# Patient Record
Sex: FEMALE | Race: WHITE | Employment: OTHER | URBAN - METROPOLITAN AREA
[De-identification: names, ages, dates, MRNs, and addresses within clinical notes are randomized per-mention and may not be internally consistent; named-entity substitution may affect disease eponyms.]

---

## 2017-01-04 ENCOUNTER — HOSPITAL ENCOUNTER (OUTPATIENT)
Dept: MAMMOGRAPHY | Age: 67
Discharge: HOME | End: 2017-01-04
Attending: OBSTETRICS & GYNECOLOGY
Payer: MEDICARE

## 2017-01-04 DIAGNOSIS — Z12.31 VISIT FOR SCREENING MAMMOGRAM: ICD-10-CM

## 2017-01-04 PROCEDURE — 77067 SCR MAMMO BI INCL CAD: CPT

## 2017-01-13 ENCOUNTER — HOSPITAL ENCOUNTER (OUTPATIENT)
Dept: GENERAL RADIOLOGY | Age: 67
Discharge: HOME | End: 2017-01-13
Payer: MEDICARE

## 2017-01-13 DIAGNOSIS — M06.9 RHEUMATOID ARTHRITIS (HCC): ICD-10-CM

## 2017-01-13 DIAGNOSIS — R76.8 POSITIVE ANA (ANTINUCLEAR ANTIBODY): ICD-10-CM

## 2017-01-13 DIAGNOSIS — M50.30 DEGENERATIVE DISC DISEASE, CERVICAL: ICD-10-CM

## 2017-01-13 PROCEDURE — 73130 X-RAY EXAM OF HAND: CPT

## 2017-07-31 RX ORDER — ZOLPIDEM TARTRATE 10 MG/1
10 TABLET ORAL
Qty: 30 TAB | Refills: 0 | Status: SHIPPED | OUTPATIENT
Start: 2017-07-31 | End: 2018-09-19 | Stop reason: SDUPTHER

## 2017-07-31 NOTE — TELEPHONE ENCOUNTER
Last Refill: 6/21/17  Last visit:6/21/17    Requested Prescriptions     Pending Prescriptions Disp Refills    zolpidem (AMBIEN) 10 mg tablet 30 Tab 0     Sig: Take 1 Tab by mouth nightly as needed for Sleep. Max Daily Amount: 10 mg.

## 2017-11-16 ENCOUNTER — OFFICE VISIT (OUTPATIENT)
Dept: INTERNAL MEDICINE CLINIC | Age: 67
End: 2017-11-16

## 2017-11-16 VITALS
HEART RATE: 75 BPM | BODY MASS INDEX: 22.98 KG/M2 | DIASTOLIC BLOOD PRESSURE: 80 MMHG | HEIGHT: 66 IN | TEMPERATURE: 98 F | OXYGEN SATURATION: 98 % | SYSTOLIC BLOOD PRESSURE: 123 MMHG | WEIGHT: 143 LBS

## 2017-11-16 DIAGNOSIS — B37.0 THRUSH, ORAL: Primary | ICD-10-CM

## 2017-11-16 RX ORDER — MELOXICAM 15 MG/1
TABLET ORAL
COMMUNITY
Start: 2017-11-13 | End: 2018-07-18 | Stop reason: ALTCHOICE

## 2017-11-16 RX ORDER — MONTELUKAST SODIUM 4 MG/1
TABLET, CHEWABLE ORAL
Refills: 3 | COMMUNITY
Start: 2017-09-26 | End: 2018-01-02 | Stop reason: SDUPTHER

## 2017-11-16 RX ORDER — NYSTATIN 100000 [USP'U]/ML
SUSPENSION ORAL
Qty: 200 ML | Refills: 0 | Status: SHIPPED | OUTPATIENT
Start: 2017-11-16 | End: 2018-04-26 | Stop reason: ALTCHOICE

## 2017-11-16 NOTE — PATIENT INSTRUCTIONS
Candidiasis: Care Instructions  Your Care Instructions  Candidiasis (say \"umc-mvy-FV-uh-lindsey\") is a yeast infection. Yeast normally lives in your body. But it can cause problems if your body's defenses don't work as they should. Some medicines can increase your chance of getting a yeast infection. These include antibiotics, steroids, and cancer drugs. And some diseases like AIDS and diabetes can make you more likely to get yeast infections. There are different types of yeast infections. Fide Cure is a yeast infection in the mouth. It usually occurs in people with weak immune systems. It causes white patches inside the mouth and throat. Yeast infections of the skin usually occur in skin folds where the skin stays moist. They cause red, oozing patches on your skin. Babies can get these infections under the diaper. People who often wear gloves can get them on their hands. Many women get vaginal yeast infections. They are most common when women take antibiotics. These infections can cause the vagina to itch and burn. They also cause white discharge that looks like cottage cheese. In rare cases, yeast infects the blood. This can cause serious disease. This kind of infection is treated with medicine given through a needle into a vein (IV). After you start treatment, a yeast infection usually goes away quickly. But if your immune system is weak, the infection may come back. Tell your doctor if you get yeast infections often. Follow-up care is a key part of your treatment and safety. Be sure to make and go to all appointments, and call your doctor if you are having problems. It's also a good idea to know your test results and keep a list of the medicines you take. How can you care for yourself at home? · Take your medicines exactly as prescribed. Call your doctor if you think you are having a problem with your medicine. · Use antibiotics only as directed by your doctor. · Eat yogurt with live cultures.  It has bacteria called lactobacillus. It may help prevent some types of yeast infections. · Keep your skin clean and dry. Put powder on moist places. · If you are using a cream or suppository to treat a vaginal yeast infection, don't use condoms or a diaphragm. Use a different type of birth control. · Eat a healthy diet and get regular exercise. This will help keep your immune system strong. When should you call for help? Watch closely for changes in your health, and be sure to contact your doctor if:  ? · You do not get better as expected. Where can you learn more? Go to http://felipe-niesha.info/. Enter H537 in the search box to learn more about \"Candidiasis: Care Instructions. \"  Current as of: October 13, 2016  Content Version: 11.4  © 9164-4435 [employer]. Care instructions adapted under license by [employer] (which disclaims liability or warranty for this information). If you have questions about a medical condition or this instruction, always ask your healthcare professional. [address] any warranty or liability for your use of this information.

## 2017-11-16 NOTE — MR AVS SNAPSHOT
Visit Information Date & Time Provider Department Dept. Phone Encounter #  
 11/16/2017 11:00 AM Domi Vergara NP [address] [place] [address] [facility] [phone] [id number] Follow-up Instructions Return if symptoms worsen or fail to improve. Upcoming Health Maintenance Date Due Hepatitis C Screening 1950 DTaP/Tdap/Td series (1 - Tdap) 12/5/1970 FOBT Q 1 YEAR AGE 50-75 12/5/1999 ZOSTER VACCINE AGE 60> 10/5/2009 GLAUCOMA SCREENING Q2Y 12/5/2014 OSTEOPOROSIS SCREENING (DEXA) 12/5/2014 Pneumococcal 65+ Low/Medium Risk (1 of 2 - PCV13) 12/5/2014 MEDICARE YEARLY EXAM 12/5/2014 Influenza Age 5 to Adult 8/1/2017 BREAST CANCER SCRN MAMMOGRAM 1/4/2019 Allergies as of 11/16/2017  Review Complete On: 11/16/2017 By: Domi Vergara NP Severity Noted Reaction Type Reactions Sulfa (Sulfonamide Antibiotics)  02/03/2014    Unknown (comments) Current Immunizations  Never Reviewed No immunizations on file. Not reviewed this visit You Were Diagnosed With   
  
 Codes Comments Thrush, oral    -  Primary ICD-10-CM: B37.0 ICD-9-CM: 112.0 Vitals BP Pulse Temp Height(growth percentile) Weight(growth percentile) SpO2  
 123/80 (BP 1 Location: Left arm, BP Patient Position: Sitting) 75 98 °F (36.7 °C) (Oral) 5' 6\" (1.676 m) 143 lb (64.9 kg) 98% BMI Smoking Status 23.08 kg/m2 Never Smoker BMI and BSA Data Body Mass Index Body Surface Area 23.08 kg/m 2 1.74 m 2 Preferred Pharmacy Pharmacy Name Phone [phone] [phone] Your Updated Medication List  
  
   
This list is accurate as of: 11/16/17  5:07 PM.  Always use your most recent med list.  
  
  
  
  
 CALTRATE PLUS PO Take  by mouth. CENTRAL-DANIA Tab Generic drug:  geriatric multivitamins-min Take  by mouth.  
  
 colestipol 1 gram tablet Commonly known as:  COLESTID  
TAKE 1 TABLET BY MOUTH 2 TIMES DAILY FENOFIBRATE PO Take  by mouth. FISH OIL PO Take  by mouth.  
  
 meloxicam 15 mg tablet Commonly known as:  MOBIC  
  
 nystatin 100,000 unit/mL suspension Commonly known as:  MYCOSTATIN Take 5 ml four times a day . Swish and swallow OMEPRAZOLE PO Take  by mouth. OSTEO BI-FLEX PO Take  by mouth. WELCHOL PO Take  by mouth.  
  
 zolpidem 10 mg tablet Commonly known as:  AMBIEN Take 1 Tab by mouth nightly as needed for Sleep. Max Daily Amount: 10 mg.  
  
  
  
  
Prescriptions Sent to Pharmacy Refills  
 nystatin (MYCOSTATIN) 100,000 unit/mL suspension 0 Sig: Take 5 ml four times a day . Swish and swallow Class: Normal  
 Pharmacy: [address] #: [phone] Follow-up Instructions Return if symptoms worsen or fail to improve. To-Do List   
 01/05/2018 10:30 AM  
  Appointment with [facility] 2 at [address] [place] [address] ([phone]) Shower or bathe using soap and water. Do not use deodorant, powder, perfumes, or lotion the day of your exam.  If your prior mammograms were not performed at [facility] 6 please bring films with you or forward prior images 2 days before your procedure. Check in at registration 15min before your appointment time unless you were instructed to do otherwise. A script is not necessary, but if you have one, please bring it on the day of the mammogram or have it faxed to the department. [facility] [phone] [facility]  [phone] [facility] [address]  [phone] [facility] [phone] [address] [phone] Patient Instructions Candidiasis: Care Instructions Your Care Instructions Candidiasis (say \"wiw-goh-XN-uh-lindsey\") is a yeast infection.  Yeast normally lives in your body. But it can cause problems if your body's defenses don't work as they should. Some medicines can increase your chance of getting a yeast infection. These include antibiotics, steroids, and cancer drugs. And some diseases like AIDS and diabetes can make you more likely to get yeast infections. There are different types of yeast infections. Fuentes Pile is a yeast infection in the mouth. It usually occurs in people with weak immune systems. It causes white patches inside the mouth and throat. Yeast infections of the skin usually occur in skin folds where the skin stays moist. They cause red, oozing patches on your skin. Babies can get these infections under the diaper. People who often wear gloves can get them on their hands. Many women get vaginal yeast infections. They are most common when women take antibiotics. These infections can cause the vagina to itch and burn. They also cause white discharge that looks like cottage cheese. In rare cases, yeast infects the blood. This can cause serious disease. This kind of infection is treated with medicine given through a needle into a vein (IV). After you start treatment, a yeast infection usually goes away quickly. But if your immune system is weak, the infection may come back. Tell your doctor if you get yeast infections often. Follow-up care is a key part of your treatment and safety. Be sure to make and go to all appointments, and call your doctor if you are having problems. It's also a good idea to know your test results and keep a list of the medicines you take. How can you care for yourself at home? · Take your medicines exactly as prescribed. Call your doctor if you think you are having a problem with your medicine. · Use antibiotics only as directed by your doctor. · Eat yogurt with live cultures. It has bacteria called lactobacillus. It may help prevent some types of yeast infections. · Keep your skin clean and dry. Put powder on moist places. · If you are using a cream or suppository to treat a vaginal yeast infection, don't use condoms or a diaphragm. Use a different type of birth control. · Eat a healthy diet and get regular exercise. This will help keep your immune system strong. When should you call for help? Watch closely for changes in your health, and be sure to contact your doctor if: 
? · You do not get better as expected. Where can you learn more? Go to http://felipe-niesha.info/. Enter U184 in the search box to learn more about \"Candidiasis: Care Instructions. \" Current as of: October 13, 2016 Content Version: 11.4 © 4117-7224 [employer]. Care instructions adapted under license by [employer] (which disclaims liability or warranty for this information). If you have questions about a medical condition or this instruction, always ask your healthcare professional. [address] any warranty or liability for your use of this information. Introducing [facility]! Dear Lorena: 
Thank you for requesting a [address] account. Our records indicate that you have previously registered for a [address] account but its currently inactive. Please call our [address] support line at [phone]. Additional Information If you have questions, please visit the Frequently Asked Questions section of the [employer] [address] website at [email or web address] Remember, MyChart is NOT to be used for urgent needs. For medical emergencies, dial 911. Now available from your iPhone and Android! Please provide this summary of care documentation to your next provider. Your primary care clinician is listed as TRENT Polanco. If you have any questions after today's visit, please call [phone].

## 2017-11-16 NOTE — PROGRESS NOTES
Subjective:  Ms. Kendall Kaur is a pleasant 78year old lady, who comes in today for evaluation of several week history of intermittent whitish coating on her tongue. It has been more pronounced in the last week. She does have a history of rheumatoid arthritis that has been well managed on Meloxicam.  She tells me that she was never diagnosed with Sjogren's syndrome, although tells me she feels like her eyes are always dry and so is her mouth. This is not associated with any sore throat, earache, but she does have a little bit of nasal congestion. She denies any shortness of breath or cough, denies any chills or fever. She is able to swallow fluids and solid foods without any difficulty. Physical Examination:  GENERAL:  On exam she is a pleasant lady in no acute distress. She is alert and oriented. VITALS:  BP: 123/80. P: 75. T: 98.  O2 sat: 98%. WT: 143 lbs. HT: 5'6\". HEENT:  Normocephalic, atraumatic. TMs normal.  Mouth mucosa pink. Tongue midline. Tongue is coated with some white patches consistent with thrush. Pharynx is minimally injected. No sinus tenderness. NECK:  Supple without adenopathy. CHEST:  Lungs were clear to auscultation, no rales or wheezes. CARDIAC:  Heart regular rhythm without murmur. EXTREMITIES:  No edema or calf tenderness. Distal pulses were present. Impression:  1. Oral candidiasis. Plan:  1. It was opted to start her on Nystatin  Suspension 100,000 units/ml To use 5 mL, four times a day to swish and swallow. 2. She will follow up with Dr. Carlos Tobias if she fails to improve.

## 2017-11-16 NOTE — PROGRESS NOTES
Stu Palmer presents with   Chief Complaint   Patient presents with    Mouth Pain   Patient of Dr Chava Capps here with complaint of mouth pain, sores & trouble swallowing. This has been going on for several months. 1. Have you been to the ER, urgent care clinic since your last visit? Hospitalized since your last visit? No    2. Have you seen or consulted any other health care providers outside of the [address] [place] [address] since your last visit? Include any pap smears or colon screening.  No

## 2017-12-04 RX ORDER — FENOFIBRATE 160 MG/1
TABLET ORAL
Qty: 30 TAB | Refills: 6 | Status: SHIPPED | OUTPATIENT
Start: 2017-12-04 | End: 2018-07-09 | Stop reason: SDUPTHER

## 2018-01-02 RX ORDER — MONTELUKAST SODIUM 4 MG/1
TABLET, CHEWABLE ORAL
Qty: 60 TAB | Refills: 3 | Status: SHIPPED | OUTPATIENT
Start: 2018-01-02 | End: 2018-05-29 | Stop reason: SDUPTHER

## 2018-01-09 ENCOUNTER — HOSPITAL ENCOUNTER (OUTPATIENT)
Dept: MAMMOGRAPHY | Age: 68
Discharge: HOME | End: 2018-01-09
Attending: OBSTETRICS & GYNECOLOGY
Payer: MEDICARE

## 2018-01-09 DIAGNOSIS — Z12.31 VISIT FOR SCREENING MAMMOGRAM: ICD-10-CM

## 2018-01-09 PROCEDURE — 77067 SCR MAMMO BI INCL CAD: CPT

## 2018-03-17 ENCOUNTER — HOSPITAL ENCOUNTER (OUTPATIENT)
Dept: MRI IMAGING | Age: 68
Discharge: HOME | End: 2018-03-17
Attending: ORTHOPAEDIC SURGERY
Payer: MEDICARE

## 2018-03-17 DIAGNOSIS — M54.40 LOW BACK PAIN WITH SCIATICA, SCIATICA LATERALITY UNSPECIFIED, UNSPECIFIED BACK PAIN LATERALITY, UNSPECIFIED CHRONICITY: ICD-10-CM

## 2018-03-17 PROCEDURE — 72148 MRI LUMBAR SPINE W/O DYE: CPT

## 2018-04-26 ENCOUNTER — OFFICE VISIT (OUTPATIENT)
Dept: INTERNAL MEDICINE CLINIC | Age: 68
End: 2018-04-26

## 2018-04-26 VITALS
HEIGHT: 66 IN | OXYGEN SATURATION: 96 % | DIASTOLIC BLOOD PRESSURE: 70 MMHG | SYSTOLIC BLOOD PRESSURE: 136 MMHG | WEIGHT: 144 LBS | BODY MASS INDEX: 23.14 KG/M2 | HEART RATE: 81 BPM

## 2018-04-26 DIAGNOSIS — M79.605 PAIN IN BOTH LOWER EXTREMITIES: ICD-10-CM

## 2018-04-26 DIAGNOSIS — R10.31 RIGHT LOWER QUADRANT ABDOMINAL PAIN: Primary | ICD-10-CM

## 2018-04-26 DIAGNOSIS — M79.604 PAIN IN BOTH LOWER EXTREMITIES: ICD-10-CM

## 2018-04-26 DIAGNOSIS — K12.1 ULCER OF MOUTH: ICD-10-CM

## 2018-04-26 RX ORDER — PREDNISONE 5 MG/1
TABLET ORAL
Qty: 30 TAB | Refills: 0 | Status: SHIPPED | OUTPATIENT
Start: 2018-04-26 | End: 2018-05-08 | Stop reason: ALTCHOICE

## 2018-04-26 RX ORDER — GABAPENTIN 100 MG/1
CAPSULE ORAL 3 TIMES DAILY
COMMUNITY
End: 2019-10-01 | Stop reason: ALTCHOICE

## 2018-04-26 NOTE — PROGRESS NOTES
This note will not be viewable in [address] Subjective:     Mrs. Russo presents to the office today with a number of issues    The patient over the last 2 years has had intermittent bulging that will develop in her right lower quadrant of her abdomen. She notes that this area will stick out quite a bit and become painful it will then reduce itself and she will be asymptomatic initially 2 years ago it was infrequent in occurrence but now it is occurring approximately 1 time a week. She has had colonoscopies. Presently she is asymptomatic and has no symptoms. The patient complains of pain in both knees into her shins. This occurs at bedtime and is described as a toothache. She was with her mother when she saw Dr. Austin Krause and he did not think that her knees were arthritic and referred her to Dr. Marvel Real. The patient had an MRI of her back which he told her did not show much and she also had an x-ray of her left hip she was given meloxicam and gabapentin but continues to have the pain. She notes of no claudication or swelling. The patient complains of an extremely dry mouth and an ulcer that is on the hard palate of her roof of her mouth. Her brother who is a dentist recommended that she be seen by an oral surgeon to have this looked at. She is followed by a rheumatologist due to her dry mouth. Past Medical History:   Diagnosis Date    Arthritis     Hypercholesterolemia     Long term (current) use of anticoagulants      Past Surgical History:   Procedure Laterality Date    HX TONSILLECTOMY       Allergies   Allergen Reactions    Sulfa (Sulfonamide Antibiotics) Unknown (comments)     Current Outpatient Prescriptions   Medication Sig Dispense Refill    gabapentin (NEURONTIN) 100 mg capsule Take  by mouth three (3) times daily.       predniSONE (DELTASONE) 5 mg tablet 5 tablets day for days 1 through 4, then 4 tablets on day 5, then 3 tablets on day 6, then 2 tablets on day 7, then 1 tablet on day 8. 30 Tab 0    colestipol (COLESTID) 1 gram tablet TAKE 1 TABLET BY MOUTH 2 TIMES DAILY 60 Tab 3    fenofibrate (LOFIBRA) 160 mg tablet TAKE 1 TABLET BY MOUTH EVERY DAY 30 Tab 6    meloxicam (MOBIC) 15 mg tablet       zolpidem (AMBIEN) 10 mg tablet Take 1 Tab by mouth nightly as needed for Sleep. Max Daily Amount: 10 mg. 30 Tab 0    geriatric multivitamins-min (CENTRAL-DANIA) tab Take  by mouth.  OMEPRAZOLE PO Take  by mouth. Social History     Social History    Marital status:      Spouse name: N/A    Number of children: N/A    Years of education: N/A     Social History Main Topics    Smoking status: Never Smoker    Smokeless tobacco: Never Used    Alcohol use Yes      Comment: rarely    Drug use: None    Sexual activity: Not Asked     Other Topics Concern    None     Social History Narrative     Family History   Problem Relation Age of Onset    Heart Disease Maternal Grandfather        Review of Systems:  GEN: no weight loss, weight gain, fatigue or night sweats  Mouth: Complains of ulcer on roof of mouth and extreme dryness of mouth  CV: no PND, orthopnea, or palpitations  Resp: no dyspnea on exertion, no cough  Abd: no nausea, vomiting or diarrhea. Complains of intermittent bulging in the right lower quadrant of her abdomen which is painful  EXT: denies edema, claudication. Complains of toothache type pain involving both knees and shin regions  Endocrine: no hair loss, excessive thirst or polyuria  Neurological ROS: no TIA or stroke symptoms  ROS otherwise negative      Objective:     Visit Vitals    /70 (BP 1 Location: Right arm, BP Patient Position: Sitting)    Pulse 81    Ht 5' 6\" (1.676 m)    Wt 144 lb (65.3 kg)    SpO2 96%    BMI 23.24 kg/m2     Body mass index is 23.24 kg/(m^2). General:   alert, cooperative and no distress   Eyes: conjunctivae/sclerae clear.  PERRL, EOM's intact   Mouth:  No oral lesions, no pharyngeal erythema, no exudates   Neck: Trachea midline, no thyromegaly, no bruits   Heart: S1 and S2 normal,no murmurs noted    Lungs: Clear to auscultation bilaterally, no increased work of breathing   Abdomen: Soft, nontender. Normal bowel sounds   Extremities: No edema or cyanosis. These do not appear to be arthritic and there is no crepitation with range of motion. Her PT/DP pulses are 2+   Neuro: ..alert, oriented x3,speech normal in context and clarity, cranial nerves II-XII intact,motor strength: full proximally and distally,gait: normal  reflexes: full and symmetric     Physical exam otherwise negative         Assessment/Plan:     Diagnoses and all orders for this visit:    Right lower quadrant abdominal pain  -     REFERRAL TO GENERAL SURGERY    Pain in both lower extremities  -     predniSONE (DELTASONE) 5 mg tablet; 5 tablets day for days 1 through 4, then 4 tablets on day 5, then 3 tablets on day 6, then 2 tablets on day 7, then 1 tablet on day 8., Normal, Disp-30 Tab, R-0    Ulcer of mouth        Other instructions: The patient will be referred to general surgery for an evaluation of the recurrent bulging in the right lower quadrant to make sure she does not have a hernia. We will stop her meloxicam and place her on a tapering course of prednisone to see if it affects her knee and lower extremity discomfort    Have recommended an oral surgery evaluation of the ulcer on the roof of her mouth which is been present for 2 months    Follow-up here pending results of the above    Follow-up Disposition:  Return if symptoms worsen or fail to improve.     Sohan Phillips MD

## 2018-04-26 NOTE — PATIENT INSTRUCTIONS
Abdominal Pain: Care Instructions  Your Care Instructions    Abdominal pain has many possible causes. Some aren't serious and get better on their own in a few days. Others need more testing and treatment. If your pain continues or gets worse, you need to be rechecked and may need more tests to find out what is wrong. You may need surgery to correct the problem. Don't ignore new symptoms, such as fever, nausea and vomiting, urination problems, pain that gets worse, and dizziness. These may be signs of a more serious problem. Your doctor may have recommended a follow-up visit in the next 8 to 12 hours. If you are not getting better, you may need more tests or treatment. The doctor has checked you carefully, but problems can develop later. If you notice any problems or new symptoms, get medical treatment right away. Follow-up care is a key part of your treatment and safety. Be sure to make and go to all appointments, and call your doctor if you are having problems. It's also a good idea to know your test results and keep a list of the medicines you take. How can you care for yourself at home? · Rest until you feel better. · To prevent dehydration, drink plenty of fluids, enough so that your urine is light yellow or clear like water. Choose water and other caffeine-free clear liquids until you feel better. If you have kidney, heart, or liver disease and have to limit fluids, talk with your doctor before you increase the amount of fluids you drink. · If your stomach is upset, eat mild foods, such as rice, dry toast or crackers, bananas, and applesauce. Try eating several small meals instead of two or three large ones. · Wait until 48 hours after all symptoms have gone away before you have spicy foods, alcohol, and drinks that contain caffeine. · Do not eat foods that are high in fat. · Avoid anti-inflammatory medicines such as aspirin, ibuprofen (Advil, Motrin), and naproxen (Aleve).  These can cause stomach upset. Talk to your doctor if you take daily aspirin for another health problem. When should you call for help? Call 911 anytime you think you may need emergency care. For example, call if:  ? · You passed out (lost consciousness). ? · You pass maroon or very bloody stools. ? · You vomit blood or what looks like coffee grounds. ? · You have new, severe belly pain. ?Call your doctor now or seek immediate medical care if:  ? · Your pain gets worse, especially if it becomes focused in one area of your belly. ? · You have a new or higher fever. ? · Your stools are black and look like tar, or they have streaks of blood. ? · You have unexpected vaginal bleeding. ? · You have symptoms of a urinary tract infection. These may include:  ¨ Pain when you urinate. ¨ Urinating more often than usual.  ¨ Blood in your urine. ? · You are dizzy or lightheaded, or you feel like you may faint. ? Watch closely for changes in your health, and be sure to contact your doctor if:  ? · You are not getting better after 1 day (24 hours). Where can you learn more? Go to http://felipe-niesha.info/. Enter S181 in the search box to learn more about \"Abdominal Pain: Care Instructions. \"  Current as of: March 20, 2017  Content Version: 11.4  © 3206-9934 [employer]. Care instructions adapted under license by [employer] (which disclaims liability or warranty for this information). If you have questions about a medical condition or this instruction, always ask your healthcare professional. [place] [address] any warranty or liability for your use of this information.

## 2018-04-26 NOTE — PROGRESS NOTES
Nghia Edwards is a 75 y.o. female presenting for Possible Hernia (abdominal) and Leg Pain (at night)  . 1. Have you been to the ER, urgent care clinic since your last visit? Hospitalized since your last visit? No    2. Have you seen or consulted any other health care providers outside of the [facility] since your last visit? Include any pap smears or colon screening. No    Fall Risk Assessment, last 12 mths 4/26/2018   Able to walk? Yes   Fall in past 12 months? No         Abuse Screening Questionnaire 4/26/2018   Do you ever feel afraid of your partner? N   Are you in a relationship with someone who physically or mentally threatens you? N   Is it safe for you to go home?  Y       PHQ over the last two weeks 4/26/2018   Little interest or pleasure in doing things Not at all   Feeling down, depressed or hopeless Not at all   Total Score PHQ 2 0       Medications Discontinued During This Encounter   Medication Reason    FENOFIBRATE PO Duplicate Order

## 2018-04-26 NOTE — MR AVS SNAPSHOT
[address] [place] [address] [address] [address] [phone] Patient: Solomon Overton MRN: [id number]:[id number]/[id number] Visit Information Date & Time Provider Department Dept. Phone Encounter #  
 4/26/2018  8:50 AM Jeremiah Francis MD [facility] 84 [phone] [id number] Follow-up Instructions Return if symptoms worsen or fail to improve. Upcoming Health Maintenance Date Due Hepatitis C Screening 1950 DTaP/Tdap/Td series (1 - Tdap) 12/5/1970 FOBT Q 1 YEAR AGE 50-75 12/5/1999 ZOSTER VACCINE AGE 60> 10/5/2009 GLAUCOMA SCREENING Q2Y 12/5/2014 Bone Densitometry (Dexa) Screening 12/5/2014 Pneumococcal 65+ Low/Medium Risk (1 of 2 - PCV13) 12/5/2014 Influenza Age 5 to Adult 8/1/2017 MEDICARE YEARLY EXAM 3/18/2018 BREAST CANCER SCRN MAMMOGRAM 1/9/2020 Allergies as of 4/26/2018  Review Complete On: 4/26/2018 By: Jeremiah Francis MD  
  
 Severity Noted Reaction Type Reactions Sulfa (Sulfonamide Antibiotics)  02/03/2014    Unknown (comments) Current Immunizations  Never Reviewed No immunizations on file. Not reviewed this visit You Were Diagnosed With   
  
 Codes Comments Right lower quadrant abdominal pain    -  Primary ICD-10-CM: R10.31 ICD-9-CM: 789.03 Pain in both lower extremities     ICD-10-CM: M79.604, M79.605 ICD-9-CM: 729.5 Ulcer of mouth     ICD-10-CM: K12.1 ICD-9-CM: 528.9 Vitals BP Pulse Height(growth percentile) Weight(growth percentile) SpO2 BMI  
 136/70 (BP 1 Location: Right arm, BP Patient Position: Sitting) 81 5' 6\" (1.676 m) 144 lb (65.3 kg) 96% 23.24 kg/m2 OB Status Smoking Status Postmenopausal Never Smoker BMI and BSA Data Body Mass Index Body Surface Area  
 23.24 kg/m 2 1.74 m 2 Preferred Pharmacy Pharmacy Name Phone [phone] [phone] Your Updated Medication List  
  
   
This list is accurate as of 18  9:29 AM.  Always use your most recent med list.  
  
  
  
  
 Demario Isrrael Tab Generic drug:  geriatric multivitamins-min Take  by mouth.  
  
 colestipol 1 gram tablet Commonly known as:  COLESTID  
TAKE 1 TABLET BY MOUTH 2 TIMES DAILY  
  
 fenofibrate 160 mg tablet Commonly known as:  LOFIBRA TAKE 1 TABLET BY MOUTH EVERY DAY  
  
 gabapentin 100 mg capsule Commonly known as:  NEURONTIN Take  by mouth three (3) times daily. meloxicam 15 mg tablet Commonly known as:  MOBIC  
  
 OMEPRAZOLE PO Take  by mouth.  
  
 predniSONE 5 mg tablet Commonly known as:  DELTASONE  
5 tablets day for days 1 through 4, then 4 tablets on day 5, then 3 tablets on day 6, then 2 tablets on day 7, then 1 tablet on day 8.  
  
 zolpidem 10 mg tablet Commonly known as:  AMBIEN Take 1 Tab by mouth nightly as needed for Sleep. Max Daily Amount: 10 mg.  
  
  
  
  
Prescriptions Sent to Pharmacy Refills  
 predniSONE (DELTASONE) 5 mg tablet 0 Si tablets day for days 1 through 4, then 4 tablets on day 5, then 3 tablets on day 6, then 2 tablets on day 7, then 1 tablet on day 8. Class: Normal  
 Pharmacy: [facility] [address], [address] [place] [address] #: [phone] We Performed the Following REFERRAL TO GENERAL SURGERY [REF27 Custom] Comments:  
 Recurrent RLQ painful bulging Follow-up Instructions Return if symptoms worsen or fail to improve. Referral Information Referral ID Referred By Referred To  
  
 [id number] Gama Bermeo MD   
   [address] [place] [address] [facility] Phone: [phone] Fax: [phone] Visits Status Start Date End Date 1 New Request 4/26/18 4/26/19 If your referral has a status of pending review or denied, additional information will be sent to support the outcome of this decision. Introducing \[facility] [place] [facility]\"" SERVICES! Eddie Silver introduces [employer] patient portal. Now you can access parts of your medical record, email your doctor's office, and request medication refills online. 1. In your internet browser, go to [email or web address] 2. Click on the First Time User? Click Here link in the Sign In box. You will see the New Member Sign Up page. 3. Enter your [employer] Access Code exactly as it appears below. You will not need to use this code after youve completed the sign-up process. If you do not sign up before the expiration date, you must request a new code. · [employer] Access Code: [email or web address] Expires: 7/25/2018  8:58 AM 
 
4. Enter the last four digits of your Social Security Number (xxxx) and Date of Birth (mm/dd/yyyy) as indicated and click Submit. You will be taken to the next sign-up page. 5. Create a [employer] ID. This will be your [employer] login ID and cannot be changed, so think of one that is secure and easy to remember. 6. Create a [employer] password. You can change your password at any time. 7. Enter your Password Reset Question and Answer. This can be used at a later time if you forget your password. 8. Enter your e-mail address. You will receive e-mail notification when new information is available in [address] 9. Click Sign Up. You can now view and download portions of your medical record. 10. Click the Download Summary menu link to download a portable copy of your medical information. If you have questions, please visit the Frequently Asked Questions section of the [employer] website. Remember, [employer] is NOT to be used for urgent needs. For medical emergencies, dial 911. Now available from your iPhone and Android! Please provide this summary of care documentation to your next provider. Your primary care clinician is listed as TRENT Polanco. If you have any questions after today's visit, please call [phone].

## 2018-05-08 ENCOUNTER — OFFICE VISIT (OUTPATIENT)
Dept: SURGERY | Age: 68
End: 2018-05-08

## 2018-05-08 VITALS
HEIGHT: 66 IN | SYSTOLIC BLOOD PRESSURE: 119 MMHG | DIASTOLIC BLOOD PRESSURE: 66 MMHG | WEIGHT: 144.2 LBS | HEART RATE: 70 BPM | OXYGEN SATURATION: 98 % | BODY MASS INDEX: 23.18 KG/M2 | TEMPERATURE: 98 F | RESPIRATION RATE: 16 BRPM

## 2018-05-08 DIAGNOSIS — M94.0 SLIPPING RIB SYNDROME: Primary | ICD-10-CM

## 2018-05-08 RX ORDER — PILOCARPINE HYDROCHLORIDE 5 MG/1
TABLET, FILM COATED ORAL
Refills: 5 | COMMUNITY
Start: 2018-04-13 | End: 2018-07-18 | Stop reason: ALTCHOICE

## 2018-05-08 RX ORDER — AMOXICILLIN 250 MG/1
CAPSULE ORAL
Refills: 0 | COMMUNITY
Start: 2018-05-02 | End: 2018-06-26 | Stop reason: ALTCHOICE

## 2018-05-08 NOTE — MR AVS SNAPSHOT
[address], [address] 
[phone] Patient: Robert Benitez MRN: [id number]:[birth year] Visit Information Date & Time Provider Department Dept. Phone Encounter #  
 5/8/2018  1:40 PM Saul Rodriguez MD [facility] [id number] Upcoming Health Maintenance Date Due Hepatitis C Screening 1950 DTaP/Tdap/Td series (1 - Tdap) 12/5/1970 FOBT Q 1 YEAR AGE 50-75 12/5/1999 ZOSTER VACCINE AGE 60> 10/5/2009 GLAUCOMA SCREENING Q2Y 12/5/2014 Bone Densitometry (Dexa) Screening 12/5/2014 Pneumococcal 65+ Low/Medium Risk (1 of 2 - PCV13) 12/5/2014 MEDICARE YEARLY EXAM 3/18/2018 Influenza Age 5 to Adult 8/1/2018 BREAST CANCER SCRN MAMMOGRAM 1/9/2020 Allergies as of 5/8/2018  Review Complete On: 5/8/2018 By: Seun Mcnulty LPN Severity Noted Reaction Type Reactions Sulfa (Sulfonamide Antibiotics)  02/03/2014    Unknown (comments) Current Immunizations  Never Reviewed No immunizations on file. Not reviewed this visit Vitals BP Pulse Temp Resp Height(growth percentile) Weight(growth percentile)  
 119/66 (BP 1 Location: Left arm, BP Patient Position: Sitting) 70 98 °F (36.7 °C) (Oral) 16 5' 6\" (1.676 m) 144 lb 3.2 oz (65.4 kg) SpO2 BMI OB Status Smoking Status 98% 23.27 kg/m2 Postmenopausal Never Smoker BMI and BSA Data Body Mass Index Body Surface Area  
 23.27 kg/m 2 1.75 m 2 Preferred Pharmacy Pharmacy Name Phone [phone] [phone] Your Updated Medication List  
  
   
This list is accurate as of 5/8/18  4:29 PM.  Always use your most recent med list.  
  
  
  
  
 amoxicillin 250 mg capsule Commonly known as:  AMOXIL TAKE 1 CAPSULE BY MOUTH THREE TIMES A DAY  
  
 CALTRATE + D3 PLUS MINERALS PO Take  by mouth. CENTRAL-DANIA Tab Generic drug:  geriatric multivitamins-min Take  by mouth.  
  
 colestipol 1 gram tablet Commonly known as:  COLESTID  
TAKE 1 TABLET BY MOUTH 2 TIMES DAILY  
  
 fenofibrate 160 mg tablet Commonly known as:  LOFIBRA TAKE 1 TABLET BY MOUTH EVERY DAY  
  
 gabapentin 100 mg capsule Commonly known as:  NEURONTIN Take  by mouth three (3) times daily. meloxicam 15 mg tablet Commonly known as:  MOBIC  
  
 OMEPRAZOLE PO Take  by mouth.  
  
 pilocarpine 5 mg tablet Commonly known as:  SALAGEN  
TAKE 1 TABLET BY MOUTH 3 TIMES A DAY  
  
 zolpidem 10 mg tablet Commonly known as:  AMBIEN Take 1 Tab by mouth nightly as needed for Sleep. Max Daily Amount: 10 mg. Introducing [facility] & HEALTH SERVICES! Maikol Persaud introduces [employer]uish[employer] patient portal. Now you can access parts of your medical record, email your doctor's office, and request medication refills online. 1. In your internet browser, go to [email or web address] [employer] Options 2. Click on the First Time User? Click Here link in the Sign In box. You will see the New Member Sign Up page. 3. Enter your a[employer]uishou Access Code exactly as it appears below. You will not need to use this code after youve completed the sign-up process. If you do not sign up before the expiration date, you must request a new code. · [employer] Access Code: [email or web address] Expires: 7/25/2018  8:58 AM 
 
4. Enter the last four digits of your Social Security Number (xxxx) and Date of Birth (mm/dd/yyyy) as indicated and click Submit. You will be taken to the next sign-up page. 5. Create a ShopTutorst ID. This will be your a[employer]uishou login ID and cannot be changed, so think of one that is secure and easy to remember. 6. Create a a[employer]uishou password. You can change your password at any time. 7. Enter your Password Reset Question and Answer.  This can be used at a later time if you forget your password. 8. Enter your e-mail address. You will receive e-mail notification when new information is available in [address] 9. Click Sign Up. You can now view and download portions of your medical record. 10. Click the Download Summary menu link to download a portable copy of your medical information. If you have questions, please visit the Frequently Asked Questions section of the [employer]ORIA website. Remember, TORIA is NOT to be used for urgent needs. For medical emergencies, dial 911. Now available from your iPhone and Android! Please provide this summary of care documentation to your next provider. Your primary care clinician is listed as TRENT Hodge 21. If you have any questions after today's visit, please call [phone].

## 2018-05-08 NOTE — PROGRESS NOTES
Chief Complaint   Patient presents with    Abdominal Pain     Seen at the request of angela Bravo recurrent rlq abdominal pain. 1. Have you been to the ER, urgent care clinic since your last visit? Hospitalized since your last visit? No    2. Have you seen or consulted any other health care providers outside of the [facility] since your last visit? Include any pap smears or colon screening.  No

## 2018-05-25 NOTE — PROGRESS NOTES
To: Yolanda Martinez MD    From: Cam Mehta MD    Thank you for sending Jaimie Rangel to see us. 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 No hernia on exam or US. Body mass index is 23.27 kg/(m^2). Plan:   Explained the pathology. Unfortunately there is no good fix. She should avoid movements that provoke the pain and she should use ice and NSAID's as needed to soreness. HPI:   Mahnaz Mcgarry is a 75 y.o. female who is seen in consultation at the request of Yolanda Martinez MD for possible right sided hernia. Symptoms were first noted She get pain on her right side with bending over. Takes her breath away at times. Only last a few seconds. Present intermittently for years. Less than once per week. No assoc with eating. No post-prandial sxs. Has been told that she has a gallstone. Thinks she notices a bulge in her RUQ. Past Medical History:   Diagnosis Date    Arthritis     Hypercholesterolemia     Long term (current) use of anticoagulants      Past Surgical History:   Procedure Laterality Date    HX TONSILLECTOMY        Family History   Problem Relation Age of Onset    Heart Disease Maternal Grandfather       Social History   Substance Use Topics    Smoking status: Never Smoker    Smokeless tobacco: Never Used    Alcohol use Yes      Comment: rarely      Current Outpatient Prescriptions   Medication Sig    amoxicillin (AMOXIL) 250 mg capsule TAKE 1 CAPSULE BY MOUTH THREE TIMES A DAY    calcium/D3/mag ox//morgan/Zn (CALTRATE + D3 PLUS MINERALS PO) Take  by mouth.  gabapentin (NEURONTIN) 100 mg capsule Take  by mouth three (3) times daily.     colestipol (COLESTID) 1 gram tablet TAKE 1 TABLET BY MOUTH 2 TIMES DAILY    fenofibrate (LOFIBRA) 160 mg tablet TAKE 1 TABLET BY MOUTH EVERY DAY    meloxicam (MOBIC) 15 mg tablet     zolpidem (AMBIEN) 10 mg tablet Take 1 Tab by mouth nightly as needed for Sleep. Max Daily Amount: 10 mg.    geriatric multivitamins-min (CENTRAL-DANIA) tab Take  by mouth.  OMEPRAZOLE PO Take  by mouth.  pilocarpine (SALAGEN) 5 mg tablet TAKE 1 TABLET BY MOUTH 3 TIMES A DAY     No current facility-administered medications for this visit. Allergies: Allergies   Allergen Reactions    Sulfa (Sulfonamide Antibiotics) Unknown (comments)        Review of Systems:  10 systems reviewed. See scanned sheet in \"Media\" section. See HPI for pertinent positives and negatives. Objective:     Visit Vitals    /66 (BP 1 Location: Left arm, BP Patient Position: Sitting)    Pulse 70    Temp 98 °F (36.7 °C) (Oral)    Resp 16    Ht 5' 6\" (1.676 m)    Wt 65.4 kg (144 lb 3.2 oz)    SpO2 98%    BMI 23.27 kg/m2       Physical Exam:  General appearance  Alert, cooperative, no distress, appears stated age               Lungs   Clear to auscultation bilaterally   Heart  Regular rate and rhythm. No murmur, rub or gallop   Abdomen   Soft. Bowel sounds normal. No organomegaly. No hernia. TTP over the tip of the 11th rib.      Extremities no cyanosis or edema   Pulses 2+ right radial   Skin Skin color, texture, turgor normal.   Lymph nodes Inguinal nodes normal.   Neurologic Without overt sensory or motor deficit     Signed By: Asad Shelton MD     May 25, 2018

## 2018-05-29 RX ORDER — MONTELUKAST SODIUM 4 MG/1
TABLET, CHEWABLE ORAL
Qty: 60 TAB | Refills: 3 | Status: SHIPPED | OUTPATIENT
Start: 2018-05-29 | End: 2018-11-01 | Stop reason: SDUPTHER

## 2018-06-26 ENCOUNTER — OFFICE VISIT (OUTPATIENT)
Dept: INTERNAL MEDICINE CLINIC | Age: 68
End: 2018-06-26

## 2018-06-26 VITALS
OXYGEN SATURATION: 98 % | WEIGHT: 137 LBS | DIASTOLIC BLOOD PRESSURE: 68 MMHG | HEIGHT: 66 IN | HEART RATE: 78 BPM | SYSTOLIC BLOOD PRESSURE: 106 MMHG | BODY MASS INDEX: 22.02 KG/M2

## 2018-06-26 DIAGNOSIS — J15.9 BACTERIAL PNEUMONIA: Primary | ICD-10-CM

## 2018-06-26 NOTE — PATIENT INSTRUCTIONS

## 2018-06-26 NOTE — MR AVS SNAPSHOT
[address] [facility] 70 [address] [address] [phone] Patient: America Valadez MRN: [id number] QDZ:53/[id number]/9521 Visit Information Date & Time Provider Department Dept. Phone Encounter #  
 6/26/2018  9:30 AM Sohan Phillips, [address] [facility] [phone] [id number] Upcoming Health Maintenance Date Due Hepatitis C Screening 1950 DTaP/Tdap/Td series (1 - Tdap) 12/5/1970 FOBT Q 1 YEAR AGE 50-75 12/5/1999 ZOSTER VACCINE AGE 60> 10/5/2009 GLAUCOMA SCREENING Q2Y 12/5/2014 Bone Densitometry (Dexa) Screening 12/5/2014 Pneumococcal 65+ Low/Medium Risk (1 of 2 - PCV13) 12/5/2014 MEDICARE YEARLY EXAM 3/18/2018 Influenza Age 5 to Adult 8/1/2018 BREAST CANCER SCRN MAMMOGRAM 1/9/2020 Allergies as of 6/26/2018  Review Complete On: 6/26/2018 By: Sohan Phillips MD  
  
 Severity Noted Reaction Type Reactions Sulfa (Sulfonamide Antibiotics)  02/03/2014    Unknown (comments) Current Immunizations  Never Reviewed No immunizations on file. Not reviewed this visit You Were Diagnosed With   
  
 Codes Comments Bacterial pneumonia    -  Primary ICD-10-CM: J15.9 ICD-9-CM: 760. 9 Vitals BP Pulse Height(growth percentile) Weight(growth percentile) SpO2 BMI  
 106/68 (BP 1 Location: Left arm, BP Patient Position: Sitting) 78 5' 6\" (1.676 m) 137 lb (62.1 kg) 98% 22.11 kg/m2 OB Status Smoking Status Postmenopausal Never Smoker BMI and BSA Data Body Mass Index Body Surface Area  
 22.11 kg/m 2 1.7 m 2 Preferred Pharmacy [facility] Name Phone [phone] [phone] Your Updated Medication List  
  
   
This list is accurate as of 6/26/18 10:54 AM.  Always use your most recent med list.  
  
  
  
  
 CALTRATE + D3 PLUS MINERALS PO Take  by mouth. CENTRAL-DANIA Tab Generic drug:  geriatric multivitamins-min Take  by mouth.  
  
 colestipol 1 gram tablet Commonly known as:  COLESTID  
TAKE 1 TABLET BY MOUTH 2 TIMES DAILY  
  
 fenofibrate 160 mg tablet Commonly known as:  LOFIBRA TAKE 1 TABLET BY MOUTH EVERY DAY  
  
 gabapentin 100 mg capsule Commonly known as:  NEURONTIN Take  by mouth three (3) times daily. meloxicam 15 mg tablet Commonly known as:  MOBIC  
  
 OMEPRAZOLE PO Take  by mouth.  
  
 pilocarpine 5 mg tablet Commonly known as:  SALAGEN  
TAKE 1 TABLET BY MOUTH 3 TIMES A DAY  
  
 zolpidem 10 mg tablet Commonly known as:  AMBIEN Take 1 Tab by mouth nightly as needed for Sleep. Max Daily Amount: 10 mg. To-Do List   
 06/26/2018 Imaging:  XR CHEST PA LAT Introducing [facility] & HEALTH SERVICES! [facility] introduces [employer] patient portal. Now you can access parts of your medical record, email your doctor's office, and request medication refills online. 1. In your internet browser, go to [email or web address] 2. Click on the First Time User? Click Here link in the Sign In box. You will see the New Member Sign Up page. 3. Enter your Text [employer] Access Code exactly as it appears below. You will not need to use this code after youve completed the sign-up process. If you do not sign up before the expiration date, you must request a new code. · [employer] Access Code: [email or web address] Expires: 7/25/2018  8:58 AM 
 
4. Enter the last four digits of your Social Security Number (xxxx) and Date of Birth (mm/dd/yyyy) as indicated and click Submit. You will be taken to the next sign-up page. 5. Create a [employer]t ID. This will be your Text A [employer] login ID and cannot be changed, so think of one that is secure and easy to remember. 6. Create a [employer] password. You can change your password at any time. 7. Enter your Password Reset Question and Answer. This can be used at a later time if you forget your password. 8. Enter your e-mail address. You will receive e-mail notification when new information is available in [address] 9. Click Sign Up. You can now view and download portions of your medical record. 10. Click the Download Summary menu link to download a portable copy of your medical information. If you have questions, please visit the Frequently Asked Questions section of the [employer] website. Remember, [employer] is NOT to be used for urgent needs. For medical emergencies, dial 911. Now available from your iPhone and Android! Please provide this summary of care documentation to your next provider. Your primary care clinician is listed as TRENT Hodge 21. If you have any questions after today's visit, please call [phone].

## 2018-06-26 NOTE — PROGRESS NOTES
Ave Goodman is a 75 y.o. female presenting for Pneumonia (follow up) and Bladder Infection (follow up)  . 1. Have you been to the ER, urgent care clinic since your last visit? Hospitalized since your last visit? Yes When: 6/2018 Where: urgent care Reason for visit: pnuemonia     2. Have you seen or consulted any other health care providers outside of the [address] [place] [address] since your last visit? Include any pap smears or colon screening. No    Fall Risk Assessment, last 12 mths 4/26/2018   Able to walk? Yes   Fall in past 12 months? No         Abuse Screening Questionnaire 4/26/2018   Do you ever feel afraid of your partner? N   Are you in a relationship with someone who physically or mentally threatens you? N   Is it safe for you to go home? Y       PHQ over the last two weeks 4/26/2018   Little interest or pleasure in doing things Not at all   Feeling down, depressed or hopeless Not at all   Total Score PHQ 2 0       There are no discontinued medications.

## 2018-07-09 RX ORDER — FENOFIBRATE 160 MG/1
TABLET ORAL
Qty: 30 TAB | Refills: 6 | Status: SHIPPED | OUTPATIENT
Start: 2018-07-09 | End: 2018-12-07 | Stop reason: SDUPTHER

## 2018-07-18 ENCOUNTER — OFFICE VISIT (OUTPATIENT)
Dept: INTERNAL MEDICINE CLINIC | Age: 68
End: 2018-07-18

## 2018-07-18 VITALS
HEART RATE: 89 BPM | HEIGHT: 65 IN | DIASTOLIC BLOOD PRESSURE: 72 MMHG | TEMPERATURE: 99.7 F | OXYGEN SATURATION: 98 % | WEIGHT: 135 LBS | SYSTOLIC BLOOD PRESSURE: 115 MMHG | BODY MASS INDEX: 22.49 KG/M2

## 2018-07-18 DIAGNOSIS — R50.9 FEVER, UNSPECIFIED FEVER CAUSE: Primary | ICD-10-CM

## 2018-07-18 LAB
BACTERIA UA POCT, BACTPOCT: ABNORMAL
BILIRUB UR QL STRIP: NEGATIVE
CASTS UA POCT: ABNORMAL
CLUE CELLS, CLUEPOCT: NEGATIVE
CRYSTALS UA POCT, CRYSPOCT: NEGATIVE
EPITHELIAL CELLS POCT: ABNORMAL
GLUCOSE UR-MCNC: NEGATIVE MG/DL
GRAN# POC: 3.8 K/UL (ref 2–7.8)
GRAN% POC: 64.5 % (ref 37–92)
HCT VFR BLD CALC: 37.9 % (ref 37–51)
HGB BLD-MCNC: 12.4 G/DL (ref 12–18)
KETONES P FAST UR STRIP-MCNC: NEGATIVE MG/DL
LY# POC: 1.6 K/UL (ref 0.6–4.1)
LY% POC: 28.9 % (ref 10–58.5)
MCH RBC QN: 28.9 PG (ref 26–32)
MCHC RBC-ENTMCNC: 32.8 G/DL (ref 30–36)
MCV RBC: 88 FL (ref 80–97)
MID #, POC: 0.3 K/UL (ref 0–1.8)
MID% POC: 6.6 % (ref 0.1–24)
MUCUS UA POCT, MUCPOCT: ABNORMAL
PH UR STRIP: 5 [PH] (ref 5–7)
PLATELET # BLD: 263 K/UL (ref 140–440)
PROT UR QL STRIP: ABNORMAL
RBC # BLD: 4.3 M/UL (ref 4.2–6.3)
RBC UA POCT, RBCPOCT: ABNORMAL
SP GR UR STRIP: 1.02 (ref 1.01–1.02)
TRICH UA POCT, TRICHPOC: NEGATIVE
UA UROBILINOGEN AMB POC: NORMAL (ref 0.2–1)
URINALYSIS CLARITY POC: CLEAR
URINALYSIS COLOR POC: ABNORMAL
URINE BLOOD POC: ABNORMAL
URINE CULT COMMENT, POCT: ABNORMAL
URINE LEUKOCYTES POC: NEGATIVE
URINE NITRITES POC: NEGATIVE
WBC # BLD: 5.7 K/UL (ref 4.1–10.9)
WBC UA POCT, WBCPOCT: 0
YEAST UA POCT, YEASTPOC: ABNORMAL

## 2018-07-18 RX ORDER — CEVIMELINE HYDROCHLORIDE 30 MG/1
30 CAPSULE ORAL 3 TIMES DAILY
COMMUNITY
End: 2019-10-01 | Stop reason: ALTCHOICE

## 2018-07-18 RX ORDER — DIFLUNISAL 500 MG/1
TABLET, FILM COATED ORAL 2 TIMES DAILY
COMMUNITY
End: 2018-11-20 | Stop reason: ALTCHOICE

## 2018-07-18 RX ORDER — AZITHROMYCIN 250 MG/1
TABLET, FILM COATED ORAL
Qty: 6 TAB | Refills: 0 | Status: SHIPPED | OUTPATIENT
Start: 2018-07-18 | End: 2018-10-02 | Stop reason: ALTCHOICE

## 2018-07-18 NOTE — PROGRESS NOTES
Skip Olvera presents with   Chief Complaint   Patient presents with    Chills   Patient of Dr Candace Cruz here with complaint of chills & low grade fever since last evening. Recently was diagnosed with bacterial pneumonia and is concerned it is back. 1. Have you been to the ER, urgent care clinic since your last visit? Hospitalized since your last visit? No    2. Have you seen or consulted any other health care providers outside of the [address] [place] [address] since your last visit? Include any pap smears or colon screening.  No

## 2018-07-18 NOTE — MR AVS SNAPSHOT
[address] [facility] 
 
 
 [address] [id number] [phone] Patient: Jaleel Self MRN: [id number] GRZ:[id number] Visit Information Date & Time Provider Department Dept. Phone Encounter #  
 7/18/2018  1:15 PM PATEL Arana 26 [phone] [id number] Follow-up Instructions Return if symptoms worsen or fail to improve. Your Appointments 7/26/2018  1:30 PM  
Complete Physical with MD Jolie Guerrero 26 ([facility] Appt Note: Medicare wellness visit [address] [id number] [address] [address] Upcoming Health Maintenance Date Due Hepatitis C Screening 1950 DTaP/Tdap/Td series (1 - Tdap) 12/5/1970 FOBT Q 1 YEAR AGE 50-75 12/5/1999 ZOSTER VACCINE AGE 60> 10/5/2009 GLAUCOMA SCREENING Q2Y 12/5/2014 Bone Densitometry (Dexa) Screening 12/5/2014 Pneumococcal 65+ Low/Medium Risk (1 of 2 - PCV13) 12/5/2014 MEDICARE YEARLY EXAM 3/18/2018 Influenza Age 5 to Adult 8/1/2018 BREAST CANCER SCRN MAMMOGRAM 1/9/2020 Allergies as of 7/18/2018  Review Complete On: 7/18/2018 By: Freddie Hargrove Severity Noted Reaction Type Reactions Sulfa (Sulfonamide Antibiotics)  02/03/2014    Unknown (comments) Current Immunizations  Never Reviewed No immunizations on file. Not reviewed this visit You Were Diagnosed With   
  
 Codes Comments Fever, unspecified fever cause    -  Primary ICD-10-CM: R50.9 ICD-9-CM: 780.60 Vitals BP Pulse Temp Height(growth percentile) Weight(growth percentile) SpO2  
 115/72 (BP 1 Location: Left arm, BP Patient Position: Sitting) 89 99.7 °F (37.6 °C) (Oral) 5' 5\" (1.651 m) 135 lb (61.2 kg) 98% BMI OB Status Smoking Status 22.47 kg/m2 Postmenopausal Never Smoker BMI and BSA Data Body Mass Index Body Surface Area  
 22.47 kg/m 2 1.68 m 2 Preferred Pharmacy Pharmacy Name Phone [facility][id number] [facility] [phone] Your Updated Medication List  
  
   
This list is accurate as of 7/18/18 11:59 PM.  Always use your most recent med list.  
  
  
  
  
 azithromycin 250 mg tablet Commonly known as:  Elisha Fine Take 2 tablets initially then one tablet daily until completed CALTRATE + D3 PLUS MINERALS PO Take  by mouth. CENTRAL-DANIA Tab Generic drug:  geriatric multivitamins-min Take  by mouth.  
  
 cevimeline 30 mg capsule Commonly known as:  [facility] Take 30 mg by mouth three (3) times daily. colestipol 1 gram tablet Commonly known as:  COLESTID  
TAKE 1 TABLET BY MOUTH 2 TIMES DAILY  
  
 diflunisal 500 mg Tab Commonly known as:  DOLOBID Take  by mouth two (2) times a day. fenofibrate 160 mg tablet Commonly known as:  LOFIBRA TAKE 1 TABLET BY MOUTH EVERY DAY  
  
 gabapentin 100 mg capsule Commonly known as:  NEURONTIN Take  by mouth three (3) times daily. OMEPRAZOLE PO Take  by mouth.  
  
 zolpidem 10 mg tablet Commonly known as:  AMBIEN Take 1 Tab by mouth nightly as needed for Sleep. Max Daily Amount: 10 mg.  
  
  
  
  
Prescriptions Sent to Pharmacy Refills  
 azithromycin (ZITHROMAX) 250 mg tablet 0 Sig: Take 2 tablets initially then one tablet daily until completed Class: Normal  
 Pharmacy: [address] #: [phone] We Performed the Following AMB POC COMPLETE CBC,AUTOMATED ENTER [id number] CPT(R)] AMB POC URINALYSIS DIP STICK AUTO W/ MICRO  [00153 CPT(R)] CULTURE, BLOOD [id number] CPT(R)] CULTURE, BLOOD [id number] CPT(R)] METABOLIC PANEL, COMPREHENSIVE [48726 CPT(R)] Follow-up Instructions Return if symptoms worsen or fail to improve. To-Do List   
 07/18/2018 Imaging:  XR CHEST PA LAT Patient Instructions Learning About Fever What is a fever? A fever is a high body temperature. It's one way your body fights being sick. A fever shows that the body is responding to infection or other illnesses, both minor and severe. A fever is a symptom, not an illness by itself. A fever can be a sign that you are ill, but most fevers are not caused by a serious problem. You may have a fever with a minor illness, such as a cold. But sometimes a very serious infection may cause little or no fever. It is important to look at other symptoms, other conditions you have, and how you feel in general. In children, notice how they act and see what symptoms they complain of. What is a normal body temperature? A normal body temperature is about 98. 6ºF. Some people have a normal temperature that is a little higher or a little lower than this. Your temperature may be a little lower in the morning than it is later in the day. It may go up during hot weather or when you exercise, wear heavy clothes, or take a hot bath. Your temperature may also be different depending on how you take it. A temperature taken in the mouth (oral) or under the arm may be a little lower than your core temperature (rectal). What is a fever temperature? A core temperature of 100.4°F or above is considered a fever. What can cause a fever? A fever may be caused by: · Infections. This is the most common cause of a fever. Examples of infections that can cause a fever include the flu, a kidney infection, or pneumonia. · Some medicines. · Severe trauma or injury, such as a heart attack, stroke, heatstroke, or burns. · Other medical conditions, such as arthritis and some cancers. How can you treat a fever at home? · Ask your doctor if you can take an over-the-counter pain medicine, such as acetaminophen (Tylenol), ibuprofen (Advil, Motrin), or naproxen (Aleve). Be safe with medicines. Read and follow all instructions on the label. · To prevent dehydration, drink plenty of fluids. Choose water and other caffeine-free clear liquids until you feel better. If you have kidney, heart, or liver disease and have to limit fluids, talk with your doctor before you increase the amount of fluids you drink. Follow-up care is a key part of your treatment and safety. Be sure to make and go to all appointments, and call your doctor if you are having problems. It's also a good idea to know your test results and keep a list of the medicines you take. Where can you learn more? Go to http://felipe-niesha.info/. Enter Z640 in the search box to learn more about \"Learning About Fever. \" Current as of: November 20, 2017 Content Version: 11.7 © 7681-4193 [employer]. Care instructions adapted under license by [employer] (which disclaims liability or warranty for this information). If you have questions about a medical condition or this instruction, always ask your healthcare professional. [address] any warranty or liability for your use of this information. Introducing [facility]! Katelynn Escobar introduces UCWeb patient portal. Now you can access parts of your medical record, email your doctor's office, and request medication refills online. 1. In your internet browser, go to [email or web address] 2. Click on the First Time User? Click Here link in the Sign In box. You will see the New Member Sign Up page. 3. Enter your UCWeb Access Code exactly as it appears below. You will not need to use this code after youve completed the sign-up process. If you do not sign up before the expiration date, you must request a new code. · BloomReach Access Code: [email or web address] Expires: 7/25/2018  8:58 AM 
 
4. Enter the last four digits of your Social Security Number (xxxx) and Date of Birth (mm/dd/yyyy) as indicated and click Submit. You will be taken to the next sign-up page. 5. Create a BloomReach ID. This will be your BloomReach login ID and cannot be changed, so think of one that is secure and easy to remember. 6. Create a BloomReach password. You can change your password at any time. 7. Enter your Password Reset Question and Answer. This can be used at a later time if you forget your password. 8. Enter your e-mail address. You will receive e-mail notification when new information is available in [address] 9. Click Sign Up. You can now view and download portions of your medical record. 10. Click the Download Summary menu link to download a portable copy of your medical information. If you have questions, please visit the Frequently Asked Questions section of the BloomReach website. Remember, BloomReach is NOT to be used for urgent needs. For medical emergencies, dial 911. Now available from your iPhone and Android! Please provide this summary of care documentation to your next provider. Your primary care clinician is listed as TRENT Hodge 21. If you have any questions after today's visit, please call [phone].

## 2018-07-19 LAB
ALBUMIN SERPL-MCNC: 4.6 G/DL (ref 3.6–4.8)
ALBUMIN/GLOB SERPL: 1.8 {RATIO} (ref 1.2–2.2)
ALP SERPL-CCNC: 32 IU/L (ref 39–117)
ALT SERPL-CCNC: 29 IU/L (ref 0–32)
AST SERPL-CCNC: 62 IU/L (ref 0–40)
BILIRUB SERPL-MCNC: 0.3 MG/DL (ref 0–1.2)
BUN SERPL-MCNC: 16 MG/DL (ref 8–27)
BUN/CREAT SERPL: 16 (ref 12–28)
CALCIUM SERPL-MCNC: 9.3 MG/DL (ref 8.7–10.3)
CHLORIDE SERPL-SCNC: 99 MMOL/L (ref 96–106)
CO2 SERPL-SCNC: 23 MMOL/L (ref 20–29)
CREAT SERPL-MCNC: 1.02 MG/DL (ref 0.57–1)
GLOBULIN SER CALC-MCNC: 2.5 G/DL (ref 1.5–4.5)
GLUCOSE SERPL-MCNC: 94 MG/DL (ref 65–99)
POTASSIUM SERPL-SCNC: 3.7 MMOL/L (ref 3.5–5.2)
PROT SERPL-MCNC: 7.1 G/DL (ref 6–8.5)
SODIUM SERPL-SCNC: 138 MMOL/L (ref 134–144)

## 2018-07-19 NOTE — PATIENT INSTRUCTIONS
Learning About Fever  What is a fever? A fever is a high body temperature. It's one way your body fights being sick. A fever shows that the body is responding to infection or other illnesses, both minor and severe. A fever is a symptom, not an illness by itself. A fever can be a sign that you are ill, but most fevers are not caused by a serious problem. You may have a fever with a minor illness, such as a cold. But sometimes a very serious infection may cause little or no fever. It is important to look at other symptoms, other conditions you have, and how you feel in general. In children, notice how they act and see what symptoms they complain of. What is a normal body temperature? A normal body temperature is about 98. 6ºF. Some people have a normal temperature that is a little higher or a little lower than this. Your temperature may be a little lower in the morning than it is later in the day. It may go up during hot weather or when you exercise, wear heavy clothes, or take a hot bath. Your temperature may also be different depending on how you take it. A temperature taken in the mouth (oral) or under the arm may be a little lower than your core temperature (rectal). What is a fever temperature? A core temperature of 100.4°F or above is considered a fever. What can cause a fever? A fever may be caused by:  · Infections. This is the most common cause of a fever. Examples of infections that can cause a fever include the flu, a kidney infection, or pneumonia. · Some medicines. · Severe trauma or injury, such as a heart attack, stroke, heatstroke, or burns. · Other medical conditions, such as arthritis and some cancers. How can you treat a fever at home? · Ask your doctor if you can take an over-the-counter pain medicine, such as acetaminophen (Tylenol), ibuprofen (Advil, Motrin), or naproxen (Aleve). Be safe with medicines. Read and follow all instructions on the label.   · To prevent dehydration, drink plenty of fluids. Choose water and other caffeine-free clear liquids until you feel better. If you have kidney, heart, or liver disease and have to limit fluids, talk with your doctor before you increase the amount of fluids you drink. Follow-up care is a key part of your treatment and safety. Be sure to make and go to all appointments, and call your doctor if you are having problems. It's also a good idea to know your test results and keep a list of the medicines you take. Where can you learn more? Go to http://felipe-niesha.info/. Enter X607 in the search box to learn more about \"Learning About Fever. \"  Current as of: November 20, 2017  Content Version: 11.7  © 6837-2889 [employer]. Care instructions adapted under license by [employer] (which disclaims liability or warranty for this information). If you have questions about a medical condition or this instruction, always ask your healthcare professional. [address] any warranty or liability for your use of this information.

## 2018-07-19 NOTE — PROGRESS NOTES
Subjective:  Ms. Chelle Santiago is a pleasant 75year old lady, patient of Dr. Louis Arvizu, who comes in today for evaluation of chills and fever. Ms. Chelle Santiago tells me that she was doing well until last evening when all of a sudden she developed shaking chills and a temperature of 100. Also, further discussion with her reveals that she was treated for a pneumonia on 06/15 with a course of Levaquin. She had follow up appointment with Dr. Louis Arvizu on 06/26/18, at which time her follow up chest xray was negative. She did well until just yesterday. This morning she has a low grade fever. She denies any headaches, dizziness or blurred vision. She denies chest pain or palpitations. She denies any shortness of breath, cough, wheezing, hemoptysis, PND or orthopnea. She denies any nausea or vomiting. She denies any diarrhea. Last evening she had some urinary frequency, but none today. Past Medical History:   Diagnosis Date    Arthritis     Bacterial pneumonia 6/26/2018    Hypercholesterolemia     Long term (current) use of anticoagulants      Past Surgical History:   Procedure Laterality Date    HX TONSILLECTOMY         Current Outpatient Prescriptions on File Prior to Visit   Medication Sig Dispense Refill    fenofibrate (LOFIBRA) 160 mg tablet TAKE 1 TABLET BY MOUTH EVERY DAY 30 Tab 6    colestipol (COLESTID) 1 gram tablet TAKE 1 TABLET BY MOUTH 2 TIMES DAILY 60 Tab 3    calcium/D3/mag ox//morgan/Zn (CALTRATE + D3 PLUS MINERALS PO) Take  by mouth.  zolpidem (AMBIEN) 10 mg tablet Take 1 Tab by mouth nightly as needed for Sleep. Max Daily Amount: 10 mg. 30 Tab 0    geriatric multivitamins-min (CENTRAL-DANIA) tab Take  by mouth.  gabapentin (NEURONTIN) 100 mg capsule Take  by mouth three (3) times daily.  OMEPRAZOLE PO Take  by mouth. No current facility-administered medications on file prior to visit.       Allergies   Allergen Reactions    Sulfa (Sulfonamide Antibiotics) Unknown (comments)     Physical Examination:  GENERAL:  Pleasant, thin lady in no acute distress. She is alert and oriented. She answers my questions appropriately. She is non toxic. VITALS:  BP: 115/72. P: 89 and regular. O2 sat: 98.  T: 99.7. HEENT:  Normocephalic, atraumatic. TMs normal.  Mouth mucosa pink. Tongue midline. Pharynx normal.  NECK:  Supple without adenopathy. CHEST:  Lungs clear to auscultation, no rales or wheezes. CV:  Heart regular rhythm without murmur. ABDOMEN:  Soft, non tender. No organomegaly or masses. EXTREMITIES:  No edema or calf tenderness. Distal pulses were present. Studies:  Two views of the chest failed to reveal any pneumonia. Stat CBC revealed WBC of 5.7, HGB 12.4, plat count normal.  UA was clear. Impression:  1. Fever, ? etiology. Plan:  1. After discussion with Dr. Tiffany Bernabe it was opted to get some blood cultures. There is some concern since she recently had a pneumonia. 2. It was opted to start her on a Z-Jairo.   3. I will call her as soon as I have results of her blood cultures. 4. She is to increase fluids.

## 2018-07-23 ENCOUNTER — TELEPHONE (OUTPATIENT)
Dept: INTERNAL MEDICINE CLINIC | Age: 68
End: 2018-07-23

## 2018-07-23 NOTE — TELEPHONE ENCOUNTER
Yannick Camacho returned the NP's call. Patient would like to speak to the NP about the results of her lab work. Also, patient has questions about a medication that her rheumatologist put her on that she is having some difficulties with. Patient may be reached at [phone] (H) or [phone] (C).

## 2018-07-23 NOTE — PROGRESS NOTES
Lab results discussed with patient. She has stopped taking Dolobid. Blood cultures still pending. Will call when completed.

## 2018-07-24 LAB
BACTERIA BLD CULT: NORMAL
BACTERIA BLD CULT: NORMAL

## 2018-09-19 DIAGNOSIS — G47.00 INSOMNIA, UNSPECIFIED TYPE: Primary | ICD-10-CM

## 2018-09-19 RX ORDER — ZOLPIDEM TARTRATE 10 MG/1
TABLET ORAL
Qty: 30 TAB | Refills: 0 | Status: SHIPPED | OUTPATIENT
Start: 2018-09-19 | End: 2019-09-18 | Stop reason: SDUPTHER

## 2018-10-02 ENCOUNTER — OFFICE VISIT (OUTPATIENT)
Dept: INTERNAL MEDICINE CLINIC | Age: 68
End: 2018-10-02

## 2018-10-02 VITALS
HEIGHT: 65 IN | HEART RATE: 91 BPM | OXYGEN SATURATION: 97 % | DIASTOLIC BLOOD PRESSURE: 70 MMHG | WEIGHT: 131 LBS | BODY MASS INDEX: 21.83 KG/M2 | SYSTOLIC BLOOD PRESSURE: 122 MMHG

## 2018-10-02 DIAGNOSIS — Z23 ENCOUNTER FOR IMMUNIZATION: ICD-10-CM

## 2018-10-02 DIAGNOSIS — L30.9 DERMATITIS: Primary | ICD-10-CM

## 2018-10-02 RX ORDER — NABUMETONE 500 MG/1
500 TABLET, FILM COATED ORAL 2 TIMES DAILY
COMMUNITY
Start: 2018-09-30

## 2018-10-02 NOTE — PATIENT INSTRUCTIONS
Vaccine Information Statement Influenza (Flu) Vaccine (Inactivated or Recombinant): What you need to know Many Vaccine Information Statements are available in Malay and other languages. See www.immunize.org/vis Hojas de Información Sobre Vacunas están disponibles en Español y en muchos otros idiomas. Visite www.immunize.org/vis 1. Why get vaccinated? Influenza (flu) is a contagious disease that spreads around the [place] every year, usually between October and May. Flu is caused by influenza viruses, and is spread mainly by coughing, sneezing, and close contact. Anyone can get flu. Flu strikes suddenly and can last several days. Symptoms vary by age, but can include: 
 fever/chills  sore throat  muscle aches  fatigue  cough  headache  runny or stuffy nose Flu can also lead to pneumonia and blood infections, and cause diarrhea and seizures in children. If you have a medical condition, such as heart or lung disease, flu can make it worse. Flu is more dangerous for some people. Infants and young children, people 72years of age and older, pregnant women, and people with certain health conditions or a weakened immune system are at greatest risk. Each year thousands of people in the [facility] die from flu, and many more are hospitalized. Flu vaccine can: 
 keep you from getting flu, 
 make flu less severe if you do get it, and 
 keep you from spreading flu to your family and other people. 2. Inactivated and recombinant flu vaccines A dose of flu vaccine is recommended every flu season. Children 6 months through 6years of age may need two doses during the same flu season. Everyone else needs only one dose each flu season.   
 
 
Some inactivated flu vaccines contain a very small amount of a mercury-based preservative called thimerosal. Studies have not shown thimerosal in vaccines to be harmful, but flu vaccines that do not contain thimerosal are available. There is no live flu virus in flu shots. They cannot cause the flu. There are many flu viruses, and they are always changing. Each year a new flu vaccine is made to protect against three or four viruses that are likely to cause disease in the upcoming flu season. But even when the vaccine doesnt exactly match these viruses, it may still provide some protection Flu vaccine cannot prevent: 
 flu that is caused by a virus not covered by the vaccine, or 
 illnesses that look like flu but are not. It takes about 2 weeks for protection to develop after vaccination, and protection lasts through the flu season. 3. Some people should not get this vaccine Tell the person who is giving you the vaccine:  If you have any severe, life-threatening allergies. If you ever had a life-threatening allergic reaction after a dose of flu vaccine, or have a severe allergy to any part of this vaccine, you may be advised not to get vaccinated. Most, but not all, types of flu vaccine contain a small amount of egg protein.  If you ever had Guillain-Barré Syndrome (also called GBS). Some people with a history of GBS should not get this vaccine. This should be discussed with your doctor.  If you are not feeling well. It is usually okay to get flu vaccine when you have a mild illness, but you might be asked to come back when you feel better. 4. Risks of a vaccine reaction With any medicine, including vaccines, there is a chance of reactions. These are usually mild and go away on their own, but serious reactions are also possible. Most people who get a flu shot do not have any problems with it. Minor problems following a flu shot include:  
 soreness, redness, or swelling where the shot was given  hoarseness  sore, red or itchy eyes  cough  fever  aches  headache  itching  fatigue If these problems occur, they usually begin soon after the shot and last 1 or 2 days. More serious problems following a flu shot can include the following:  There may be a small increased risk of Guillain-Barré Syndrome (GBS) after inactivated flu vaccine. This risk has been estimated at 1 or 2 additional cases per million people vaccinated. This is much lower than the risk of severe complications from flu, which can be prevented by flu vaccine.  Young children who get the flu shot along with pneumococcal vaccine (PCV13) and/or DTaP vaccine at the same time might be slightly more likely to have a seizure caused by fever. Ask your doctor for more information. Tell your doctor if a child who is getting flu vaccine has ever had a seizure. Problems that could happen after any injected vaccine:  People sometimes faint after a medical procedure, including vaccination. Sitting or lying down for about 15 minutes can help prevent fainting, and injuries caused by a fall. Tell your doctor if you feel dizzy, or have vision changes or ringing in the ears.  Some people get severe pain in the shoulder and have difficulty moving the arm where a shot was given. This happens very rarely.  Any medication can cause a severe allergic reaction. Such reactions from a vaccine are very rare, estimated at about 1 in a million doses, and would happen within a few minutes to a few hours after the vaccination. As with any medicine, there is a very remote chance of a vaccine causing a serious injury or death. The safety of vaccines is always being monitored. For more information, visit: www.cdc.gov/vaccinesafety/ 
 
5. What if there is a serious reaction? What should I look for?  Look for anything that concerns you, such as signs of a severe allergic reaction, very high fever, or unusual behavior.  
 
Signs of a severe allergic reaction can include hives, swelling of the face and throat, difficulty breathing, a fast heartbeat, dizziness, and weakness  usually within a few minutes to a few hours after the vaccination. What should I do?  If you think it is a severe allergic reaction or other emergency that cant wait, call 9-1-1 and get the person to the nearest hospital. Otherwise, call your doctor.  Reactions should be reported to the Vaccine Adverse Event Reporting System (VAERS). Your doctor should file this report, or you can do it yourself through  the VAERS web site at www.vaers. [facility].gov, or by calling 4-226.902.2055. VAERS does not give medical advice. 6. The National Vaccine Injury Compensation Program 
 
The [facility] Vaccine Injury Compensation Program (VICP) is a federal program that was created to compensate people who may have been injured by certain vaccines. Persons who believe they may have been injured by a vaccine can learn about the program and about filing a claim by calling 0-670.776.5149 or visiting the [employer] website at www.[facility].gov/vaccinecompensation. There is a time limit to file a claim for compensation. 7. How can I learn more?  Ask your healthcare provider. He or she can give you the vaccine package insert or suggest other sources of information.  Call your local or state health department.  Contact the Centers for Disease Control and Prevention (CDC): 
- Call 2-748.408.3774 (1-800-CDC-INFO) or 
- Visit CDCs website at www.cdc.gov/flu Vaccine Information Statement Inactivated Influenza Vaccine 8/7/2015 
[address] [employer] 958VT-97 Department of Health and SupplyBetter Centers for Disease Control and Prevention Office Use Only

## 2018-10-02 NOTE — PROGRESS NOTES
This note will not be viewable in [address] Subjective:  
 
Mrs. Hilario Madrigal presents to the office today with complaints of a rash. She was recently seen by her dermatologist, Dr. Meaghan Smith, who suggested that they follow it but it could be a T-cell lymphoma. A follow-up appointment was made for February. The patient has been under a lot of stress recently due to declining health and her mother who is now at [facility] and she has not been eating well and has lost weight trying to take care of her mother. She has become even more anxious when the possibility of a T-cell lymphoma was entertained in regards to the rash that she has. Patient has noted a pigmented area in her lower abdomen/pelvis which occasionally is pruritic. It has not improved or gotten worse over time. The patient denies any fevers or sweats or other somatic symptoms. Past Medical History:  
Diagnosis Date  Arthritis  Bacterial pneumonia 6/26/2018  Hypercholesterolemia  Long term (current) use of anticoagulants Past Surgical History:  
Procedure Laterality Date  HX TONSILLECTOMY Allergies Allergen Reactions  Sulfa (Sulfonamide Antibiotics) Unknown (comments) Current Outpatient Prescriptions Medication Sig Dispense Refill  nabumetone (RELAFEN) 500 mg tablet Take 500 mg by mouth two (2) times a day.  zolpidem (AMBIEN) 10 mg tablet TAKE 1 TABLET BY MOUTH AT BEDTIME AS NEEDED 30 Tab 0  cevimeline (EVOXAC) 30 mg capsule Take 30 mg by mouth three (3) times daily.  diflunisal (DOLOBID) 500 mg tab Take  by mouth two (2) times a day.  fenofibrate (LOFIBRA) 160 mg tablet TAKE 1 TABLET BY MOUTH EVERY DAY 30 Tab 6  colestipol (COLESTID) 1 gram tablet TAKE 1 TABLET BY MOUTH 2 TIMES DAILY 60 Tab 3  
 calcium/D3/mag ox//morgan/Zn (CALTRATE + D3 PLUS MINERALS PO) Take  by mouth.  gabapentin (NEURONTIN) 100 mg capsule Take  by mouth three (3) times daily.  geriatric multivitamins-min (CENTRAL-DANIA) tab Take  by mouth.  OMEPRAZOLE PO Take  by mouth. Social History Social History  Marital status:  Spouse name: N/A  
 Number of children: N/A  
 Years of education: N/A Social History Main Topics  Smoking status: Never Smoker  Smokeless tobacco: Never Used  Alcohol use Yes Comment: rarely  Drug use: None  Sexual activity: Not Asked Other Topics Concern  None Social History Narrative Family History Problem Relation Age of Onset  Heart Disease Maternal Grandfather Review of Systems: 
GEN: Positive for recent weight loss without fatigue or night sweats CV: no PND, orthopnea, or palpitations Resp: no dyspnea on exertion, no cough Abd: no nausea, vomiting or diarrhea EXT: denies edema, claudication Derm: Positive for lower abdominal dermatitis Neurological ROS: no TIA or stroke symptoms ROS otherwise negative Objective:  
 
Visit Vitals  /70 (BP 1 Location: Left arm, BP Patient Position: Sitting)  Pulse 91  
 Ht 5' 5\" (1.651 m)  Wt 131 lb (59.4 kg)  SpO2 97%  BMI 21.8 kg/m2 Body mass index is 21.8 kg/(m^2). General:   alert, cooperative and no distress Eyes: conjunctivae/sclerae clear. PERRL, EOM's intact Mouth:  No oral lesions, no pharyngeal erythema, no exudates Neck: Trachea midline, no thyromegaly, no bruits Heart: S1 and S2 normal,no murmurs noted Lungs: Clear to auscultation bilaterally, no increased work of breathing Abdomen: Soft, nontender. Normal bowel sounds. There is a horizontal rash present on the lower abdomen/pelvis which is slightly pink but is not raised or excoriated and is quite faint. Extremities: No edema or cyanosis Neuro: ..alert, oriented x3,speech normal in context and clarity, cranial nerves II-XII intact,motor strength: full proximally and distally,gait: normal 
reflexes: full and symmetric Physical exam otherwise negative Assessment/Plan:  
 
Diagnoses and all orders for this visit: 
 
Dermatitis 
-     REFERRAL TO DERMATOLOGY Encounter for immunization -     Influenza Vaccine Inactivated (IIV)(FLUAD), Subunit, Adjuvanted, IM, (11208) -     Administration fee () for Medicare insured patients Other instructions: The patient's medications are reviewed and reconciled. Unclear as to what the rash might be and will seek a second dermatology evaluation with Dr. Herber Vieyra. She may need to have this biopsied. Influenza vaccination is given at patient's request 
 
Follow-up as previously scheduled Follow-up Disposition: 
Return for As previously scheduled.  
 
Bartolo Wills MD

## 2018-10-02 NOTE — MR AVS SNAPSHOT
[address] [address] [address] [address] [phone] Patient: Cyndi Varghese MRN: [id number] TOX:[id number] Visit Information Date & Time Provider Department Dept. Phone Encounter #  
 10/2/2018  2:00 PM Huyen Cruz, [address] [facility] [phone] [id number] Follow-up Instructions Return for As previously scheduled. Upcoming Health Maintenance Date Due Hepatitis C Screening 1950 DTaP/Tdap/Td series (1 - Tdap) 12/5/1970 Shingrix Vaccine Age 50> (1 of 2) 12/5/1999 FOBT Q 1 YEAR AGE 50-75 12/5/1999 GLAUCOMA SCREENING Q2Y 12/5/2014 Bone Densitometry (Dexa) Screening 12/5/2014 Pneumococcal 65+ Low/Medium Risk (1 of 2 - PCV13) 12/5/2014 MEDICARE YEARLY EXAM 3/18/2018 Influenza Age 5 to Adult 8/1/2018 BREAST CANCER SCRN MAMMOGRAM 1/9/2020 Allergies as of 10/2/2018  Review Complete On: 10/2/2018 By: Huyen Cruz MD  
  
 Severity Noted Reaction Type Reactions Sulfa (Sulfonamide Antibiotics)  02/03/2014    Unknown (comments) Current Immunizations  Never Reviewed Name Date Influenza Vaccine (Tri) Adjuvanted  Incomplete Not reviewed this visit You Were Diagnosed With   
  
 Codes Comments Dermatitis    -  Primary ICD-10-CM: L30.9 ICD-9-CM: 692.9 Encounter for immunization     ICD-10-CM: V17 ICD-9-CM: V03.89 Vitals BP Pulse Height(growth percentile) Weight(growth percentile) SpO2 BMI  
 122/70 (BP 1 Location: Left arm, BP Patient Position: Sitting) 91 5' 5\" (1.651 m) 131 lb (59.4 kg) 97% 21.8 kg/m2 OB Status Smoking Status Postmenopausal Never Smoker Vitals History BMI and BSA Data Body Mass Index Body Surface Area  
 21.8 kg/m 2 1.65 m 2 Preferred Pharmacy Pharmacy Name Phone  [id number] [address] [facility] [phone] Your Updated Medication List  
  
   
This list is accurate as of 10/2/18  2:26 PM.  Always use your most recent med list.  
  
  
  
  
 CALTRATE + D3 PLUS MINERALS PO Take  by mouth. CENTRAL-DANIA Tab Generic drug:  geriatric multivitamins-min Take  by mouth.  
  
 cevimeline 30 mg capsule Commonly known as:  [facility] Take 30 mg by mouth three (3) times daily. colestipol 1 gram tablet Commonly known as:  COLESTID  
TAKE 1 TABLET BY MOUTH 2 TIMES DAILY  
  
 diflunisal 500 mg Tab Commonly known as:  DOLOBID Take  by mouth two (2) times a day. fenofibrate 160 mg tablet Commonly known as:  LOFIBRA TAKE 1 TABLET BY MOUTH EVERY DAY  
  
 gabapentin 100 mg capsule Commonly known as:  NEURONTIN Take  by mouth three (3) times daily. nabumetone 500 mg tablet Commonly known as:  RELAFEN Take 500 mg by mouth two (2) times a day. OMEPRAZOLE PO Take  by mouth.  
  
 zolpidem 10 mg tablet Commonly known as:  AMBIEN  
TAKE 1 TABLET BY MOUTH AT BEDTIME AS NEEDED We Performed the Following ADMIN INFLUENZA VIRUS VAC [ [facility]] INFLUENZA VACCINE INACTIVATED (IIV), SUBUNIT, ADJUVANTED, IM [id number] CPT(R)] REFERRAL TO DERMATOLOGY [REF19 Custom] Comments:  
 Dermatitis abdomen Follow-up Instructions Return for As previously scheduled. Referral Information Referral ID Referred By Referred To  
  
 [id number] Annie Sanchez MD   
   [address] [facility] Phone: [phone] Fax: [phone] Visits Status Start Date End Date 1 New Request 10/2/18 10/2/19 If your referral has a status of pending review or denied, additional information will be sent to support the outcome of this decision. Patient Instructions Vaccine Information Statement Influenza (Flu) Vaccine (Inactivated or Recombinant): What you need to know Many Vaccine Information Statements are available in Citizen of [place] and other languages. See www.immunize.org/vis Hojas de Información Sobre Vacunas están disponibles en Español y en muchos otros idiomas. Visite www.immunize.org/vis 1. Why get vaccinated? Influenza (flu) is a contagious disease that spreads around the [place] every year, usually between October and May. Flu is caused by influenza viruses, and is spread mainly by coughing, sneezing, and close contact. Anyone can get flu. Flu strikes suddenly and can last several days. Symptoms vary by age, but can include: 
 fever/chills  sore throat  muscle aches  fatigue  cough  headache  runny or stuffy nose Flu can also lead to pneumonia and blood infections, and cause diarrhea and seizures in children. If you have a medical condition, such as heart or lung disease, flu can make it worse. Flu is more dangerous for some people. Infants and young children, people 72years of age and older, pregnant women, and people with certain health conditions or a weakened immune system are at greatest risk. Each year thousands of people in the [facility] die from flu, and many more are hospitalized. Flu vaccine can: 
 keep you from getting flu, 
 make flu less severe if you do get it, and 
 keep you from spreading flu to your family and other people. 2. Inactivated and recombinant flu vaccines A dose of flu vaccine is recommended every flu season. Children 6 months through 6years of age may need two doses during the same flu season. Everyone else needs only one dose each flu season. Some inactivated flu vaccines contain a very small amount of a mercury-based preservative called thimerosal. Studies have not shown thimerosal in vaccines to be harmful, but flu vaccines that do not contain thimerosal are available. There is no live flu virus in flu shots. They cannot cause the flu. There are many flu viruses, and they are always changing. Each year a new flu vaccine is made to protect against three or four viruses that are likely to cause disease in the upcoming flu season. But even when the vaccine doesnt exactly match these viruses, it may still provide some protection Flu vaccine cannot prevent: 
 flu that is caused by a virus not covered by the vaccine, or 
 illnesses that look like flu but are not. It takes about 2 weeks for protection to develop after vaccination, and protection lasts through the flu season. 3. Some people should not get this vaccine Tell the person who is giving you the vaccine:  If you have any severe, life-threatening allergies. If you ever had a life-threatening allergic reaction after a dose of flu vaccine, or have a severe allergy to any part of this vaccine, you may be advised not to get vaccinated. Most, but not all, types of flu vaccine contain a small amount of egg protein.  If you ever had Guillain-Barré Syndrome (also called GBS). Some people with a history of GBS should not get this vaccine. This should be discussed with your doctor.  If you are not feeling well. It is usually okay to get flu vaccine when you have a mild illness, but you might be asked to come back when you feel better. 4. Risks of a vaccine reaction With any medicine, including vaccines, there is a chance of reactions. These are usually mild and go away on their own, but serious reactions are also possible. Most people who get a flu shot do not have any problems with it. Minor problems following a flu shot include:  
 soreness, redness, or swelling where the shot was given  hoarseness  sore, red or itchy eyes  cough  fever  aches  headache  itching  fatigue If these problems occur, they usually begin soon after the shot and last 1 or 2 days. More serious problems following a flu shot can include the following:  There may be a small increased risk of Guillain-Barré Syndrome (GBS) after inactivated flu vaccine. This risk has been estimated at 1 or 2 additional cases per million people vaccinated. This is much lower than the risk of severe complications from flu, which can be prevented by flu vaccine.  Young children who get the flu shot along with pneumococcal vaccine (PCV13) and/or DTaP vaccine at the same time might be slightly more likely to have a seizure caused by fever. Ask your doctor for more information. Tell your doctor if a child who is getting flu vaccine has ever had a seizure. Problems that could happen after any injected vaccine:  People sometimes faint after a medical procedure, including vaccination. Sitting or lying down for about 15 minutes can help prevent fainting, and injuries caused by a fall. Tell your doctor if you feel dizzy, or have vision changes or ringing in the ears.  Some people get severe pain in the shoulder and have difficulty moving the arm where a shot was given. This happens very rarely.  Any medication can cause a severe allergic reaction. Such reactions from a vaccine are very rare, estimated at about 1 in a million doses, and would happen within a few minutes to a few hours after the vaccination. As with any medicine, there is a very remote chance of a vaccine causing a serious injury or death. The safety of vaccines is always being monitored. For more information, visit: www.cdc.gov/vaccinesafety/ 
 
 
The [facility] Vaccine Injury Compensation Program (VICP) is a federal program that was created to compensate people who may have been injured by certain vaccines. Persons who believe they may have been injured by a vaccine can learn about the program and about filing a claim by calling 1-989.638.5251 or visiting the [employer] [address] website at www.[facility].gov/vaccinecompensation. There is a time limit to file a claim for compensation. 7. How can I learn more?  Ask your healthcare provider. He or she can give you the vaccine package insert or suggest other sources of information.  Call your local or state health department.  Contact the Centers for Disease Control and Prevention (CDC): 
- Call 7-253.314.7927 (8-420-VPL-INFO) or 
- Visit CDCs website at www.cdc.gov/flu Vaccine Information Statement Inactivated Influenza Vaccine 8/7/2015 
42 CHAVA Charles 742CE-81 Department of [facility] and [employer] Centers for Disease Control and Prevention Office Use Only Introducing [facility]! [facility] introduces Ahead patient portal. Now you can access parts of your medical record, email your doctor's office, and request medication refills online. 1. In your internet browser, go to [email or web address] 2. Click on the First Time User? Click Here link in the Sign In box. You will see the New Member Sign Up page. 3. Enter your Ahead Access Code exactly as it appears below.  You will not need to use this code after youve completed the sign-up process. If you do not sign up before the expiration date, you must request a new code. · CareSimply Access Code: [email or web address] Expires: 12/31/2018  1:59 PM 
 
4. Enter the last four digits of your Social Security Number (xxxx) and Date of Birth (mm/dd/yyyy) as indicated and click Submit. You will be taken to the next sign-up page. 5. Create a CareSimply ID. This will be your CareSimply login ID and cannot be changed, so think of one that is secure and easy to remember. 6. Create a CareSimply password. You can change your password at any time. 7. Enter your Password Reset Question and Answer. This can be used at a later time if you forget your password. 8. Enter your e-mail address. You will receive e-mail notification when new information is available in [address] 9. Click Sign Up. You can now view and download portions of your medical record. 10. Click the Download Summary menu link to download a portable copy of your medical information. If you have questions, please visit the Frequently Asked Questions section of the CareSimply website. Remember, CareSimply is NOT to be used for urgent needs. For medical emergencies, dial 911. Now available from your iPhone and Android! Please provide this summary of care documentation to your next provider. Your primary care clinician is listed as TRENT Hodge 21. If you have any questions after today's visit, please call [phone].

## 2018-10-02 NOTE — PROGRESS NOTES
Golden Rome is a 75 y.o. female presenting for Rash (follow up from dermatologist ? T-cell Lymphoma) Jesica Das 1. Have you been to the ER, urgent care clinic since your last visit? Hospitalized since your last visit? No 
 
2. Have you seen or consulted any other health care providers outside of the [address] [place] [address] since your last visit? Include any pap smears or colon screening. Yes Where: dermatologist Reason for visit: rash Fall Risk Assessment, last 12 mths 4/26/2018 Able to walk? Yes Fall in past 12 months? No  
 
 
 
Abuse Screening Questionnaire 4/26/2018 Do you ever feel afraid of your partner? Branch Rota Are you in a relationship with someone who physically or mentally threatens you? Odalis Rota Is it safe for you to go home? Y  
 
 
PHQ over the last two weeks 4/26/2018 Little interest or pleasure in doing things Not at all Feeling down, depressed, irritable, or hopeless Not at all Total Score PHQ 2 0 There are no discontinued medications.

## 2018-11-02 RX ORDER — MONTELUKAST SODIUM 4 MG/1
TABLET, CHEWABLE ORAL
Qty: 60 TAB | Refills: 3 | Status: SHIPPED | OUTPATIENT
Start: 2018-11-02 | End: 2018-11-26 | Stop reason: SDUPTHER

## 2018-11-20 ENCOUNTER — OFFICE VISIT (OUTPATIENT)
Dept: INTERNAL MEDICINE CLINIC | Age: 68
End: 2018-11-20

## 2018-11-20 VITALS
WEIGHT: 134 LBS | DIASTOLIC BLOOD PRESSURE: 60 MMHG | SYSTOLIC BLOOD PRESSURE: 110 MMHG | OXYGEN SATURATION: 94 % | HEIGHT: 65 IN | HEART RATE: 69 BPM | BODY MASS INDEX: 22.33 KG/M2

## 2018-11-20 DIAGNOSIS — Z00.00 INITIAL MEDICARE ANNUAL WELLNESS VISIT: Primary | ICD-10-CM

## 2018-11-20 DIAGNOSIS — E78.00 HYPERCHOLESTEROLEMIA: ICD-10-CM

## 2018-11-20 DIAGNOSIS — R63.4 WEIGHT LOSS, UNINTENTIONAL: ICD-10-CM

## 2018-11-20 DIAGNOSIS — Z23 ENCOUNTER FOR IMMUNIZATION: ICD-10-CM

## 2018-11-20 DIAGNOSIS — Z11.59 NEED FOR HEPATITIS C SCREENING TEST: ICD-10-CM

## 2018-11-20 DIAGNOSIS — M35.00 SJOGREN'S SYNDROME, WITH UNSPECIFIED ORGAN INVOLVEMENT (HCC): ICD-10-CM

## 2018-11-20 DIAGNOSIS — G89.29 CHRONIC BILATERAL LOW BACK PAIN WITHOUT SCIATICA: ICD-10-CM

## 2018-11-20 DIAGNOSIS — M54.50 CHRONIC BILATERAL LOW BACK PAIN WITHOUT SCIATICA: ICD-10-CM

## 2018-11-20 DIAGNOSIS — M15.9 PRIMARY OSTEOARTHRITIS INVOLVING MULTIPLE JOINTS: ICD-10-CM

## 2018-11-20 DIAGNOSIS — Z78.0 POSTMENOPAUSAL: ICD-10-CM

## 2018-11-20 LAB
BACTERIA UA POCT, BACTPOCT: NORMAL
BILIRUB UR QL STRIP: NEGATIVE
CASTS UA POCT: NEGATIVE
CLUE CELLS, CLUEPOCT: NEGATIVE
CRYSTALS UA POCT, CRYSPOCT: NEGATIVE
EPITHELIAL CELLS POCT: NORMAL
GLUCOSE UR-MCNC: NEGATIVE MG/DL
GRAN# POC: 4.5 K/UL (ref 2–7.8)
GRAN% POC: 60.8 % (ref 37–92)
HCT VFR BLD CALC: 37 % (ref 37–51)
HGB BLD-MCNC: 12.2 G/DL (ref 12–18)
KETONES P FAST UR STRIP-MCNC: NEGATIVE MG/DL
LY# POC: 2.5 K/UL (ref 0.6–4.1)
LY% POC: 34.9 % (ref 10–58.5)
MCH RBC QN: 29.6 PG (ref 26–32)
MCHC RBC-ENTMCNC: 33.1 G/DL (ref 30–36)
MCV RBC: 89 FL (ref 80–97)
MID #, POC: 0.3 K/UL (ref 0–1.8)
MID% POC: 4.3 % (ref 0.1–24)
MUCUS UA POCT, MUCPOCT: NORMAL
PH UR STRIP: 7 [PH] (ref 5–7)
PLATELET # BLD: 393 K/UL (ref 140–440)
PROT UR QL STRIP: NEGATIVE
RBC # BLD: 4.14 M/UL (ref 4.2–6.3)
RBC UA POCT, RBCPOCT: NORMAL
SP GR UR STRIP: 1.01 (ref 1.01–1.02)
TRICH UA POCT, TRICHPOC: NEGATIVE
UA UROBILINOGEN AMB POC: NORMAL (ref 0.2–1)
URINALYSIS CLARITY POC: CLEAR
URINALYSIS COLOR POC: NORMAL
URINE BLOOD POC: NEGATIVE
URINE CULT COMMENT, POCT: NORMAL
URINE LEUKOCYTES POC: NEGATIVE
URINE NITRITES POC: NEGATIVE
WBC # BLD: 7.3 K/UL (ref 4.1–10.9)
WBC UA POCT, WBCPOCT: 0
YEAST UA POCT, YEASTPOC: NEGATIVE

## 2018-11-20 NOTE — PROGRESS NOTES
This note will not be viewable in [address] Diego Moise is a 75 y.o. female who presents for an Initial Medicare Annual Wellness Exam (AWV) and follow up of chronic medical conditions. The patient has not had a Medicare wellness exam done within the last year I have reviewed the patient's medical history in detail and updated the computerized patient record. History Subjective: 
Mrs. Ambrosio Ormond presents to the office today for Medicare wellness check plus follow-up of multiple medical problems. The patient has hypercholesterolemia and is currently on Colestid and fenofibrate. She is tolerating this without GI upset. She has no history of coronary artery disease and denies exertional chest pains or claudication. She is followed by rheumatology for Sjogren's syndrome. She notes dry mouth as the prominent symptom. The patient is on Evoxac for this. This seems to help but she also drinks quite a bit of water. The patient has significant degenerative arthritis in multiple joints. In addition she has chronic back pain. She is currently on Relafen for this as well as gabapentin. This seems to be controlling her symptoms. She is tolerating the medications without side effects. She does have some chronic insomnia. She believes that her brain races through thoughts during the night and keeps her awake. She takes the Ambien sparingly and notes that it works effectively in allowing her to sleep but she only takes it once or twice a week at most.  She is tolerated the Ambien without side effect. Patient Active Problem List  
Diagnosis Code  Right lower quadrant abdominal pain R10.31  
 Pain in both lower extremities M79.604, M79.605  Ulcer of mouth K12.1  Bacterial pneumonia J15.9  Sjogren's syndrome ([facility] [address] M35.00  Hypercholesterolemia E78.00  
 Primary osteoarthritis involving multiple joints M15.0  Chronic bilateral low back pain without sciatica M54.5, G89.29  
  Weight loss, unintentional R63.4 Patient Care Team: 
Lupe Hayward MD as PCP - General (Internal Medicine) Rashad White MD (General Surgery) Osvaldo Szymanski MD (Rheumatology) Past Medical History:  
Diagnosis Date  Arthritis  Bacterial pneumonia 6/26/2018  Chronic bilateral low back pain without sciatica 11/20/2018  Hypercholesterolemia  Long term (current) use of anticoagulants  Primary osteoarthritis involving multiple joints 11/20/2018  Sjogren's syndrome ([address] 11/20/2018  Weight loss, unintentional 11/20/2018 Past Surgical History:  
Procedure Laterality Date  HX TONSILLECTOMY Allergies Allergen Reactions  Sulfa (Sulfonamide Antibiotics) Unknown (comments) Current Outpatient Medications Medication Sig Dispense Refill  colestipol (COLESTID) 1 gram tablet TAKE 1 TABLET BY MOUTH 2 TIMES DAILY 60 Tab 3  
 nabumetone (RELAFEN) 500 mg tablet Take 500 mg by mouth two (2) times a day.  zolpidem (AMBIEN) 10 mg tablet TAKE 1 TABLET BY MOUTH AT BEDTIME AS NEEDED 30 Tab 0  cevimeline (EVOXAC) 30 mg capsule Take 30 mg by mouth three (3) times daily.  fenofibrate (LOFIBRA) 160 mg tablet TAKE 1 TABLET BY MOUTH EVERY DAY 30 Tab 6  calcium/D3/mag ox//morgan/Zn (CALTRATE + D3 PLUS MINERALS PO) Take  by mouth.  gabapentin (NEURONTIN) 100 mg capsule Take  by mouth three (3) times daily.  geriatric multivitamins-min (CENTRAL-DANIA) tab Take  by mouth.  OMEPRAZOLE PO Take  by mouth. Family History Problem Relation Age of Onset  Heart Disease Maternal Grandfather  Prostate Cancer Father  Prostate Cancer Brother Health Maintenance Topic Date Due  
 Hepatitis C Screening  1950  
 DTaP/Tdap/Td series (1 - Tdap) 12/05/1970  Shingrix Vaccine Age 50> (1 of 2) 12/05/1999  GLAUCOMA SCREENING Q2Y  12/05/2014  Bone Densitometry (Dexa) Screening  12/05/2014  Pneumococcal 65+ Low/Medium Risk (2 of 2 - PPSV23) 12/05/2017  MEDICARE YEARLY EXAM  03/18/2018  BREAST CANCER SCRN MAMMOGRAM  01/09/2020  COLONOSCOPY  11/30/2020  Influenza Age 5 to Adult  Completed Review of Systems Constitutional: negative for fevers, chills, anorexia and weight loss Eyes:   negative for visual disturbance and irritation ENT:   negative for tinnitus,sore throat,nasal congestion,ear pain,hoarseness Respiratory:  negative for cough, hemoptysis, dyspnea,wheezing CV:   negative for chest pain, palpitations, lower extremity edema GI:   negative for nausea, vomiting, diarrhea, abdominal pain,melena Endo:               negative for polyuria,polydipsia,polyphagia,heat intolerance Genitourinary: negative for frequency, dysuria and hematuria Integumentary: negative for rash and pruritus Hematologic:  negative for easy bruising and gum/nose bleeding Musculoskel: negative for myalgias, arthralgias, back pain, muscle weakness, joint pain Neurological:  negative for headaches, dizziness, vertigo, memory problems and gait Behavl/Psych: negative for feelings of anxiety, depression, mood changes ROS otherwise negative Depression Risk Factor Screening: PHQ over the last two weeks 4/26/2018 Little interest or pleasure in doing things Not at all Feeling down, depressed, irritable, or hopeless Not at all Total Score PHQ 2 0 Alcohol Risk Factor Screening: You do not drink alcohol or very rarely. Functional Ability and Level of Safety:  
Hearing Loss Hearing is good. Activities of Daily Living ADL Assessment 11/20/2018 Feeding yourself No Help Needed Getting from bed to chair No Help Needed Getting dressed No Help Needed Bathing or showering No Help Needed Walk across the room (includes cane/walker) No Help Needed Using the telphone No Help Needed Taking your medications No Help Needed Preparing meals No Help Needed Managing money (expenses/bills) No Help Needed Moderately strenuous housework (laundry) No Help Needed Shopping for personal items (toiletries/medicines) No Help Needed Shopping for groceries No Help Needed Driving No Help Needed Climbing a flight of stairs No Help Needed Getting to places beyond walking distances No Help Needed Fall Risk Fall Risk Assessment, last 12 mths 4/26/2018 Able to walk? Yes Fall in past 12 months? No  
 
 
Abuse Screen Abuse Screening Questionnaire 4/26/2018 Do you ever feel afraid of your partner? Gerard Luna Are you in a relationship with someone who physically or mentally threatens you? Gerard Luna Is it safe for you to go home? Dawn Ramos Cognitive Screening Evaluation of Cognitive Function: 
Has your family/caregiver stated any concerns about your memory: no 
 
Physical Exam  
Visit Vitals /60 (BP 1 Location: Left arm, BP Patient Position: Sitting) Pulse 69 Ht 5' 5\" (1.651 m) Wt 134 lb (60.8 kg) SpO2 94% BMI 22.30 kg/m² Body mass index is 22.3 kg/m². General appearance - alert, well appearing, and in no distress Mental status - alert, oriented to person, place, and time EYE-EFRAIN, EOMI,conjunctiva normal bilaterally, lids normal 
ENT-ENT exam normal, no neck nodes or sinus tenderness Nose - normal and patent, no erythema,  Or discharge Mouth - mucous membranes moist, pharynx normal without lesions Neck - supple, no significant adenopathy or bruit Chest - clear to auscultation, no wheezes, rales or rhonchi. Heart - normal rate, regular rhythm, normal S1, S2, no murmurs, rubs, clicks or gallops Abdomen - soft, nontender, nondistended, no masses or organomegaly Lymph- no adenopathy palpable Ext-peripheral pulses normal, no pedal edema, no clubbing or cyanosis Skin-Warm and dry. no hyperpigmentation, vitiligo, or suspicious lesions Neuro -alert, oriented, normal speech, no focal findings or movement disorder noted Assessment/Plan IAWV education and counseling provided: 
Age appropriate evidence-based preventive care recommendations based on today's review and evaluation; including relevant cancer screening guidelines, and vaccination recommendations. An After Visit Summary was printed and given to the patient which information about these guidelines, and a personalized schedule for health maintenance items. Whe appropriate and with patient agreement, orders noted below were placed to complete missing health maintenance items. Additional Plan for follow up chronic medical conditions includes: 
Diagnoses and all orders for this visit: 
 
Initial Medicare annual wellness visit Hypercholesterolemia -     AMB POC COMPLETE CBC,AUTOMATED ENTER 
-     COLLECTION VENOUS BLOOD,VENIPUNCTURE 
-     LIPID PANEL 
-     METABOLIC PANEL, COMPREHENSIVE Sjogren's syndrome, with unspecified organ involvement ([facility] [address] Primary osteoarthritis involving multiple joints Chronic bilateral low back pain without sciatica Weight loss, unintentional 
-     AMB POC URINALYSIS DIP STICK AUTO W/ MICRO  
-     T4, FREE 
-     TSH 3RD GENERATION Need for hepatitis C screening test 
-     HEPATITIS C AB Postmenopausal 
-     DEXA BONE DENSITY STUDY AXIAL; Future Encounter for immunization 
-     PNEUMOCOCCAL CONJ VACCINE 13 VALENT IM 
-     ADMIN PNEUMOCOCCAL VACCINE Other instructions: The patient's medications are reviewed and reconciled. No change in her current medical regimen is made. A prudent diet and exercise is encouraged she has lost some weight over the last year and states that she has lost 10 pounds since May. She has not been on any specific diet. 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 Health maintenance issues were reviewed and CDC hepatitis C screening will be ordered.   The patient has had a glaucoma test within the last year and will have a copy of her eye exam forwarded to us. She has not had bone density testing and this will be ordered. Age-appropriate vaccinations were reviewed and she is due to start her pneumonia vaccines and will be given Prevnar 13 today. Tdap and shingles has been recommended to be started after the holidays. Await results of multiple labs. Follow-up yearly Follow-up Disposition: 
Return in about 1 year (around 11/20/2019). I have reviewed with the patient details of the assessment and plan and all questions were answered. Relevent patient education was performed. The most recent lab findings were reviewed with the patient.  
 
Octaviano Evans MD

## 2018-11-20 NOTE — PATIENT INSTRUCTIONS
The best way to stay healthy is to live a healthy lifestyle. A healthy lifestyle includes regular exercise, eating a well-balanced diet, keeping a healthy weight and not smoking. Regular physical exams and screening tests are another important way to take care of yourself. Preventive exams provided by health care providers can find health problems early when treatment works best and can keep you from getting certain diseases or illnesses. Preventive services include exams, lab tests, screenings, shots, monitoring and information to help you take care of your own health. All people over 65 should have a pneumonia shot. Pneumonia shots are usually only needed once in a lifetime unless your doctor decides differently. In addition to your physical exam, some screening tests are recommended: 
 
All people over 65 should have a yearly flu shot. People over 65 are at medium to high risk for Hepatitis B. Three shots are needed for complete protection. Bone mass measurement (dexa scan) is recommended every two years. Diabetes Mellitus screening is recommended every year. Glaucoma is an eye disease caused by high pressure in the eye. An eye exam is recommended every year. Cardiovascular screening tests that check your cholesterol and other blood fat (lipid) levels are recommended every five years. Colorectal Cancer screening tests help to find pre-cancerous polyps (growths in the colon) so they can be removed before they turn into cancer. Tests ordered for screening depend on your personal and family history risk factors. Prostate Cancer Screening (annually up to age 76) Screening for breast cancer is recommended yearly with a Mammogram. 
 
Screening for cervical and vaginal cancer is recommended with a pelvic and Pap test every two years.  However if you have had an abnormal pap in the past  three years or at high risk for cervical or vaginal cancer Medicare will cover a pap test and a pelvic exam every year. Here is a list of your current Health Maintenance items with a due date: 
Health Maintenance Due Topic Date Due  
Joselyn Pattonas Test  1950  
 DTaP/Tdap/Td  (1 - Tdap) 12/05/1970  Shingles Vaccine (1 of 2) 12/05/1999  Glaucoma Screening   12/05/2014  Bone Mineral Density   12/05/2014  Pneumococcal Vaccine (2 of 2 - PPSV23) 12/05/2017 Swathi Sahu Annual Well Visit  03/18/2018 Vaccine Information Statement Pneumococcal Conjugate Vaccine (PCV13): What You Need to Know Many Vaccine Information Statements are available in [place] and other languages. See www.immunize.org/vis. Hojas de información Sobre Vacunas están disponibles en español y en muchos otros idiomas. Visite www.immunize.org/vis. 1. Why get vaccinated? Vaccination can protect both children and adults from pneumococcal disease. Pneumococcal disease is caused by bacteria that can spread from person to person through close contact. It can cause ear infections, and it can also lead to more serious infections of the: 
 Lungs (pneumonia),  Blood (bacteremia), and 
 Covering of the brain and spinal cord (meningitis). Pneumococcal pneumonia is most common among adults. Pneumococcal meningitis can cause deafness and brain damage, and it kills about 1 child in 10 who get it. Anyone can get pneumococcal disease, but children under 3years of age and adults 72 years and older, people with certain medical conditions, and cigarette smokers are at the highest risk. Before there was a vaccine, the [facility] saw: 
 more than 700 cases of meningitis, 
 about 13,000 blood infections, 
 about 5 million ear infections, and 
 about 200 deaths 
in children under 5 each year from pneumococcal disease. Since vaccine became available, severe pneumococcal disease in these children has fallen by 88%.  
 
About 18,000 older adults die of pneumococcal disease each year in the [place]. Treatment of pneumococcal infections with penicillin and other drugs is not as effective as it used to be, because some strains of the disease have become resistant to these drugs. This makes prevention of the disease, through vaccination, even more important. 2. PCV13 vaccine Pneumococcal conjugate vaccine (called PCV13) protects against 13 types of pneumococcal bacteria. PCV13 is routinely given to children at 2, 4, 6, and 1515 months of age. It is also recommended for children and adults 3to 59years of age with certain health conditions, and for all adults 72years of age and older. Your doctor can give you details. 3. Some people should not get this vaccine Anyone who has ever had a life-threatening allergic reaction to a dose of this vaccine, to an earlier pneumococcal vaccine called PCV7, or to any vaccine containing diphtheria toxoid (for example, DTaP), should not get PCV13. Anyone with a severe allergy to any component of PCV13 should not get the vaccine. Tell your doctor if the person being vaccinated has any severe allergies. If the person scheduled for vaccination is not feeling well, your healthcare provider might decide to reschedule the shot on another day. 4. Risks of a vaccine reaction With any medicine, including vaccines, there is a chance of reactions. These are usually mild and go away on their own, but serious reactions are also possible. Problems reported following PCV13 varied by age and dose in the series. The most common problems reported among children were:  About half became drowsy after the shot, had a temporary loss of appetite, or had redness or tenderness where the shot was given.  About 1 out of 3 had swelling where the shot was given.  About 1 out of 3 had a mild fever, and about 1 in 20 had a fever over 102.2°F. 
 Up to about 8 out of 10 became fussy or irritable. Adults have reported pain, redness, and swelling where the shot was given; also mild fever, fatigue, headache, chills, or muscle pain. [facility] children who get PCV13 along with inactivated flu vaccine at the same time may be at increased risk for seizures caused by fever. Ask your doctor for more information. Problems that could happen after any vaccine:  People sometimes faint after a medical procedure, including vaccination. Sitting or lying down for about 15 minutes can help prevent fainting, and injuries caused by a fall. Tell your doctor if you feel dizzy, or have vision changes or ringing in the ears.  Some older children and adults get severe pain in the shoulder and have difficulty moving the arm where a shot was given. This happens very rarely.  Any medication can cause a severe allergic reaction. Such reactions from a vaccine are very rare, estimated at about 1 in a million doses, and would happen within a few minutes to a few hours after the vaccination. As with any medicine, there is a very small chance of a vaccine causing a serious injury or death. The safety of vaccines is always being monitored. For more information, visit: www.cdc.gov/vaccinesafety/  
 
5. What if there is a serious reaction? What should I look for?  Look for anything that concerns you, such as signs of a severe allergic reaction, very high fever, or unusual behavior. Signs of a severe allergic reaction can include hives, swelling of the face and throat, difficulty breathing, a fast heartbeat, dizziness, and weakness  usually within a few minutes to a few hours after the vaccination. What should I do?  If you think it is a severe allergic reaction or other emergency that cant wait, call 9-1-1 or get the person to the nearest hospital. Otherwise, call your doctor.  
 
Reactions should be reported to the Vaccine Adverse Event Reporting System (VAERS). Your doctor should file this report, or you can do it yourself through the VAERS web site at www.vaers. [facility], or by calling 8-720.772.3223. VAERS does not give medical advice. 6. The National Vaccine Injury Compensation Program 
 
The [facility] Vaccine Injury Compensation Program (VICP) is a federal program that was created to compensate people who may have been injured by certain vaccines. Persons who believe they may have been injured by a vaccine can learn about the program and about filing a claim by calling 1-999.790.3228 or visiting the [employer] website at www.[facility].gov/vaccinecompensation. There is a time limit to file a claim for compensation. 7. How can I learn more?  Ask your healthcare provider. He or she can give you the vaccine package insert or suggest other sources of information.  Call your local or state health department.  Contact the Centers for Disease Control and Prevention (CDC): 
- Call 7-602.671.1448 (1-800-CDC-INFO) or 
- Visit CDCs website at www.cdc.gov/vaccines Vaccine Information Statement PCV13 Vaccine 11/5/2015  
[address] [id number]-[id number] Department of Health and [employer] Centers for Disease Control and Prevention Office Use Only Advance Directives: Care Instructions Your Care Instructions An advance directive is a legal way to state your wishes at the end of your life. It tells your family and your doctor what to do if you can no longer say what you want. There are two main types of advance directives. You can change them any time that your wishes change. · A living will tells your family and your doctor your wishes about life support and other treatment. · A durable power of  for health care lets you name a person to make treatment decisions for you when you can't speak for yourself. This person is called a health care agent.  
If you do not have an advance directive, decisions about your medical care may be made by a doctor or a  who doesn't know you. It may help to think of an advance directive as a gift to the people who care for you. If you have one, they won't have to make tough decisions by themselves. Follow-up care is a key part of your treatment and safety. Be sure to make and go to all appointments, and call your doctor if you are having problems. It's also a good idea to know your test results and keep a list of the medicines you take. How can you care for yourself at home? · Discuss your wishes with your loved ones and your doctor. This way, there are no surprises. · Many states have a unique form. Or you might use a universal form that has been approved by many states. This kind of form can sometimes be completed and stored online. Your electronic copy will then be available wherever you have a connection to the Internet. In most cases, doctors will respect your wishes even if you have a form from a different state. · You don't need a  to do an advance directive. But you may want to get legal advice. · Think about these questions when you prepare an advance directive: 
? Who do you want to make decisions about your medical care if you are not able to? Many people choose a family member or close friend. ? Do you know enough about life support methods that might be used? If not, talk to your doctor so you understand. ? What are you most afraid of that might happen? You might be afraid of having pain, losing your independence, or being kept alive by machines. ? Where would you prefer to die? Choices include your home, a hospital, or a nursing home. ? Would you like to have information about hospice care to support you and your family? ? Do you want to donate organs when you die? ? Do you want certain Restorationist practices performed before you die? If so, put your wishes in the advance directive. · Read your advance directive every year, and make changes as needed. When should you call for help? Be sure to contact your doctor if you have any questions. Where can you learn more? Go to http://felipe-niesha.info/. Enter R264 in the search box to learn more about \"Advance Directives: Care Instructions. \" Current as of: April 19, 2018 Content Version: 11.8 © 0632-1150 Healthwise, [employer]. Care instructions adapted under license by [employer] (which disclaims liability or warranty for this information). If you have questions about a medical condition or this instruction, always ask your healthcare professional. [address] any warranty or liability for your use of this information.

## 2018-11-20 NOTE — PROGRESS NOTES
Chief Complaint Patient presents with [facility] Annual Wellness Visit  Complete Physical  
 
 
Depression Risk Factor Screening: PHQ over the last two weeks 4/26/2018 Little interest or pleasure in doing things Not at all Feeling down, depressed, irritable, or hopeless Not at all Total Score PHQ 2 0 Functional Ability and Level of Safety: Activities of Daily Living ADL Assessment 11/20/2018 Feeding yourself No Help Needed Getting from bed to chair No Help Needed Getting dressed No Help Needed Bathing or showering No Help Needed Walk across the room (includes cane/walker) No Help Needed Using the telphone No Help Needed Taking your medications No Help Needed Preparing meals No Help Needed Managing money (expenses/bills) No Help Needed Moderately strenuous housework (laundry) No Help Needed Shopping for personal items (toiletries/medicines) No Help Needed Shopping for groceries No Help Needed Driving No Help Needed Climbing a flight of stairs No Help Needed Getting to places beyond walking distances No Help Needed Fall Risk Fall Risk Assessment, last 12 mths 4/26/2018 Able to walk? Yes Fall in past 12 months? No  
 
 
Abuse Screen Abuse Screening Questionnaire 4/26/2018 Do you ever feel afraid of your partner? Pavan Fruit Are you in a relationship with someone who physically or mentally threatens you? Pavan Fruit Is it safe for you to go home? Esther Minor Patient Care Team  
Patient Care Team: 
Lucian Gonzales MD as PCP - General (Internal Medicine) Sofai Hendricks MD (General Surgery)

## 2018-11-21 ENCOUNTER — TELEPHONE (OUTPATIENT)
Dept: INTERNAL MEDICINE CLINIC | Age: 68
End: 2018-11-21

## 2018-11-21 LAB
ALBUMIN SERPL-MCNC: 4.6 G/DL (ref 3.6–4.8)
ALBUMIN/GLOB SERPL: 1.9 {RATIO} (ref 1.2–2.2)
ALP SERPL-CCNC: 41 IU/L (ref 39–117)
ALT SERPL-CCNC: 15 IU/L (ref 0–32)
AST SERPL-CCNC: 29 IU/L (ref 0–40)
BILIRUB SERPL-MCNC: 0.3 MG/DL (ref 0–1.2)
BUN SERPL-MCNC: 17 MG/DL (ref 8–27)
BUN/CREAT SERPL: 19 (ref 12–28)
CALCIUM SERPL-MCNC: 9.9 MG/DL (ref 8.7–10.3)
CHLORIDE SERPL-SCNC: 101 MMOL/L (ref 96–106)
CHOLEST SERPL-MCNC: 188 MG/DL (ref 100–199)
CO2 SERPL-SCNC: 28 MMOL/L (ref 20–29)
CREAT SERPL-MCNC: 0.9 MG/DL (ref 0.57–1)
GLOBULIN SER CALC-MCNC: 2.4 G/DL (ref 1.5–4.5)
GLUCOSE SERPL-MCNC: 83 MG/DL (ref 65–99)
HCV AB S/CO SERPL IA: <0.1 S/CO RATIO (ref 0–0.9)
HDLC SERPL-MCNC: 68 MG/DL
LDLC SERPL CALC-MCNC: 88 MG/DL (ref 0–99)
POTASSIUM SERPL-SCNC: 4.1 MMOL/L (ref 3.5–5.2)
PROT SERPL-MCNC: 7 G/DL (ref 6–8.5)
SODIUM SERPL-SCNC: 141 MMOL/L (ref 134–144)
T4 FREE SERPL-MCNC: 1.54 NG/DL (ref 0.82–1.77)
TRIGL SERPL-MCNC: 159 MG/DL (ref 0–149)
TSH SERPL DL<=0.005 MIU/L-ACNC: 1.27 UIU/ML (ref 0.45–4.5)
VLDLC SERPL CALC-MCNC: 32 MG/DL (ref 5–40)

## 2018-11-21 NOTE — TELEPHONE ENCOUNTER
Patient is calling, she states that when she came in for her appointment yesterday she thought that she was getting over her recent UTI, but she is now feeling that it is getting worse. She would like to know if something can be called in before the [address].     Best call back # for patient: [phone]  Pharmacy on file verified

## 2018-11-26 RX ORDER — MONTELUKAST SODIUM 4 MG/1
TABLET, CHEWABLE ORAL
Qty: 90 TAB | Refills: 0 | Status: SHIPPED | OUTPATIENT
Start: 2018-11-26 | End: 2019-04-26 | Stop reason: SDUPTHER

## 2018-12-07 RX ORDER — FENOFIBRATE 160 MG/1
TABLET ORAL
Qty: 90 TAB | Refills: 3 | Status: SHIPPED | OUTPATIENT
Start: 2018-12-07 | End: 2020-01-31

## 2018-12-07 NOTE — TELEPHONE ENCOUNTER
Pharmacy on file verified  Requested Prescriptions     Pending Prescriptions Disp Refills    fenofibrate (LOFIBRA) 160 mg tablet 90 Tab 3     Sig: TAKE 1 TABLET BY MOUTH EVERY DAY     LOV 11/20/2018  NOV 11/26/2019  *requesting a 90 day supply due to insurance coverage

## 2018-12-17 ENCOUNTER — LAB ONLY (OUTPATIENT)
Dept: INTERNAL MEDICINE CLINIC | Age: 68
End: 2018-12-17

## 2018-12-17 DIAGNOSIS — N39.0 URINARY TRACT INFECTION WITHOUT HEMATURIA, SITE UNSPECIFIED: ICD-10-CM

## 2018-12-17 DIAGNOSIS — R39.89 URINE TROUBLES: Primary | ICD-10-CM

## 2018-12-17 LAB
BACTERIA UA POCT, BACTPOCT: ABNORMAL
BILIRUB UR QL STRIP: NEGATIVE
CASTS UA POCT: ABNORMAL
CLUE CELLS, CLUEPOCT: NEGATIVE
CRYSTALS UA POCT, CRYSPOCT: NEGATIVE
EPITHELIAL CELLS POCT: ABNORMAL
GLUCOSE UR-MCNC: NEGATIVE MG/DL
KETONES P FAST UR STRIP-MCNC: NEGATIVE MG/DL
MUCUS UA POCT, MUCPOCT: ABNORMAL
PH UR STRIP: 7 [PH] (ref 5–7)
PROT UR QL STRIP: NEGATIVE
RBC UA POCT, RBCPOCT: ABNORMAL
SP GR UR STRIP: 1.01 (ref 1.01–1.02)
TRICH UA POCT, TRICHPOC: NEGATIVE
UA UROBILINOGEN AMB POC: NORMAL (ref 0.2–1)
URINALYSIS CLARITY POC: ABNORMAL
URINALYSIS COLOR POC: YELLOW
URINE BLOOD POC: NEGATIVE
URINE CULT COMMENT, POCT: ABNORMAL
URINE LEUKOCYTES POC: ABNORMAL
URINE NITRITES POC: POSITIVE
WBC UA POCT, WBCPOCT: ABNORMAL
YEAST UA POCT, YEASTPOC: NEGATIVE

## 2018-12-20 LAB — BACTERIA UR CULT: ABNORMAL

## 2019-01-28 ENCOUNTER — HOSPITAL ENCOUNTER (OUTPATIENT)
Dept: MAMMOGRAPHY | Age: 69
Discharge: HOME | End: 2019-01-28
Attending: INTERNAL MEDICINE
Payer: MEDICARE

## 2019-01-28 DIAGNOSIS — Z12.39 SCREENING BREAST EXAMINATION: ICD-10-CM

## 2019-01-28 PROCEDURE — 77067 SCR MAMMO BI INCL CAD: CPT

## 2019-04-23 ENCOUNTER — HOSPITAL ENCOUNTER (EMERGENCY)
Age: 69
Discharge: HOME | End: 2019-04-23
Attending: EMERGENCY MEDICINE | Admitting: EMERGENCY MEDICINE
Payer: MEDICARE

## 2019-04-23 VITALS
RESPIRATION RATE: 16 BRPM | TEMPERATURE: 98.2 F | BODY MASS INDEX: 20.83 KG/M2 | HEIGHT: 66 IN | HEART RATE: 75 BPM | OXYGEN SATURATION: 99 % | DIASTOLIC BLOOD PRESSURE: 75 MMHG | WEIGHT: 129.63 LBS | SYSTOLIC BLOOD PRESSURE: 133 MMHG

## 2019-04-23 DIAGNOSIS — R21 FACIAL RASH: Primary | ICD-10-CM

## 2019-04-23 PROCEDURE — 74011250637 HC RX REV CODE- 250/637: Performed by: PHYSICIAN ASSISTANT

## 2019-04-23 PROCEDURE — 99283 EMERGENCY DEPT VISIT LOW MDM: CPT

## 2019-04-23 RX ORDER — PREDNISONE 5 MG/1
TABLET ORAL
COMMUNITY
End: 2019-10-01 | Stop reason: ALTCHOICE

## 2019-04-23 RX ORDER — DIPHENHYDRAMINE HCL 50 MG
50 CAPSULE ORAL
Status: COMPLETED | OUTPATIENT
Start: 2019-04-23 | End: 2019-04-23

## 2019-04-23 RX ORDER — CETIRIZINE HCL 10 MG
10 TABLET ORAL DAILY
Qty: 20 TAB | Refills: 0 | Status: SHIPPED | OUTPATIENT
Start: 2019-04-23 | End: 2019-10-01 | Stop reason: ALTCHOICE

## 2019-04-23 RX ADMIN — DIPHENHYDRAMINE HYDROCHLORIDE 50 MG: 50 CAPSULE ORAL at 21:04

## 2019-04-24 NOTE — ED PROVIDER NOTES
EMERGENCY DEPARTMENT HISTORY AND PHYSICAL EXAM 
 
 
Date: 4/23/2019 Patient Name: Cecilia Lawrence History of Presenting Illness Chief Complaint Patient presents with  Rash  
  patient reports rash across face and moving down neck since Sunday night. reports only abnormal contact was a olinda that passed closed by her face History Provided By: Patient HPI: Cecilia Lawrence, 70 y.o. female presents ambulatory to the ED with cc of 2 days of constant moderately itchy rash to the face with some other spots on the skin that is not much better with the prescribed oral prednisone and topical triamcinolone that she was given following her visit at [facility]. She tells me she has had no known outdoor exposures except for the [place] [employer] this past Sunday when her symptoms started. There are no other complaints, changes, or physical findings at this time. PCP: aJ Velazquez MD 
 
Current Outpatient Medications Medication Sig Dispense Refill  cetirizine (ZYRTEC) 10 mg tablet Take 1 Tab by mouth daily. 20 Tab 0  predniSONE (STERAPRED) 5 mg dose pack prednisone 5 mg tablets in a dose pack  fenofibrate (LOFIBRA) 160 mg tablet TAKE 1 TABLET BY MOUTH EVERY DAY 90 Tab 3  
 colestipol (COLESTID) 1 gram tablet TAKE 1 TABLET BY MOUTH 2 TIMES DAILY 90 Tab 0  
 nabumetone (RELAFEN) 500 mg tablet Take 500 mg by mouth two (2) times a day.  zolpidem (AMBIEN) 10 mg tablet TAKE 1 TABLET BY MOUTH AT BEDTIME AS NEEDED 30 Tab 0  cevimeline (EVOXAC) 30 mg capsule Take 30 mg by mouth three (3) times daily.  calcium/D3/mag ox//morgan/Zn (CALTRATE + D3 PLUS MINERALS PO) Take  by mouth.  gabapentin (NEURONTIN) 100 mg capsule Take  by mouth three (3) times daily.  geriatric multivitamins-min (CENTRAL-DANIA) tab Take  by mouth.  OMEPRAZOLE PO Take  by mouth. Past History Past Medical History: 
Past Medical History:  
Diagnosis Date  Arthritis  Bacterial pneumonia 6/26/2018  Chronic bilateral low back pain without sciatica 11/20/2018  Hypercholesterolemia  Long term (current) use of anticoagulants  Primary osteoarthritis involving multiple joints 11/20/2018  Sjogren's syndrome ([address] 11/20/2018  Weight loss, unintentional 11/20/2018 Past Surgical History: 
Past Surgical History:  
Procedure Laterality Date  HX TONSILLECTOMY Family History: 
Family History Problem Relation Age of Onset  Heart Disease Maternal Grandfather  Prostate Cancer Father  Prostate Cancer Brother Social History: 
Social History Tobacco Use  Smoking status: Never Smoker  Smokeless tobacco: Never Used Substance Use Topics  Alcohol use: Yes Comment: rarely  Drug use: Not on file Allergies: Allergies Allergen Reactions  Sulfa (Sulfonamide Antibiotics) Unknown (comments) Review of Systems Review of Systems Constitutional: Negative for fatigue and fever. HENT: Negative for ear pain and sore throat. Eyes: Negative for pain, redness and visual disturbance. Respiratory: Negative for cough and shortness of breath. Cardiovascular: Negative for chest pain and palpitations. Gastrointestinal: Negative for abdominal pain, nausea and vomiting. Genitourinary: Negative for dysuria, frequency and urgency. Musculoskeletal: Negative for back pain, gait problem, neck pain and neck stiffness. Skin: Positive for rash. Negative for wound. Neurological: Negative for dizziness, weakness, light-headedness, numbness and headaches. Physical Exam  
Physical Exam  
Constitutional: She is oriented to person, place, and time. She appears well-developed and well-nourished. Non-toxic appearance. No distress. HENT:  
Head: Normocephalic and atraumatic. Right Ear: External ear normal.  
Left Ear: External ear normal.  
Nose: Nose normal.  
Mouth/Throat: Uvula is midline. No trismus in the jaw. Eyes: Pupils are equal, round, and reactive to light. Conjunctivae and EOM are normal. No scleral icterus. Neck: Normal range of motion and full passive range of motion without pain. Cardiovascular: Normal rate and regular rhythm. Pulmonary/Chest: Effort normal. No accessory muscle usage. No tachypnea. No respiratory distress. She has no decreased breath sounds. She has no wheezes. Abdominal: Soft. There is no tenderness. Musculoskeletal: Normal range of motion. Neurological: She is alert and oriented to person, place, and time. She is not disoriented. No cranial nerve deficit. GCS eye subscore is 4. GCS verbal subscore is 5. GCS motor subscore is 6. Skin: Skin is intact. Rash noted. Mild erythema to the forehead cheeks and chin without scaling, papules or plaques. Described as pruritic. No vesicles or pustules. Psychiatric: She has a normal mood and affect. Her speech is normal.  
Nursing note and vitals reviewed. Diagnostic Study Results Labs - No results found for this or any previous visit (from the past 12 hour(s)). Radiologic Studies - No orders to display CT Results  (Last 48 hours) None CXR Results  (Last 48 hours) None Medical Decision Making I am the first provider for this patient. I reviewed the vital signs, available nursing notes, past medical history, past surgical history, family history and social history. Vital Signs-Reviewed the patient's vital signs. Patient Vitals for the past 12 hrs: 
 Temp Pulse Resp BP SpO2  
04/23/19 1950 98.2 °F (36.8 °C) 75 16 133/75 99 % Pulse Oximetry Analysis - 99% on RA Records Reviewed: Nursing Notes, Old Medical Records, Previous Radiology Studies and Previous Laboratory Studies Provider Notes (Medical Decision Making): Afebrile; well-appearing; rash seen more than a malar distribution of face; presentation likely reflects a dermatitis however if symptoms persist in spite of completing prescribed treatment I recommend follow-up with the primary care or dermatology. Additional testing deferred ED Course:  
Initial assessment performed. The patients presenting problems have been discussed, and they are in agreement with the care plan formulated and outlined with them. I have encouraged them to ask questions as they arise throughout their visit. Disposition: 
Discharge PLAN: 
1. Discharge Medication List as of 4/23/2019 10:58 PM  
  
START taking these medications Details  
cetirizine (ZYRTEC) 10 mg tablet Take 1 Tab by mouth daily. , Print, Disp-20 Tab, R-0  
  
  
CONTINUE these medications which have NOT CHANGED Details  
predniSONE (STERAPRED) 5 mg dose pack prednisone 5 mg tablets in a dose pack, Historical Med  
  
fenofibrate (LOFIBRA) 160 mg tablet TAKE 1 TABLET BY MOUTH EVERY DAY, Normal, Disp-90 Tab, R-3  
  
colestipol (COLESTID) 1 gram tablet TAKE 1 TABLET BY MOUTH 2 TIMES DAILY, Normal, Disp-90 Tab, R-0  
  
nabumetone (RELAFEN) 500 mg tablet Take 500 mg by mouth two (2) times a day., Historical Med  
  
zolpidem (AMBIEN) 10 mg tablet TAKE 1 TABLET BY MOUTH AT BEDTIME AS NEEDED, PrintThis request is for a new prescription for a controlled substance as required by Federal/State law. .Disp-30 Tab, R-0  
  
cevimeline (EVOXAC) 30 mg capsule Take 30 mg by mouth three (3) times daily. , Historical Med  
  
calcium/D3/mag ox//morgan/Zn (CALTRATE + D3 PLUS MINERALS PO) Take  by mouth., Historical Med  
  
gabapentin (NEURONTIN) 100 mg capsule Take  by mouth three (3) times daily. , Historical Med  
  
geriatric multivitamins-min (CENTRAL-DANIA) tab Take  by mouth., Historical Med  
  
OMEPRAZOLE PO Take  by mouth., Historical Med 2. Follow-up Information Follow up With Specialties Details Why Contact Info  Clovis Kc MD Dermatology Schedule an appointment as soon as possible for a visit DERMATOLOGY: call to schedule follow up 1 Valeri Coleman [facility] [address] [address] 
[phone] Lili Coffey MD Internal Medicine Schedule an appointment as soon as possible for a visit PRIMARY CARE: call to schedule follow up [facility] 
[phone] Return to ED if worse Diagnosis Clinical Impression: 1. Facial rash

## 2019-09-05 RX ORDER — MONTELUKAST SODIUM 4 MG/1
TABLET, CHEWABLE ORAL
Qty: 180 TAB | Refills: 0 | Status: SHIPPED | OUTPATIENT
Start: 2019-09-05 | End: 2020-04-03

## 2019-09-18 DIAGNOSIS — G47.00 INSOMNIA, UNSPECIFIED TYPE: ICD-10-CM

## 2019-09-18 RX ORDER — ZOLPIDEM TARTRATE 10 MG/1
TABLET ORAL
Qty: 30 TAB | Refills: 0 | Status: SHIPPED | OUTPATIENT
Start: 2019-09-18 | End: 2019-09-26 | Stop reason: SDUPTHER

## 2019-09-26 DIAGNOSIS — G47.00 INSOMNIA, UNSPECIFIED TYPE: ICD-10-CM

## 2019-09-26 RX ORDER — ZOLPIDEM TARTRATE 10 MG/1
TABLET ORAL
Qty: 30 TAB | Refills: 0 | Status: SHIPPED | OUTPATIENT
Start: 2019-09-26 | End: 2020-09-08 | Stop reason: SDUPTHER

## 2019-10-01 ENCOUNTER — OFFICE VISIT (OUTPATIENT)
Dept: INTERNAL MEDICINE CLINIC | Age: 69
End: 2019-10-01

## 2019-10-01 ENCOUNTER — TELEPHONE (OUTPATIENT)
Dept: INTERNAL MEDICINE CLINIC | Age: 69
End: 2019-10-01

## 2019-10-01 VITALS
SYSTOLIC BLOOD PRESSURE: 118 MMHG | HEIGHT: 66 IN | RESPIRATION RATE: 20 BRPM | HEART RATE: 76 BPM | BODY MASS INDEX: 20.79 KG/M2 | WEIGHT: 129.4 LBS | TEMPERATURE: 98 F | OXYGEN SATURATION: 99 % | DIASTOLIC BLOOD PRESSURE: 78 MMHG

## 2019-10-01 DIAGNOSIS — M05.9 RHEUMATOID ARTHRITIS WITH POSITIVE RHEUMATOID FACTOR, INVOLVING UNSPECIFIED SITE (HCC): Primary | ICD-10-CM

## 2019-10-01 DIAGNOSIS — M35.00 SJOGREN'S SYNDROME, WITH UNSPECIFIED ORGAN INVOLVEMENT (HCC): ICD-10-CM

## 2019-10-01 DIAGNOSIS — R20.8 BURNING SENSATION OF MOUTH: ICD-10-CM

## 2019-10-01 NOTE — PATIENT INSTRUCTIONS
Sjogren's Syndrome: Care Instructions Your Care Instructions Sjögren's syndrome (say \"show-grins\") causes your body's defense, or immune, system to attack the glands that make moisture. The condition affects your tear and saliva glands and sometimes other parts of your body. Your eyes and mouth get very dry. Sjögren's also may cause you to be very tired and to have pain in your joints. A small number of people may have problems with their lungs, kidneys, and nerves. Even though there is no cure for Sjögren's, you can treat the symptoms. You can use artificial tears to keep your eyes moist. Saliva substitutes, water, or prescription medicines can help keep your mouth and throat moist. 
Follow-up care is a key part of your treatment and safety. Be sure to make and go to all appointments, and call your doctor if you are having problems. It's also a good idea to know your test results and keep a list of the medicines you take. How can you care for yourself at home? For your eyes · Use artificial tears during the day. They come in different brands, so if one type does not help, try another. Try to use preservative-free drops. They may be easier on your eyes. · Use lubricating ointment at night. It is thicker and lasts longer than artificial tears, so you have less burning, dryness, and itching when you wake up in the morning. The ointment may blur your vision for a short time, so use it before going to bed. · Wear wraparound sunglasses to protect your eyes from wind and dust. 
· Avoid smoke. It irritates your eyes. · Keep makeup away from your eyes or you may want to avoid eye makeup. For your mouth · Drink fluids during the day to keep your mouth moist. Try drinking small sips of water and rinsing your mouth a lot. · Use mouthwash or spray to keep your mouth wet. · Brush your teeth with fluoride toothpaste two times a day and after meals, and floss your teeth every day. · Visit the dentist two times a year, or more if needed, to prevent and treat tooth decay. · Use sugar-free gum or candies such as lemon drops. They increase saliva. (Sugar can increase your risk for cavities and yeast infections.) · Avoid over-the-counter medicines that can cause dryness. These include antihistamines, such as Benadryl or Chlor-Trimeton. For other parts of your body · Take anti-inflammatory medicines if you have joint pain and swelling. These include ibuprofen (Advil, Motrin) and naproxen (Aleve). Read and follow all instructions on the label. · Use moisturizing skin creams or ointments during the day. · Use only moisturizing soaps while bathing. After bathing, apply skin creams or ointments. · Always wear sunscreen on exposed skin. Make sure to use a broad-spectrum sunscreen that has a sun protection factor (SPF) of 30 or higher. Use it every day, even when it is cloudy. · Use a vaporizer or humidifier to add moisture to your bedroom. Follow the directions for cleaning the machine. · Use saline (saltwater) nasal washes to help keep your nasal passages open and to wash out mucus and bacteria. You can buy saline nose drops at a grocery store or pharmacy. Or you can make your own at home by adding 1 teaspoon of salt and 1 teaspoon of baking soda to 2 cups of distilled water. If you make your own, fill a bulb syringe with the solution, insert the tip into your nostril, and squeeze gently. Fermín Si your nose. · Use lubricants if you have vaginal dryness. · Take an over-the-counter antacid or acid reducer, such as Pepcid AC or Zantac 75, when needed to reduce heartburn. Be careful when you take over-the-counter antacid medicines. Many of these medicines have aspirin in them. Read the label to make sure that you are not taking more than the recommended dose. Too much aspirin can be harmful. When should you call for help?  
Watch closely for changes in your health, and be sure to contact your doctor if: 
  · Your eyes and mouth are still very dry even with home care.  
  · Your joint pain does not get better with over-the-counter medicine.  
  · Your tiredness gets worse or makes it hard to do daily activities. Where can you learn more? Go to http://felipe-niesha.info/. Enter D040 in the search box to learn more about \"Sjogren's Syndrome: Care Instructions. \" Current as of: April 1, 2019 Content Version: 12.2 © 3771-2759 [employer]. Care instructions adapted under license by [employer] (which disclaims liability or warranty for this information). If you have questions about a medical condition or this instruction, always ask your healthcare professional. [address] any warranty or liability for your use of this information.

## 2019-10-01 NOTE — PROGRESS NOTES
Mariza Barber is a 70 y.o. female presenting for Mouth Pain  . 1. Have you been to the ER, urgent care clinic since your last visit? Hospitalized since your last visit? Yes When: 4-22-19 [facility] Reason for visit: rash, 4-23-19 [facility] ER- Rash    2. Have you seen or consulted any other health care providers outside of the [address] [place] [address] since your last visit? Include any pap smears or colon screening. No    Fall Risk Assessment, last 12 mths 10/1/2019   Able to walk? Yes   Fall in past 12 months? No         Abuse Screening Questionnaire 10/1/2019   Do you ever feel afraid of your partner? N   Are you in a relationship with someone who physically or mentally threatens you? N   Is it safe for you to go home? Y       3 most recent PHQ Screens 10/1/2019   Little interest or pleasure in doing things Nearly every day   Feeling down, depressed, irritable, or hopeless Nearly every day   Total Score PHQ 2 6       There are no discontinued medications.

## 2019-10-01 NOTE — PROGRESS NOTES
This note will not be viewable in [address] Subjective:     Mrs. Carlos Wallis presents to the office today with complaints of severe mouth irritation. She is a lady who has Sjogren's syndrome and rheumatoid arthritis and is currently followed by Dr. Kamlesh Solorzano. The patient currently is only taking Relafen for joint discomfort. Patient has noted since sometime after Easter severe mouth irritation localized to the tip of the tongue and inside of the lower lip. She notes that multiple foods, spices and drinks will cause her mouth to become severely inflamed. She is not had any ulcerations nor has she had any dental pathology. She notes that she has lost weight as a result of having to avoid certain foods which cause further irritation. Patient noted that she had an issue for which she was on prednisone for 2 weeks at the end of April and all of her mouth symptoms entirely went away. The patient also notes the onset of rainouts disease as well. The patient has had no peripheral rashes nor has she had any soreness or redness or genital lesions. She has had no flareup of her arthritis.     Past Medical History:   Diagnosis Date    Arthritis     Bacterial pneumonia 6/26/2018    Chronic bilateral low back pain without sciatica 11/20/2018    Hypercholesterolemia     Long term (current) use of anticoagulants     Primary osteoarthritis involving multiple joints 11/20/2018    Sjogren's syndrome ([address] 11/20/2018    Weight loss, unintentional 11/20/2018     Past Surgical History:   Procedure Laterality Date    HX TONSILLECTOMY       Allergies   Allergen Reactions    Sulfa (Sulfonamide Antibiotics) Unknown (comments)     Current Outpatient Medications   Medication Sig Dispense Refill    zolpidem (AMBIEN) 10 mg tablet TAKE 1 TABLET BY MOUTH AT BEDTIME AS NEEDED 30 Tab 0    colestipol (COLESTID) 1 gram tablet TAKE 1 TABLET BY MOUTH TWICE DAILY 180 Tab 0    fenofibrate (LOFIBRA) 160 mg tablet TAKE 1 TABLET BY MOUTH EVERY DAY 90 Tab 3    nabumetone (RELAFEN) 500 mg tablet Take 500 mg by mouth two (2) times a day.  calcium/D3/mag ox//morgan/Zn (CALTRATE + D3 PLUS MINERALS PO) Take  by mouth.  geriatric multivitamins-min (CENTRAL-DANIA) tab Take  by mouth.  OMEPRAZOLE PO Take  by mouth. Social History     Socioeconomic History    Marital status:      Spouse name: Not on file    Number of children: Not on file    Years of education: Not on file    Highest education level: Not on file   Tobacco Use    Smoking status: Never Smoker    Smokeless tobacco: Never Used   Substance and Sexual Activity    Alcohol use: Yes     Comment: rarely     Family History   Problem Relation Age of Onset    Heart Disease Maternal Grandfather     Prostate Cancer Father     Prostate Cancer Brother        Review of Systems:  GEN: no weight loss, weight gain, fatigue or night sweats  ENT: Complains of mouth dryness and severe tongue and lower lip sensitivity to foods  CV: no PND, orthopnea, or palpitations  Resp: no dyspnea on exertion, no cough  Abd: no nausea, vomiting or diarrhea  EXT: denies edema, claudication  Endocrine: no hair loss, excessive thirst or polyuria  Neurological ROS: no TIA or stroke symptoms  ROS otherwise negative      Objective:     Visit Vitals  /78 (BP 1 Location: Right arm, BP Patient Position: Sitting)   Pulse 76   Temp 98 °F (36.7 °C) (Oral)   Resp 20   Ht 5' 6\" (1.676 m)   Wt 129 lb 6.4 oz (58.7 kg)   SpO2 99%   BMI 20.89 kg/m²     Body mass index is 20.89 kg/m². General:   alert, cooperative and no distress   Eyes: conjunctivae/sclerae clear. PERRL, EOM's intact   Mouth:   Mouth is extremely dry but no oral lesions or ulcerations are noted. No thrush is seen   Neck: Trachea midline, no thyromegaly, no bruits   Heart: S1 and S2 normal,no murmurs noted    Lungs: Clear to auscultation bilaterally, no increased work of breathing   Abdomen: Soft, nontender.   Normal bowel sounds Extremities: No edema or cyanosis. No joint swelling or synovitis is evident   Neuro: ..alert, oriented x3,speech normal in context and clarity, cranial nerves II-XII intact,motor strength: full proximally and distally,gait: normal  reflexes: full and symmetric     Physical exam otherwise negative         Assessment/Plan:     Diagnoses and all orders for this visit:    Rheumatoid arthritis with positive rheumatoid factor, involving unspecified site ([address]    Sjogren's syndrome, with unspecified organ involvement ([facility])    Burning sensation of mouth        Other instructions:   Patient's medications were reviewed and reconciled. Her oral pharyngeal complaints likely are rheumatological in origin. She does have Sjogren's syndrome rheumatoid arthritis and now has having rainouts as well. I am wondering whether she is developing Behcet's syndrome. The patient does have an appointment with her rheumatologist in 1 week and I think he needs to take another look at this as she did respond to prednisone for the 2 weeks that she was on it in April. Follow-up here pending the above. May need to consider ENT evaluation as well as allergy evaluation. Follow-up and Dispositions    · Return for As previously scheduled.          Donnita Goldmann, MD

## 2019-11-01 ENCOUNTER — TELEPHONE (OUTPATIENT)
Dept: INTERNAL MEDICINE CLINIC | Age: 69
End: 2019-11-01

## 2019-11-01 NOTE — TELEPHONE ENCOUNTER
Patient stated she woke up with a rash going from her head to her knees. .    Stated her throat is a little sore as well.     Call back [phone]

## 2019-11-03 ENCOUNTER — HOSPITAL ENCOUNTER (EMERGENCY)
Age: 69
Discharge: HOME | End: 2019-11-03
Attending: EMERGENCY MEDICINE
Payer: MEDICARE

## 2019-11-03 VITALS
HEART RATE: 64 BPM | BODY MASS INDEX: 20.05 KG/M2 | TEMPERATURE: 97.5 F | OXYGEN SATURATION: 99 % | SYSTOLIC BLOOD PRESSURE: 139 MMHG | RESPIRATION RATE: 16 BRPM | DIASTOLIC BLOOD PRESSURE: 63 MMHG | WEIGHT: 124.78 LBS | HEIGHT: 66 IN

## 2019-11-03 DIAGNOSIS — L50.9 HIVES: Primary | ICD-10-CM

## 2019-11-03 LAB
ALBUMIN SERPL-MCNC: 3.8 G/DL (ref 3.5–5)
ALBUMIN/GLOB SERPL: 1.2 {RATIO} (ref 1.1–2.2)
ALP SERPL-CCNC: 35 U/L (ref 45–117)
ALT SERPL-CCNC: 26 U/L (ref 12–78)
ANION GAP SERPL CALC-SCNC: 5 MMOL/L (ref 5–15)
AST SERPL-CCNC: 33 U/L (ref 15–37)
BASOPHILS # BLD: 0.1 K/UL (ref 0–0.1)
BASOPHILS NFR BLD: 1 % (ref 0–1)
BILIRUB SERPL-MCNC: 0.4 MG/DL (ref 0.2–1)
BUN SERPL-MCNC: 15 MG/DL (ref 6–20)
BUN/CREAT SERPL: 15 (ref 12–20)
CALCIUM SERPL-MCNC: 8.9 MG/DL (ref 8.5–10.1)
CHLORIDE SERPL-SCNC: 105 MMOL/L (ref 97–108)
CO2 SERPL-SCNC: 28 MMOL/L (ref 21–32)
CREAT SERPL-MCNC: 0.98 MG/DL (ref 0.55–1.02)
DIFFERENTIAL METHOD BLD: ABNORMAL
EOSINOPHIL # BLD: 0 K/UL (ref 0–0.4)
EOSINOPHIL NFR BLD: 0 % (ref 0–7)
ERYTHROCYTE [DISTWIDTH] IN BLOOD BY AUTOMATED COUNT: 13.4 % (ref 11.5–14.5)
GLOBULIN SER CALC-MCNC: 3.3 G/DL (ref 2–4)
GLUCOSE SERPL-MCNC: 77 MG/DL (ref 65–100)
HCT VFR BLD AUTO: 40.4 % (ref 35–47)
HGB BLD-MCNC: 12.8 G/DL (ref 11.5–16)
IMM GRANULOCYTES # BLD AUTO: 0.1 K/UL (ref 0–0.04)
IMM GRANULOCYTES NFR BLD AUTO: 1 % (ref 0–0.5)
LYMPHOCYTES # BLD: 2.6 K/UL (ref 0.8–3.5)
LYMPHOCYTES NFR BLD: 21 % (ref 12–49)
MCH RBC QN AUTO: 29.7 PG (ref 26–34)
MCHC RBC AUTO-ENTMCNC: 31.7 G/DL (ref 30–36.5)
MCV RBC AUTO: 93.7 FL (ref 80–99)
MONOCYTES # BLD: 0.8 K/UL (ref 0–1)
MONOCYTES NFR BLD: 6 % (ref 5–13)
NEUTS SEG # BLD: 8.7 K/UL (ref 1.8–8)
NEUTS SEG NFR BLD: 71 % (ref 32–75)
NRBC # BLD: 0 K/UL (ref 0–0.01)
NRBC BLD-RTO: 0 PER 100 WBC
PLATELET # BLD AUTO: 278 K/UL (ref 150–400)
PMV BLD AUTO: 9.4 FL (ref 8.9–12.9)
POTASSIUM SERPL-SCNC: 3.7 MMOL/L (ref 3.5–5.1)
PROT SERPL-MCNC: 7.1 G/DL (ref 6.4–8.2)
RBC # BLD AUTO: 4.31 M/UL (ref 3.8–5.2)
SODIUM SERPL-SCNC: 138 MMOL/L (ref 136–145)
WBC # BLD AUTO: 12.3 K/UL (ref 3.6–11)

## 2019-11-03 PROCEDURE — 85025 COMPLETE CBC W/AUTO DIFF WBC: CPT

## 2019-11-03 PROCEDURE — 36415 COLL VENOUS BLD VENIPUNCTURE: CPT

## 2019-11-03 PROCEDURE — 74011250636 HC RX REV CODE- 250/636: Performed by: EMERGENCY MEDICINE

## 2019-11-03 PROCEDURE — 96374 THER/PROPH/DIAG INJ IV PUSH: CPT

## 2019-11-03 PROCEDURE — 99283 EMERGENCY DEPT VISIT LOW MDM: CPT

## 2019-11-03 PROCEDURE — 80053 COMPREHEN METABOLIC PANEL: CPT

## 2019-11-03 PROCEDURE — 74011250637 HC RX REV CODE- 250/637: Performed by: EMERGENCY MEDICINE

## 2019-11-03 RX ORDER — HYDROCORTISONE 25 MG/ML
LOTION TOPICAL 2 TIMES DAILY
Qty: 118 ML | Refills: 0 | Status: SHIPPED | OUTPATIENT
Start: 2019-11-03 | End: 2019-12-13

## 2019-11-03 RX ORDER — CETIRIZINE HCL 10 MG
TABLET ORAL
COMMUNITY
End: 2019-12-13

## 2019-11-03 RX ORDER — DEXAMETHASONE SODIUM PHOSPHATE 4 MG/ML
10 INJECTION, SOLUTION INTRA-ARTICULAR; INTRALESIONAL; INTRAMUSCULAR; INTRAVENOUS; SOFT TISSUE ONCE
Status: COMPLETED | OUTPATIENT
Start: 2019-11-03 | End: 2019-11-03

## 2019-11-03 RX ORDER — HYDROXYZINE 50 MG/1
50 TABLET, FILM COATED ORAL
Qty: 30 TAB | Refills: 0 | Status: SHIPPED | OUTPATIENT
Start: 2019-11-03 | End: 2019-11-13

## 2019-11-03 RX ORDER — HYDROXYZINE 25 MG/1
25 TABLET, FILM COATED ORAL
Status: COMPLETED | OUTPATIENT
Start: 2019-11-03 | End: 2019-11-03

## 2019-11-03 RX ORDER — PREDNISONE 10 MG/1
10 TABLET ORAL
Status: ON HOLD | COMMUNITY
End: 2019-12-20 | Stop reason: SDUPTHER

## 2019-11-03 RX ADMIN — HYDROXYZINE HYDROCHLORIDE 25 MG: 25 TABLET, FILM COATED ORAL at 10:41

## 2019-11-03 RX ADMIN — DEXAMETHASONE SODIUM PHOSPHATE 10 MG: 4 INJECTION, SOLUTION INTRAMUSCULAR; INTRAVENOUS at 10:41

## 2019-11-03 NOTE — DISCHARGE INSTRUCTIONS
Continue prednisone  Hydrocortisone cream  Atarax as needed for itching  Oatmeal bath  Follow up with rheumatology

## 2019-11-03 NOTE — ED PROVIDER NOTES
EMERGENCY DEPARTMENT HISTORY AND PHYSICAL EXAM      Date: 11/3/2019  Patient Name: Kyrie Plasencia  Patient Age and Sex: 70 y.o. female     History of Presenting Illness     Chief Complaint   Patient presents with    Allergic Reaction     generalized hives onset Thursday, pt states she started prednisone 5mg qd and plaquenil 200mg bid 3 weeks ago that was prescribed by rheumatologist, states she was seen by [facility] on Friday and was started on prednisone 20mg, dexamethasone, and pepcid with no relief       History Provided By: Patient and     HPI: Kyrie Plasencia  Is a 74-year-old female with past medical history of autoimmune diseases including rheumatoid arthritis and Sjogren's syndrome presenting today with rash. Patient reports that she has had issues recently with eating because of mouth pain and soreness and was placed on Plaquenil and prednisone by her rheumatologist.  She was on this medication for 3 weeks and then started having hives. She was taken off of the Plaquenil and placed on a low-dose of prednisone. She continued to have worsening of the hives and went to urgent care where her prednisone was increased, and she was treated with Benadryl. Patient reports that her hives have continued to worsen and now cover most of her body. She reports itching associated with this. Denies any shortness of breath, oropharyngeal swelling, nausea, vomiting, diarrhea or abdominal pain. 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 There are no other complaints, changes, or physical findings at this time. PCP: Jose J Villavicencio MD    No current facility-administered medications on file prior to encounter. Current Outpatient Medications on File Prior to Encounter   Medication Sig Dispense Refill    cetirizine (ZYRTEC) 10 mg tablet Take  by mouth.  predniSONE (DELTASONE) 20 mg tablet Take 20 mg by mouth daily (with breakfast).       zolpidem (AMBIEN) 10 mg tablet TAKE 1 TABLET BY MOUTH AT BEDTIME AS NEEDED 30 Tab 0    colestipol (COLESTID) 1 gram tablet TAKE 1 TABLET BY MOUTH TWICE DAILY 180 Tab 0    fenofibrate (LOFIBRA) 160 mg tablet TAKE 1 TABLET BY MOUTH EVERY DAY 90 Tab 3    nabumetone (RELAFEN) 500 mg tablet Take 500 mg by mouth two (2) times a day.  calcium/D3/mag ox//morgan/Zn (CALTRATE + D3 PLUS MINERALS PO) Take  by mouth.  OMEPRAZOLE PO Take  by mouth daily.  [DISCONTINUED] geriatric multivitamins-min (CENTRAL-DANIA) tab Take  by mouth. Past History     Past Medical History:  Past Medical History:   Diagnosis Date    Arthritis     Bacterial pneumonia 6/26/2018    Chronic bilateral low back pain without sciatica 11/20/2018    Hypercholesterolemia     Long term (current) use of anticoagulants     Primary osteoarthritis involving multiple joints 11/20/2018    Sjogren's syndrome ([facility] [address] 11/20/2018    Weight loss, unintentional 11/20/2018       Past Surgical History:  Past Surgical History:   Procedure Laterality Date    HX TONSILLECTOMY         Family History:  Family History   Problem Relation Age of Onset    Heart Disease Maternal Grandfather     Prostate Cancer Father     Prostate Cancer Brother        Social History:  Social History     Tobacco Use    Smoking status: Never Smoker    Smokeless tobacco: Never Used   Substance Use Topics    Alcohol use: Yes     Comment: rarely    Drug use: Not on file       Allergies: Allergies   Allergen Reactions    Diclofenac Other (comments)     Nausea fever and chills    Plaquenil [Hydroxychloroquine] Rash    Sulfa (Sulfonamide Antibiotics) Unknown (comments)         Review of Systems   Constitutional: No  fever,  No  headache  Skin: +  rash, No  jaundice  HEENT: No  nasal congestion, No  eye drainage. Resp: No cough,  No  wheezing  CV: No chest pain, No  palpitations  GI: No vomiting,  No  diarrhea.,  No  constipation  : No dysuria,  No  hematuria  MSK: No joint pain,  No  trauma  Neuro:  No numbness, No  tingling  Psych: No suicidal, No  paranoid      Physical Exam     Patient Vitals for the past 12 hrs:   Temp Pulse Resp BP SpO2   11/03/19 1142  64 16 139/63 99 %   11/03/19 0906 97.5 °F (36.4 °C) 97 16 147/56 100 %       General: alert, No acute distress  Eyes: EOMI, normal conjunctiva  ENT: moist mucous membranes. Neck: Active, full ROM of neck. Skin: Hives over face, chest, back, arms, axilla, and legs            Lungs: Equal chest expansion. no respiratory distress. Heart: regular rate     no peripheral edema    Abd:  non distended soft  Back: Full ROM  MSK: Full, active ROM in all 4 extremities. Neuro: alert  Person, Place, Time and Situation; normal speech;   Psych: Cooperative with exam; Appropriate mood and affect             Diagnostic Study Results     Labs -     Recent Results (from the past 12 hour(s))   CBC WITH AUTOMATED DIFF    Collection Time: 11/03/19 10:44 AM   Result Value Ref Range    WBC 12.3 (H) 3.6 - 11.0 K/uL    RBC 4.31 3.80 - 5.20 M/uL    HGB 12.8 11.5 - 16.0 g/dL    HCT 40.4 35.0 - 47.0 %    MCV 93.7 80.0 - 99.0 FL    MCH 29.7 26.0 - 34.0 PG    MCHC 31.7 30.0 - 36.5 g/dL    RDW 13.4 11.5 - 14.5 %    PLATELET 762 806 - 865 K/uL    MPV 9.4 8.9 - 12.9 FL    NRBC 0.0 0  WBC    ABSOLUTE NRBC 0.00 0.00 - 0.01 K/uL    NEUTROPHILS 71 32 - 75 %    LYMPHOCYTES 21 12 - 49 %    MONOCYTES 6 5 - 13 %    EOSINOPHILS 0 0 - 7 %    BASOPHILS 1 0 - 1 %    IMMATURE GRANULOCYTES 1 (H) 0.0 - 0.5 %    ABS. NEUTROPHILS 8.7 (H) 1.8 - 8.0 K/UL    ABS. LYMPHOCYTES 2.6 0.8 - 3.5 K/UL    ABS. MONOCYTES 0.8 0.0 - 1.0 K/UL    ABS. EOSINOPHILS 0.0 0.0 - 0.4 K/UL    ABS. BASOPHILS 0.1 0.0 - 0.1 K/UL    ABS. IMM.  GRANS. 0.1 (H) 0.00 - 0.04 K/UL    DF AUTOMATED     METABOLIC PANEL, COMPREHENSIVE    Collection Time: 11/03/19 10:44 AM   Result Value Ref Range    Sodium 138 136 - 145 mmol/L    Potassium 3.7 3.5 - 5.1 mmol/L    Chloride 105 97 - 108 mmol/L    CO2 28 21 - 32 mmol/L    Anion gap 5 5 - 15 mmol/L    Glucose 77 65 - 100 mg/dL    BUN 15 6 - 20 MG/DL    Creatinine 0.98 0.55 - 1.02 MG/DL    BUN/Creatinine ratio 15 12 - 20      GFR est AA >60 >60 ml/min/1.73m2    GFR est non-AA 56 (L) >60 ml/min/1.73m2    Calcium 8.9 8.5 - 10.1 MG/DL    Bilirubin, total 0.4 0.2 - 1.0 MG/DL    ALT (SGPT) 26 12 - 78 U/L    AST (SGOT) 33 15 - 37 U/L    Alk. phosphatase 35 (L) 45 - 117 U/L    Protein, total 7.1 6.4 - 8.2 g/dL    Albumin 3.8 3.5 - 5.0 g/dL    Globulin 3.3 2.0 - 4.0 g/dL    A-G Ratio 1.2 1.1 - 2.2         Radiologic Studies -   No orders to display     CT Results  (Last 48 hours)    None        CXR Results  (Last 48 hours)    None            Medical Decision Making     Differential Diagnosis: Hives, allergic reaction, Sjogren's, T cell lymphoma    I reviewed the vital signs, available nursing notes, past medical history, past surgical history, family history and social history and old medical records. On my interpretation, Laboratory workup is significant for slightly elevated white blood cell count, otherwise unremarkable laboratory work-up    Management/ED course: 58-year-old female with past medical history of rheumatoid arthritis and Sjogren's syndrome presenting with treatment resistant hives. She has been on steroids, stop the Plaquenil which she thinks may be the culprit, has been using topical agents, and Benadryl as needed. Laboratory work-up is unremarkable. She does have a prior history of T-cell lymphoma with skin manifestations, but has no abnormalities on her CBC. I treated her with Atarax and additional DEXA Methasone. She will continue prednisone, topical agents as an outpatient, and I did give her a prescription for Atarax. Encouraged her to follow-up with her rheumatologist.  Patient is comfortable with this plan           Dispo: Discharged. The patient has been re-evaluated and is ready for discharge. Reviewed available results with patient.  Counseled patient on diagnosis and care plan. Patient has expressed understanding, and all questions have been answered. Patient agrees with plan and agrees to follow up as recommended, or to return to the ED if their symptoms worsen. Discharge instructions have been provided and explained to the patient, along with reasons to return to the ED. PLAN:  Discharge Medication List as of 11/3/2019 11:46 AM      START taking these medications    Details   hydrOXYzine HCl (ATARAX) 50 mg tablet Take 1 Tab by mouth every six (6) hours as needed for Itching for up to 10 days. , Normal, Disp-30 Tab, R-0      hydrocortisone (HYTONE) 2.5 % lotion Apply  to affected area two (2) times a day. use thin layer, Normal, Disp-118 mL, R-0         CONTINUE these medications which have NOT CHANGED    Details   cetirizine (ZYRTEC) 10 mg tablet Take  by mouth., Historical Med      predniSONE (DELTASONE) 20 mg tablet Take 20 mg by mouth daily (with breakfast). , Historical Med      zolpidem (AMBIEN) 10 mg tablet TAKE 1 TABLET BY MOUTH AT BEDTIME AS NEEDED, PrintThis request is for a new prescription for a controlled substance as required by Federal/State law. Disp-30 Tab, R-0      colestipol (COLESTID) 1 gram tablet TAKE 1 TABLET BY MOUTH TWICE DAILY, Normal, Disp-180 Tab, R-0      fenofibrate (LOFIBRA) 160 mg tablet TAKE 1 TABLET BY MOUTH EVERY DAY, Normal, Disp-90 Tab, R-3      nabumetone (RELAFEN) 500 mg tablet Take 500 mg by mouth two (2) times a day., Historical Med      calcium/D3/mag ox//morgan/Zn (CALTRATE + D3 PLUS MINERALS PO) Take  by mouth., Historical Med      OMEPRAZOLE PO Take  by mouth daily. , Historical Med           2. Follow-up Information     Follow up With Specialties Details Why Contact Info    Rheumatology            3. Return to ED if worse     Diagnosis     Clinical Impression:   1.  Hives        Attestations:    Jason Urbina MD

## 2019-12-07 ENCOUNTER — HOSPITAL ENCOUNTER (EMERGENCY)
Age: 69
Discharge: HOME | End: 2019-12-07
Attending: EMERGENCY MEDICINE
Payer: MEDICARE

## 2019-12-07 ENCOUNTER — APPOINTMENT (OUTPATIENT)
Dept: GENERAL RADIOLOGY | Age: 69
End: 2019-12-07
Attending: EMERGENCY MEDICINE
Payer: MEDICARE

## 2019-12-07 VITALS
SYSTOLIC BLOOD PRESSURE: 122 MMHG | DIASTOLIC BLOOD PRESSURE: 76 MMHG | HEIGHT: 66 IN | HEART RATE: 88 BPM | BODY MASS INDEX: 19.77 KG/M2 | WEIGHT: 123.02 LBS | RESPIRATION RATE: 17 BRPM | OXYGEN SATURATION: 94 % | TEMPERATURE: 100.8 F

## 2019-12-07 DIAGNOSIS — Z87.440 HISTORY OF UTI: ICD-10-CM

## 2019-12-07 DIAGNOSIS — E87.1 ACUTE HYPONATREMIA: ICD-10-CM

## 2019-12-07 DIAGNOSIS — R50.9 ACUTE FEBRILE ILLNESS: ICD-10-CM

## 2019-12-07 DIAGNOSIS — J18.9 PNEUMONIA OF BOTH LUNGS DUE TO INFECTIOUS ORGANISM, UNSPECIFIED PART OF LUNG: Primary | ICD-10-CM

## 2019-12-07 LAB
ALBUMIN SERPL-MCNC: 2.8 G/DL (ref 3.5–5)
ALBUMIN/GLOB SERPL: 0.8 {RATIO} (ref 1.1–2.2)
ALP SERPL-CCNC: 39 U/L (ref 45–117)
ALT SERPL-CCNC: 18 U/L (ref 12–78)
ANION GAP SERPL CALC-SCNC: 4 MMOL/L (ref 5–15)
APPEARANCE UR: CLEAR
AST SERPL-CCNC: 20 U/L (ref 15–37)
BACTERIA URNS QL MICRO: NEGATIVE /HPF
BASOPHILS # BLD: 0 K/UL (ref 0–0.1)
BASOPHILS NFR BLD: 0 % (ref 0–1)
BILIRUB SERPL-MCNC: 0.5 MG/DL (ref 0.2–1)
BILIRUB UR QL: NEGATIVE
BUN SERPL-MCNC: 13 MG/DL (ref 6–20)
BUN/CREAT SERPL: 18 (ref 12–20)
CALCIUM SERPL-MCNC: 8.4 MG/DL (ref 8.5–10.1)
CHLORIDE SERPL-SCNC: 105 MMOL/L (ref 97–108)
CO2 SERPL-SCNC: 26 MMOL/L (ref 21–32)
COLOR UR: NORMAL
CREAT SERPL-MCNC: 0.73 MG/DL (ref 0.55–1.02)
DIFFERENTIAL METHOD BLD: ABNORMAL
EOSINOPHIL # BLD: 0.1 K/UL (ref 0–0.4)
EOSINOPHIL NFR BLD: 2 % (ref 0–7)
EPITH CASTS URNS QL MICRO: NORMAL /LPF
ERYTHROCYTE [DISTWIDTH] IN BLOOD BY AUTOMATED COUNT: 13.7 % (ref 11.5–14.5)
FLUAV AG NPH QL IA: NEGATIVE
FLUBV AG NOSE QL IA: NEGATIVE
GLOBULIN SER CALC-MCNC: 3.4 G/DL (ref 2–4)
GLUCOSE SERPL-MCNC: 105 MG/DL (ref 65–100)
GLUCOSE UR STRIP.AUTO-MCNC: NEGATIVE MG/DL
HCT VFR BLD AUTO: 36.2 % (ref 35–47)
HGB BLD-MCNC: 11.9 G/DL (ref 11.5–16)
HGB UR QL STRIP: NEGATIVE
HYALINE CASTS URNS QL MICRO: NORMAL /LPF (ref 0–5)
IMM GRANULOCYTES # BLD AUTO: 0 K/UL (ref 0–0.04)
IMM GRANULOCYTES NFR BLD AUTO: 0 % (ref 0–0.5)
KETONES UR QL STRIP.AUTO: NEGATIVE MG/DL
LACTATE BLD-SCNC: 0.73 MMOL/L (ref 0.4–2)
LEUKOCYTE ESTERASE UR QL STRIP.AUTO: NEGATIVE
LYMPHOCYTES # BLD: 0.7 K/UL (ref 0.8–3.5)
LYMPHOCYTES NFR BLD: 12 % (ref 12–49)
MCH RBC QN AUTO: 29.5 PG (ref 26–34)
MCHC RBC AUTO-ENTMCNC: 32.9 G/DL (ref 30–36.5)
MCV RBC AUTO: 89.6 FL (ref 80–99)
MONOCYTES # BLD: 0.4 K/UL (ref 0–1)
MONOCYTES NFR BLD: 6 % (ref 5–13)
NEUTS SEG # BLD: 5 K/UL (ref 1.8–8)
NEUTS SEG NFR BLD: 80 % (ref 32–75)
NITRITE UR QL STRIP.AUTO: NEGATIVE
NRBC # BLD: 0 K/UL (ref 0–0.01)
NRBC BLD-RTO: 0 PER 100 WBC
PH UR STRIP: 6.5 [PH] (ref 5–8)
PLATELET # BLD AUTO: 255 K/UL (ref 150–400)
PMV BLD AUTO: 8.8 FL (ref 8.9–12.9)
POTASSIUM SERPL-SCNC: 3.6 MMOL/L (ref 3.5–5.1)
PROT SERPL-MCNC: 6.2 G/DL (ref 6.4–8.2)
PROT UR STRIP-MCNC: NEGATIVE MG/DL
RBC # BLD AUTO: 4.04 M/UL (ref 3.8–5.2)
RBC #/AREA URNS HPF: NORMAL /HPF (ref 0–5)
RBC MORPH BLD: ABNORMAL
SODIUM SERPL-SCNC: 135 MMOL/L (ref 136–145)
SP GR UR REFRACTOMETRY: 1.01 (ref 1–1.03)
UA: UC IF INDICATED,UAUC: NORMAL
UROBILINOGEN UR QL STRIP.AUTO: 1 EU/DL (ref 0.2–1)
WBC # BLD AUTO: 6.2 K/UL (ref 3.6–11)
WBC URNS QL MICRO: NORMAL /HPF (ref 0–4)

## 2019-12-07 PROCEDURE — 74011250636 HC RX REV CODE- 250/636: Performed by: EMERGENCY MEDICINE

## 2019-12-07 PROCEDURE — 36415 COLL VENOUS BLD VENIPUNCTURE: CPT

## 2019-12-07 PROCEDURE — 87804 INFLUENZA ASSAY W/OPTIC: CPT

## 2019-12-07 PROCEDURE — 87040 BLOOD CULTURE FOR BACTERIA: CPT

## 2019-12-07 PROCEDURE — 96374 THER/PROPH/DIAG INJ IV PUSH: CPT

## 2019-12-07 PROCEDURE — 99285 EMERGENCY DEPT VISIT HI MDM: CPT

## 2019-12-07 PROCEDURE — 74011000258 HC RX REV CODE- 258: Performed by: EMERGENCY MEDICINE

## 2019-12-07 PROCEDURE — 83605 ASSAY OF LACTIC ACID: CPT

## 2019-12-07 PROCEDURE — 81001 URINALYSIS AUTO W/SCOPE: CPT

## 2019-12-07 PROCEDURE — 80053 COMPREHEN METABOLIC PANEL: CPT

## 2019-12-07 PROCEDURE — 71046 X-RAY EXAM CHEST 2 VIEWS: CPT

## 2019-12-07 PROCEDURE — 74011250637 HC RX REV CODE- 250/637: Performed by: EMERGENCY MEDICINE

## 2019-12-07 PROCEDURE — 93005 ELECTROCARDIOGRAM TRACING: CPT

## 2019-12-07 PROCEDURE — 85025 COMPLETE CBC W/AUTO DIFF WBC: CPT

## 2019-12-07 RX ORDER — NAPROXEN 500 MG/1
500 TABLET ORAL 2 TIMES DAILY WITH MEALS
Qty: 20 TAB | Refills: 0 | Status: SHIPPED | OUTPATIENT
Start: 2019-12-07 | End: 2020-03-09

## 2019-12-07 RX ORDER — LEVOFLOXACIN 750 MG/1
750 TABLET ORAL DAILY
Qty: 5 TAB | Refills: 0 | Status: SHIPPED | OUTPATIENT
Start: 2019-12-07 | End: 2019-12-13

## 2019-12-07 RX ORDER — SODIUM CHLORIDE 0.9 % (FLUSH) 0.9 %
5-10 SYRINGE (ML) INJECTION AS NEEDED
Status: DISCONTINUED | OUTPATIENT
Start: 2019-12-07 | End: 2019-12-08 | Stop reason: HOSPADM

## 2019-12-07 RX ORDER — AZITHROMYCIN 250 MG/1
500 TABLET, FILM COATED ORAL
Status: COMPLETED | OUTPATIENT
Start: 2019-12-07 | End: 2019-12-07

## 2019-12-07 RX ORDER — ACETAMINOPHEN 325 MG/1
975 TABLET ORAL
Status: COMPLETED | OUTPATIENT
Start: 2019-12-07 | End: 2019-12-07

## 2019-12-07 RX ADMIN — CEFTRIAXONE 1 G: 1 INJECTION, POWDER, FOR SOLUTION INTRAMUSCULAR; INTRAVENOUS at 22:52

## 2019-12-07 RX ADMIN — ACETAMINOPHEN 975 MG: 325 TABLET ORAL at 22:58

## 2019-12-07 RX ADMIN — AZITHROMYCIN MONOHYDRATE 500 MG: 250 TABLET ORAL at 22:58

## 2019-12-07 RX ADMIN — SODIUM CHLORIDE 1000 ML: 900 INJECTION, SOLUTION INTRAVENOUS at 21:27

## 2019-12-08 LAB
ATRIAL RATE: 91 BPM
CALCULATED P AXIS, ECG09: 64 DEGREES
CALCULATED R AXIS, ECG10: 69 DEGREES
CALCULATED T AXIS, ECG11: 49 DEGREES
DIAGNOSIS, 93000: NORMAL
P-R INTERVAL, ECG05: 142 MS
Q-T INTERVAL, ECG07: 340 MS
QRS DURATION, ECG06: 76 MS
QTC CALCULATION (BEZET), ECG08: 418 MS
VENTRICULAR RATE, ECG03: 91 BPM

## 2019-12-08 RX ORDER — AMOXICILLIN AND CLAVULANATE POTASSIUM 875; 125 MG/1; MG/1
1 TABLET, FILM COATED ORAL 2 TIMES DAILY
Qty: 14 TAB | Refills: 0 | Status: SHIPPED | OUTPATIENT
Start: 2019-12-08 | End: 2019-12-20

## 2019-12-08 RX ORDER — AZITHROMYCIN 250 MG/1
250 TABLET, FILM COATED ORAL DAILY
Qty: 4 TAB | Refills: 0 | Status: SHIPPED | OUTPATIENT
Start: 2019-12-08 | End: 2019-12-13 | Stop reason: ALTCHOICE

## 2019-12-08 NOTE — ED NOTES
Patient arrives to ED with complaints of fever. Patient states that she recently was diagnosed with bilateral pneumonia and a UTI at [facility]. Patient denies urinary symptoms at this time but states that she noticed a fever of 104 F at home this afternoon. Patient is in no apparent distress. Placed on the monitor x3.  at bedside.

## 2019-12-08 NOTE — DISCHARGE INSTRUCTIONS
Thank you for allowing us to take care of you today! We hope we addressed all of your concerns and needs. We strive to provide excellent quality care in the Emergency Department. You will receive a survey after your visit to evaluate the care you were provided. Should you receive a survey from us, we invite you to share your experience and tell us what made it excellent. It was a pleasure serving you, we invite you to share your experience with us, in our pursuit for excellence, should you be selected to receive a survey. The exam and treatment you received in the Emergency Department were for an urgent problem and are not intended as complete care. It is important that you follow up with a doctor, nurse practitioner, or physician assistant for ongoing care. If your symptoms become worse or you do not improve as expected and you are unable to reach your usual health care provider, you should return to the Emergency Department. We are available 24 hours a day. Please take your discharge instructions with you when you go to your follow-up appointment. If you have any problem arranging a follow-up appointment, contact the Emergency Department immediately. If a prescription has been provided, please have it filled as soon as possible to prevent a delay in treatment. Read the entire medication instruction sheet provided to you by the pharmacy. If you have any questions or reservations about taking the medication due to side effects or interactions with other medications, please call your primary care physician or contact the ER to speak with the charge nurse. Make an appointment with your family doctor or the physician you were referred to for follow-up of this visit as instructed on your discharge paperwork, as this is mandatory follow-up. Return to the ER if you are unable to be seen or if you are unable to be seen in a timely manner.     If you have any problem arranging the follow-up visit, contact the Emergency Department immediately. I hope you feel better and thank you again for allow us to provide you with excellent care today at [facility]! Warmest regards,    Miguel Lim MD  Emergency Medicine Physician  [facility]        _____________________________________________________________________________________________________________    Vitals:    12/07/19 2021 12/07/19 2022 12/07/19 2032 12/07/19 2305   BP: 96/55  107/57 122/76   BP 1 Location:    Right arm   BP Patient Position:    Supine   Pulse:  91 87 88   Resp:  20 20 17   Temp:    (!) 100.8 °F (38.2 °C)   SpO2:  93% 95% 94%   Weight:       Height:           Recent Results (from the past 12 hour(s))   EKG, 12 LEAD, INITIAL    Collection Time: 12/07/19  7:50 PM   Result Value Ref Range    Ventricular Rate 91 BPM    Atrial Rate 91 BPM    P-R Interval 142 ms    QRS Duration 76 ms    Q-T Interval 340 ms    QTC Calculation (Bezet) 418 ms    Calculated P Axis 64 degrees    Calculated R Axis 69 degrees    Calculated T Axis 49 degrees    Diagnosis       Normal sinus rhythm with sinus arrhythmia  Normal ECG  No previous ECGs available     CBC WITH AUTOMATED DIFF    Collection Time: 12/07/19  8:30 PM   Result Value Ref Range    WBC 6.2 3.6 - 11.0 K/uL    RBC 4.04 3.80 - 5.20 M/uL    HGB 11.9 11.5 - 16.0 g/dL    HCT 36.2 35.0 - 47.0 %    MCV 89.6 80.0 - 99.0 FL    MCH 29.5 26.0 - 34.0 PG    MCHC 32.9 30.0 - 36.5 g/dL    RDW 13.7 11.5 - 14.5 %    PLATELET 066 680 - 186 K/uL    MPV 8.8 (L) 8.9 - 12.9 FL    NRBC 0.0 0  WBC    ABSOLUTE NRBC 0.00 0.00 - 0.01 K/uL    NEUTROPHILS 80 (H) 32 - 75 %    LYMPHOCYTES 12 12 - 49 %    MONOCYTES 6 5 - 13 %    EOSINOPHILS 2 0 - 7 %    BASOPHILS 0 0 - 1 %    IMMATURE GRANULOCYTES 0 0.0 - 0.5 %    ABS. NEUTROPHILS 5.0 1.8 - 8.0 K/UL    ABS. LYMPHOCYTES 0.7 (L) 0.8 - 3.5 K/UL    ABS. MONOCYTES 0.4 0.0 - 1.0 K/UL    ABS.  EOSINOPHILS 0.1 0.0 - 0.4 K/UL    ABS. BASOPHILS 0.0 0.0 - 0.1 K/UL    ABS. IMM. GRANS. 0.0 0.00 - 0.04 K/UL    DF AUTOMATED      RBC COMMENTS NORMOCYTIC, NORMOCHROMIC     METABOLIC PANEL, COMPREHENSIVE    Collection Time: 12/07/19  8:30 PM   Result Value Ref Range    Sodium 135 (L) 136 - 145 mmol/L    Potassium 3.6 3.5 - 5.1 mmol/L    Chloride 105 97 - 108 mmol/L    CO2 26 21 - 32 mmol/L    Anion gap 4 (L) 5 - 15 mmol/L    Glucose 105 (H) 65 - 100 mg/dL    BUN 13 6 - 20 MG/DL    Creatinine 0.73 0.55 - 1.02 MG/DL    BUN/Creatinine ratio 18 12 - 20      GFR est AA >60 >60 ml/min/1.73m2    GFR est non-AA >60 >60 ml/min/1.73m2    Calcium 8.4 (L) 8.5 - 10.1 MG/DL    Bilirubin, total 0.5 0.2 - 1.0 MG/DL    ALT (SGPT) 18 12 - 78 U/L    AST (SGOT) 20 15 - 37 U/L    Alk.  phosphatase 39 (L) 45 - 117 U/L    Protein, total 6.2 (L) 6.4 - 8.2 g/dL    Albumin 2.8 (L) 3.5 - 5.0 g/dL    Globulin 3.4 2.0 - 4.0 g/dL    A-G Ratio 0.8 (L) 1.1 - 2.2     INFLUENZA A & B AG (RAPID TEST)    Collection Time: 12/07/19  8:30 PM   Result Value Ref Range    Influenza A Antigen NEGATIVE  NEG      Influenza B Antigen NEGATIVE  NEG     POC LACTIC ACID    Collection Time: 12/07/19  8:48 PM   Result Value Ref Range    Lactic Acid (POC) 0.73 0.40 - 2.00 mmol/L   URINALYSIS W/ REFLEX CULTURE    Collection Time: 12/07/19 10:45 PM   Result Value Ref Range    Color YELLOW/STRAW      Appearance CLEAR CLEAR      Specific gravity 1.009 1.003 - 1.030      pH (UA) 6.5 5.0 - 8.0      Protein NEGATIVE  NEG mg/dL    Glucose NEGATIVE  NEG mg/dL    Ketone NEGATIVE  NEG mg/dL    Bilirubin NEGATIVE  NEG      Blood NEGATIVE  NEG      Urobilinogen 1.0 0.2 - 1.0 EU/dL    Nitrites NEGATIVE  NEG      Leukocyte Esterase NEGATIVE  NEG      WBC 0-4 0 - 4 /hpf    RBC 0-5 0 - 5 /hpf    Epithelial cells FEW FEW /lpf    Bacteria NEGATIVE  NEG /hpf    UA:UC IF INDICATED CULTURE NOT INDICATED BY UA RESULT CNI      Hyaline cast 2-5 0 - 5 /lpf       XR CHEST PA LAT   Final Result   IMPRESSION: Bilateral pneumonia given the clinical history. Recommend followup PA and   lateral chest views in 8-10 weeks to ensure resolution.                CT Results  (Last 48 hours)    None          Local Primary Care Physicians   [facility] [phone]  MD Kashif Mcdonald MD Margot Petties, MD [facility] Doctors [phone]  Rick Friedman, [facility]  MD Pablo Faye MD Parks Balloon, MD [address] [facility] [facility] [phone]  [facility], [place] [address] [facility] [phone]  MD Edwin Song, MD Sarah Serna MD   [facility] [phone]  [facility]AVU VR, MD Sheryle Button, MD Wylene Cass, NP [address] [phone]  MD Benjamín Nicholas MD Hosea Paddock, MD Janeal Helms, MD Vivianne Latin, MD Cydne Pitter, MD Erwin Forehand, MD   [address] [facility]  Dakota Riley MD [facility] [phone]  MD Tania Antunez, NP  MD Luci Sylvester MD Violeta Flower, MD Mel Sheng, MD Floyce Lister, MD   [address] [facility] [phone]  MD Leena An, [facility]  Anne Marie Slade, NP  MD Amandeep Moreno MD Everlena Finch, MD Edelmira Fan, MD [facility] [phone]  MD Rosemary Gunter MD Kenton Brightly, MD Vassie Peak, MD Llewellyn Actis, MD   [address] [phone]  MD Alexey Talamantes MD [place] [phone]  MD Adrian Ramirez MD Alta Gull, MD   [facility] Physicians [phone]  Sara Shake, MD  [facility] Shavon Osborn MD Janora Kand, MD Froylan Searles, MD Tyrus Cheeks, PATEL Delacruz MD [address] [phone]  MD Mart Huertas MD  [facility] Kelly Marcano, [address] [facility] [phone]  MD Amber Monsivais, SHE Hanson, SHE Younger MD Ardyth Sievert, PATEL Sky DO   Miscellaneous:  Genet Mcgovern MD [facility] [place] [facility]   For adult and child immunizations, family planning, TB screening, STD testing and women's health services. [facility]: [place] [phone]     [address] 1822   [facility]   [address] [place] [address]: [place] [facility] [address] [phone] [address] [place] [address]        [address] [address]  For primary care services, woman and child wellness, and some clinics providing specialty care. [facility] -- [address] [facility]. [address] [place] [address] [phone]   [address] [place] [address] [address] [facility] [address] [place] [address] [phone]   [address] [place] [address] [address]  [phone] [address] [address]  [phone]   [address] [place] [address] [phone]   [facility] [address] [facility] [phone]   [facility] [address] [place] [address] [phone]   [facility]: [address] [place] [address], [address]     [place] [address]  Community  [phone]   [employer]  [address] ([email or web address])         Sexual Health/Woman Wellness Clinics   For STD/HIV testing and treatment, pregnancy testing and services, men's health, birth control services, LGBT services, and hepatitis/HPV vaccine services. [facility] [employer] [address] [facility] [address] [facility] [address] [phone] [address], 5th floor [phone]   Pregnancy [address] [phone]        [address] [address] [facility]   [phone]   [place]   [phone]   Women, Infant and Children's Services: [address] [phone]       [facility] [address]    [phone]   [address] [facility]   [phone]   [address]   [address]       Patient Education        Pneumonia: Care Instructions  Your Care Instructions    Pneumonia is an infection of the lungs. Most cases are caused by infections from bacteria or viruses. Pneumonia may be mild or very severe. If it is caused by bacteria, you will be treated with antibiotics. It may take a few weeks to a few months to recover fully from pneumonia, depending on how sick you were and whether your overall health is good. Follow-up care is a key part of your treatment and safety. Be sure to make and go to all appointments, and call your doctor if you are having problems. It's also a good idea to know your test results and keep a list of the medicines you take. How can you care for yourself at home? · Take your antibiotics exactly as directed. Do not stop taking the medicine just because you are feeling better. You need to take the full course of antibiotics. · Take your medicines exactly as prescribed. Call your doctor if you think you are having a problem with your medicine. · Get plenty of rest and sleep. You may feel weak and tired for a while, but your energy level will improve with time. · To prevent dehydration, drink plenty of fluids, enough so that your urine is light yellow or clear like water.  Choose water and other caffeine-free clear liquids until you feel better. If you have kidney, heart, or liver disease and have to limit fluids, talk with your doctor before you increase the amount of fluids you drink. · Take care of your cough so you can rest. A cough that brings up mucus from your lungs is common with pneumonia. It is one way your body gets rid of the infection. But if coughing keeps you from resting or causes severe fatigue and chest-wall pain, talk to your doctor. He or she may suggest that you take a medicine to reduce the cough. · Use a vaporizer or humidifier to add moisture to your bedroom. Follow the directions for cleaning the machine. · Do not smoke or allow others to smoke around you. Smoke will make your cough last longer. If you need help quitting, talk to your doctor about stop-smoking programs and medicines. These can increase your chances of quitting for good. · Take an over-the-counter pain medicine, such as acetaminophen (Tylenol), ibuprofen (Advil, Motrin), or naproxen (Aleve). Read and follow all instructions on the label. · Do not take two or more pain medicines at the same time unless the doctor told you to. Many pain medicines have acetaminophen, which is Tylenol. Too much acetaminophen (Tylenol) can be harmful. · If you were given a spirometer to measure how well your lungs are working, use it as instructed. This can help your doctor tell how your recovery is going. · To prevent pneumonia in the future, talk to your doctor about getting a flu vaccine (once a year) and a pneumococcal vaccine (one time only for most people). When should you call for help? Call 911 anytime you think you may need emergency care.  For example, call if:    · You have severe trouble breathing.    Call your doctor now or seek immediate medical care if:    · You cough up dark brown or bloody mucus (sputum).     · You have new or worse trouble breathing.     · You are dizzy or lightheaded, or you feel like you may faint.    Watch closely for changes in your health, and be sure to contact your doctor if:    · You have a new or higher fever.     · You are coughing more deeply or more often.     · You are not getting better after 2 days (48 hours).     · You do not get better as expected. Where can you learn more? Go to http://felipe-niesha.info/. Enter [id number] in the search box to learn more about \"Pneumonia: Care Instructions. \"  Current as of: June 9, 2019  Content Version: 12.2  © 0042-8627 [employer]. Care instructions adapted under license by [employer] (which disclaims liability or warranty for this information). If you have questions about a medical condition or this instruction, always ask your healthcare professional. [address] any warranty or liability for your use of this information.

## 2019-12-08 NOTE — ED NOTES
Dr. Coral Jeong has reviewed discharge instructions with the patient. The patient verbalized understanding. Patient ambulatory out of ED with papers.

## 2019-12-08 NOTE — PROGRESS NOTES
Re: Ceftriaxone ([facility] approved dose change has been made on this patient)    Indication: URI  Ordered dose: 2 mg IV x1 dose  Pharmacy ordered dose change: 1 gm IV x1 per pharmacy protocol    Thanks,  Tony Cunningham, [facility]

## 2019-12-08 NOTE — ED PROVIDER NOTES
EMERGENCY DEPARTMENT HISTORY AND PHYSICAL EXAM      Please note that this dictation was completed with Ajaline, the computer voice recognition software. Quite often unanticipated grammatical, syntax, homophones, and other interpretive errors are inadvertently transcribed by the computer software. Please disregard these errors and any errors that have escaped final proofreading. Thank you. Date: 12/7/2019  Patient Name: Genevieve Head  Patient Age and Sex: 78 y.o. female    History of Presenting Illness     Chief Complaint   Patient presents with    Fever     Patient presents after being seen at her PCP x2 this week with dx of pnemonia and UTI on antibiotics for both       History Provided By: Patient    HPI: Genevieve Head, 78 y.o. female with past medical history as documented below presents to the ED with c/o of persistent fever since Wednesday. Patient reports going to urgent care on Wednesday and was diagnosed with a urinary tract infection. She was then given prescription for Keflex. She has, however, denied any urinary tract symptoms at that time. She did went back on Friday given the fact that she did have persistent fevers of 101 °F.  She was then diagnosed with a pneumonia on chest x-ray and was started on cefdinir 300 mg twice a day. She started this dose on December 6th. She presents to the ER due to persistent fevers, body aches and cough. Of note, she does have a history of rheumatoid arthritis on immunosuppression therapy. She brings with her a discharge paperwork from urgent care with x-ray findings reading \"bilateral opacities may represent multifocal infectious process. \" Pt denies any other alleviating or exacerbating factors. Additionally, pt specifically denies any recent headache, nausea, vomiting, abdominal pain, CP, SOB, lightheadedness, dizziness, numbness, weakness, BLE swelling, heart palpitations, diarrhea, constipation, melena, hematochezia.     There are no other complaints, changes or physical findings at this time. PCP: Lianne Koyanagi, MD    Past History   Past Medical History:  Past Medical History:   Diagnosis Date    Arthritis     Bacterial pneumonia 6/26/2018    Chronic bilateral low back pain without sciatica 11/20/2018    Hypercholesterolemia     Long term (current) use of anticoagulants     Primary osteoarthritis involving multiple joints 11/20/2018    Sjogren's syndrome ([address] 11/20/2018    Weight loss, unintentional 11/20/2018     Past Surgical History:  Past Surgical History:   Procedure Laterality Date    HX TONSILLECTOMY       Family History:  Family History   Problem Relation Age of Onset    Heart Disease Maternal Grandfather     Prostate Cancer Father     Prostate Cancer Brother      Social History:  Social History     Tobacco Use    Smoking status: Never Smoker    Smokeless tobacco: Never Used   Substance Use Topics    Alcohol use: Yes     Comment: rarely    Drug use: Not on file       Allergies: Allergies   Allergen Reactions    Diclofenac Other (comments)     Nausea fever and chills    Plaquenil [Hydroxychloroquine] Rash    Sulfa (Sulfonamide Antibiotics) Unknown (comments)       Current Medications:  No current facility-administered medications on file prior to encounter. Current Outpatient Medications on File Prior to Encounter   Medication Sig Dispense Refill    cetirizine (ZYRTEC) 10 mg tablet Take  by mouth.  predniSONE (DELTASONE) 20 mg tablet Take 20 mg by mouth daily (with breakfast).  hydrocortisone (HYTONE) 2.5 % lotion Apply  to affected area two (2) times a day.  use thin layer 118 mL 0    zolpidem (AMBIEN) 10 mg tablet TAKE 1 TABLET BY MOUTH AT BEDTIME AS NEEDED 30 Tab 0    colestipol (COLESTID) 1 gram tablet TAKE 1 TABLET BY MOUTH TWICE DAILY 180 Tab 0    fenofibrate (LOFIBRA) 160 mg tablet TAKE 1 TABLET BY MOUTH EVERY DAY 90 Tab 3    nabumetone (RELAFEN) 500 mg tablet Take 500 mg by mouth two (2) times a day.      calcium/D3/mag ox//morgan/Zn (CALTRATE + D3 PLUS MINERALS PO) Take  by mouth.  OMEPRAZOLE PO Take  by mouth daily. Review of Systems   Review of Systems   Constitutional: Positive for chills and fever. HENT: Positive for congestion. Negative for facial swelling, rhinorrhea, sore throat, trouble swallowing and voice change. Eyes: Negative. Respiratory: Positive for cough. Negative for apnea, chest tightness, shortness of breath and wheezing. Cardiovascular: Negative. Negative for chest pain, palpitations and leg swelling. Gastrointestinal: Negative. Negative for abdominal distention, abdominal pain, blood in stool, constipation, diarrhea, nausea and vomiting. Endocrine: Negative. Negative for cold intolerance, heat intolerance and polyuria. Genitourinary: Negative. Negative for difficulty urinating, dysuria, flank pain, frequency, hematuria and urgency. Musculoskeletal: Negative. Negative for arthralgias, back pain, myalgias, neck pain and neck stiffness. Skin: Negative. Negative for color change and rash. Neurological: Negative. Negative for dizziness, syncope, facial asymmetry, speech difficulty, weakness, light-headedness, numbness and headaches. Hematological: Negative. Does not bruise/bleed easily. Psychiatric/Behavioral: Negative. Negative for confusion and self-injury. The patient is not nervous/anxious. Physical Exam   Physical Exam  Vitals signs and nursing note reviewed. Constitutional:       General: She is not in acute distress. Appearance: She is well-developed. She is not diaphoretic. HENT:      Head: Normocephalic and atraumatic. Mouth/Throat:      Pharynx: No oropharyngeal exudate. Eyes:      Conjunctiva/sclera: Conjunctivae normal.      Pupils: Pupils are equal, round, and reactive to light. Neck:      Musculoskeletal: Normal range of motion. Cardiovascular:      Rate and Rhythm: Normal rate and regular rhythm. Heart sounds: Normal heart sounds. No murmur. No friction rub. No gallop. Pulmonary:      Effort: Pulmonary effort is normal. No respiratory distress. Breath sounds: Normal breath sounds. No wheezing or rales. Chest:      Chest wall: No tenderness. Abdominal:      General: Bowel sounds are normal. There is no distension. Palpations: Abdomen is soft. There is no mass. Tenderness: There is no tenderness. There is no guarding or rebound. Musculoskeletal: Normal range of motion. General: No tenderness or deformity. Skin:     General: Skin is warm. Findings: No rash. Neurological:      Mental Status: She is alert and oriented to person, place, and time. Cranial Nerves: No cranial nerve deficit. Motor: No abnormal muscle tone.       Coordination: Coordination normal.      Deep Tendon Reflexes: Reflexes normal.         Diagnostic Study Results     Labs -  Recent Results (from the past 24 hour(s))   EKG, 12 LEAD, INITIAL    Collection Time: 12/07/19  7:50 PM   Result Value Ref Range    Ventricular Rate 91 BPM    Atrial Rate 91 BPM    P-R Interval 142 ms    QRS Duration 76 ms    Q-T Interval 340 ms    QTC Calculation (Bezet) 418 ms    Calculated P Axis 64 degrees    Calculated R Axis 69 degrees    Calculated T Axis 49 degrees    Diagnosis       Normal sinus rhythm with sinus arrhythmia  Normal ECG  No previous ECGs available     CBC WITH AUTOMATED DIFF    Collection Time: 12/07/19  8:30 PM   Result Value Ref Range    WBC 6.2 3.6 - 11.0 K/uL    RBC 4.04 3.80 - 5.20 M/uL    HGB 11.9 11.5 - 16.0 g/dL    HCT 36.2 35.0 - 47.0 %    MCV 89.6 80.0 - 99.0 FL    MCH 29.5 26.0 - 34.0 PG    MCHC 32.9 30.0 - 36.5 g/dL    RDW 13.7 11.5 - 14.5 %    PLATELET 105 654 - 486 K/uL    MPV 8.8 (L) 8.9 - 12.9 FL    NRBC 0.0 0  WBC    ABSOLUTE NRBC 0.00 0.00 - 0.01 K/uL    NEUTROPHILS 80 (H) 32 - 75 %    LYMPHOCYTES 12 12 - 49 %    MONOCYTES 6 5 - 13 %    EOSINOPHILS 2 0 - 7 %    BASOPHILS 0 0 - 1 %    IMMATURE GRANULOCYTES 0 0.0 - 0.5 %    ABS. NEUTROPHILS 5.0 1.8 - 8.0 K/UL    ABS. LYMPHOCYTES 0.7 (L) 0.8 - 3.5 K/UL    ABS. MONOCYTES 0.4 0.0 - 1.0 K/UL    ABS. EOSINOPHILS 0.1 0.0 - 0.4 K/UL    ABS. BASOPHILS 0.0 0.0 - 0.1 K/UL    ABS. IMM. GRANS. 0.0 0.00 - 0.04 K/UL    DF AUTOMATED      RBC COMMENTS NORMOCYTIC, NORMOCHROMIC     METABOLIC PANEL, COMPREHENSIVE    Collection Time: 12/07/19  8:30 PM   Result Value Ref Range    Sodium 135 (L) 136 - 145 mmol/L    Potassium 3.6 3.5 - 5.1 mmol/L    Chloride 105 97 - 108 mmol/L    CO2 26 21 - 32 mmol/L    Anion gap 4 (L) 5 - 15 mmol/L    Glucose 105 (H) 65 - 100 mg/dL    BUN 13 6 - 20 MG/DL    Creatinine 0.73 0.55 - 1.02 MG/DL    BUN/Creatinine ratio 18 12 - 20      GFR est AA >60 >60 ml/min/1.73m2    GFR est non-AA >60 >60 ml/min/1.73m2    Calcium 8.4 (L) 8.5 - 10.1 MG/DL    Bilirubin, total 0.5 0.2 - 1.0 MG/DL    ALT (SGPT) 18 12 - 78 U/L    AST (SGOT) 20 15 - 37 U/L    Alk.  phosphatase 39 (L) 45 - 117 U/L    Protein, total 6.2 (L) 6.4 - 8.2 g/dL    Albumin 2.8 (L) 3.5 - 5.0 g/dL    Globulin 3.4 2.0 - 4.0 g/dL    A-G Ratio 0.8 (L) 1.1 - 2.2     INFLUENZA A & B AG (RAPID TEST)    Collection Time: 12/07/19  8:30 PM   Result Value Ref Range    Influenza A Antigen NEGATIVE  NEG      Influenza B Antigen NEGATIVE  NEG     POC LACTIC ACID    Collection Time: 12/07/19  8:48 PM   Result Value Ref Range    Lactic Acid (POC) 0.73 0.40 - 2.00 mmol/L   URINALYSIS W/ REFLEX CULTURE    Collection Time: 12/07/19 10:45 PM   Result Value Ref Range    Color YELLOW/STRAW      Appearance CLEAR CLEAR      Specific gravity 1.009 1.003 - 1.030      pH (UA) 6.5 5.0 - 8.0      Protein NEGATIVE  NEG mg/dL    Glucose NEGATIVE  NEG mg/dL    Ketone NEGATIVE  NEG mg/dL    Bilirubin NEGATIVE  NEG      Blood NEGATIVE  NEG      Urobilinogen 1.0 0.2 - 1.0 EU/dL    Nitrites NEGATIVE  NEG      Leukocyte Esterase NEGATIVE  NEG      WBC 0-4 0 - 4 /hpf    RBC 0-5 0 - 5 /hpf    Epithelial cells FEW FEW /lpf    Bacteria NEGATIVE  NEG /hpf    UA:UC IF INDICATED CULTURE NOT INDICATED BY UA RESULT CNI      Hyaline cast 2-5 0 - 5 /lpf       Radiologic Studies -   XR CHEST PA LAT   Final Result   IMPRESSION:      Bilateral pneumonia given the clinical history. Recommend followup PA and   lateral chest views in 8-10 weeks to ensure resolution. CT Results  (Last 48 hours)    None        CXR Results  (Last 48 hours)               12/07/19 2019  XR CHEST PA LAT Final result    Impression:  IMPRESSION:       Bilateral pneumonia given the clinical history. Recommend followup PA and   lateral chest views in 8-10 weeks to ensure resolution. Narrative:  EXAM: XR CHEST PA LAT       INDICATION: Fever, pneumonia, and UTI. COMPARISON: Chest views on 7/18/2018       TECHNIQUE: PA and lateral chest views       FINDINGS: Cardiac monitoring wires overlie the thorax. The cardiomediastinal and   hilar contours are within normal limits. The pulmonary vasculature is within   normal limits. Airspace opacity is new in the right upper lobe. Airspace opacity is new in the   lingula of the left upper lobe. Possible airspace opacity in the bilateral lower   lobes. Pleural spaces are clear. Bones are osteopenic. Medical Decision Making   I am the first provider for this patient. I reviewed the vital signs, available nursing notes, past medical history, past surgical history, family history and social history. Vital Signs-Reviewed the patient's vital signs.   Patient Vitals for the past 24 hrs:   Temp Pulse Resp BP SpO2   12/07/19 2305 (!) 100.8 °F (38.2 °C) 88 17 122/76 94 %   12/07/19 2032  87 20 107/57 95 %   12/07/19 2022  91 20  93 %   12/07/19 2021    96/55    12/07/19 1940 (!) 100.7 °F (38.2 °C) (!) 107 20 93/52 94 %       Pulse Oximetry Analysis - 94% on RA    Cardiac Monitor:   Rate: 91 bpm  Rhythm: Normal Sinus Rhythm      ED EKG interpretation:  Rhythm: normal sinus rhythm; and regular . Rate (approx.): 91; Axis: normal; P wave: normal; QRS interval: normal ; ST/T wave: normal; Other findings: normal. This EKG was interpreted by Earlene Suazo M.D. Records Reviewed: Nursing Notes, Old Medical Records, Previous electrocardiograms, Previous Radiology Studies and Previous Laboratory Studies    Provider Notes (Medical Decision Making):   Patient presents with fever, tachycardia and concerns for infection. Most likely PNA vs URI. DDx: sepsis 2/2 UTI, PNA, intraabdominal infection (colitis, appendicitis, cholecystitis),  infectious diarrhea, meningitis, soft tissue infection, septic arthritis, flu/viral prodrome. Will follow sepsis protocol and order set by providing IVF resuscitation, obtaining blood and urine cultures, antibiotics, labs, lactate, EKG and frequently reassessing hemodynamic status on the patient. Will hold off on aggressive fluid hydration unless patient shows signs of severe sepsis or shock. ED Course:   Initial assessment performed. The patients presenting problems have been discussed, and they are in agreement with the care plan formulated and outlined with them. I have encouraged them to ask questions as they arise throughout their visit. ALCOHOL/SUBSTANCE ABUSE COUNSELING:  Upon evaluation, pt endorsed recent alcohol/illicit drug use. For approximately 15 minutes, pt has been counseled on the dangers of alcohol and illicit drug use on their health, and they were encouraged to quit as soon as possible in order to decrease further risks to their health. Pt has conveyed their understanding of the risks involved should they continue to use these products. I reviewed our electronic medical record system for any past medical records that were available that may contribute to the patient's current condition, the nursing notes and vital signs from today's visit.   Queta Granados MD    ED Orders Placed :  Orders Placed This Encounter    SEVERE SEPSIS AND SEPTIC SHOCK BUNDLE INITIATED    CULTURE, BLOOD, PERIPHERAL    INFLUENZA A & B AG (RAPID TEST)    CULTURE, BLOOD    CULTURE, BLOOD    XR CHEST PA LAT    CBC WITH AUTOMATED DIFF    URINALYSIS W/ REFLEX CULTURE    METABOLIC PANEL, COMPREHENSIVE    POC  LACTIC ACID    VITAL SIGNS - PER UNIT ROUTINE    STRICT I & O    NEUROLOGIC STATUS ASSESSMENT - PER UNIT ROUTINE    NOTIFY PROVIDER: SPECIFY Notify provider within one hour to start vasopressors if patient is unable to maintain a MAP of greater than or equal to 65 mmHg despite fluid resuscitation CONTINUOUS STAT    POC LACTIC ACID    EKG 12 LEAD INITIAL    SALINE LOCK IV ONE TIME STAT    sodium chloride (NS) flush 5-10 mL    sodium chloride 0.9 % bolus infusion 1,000 mL    acetaminophen (TYLENOL) tablet 975 mg    DISCONTD: cefTRIAXone (ROCEPHIN) 2 g in 0.9% sodium chloride (MBP/ADV) 50 mL    azithromycin (ZITHROMAX) tablet 500 mg    cefTRIAXone (ROCEPHIN) 1 g in 0.9% sodium chloride (MBP/ADV) 50 mL    levoFLOXacin (LEVAQUIN) 750 mg tablet    naproxen (NAPROSYN) 500 mg tablet     ED Medications Administered:  Medications   sodium chloride (NS) flush 5-10 mL (has no administration in time range)   sodium chloride 0.9 % bolus infusion 1,000 mL (0 mL IntraVENous IV Completed 12/7/19 2227)   acetaminophen (TYLENOL) tablet 975 mg (975 mg Oral Given 12/7/19 2258)   azithromycin (ZITHROMAX) tablet 500 mg (500 mg Oral Given 12/7/19 2258)   cefTRIAXone (ROCEPHIN) 1 g in 0.9% sodium chloride (MBP/ADV) 50 mL (0 g IntraVENous IV Completed 12/7/19 2302)        Progress Note:  Patient has been reassessed and reports feeling better and symptoms have improved significantly after ED treatment. Patient feels comfortable going home with close follow-up. Cecilio Mendoza's final labs and imaging have been reviewed with her and available family and/or caregiver. They have been counseled regarding her diagnosis.  She verbally conveys understanding and agreement of the signs, symptoms, diagnosis, treatment and prognosis and additionally agrees to follow up as recommended with Dr. Evelyn Paredes MD and/or specialist in 24 - 48 hours. She also agrees with the care-plan we created together and conveys that all of her questions have been answered. I have also put together some discharge instructions for her that include: 1) educational information regarding their diagnosis, 2) how to care for their diagnosis at home, as well a 3) list of reasons why they would want to return to the ED prior to their follow-up appointment should the patient's condition change or symptoms worsen. I have answered all questions to the patient's satisfaction. Strict return precautions given. She both understood and agreed with plan as discussed. Vital signs stable for discharge. Disposition: DISCHARGE  The pt is ready for discharge. The pt's signs, symptoms, diagnosis, and discharge instructions have been discussed and pt has conveyed their understanding. The pt is to follow up as recommended or return to ER should their symptoms worsen. Plan has been discussed and pt is in agreement. PLAN:  1. Return precautions as discussed. 2.   Discharge Medication List as of 12/7/2019 11:18 PM      START taking these medications    Details   levoFLOXacin (LEVAQUIN) 750 mg tablet Take 1 Tab by mouth daily. , Print, Disp-5 Tab, R-0         CONTINUE these medications which have NOT CHANGED    Details   cetirizine (ZYRTEC) 10 mg tablet Take  by mouth., Historical Med      predniSONE (DELTASONE) 20 mg tablet Take 20 mg by mouth daily (with breakfast). , Historical Med      hydrocortisone (HYTONE) 2.5 % lotion Apply  to affected area two (2) times a day. use thin layer, Normal, Disp-118 mL, R-0      zolpidem (AMBIEN) 10 mg tablet TAKE 1 TABLET BY MOUTH AT BEDTIME AS NEEDED, PrintThis request is for a new prescription for a controlled substance as required by Federal/State law. Disp-30 Tab, R-0 colestipol (COLESTID) 1 gram tablet TAKE 1 TABLET BY MOUTH TWICE DAILY, Normal, Disp-180 Tab, R-0      fenofibrate (LOFIBRA) 160 mg tablet TAKE 1 TABLET BY MOUTH EVERY DAY, Normal, Disp-90 Tab, R-3      nabumetone (RELAFEN) 500 mg tablet Take 500 mg by mouth two (2) times a day., Historical Med      calcium/D3/mag ox//morgan/Zn (CALTRATE + D3 PLUS MINERALS PO) Take  by mouth., Historical Med      OMEPRAZOLE PO Take  by mouth daily. , Historical Med           3. Follow-up Information     Follow up With Specialties Details Why Contact Info    [facility] EMERGENCY DEPT Emergency Medicine   [address] [place] [address]  [phone]    Evelyn Paredes MD Internal Medicine  As needed, If symptoms worsen [address] [place] [id number] [phone]            Return to ED if worse  Diagnosis     Clinical Impression:   1. Pneumonia of both lungs due to infectious organism, unspecified part of lung    2. Acute febrile illness    3. History of UTI    4. Acute hyponatremia        Attestation:  I personally performed the services described in this documentation on this date 12/7/2019 for patient, Enid Kapoor. Miguel Lim MD      This note will not be viewable in [address]

## 2019-12-13 ENCOUNTER — APPOINTMENT (OUTPATIENT)
Dept: CT IMAGING | Age: 69
DRG: 193 | End: 2019-12-13
Attending: EMERGENCY MEDICINE
Payer: MEDICARE

## 2019-12-13 ENCOUNTER — APPOINTMENT (OUTPATIENT)
Dept: GENERAL RADIOLOGY | Age: 69
DRG: 193 | End: 2019-12-13
Attending: EMERGENCY MEDICINE
Payer: MEDICARE

## 2019-12-13 ENCOUNTER — HOSPITAL ENCOUNTER (INPATIENT)
Age: 69
LOS: 7 days | Discharge: HOME | DRG: 193 | End: 2019-12-20
Attending: EMERGENCY MEDICINE | Admitting: INTERNAL MEDICINE
Payer: MEDICARE

## 2019-12-13 DIAGNOSIS — D84.9 IMMUNOSUPPRESSED STATUS (HCC): ICD-10-CM

## 2019-12-13 DIAGNOSIS — J18.9 HCAP (HEALTHCARE-ASSOCIATED PNEUMONIA): ICD-10-CM

## 2019-12-13 DIAGNOSIS — J96.01 ACUTE RESPIRATORY FAILURE WITH HYPOXIA (HCC): ICD-10-CM

## 2019-12-13 DIAGNOSIS — A41.9 SEPSIS, DUE TO UNSPECIFIED ORGANISM, UNSPECIFIED WHETHER ACUTE ORGAN DYSFUNCTION PRESENT (HCC): Primary | ICD-10-CM

## 2019-12-13 DIAGNOSIS — M05.9 RHEUMATOID ARTHRITIS WITH POSITIVE RHEUMATOID FACTOR, INVOLVING UNSPECIFIED SITE (HCC): ICD-10-CM

## 2019-12-13 LAB
ALBUMIN SERPL-MCNC: 2.2 G/DL (ref 3.5–5)
ALBUMIN/GLOB SERPL: 0.6 {RATIO} (ref 1.1–2.2)
ALP SERPL-CCNC: 73 U/L (ref 45–117)
ALT SERPL-CCNC: 23 U/L (ref 12–78)
ANION GAP SERPL CALC-SCNC: 9 MMOL/L (ref 5–15)
AST SERPL-CCNC: 34 U/L (ref 15–37)
BACTERIA SPEC CULT: NORMAL
BACTERIA SPEC CULT: NORMAL
BASOPHILS # BLD: 0 K/UL (ref 0–0.1)
BASOPHILS NFR BLD: 0 % (ref 0–1)
BILIRUB SERPL-MCNC: 0.5 MG/DL (ref 0.2–1)
BNP SERPL-MCNC: 266 PG/ML
BUN SERPL-MCNC: 12 MG/DL (ref 6–20)
BUN/CREAT SERPL: 21 (ref 12–20)
CALCIUM SERPL-MCNC: 8.8 MG/DL (ref 8.5–10.1)
CHLORIDE SERPL-SCNC: 103 MMOL/L (ref 97–108)
CK SERPL-CCNC: 22 U/L (ref 26–192)
CO2 SERPL-SCNC: 26 MMOL/L (ref 21–32)
CREAT SERPL-MCNC: 0.56 MG/DL (ref 0.55–1.02)
DIFFERENTIAL METHOD BLD: ABNORMAL
EOSINOPHIL # BLD: 0.1 K/UL (ref 0–0.4)
EOSINOPHIL NFR BLD: 1 % (ref 0–7)
ERYTHROCYTE [DISTWIDTH] IN BLOOD BY AUTOMATED COUNT: 13.7 % (ref 11.5–14.5)
GLOBULIN SER CALC-MCNC: 3.8 G/DL (ref 2–4)
GLUCOSE SERPL-MCNC: 86 MG/DL (ref 65–100)
HCT VFR BLD AUTO: 33.1 % (ref 35–47)
HGB BLD-MCNC: 10.7 G/DL (ref 11.5–16)
IMM GRANULOCYTES # BLD AUTO: 0 K/UL (ref 0–0.04)
IMM GRANULOCYTES NFR BLD AUTO: 1 % (ref 0–0.5)
LACTATE BLD-SCNC: 1.24 MMOL/L (ref 0.4–2)
LYMPHOCYTES # BLD: 1 K/UL (ref 0.8–3.5)
LYMPHOCYTES NFR BLD: 13 % (ref 12–49)
MCH RBC QN AUTO: 29.1 PG (ref 26–34)
MCHC RBC AUTO-ENTMCNC: 32.3 G/DL (ref 30–36.5)
MCV RBC AUTO: 89.9 FL (ref 80–99)
MONOCYTES # BLD: 0.6 K/UL (ref 0–1)
MONOCYTES NFR BLD: 8 % (ref 5–13)
NEUTS SEG # BLD: 6.3 K/UL (ref 1.8–8)
NEUTS SEG NFR BLD: 77 % (ref 32–75)
NRBC # BLD: 0 K/UL (ref 0–0.01)
NRBC BLD-RTO: 0 PER 100 WBC
PLATELET # BLD AUTO: 497 K/UL (ref 150–400)
PMV BLD AUTO: 9 FL (ref 8.9–12.9)
POTASSIUM SERPL-SCNC: 3.5 MMOL/L (ref 3.5–5.1)
PROT SERPL-MCNC: 6 G/DL (ref 6.4–8.2)
RBC # BLD AUTO: 3.68 M/UL (ref 3.8–5.2)
SERVICE CMNT-IMP: NORMAL
SERVICE CMNT-IMP: NORMAL
SODIUM SERPL-SCNC: 138 MMOL/L (ref 136–145)
TROPONIN I SERPL-MCNC: <0.05 NG/ML
WBC # BLD AUTO: 8.2 K/UL (ref 3.6–11)

## 2019-12-13 PROCEDURE — 74011250637 HC RX REV CODE- 250/637: Performed by: INTERNAL MEDICINE

## 2019-12-13 PROCEDURE — 96360 HYDRATION IV INFUSION INIT: CPT

## 2019-12-13 PROCEDURE — 74011250636 HC RX REV CODE- 250/636: Performed by: INTERNAL MEDICINE

## 2019-12-13 PROCEDURE — 84484 ASSAY OF TROPONIN QUANT: CPT

## 2019-12-13 PROCEDURE — 99285 EMERGENCY DEPT VISIT HI MDM: CPT

## 2019-12-13 PROCEDURE — 87040 BLOOD CULTURE FOR BACTERIA: CPT

## 2019-12-13 PROCEDURE — 93005 ELECTROCARDIOGRAM TRACING: CPT

## 2019-12-13 PROCEDURE — 65660000000 HC RM CCU STEPDOWN

## 2019-12-13 PROCEDURE — 80053 COMPREHEN METABOLIC PANEL: CPT

## 2019-12-13 PROCEDURE — 36415 COLL VENOUS BLD VENIPUNCTURE: CPT

## 2019-12-13 PROCEDURE — 74011636320 HC RX REV CODE- 636/320: Performed by: EMERGENCY MEDICINE

## 2019-12-13 PROCEDURE — 74011000258 HC RX REV CODE- 258: Performed by: INTERNAL MEDICINE

## 2019-12-13 PROCEDURE — 82550 ASSAY OF CK (CPK): CPT

## 2019-12-13 PROCEDURE — 74011250636 HC RX REV CODE- 250/636: Performed by: EMERGENCY MEDICINE

## 2019-12-13 PROCEDURE — 71046 X-RAY EXAM CHEST 2 VIEWS: CPT

## 2019-12-13 PROCEDURE — 83605 ASSAY OF LACTIC ACID: CPT

## 2019-12-13 PROCEDURE — 85025 COMPLETE CBC W/AUTO DIFF WBC: CPT

## 2019-12-13 PROCEDURE — 83880 ASSAY OF NATRIURETIC PEPTIDE: CPT

## 2019-12-13 PROCEDURE — 71275 CT ANGIOGRAPHY CHEST: CPT

## 2019-12-13 PROCEDURE — 0099U RESPIRATORY PANEL,PCR,NASOPHARYNGEAL: CPT

## 2019-12-13 RX ORDER — SODIUM CHLORIDE 0.9 % (FLUSH) 0.9 %
5-40 SYRINGE (ML) INJECTION EVERY 8 HOURS
Status: DISCONTINUED | OUTPATIENT
Start: 2019-12-13 | End: 2019-12-20 | Stop reason: HOSPADM

## 2019-12-13 RX ORDER — FERROUS SULFATE, DRIED 160(50) MG
1 TABLET, EXTENDED RELEASE ORAL DAILY
Status: DISCONTINUED | OUTPATIENT
Start: 2019-12-14 | End: 2019-12-20 | Stop reason: HOSPADM

## 2019-12-13 RX ORDER — ZOLPIDEM TARTRATE 5 MG/1
10 TABLET ORAL
Status: DISCONTINUED | OUTPATIENT
Start: 2019-12-13 | End: 2019-12-20 | Stop reason: HOSPADM

## 2019-12-13 RX ORDER — ENOXAPARIN SODIUM 100 MG/ML
30 INJECTION SUBCUTANEOUS EVERY 24 HOURS
Status: DISCONTINUED | OUTPATIENT
Start: 2019-12-13 | End: 2019-12-14

## 2019-12-13 RX ORDER — SODIUM CHLORIDE 0.9 % (FLUSH) 0.9 %
5-40 SYRINGE (ML) INJECTION AS NEEDED
Status: DISCONTINUED | OUTPATIENT
Start: 2019-12-13 | End: 2019-12-20 | Stop reason: HOSPADM

## 2019-12-13 RX ORDER — ACETAMINOPHEN 500 MG
1000 TABLET ORAL
Status: DISCONTINUED | OUTPATIENT
Start: 2019-12-13 | End: 2019-12-20 | Stop reason: HOSPADM

## 2019-12-13 RX ORDER — NYSTATIN 100000 [USP'U]/ML
3 SUSPENSION ORAL
COMMUNITY
End: 2021-03-08

## 2019-12-13 RX ORDER — SODIUM CHLORIDE 0.9 % (FLUSH) 0.9 %
5-10 SYRINGE (ML) INJECTION AS NEEDED
Status: DISCONTINUED | OUTPATIENT
Start: 2019-12-13 | End: 2019-12-20 | Stop reason: HOSPADM

## 2019-12-13 RX ORDER — ACETAMINOPHEN 500 MG
1000 TABLET ORAL
COMMUNITY

## 2019-12-13 RX ORDER — MONTELUKAST SODIUM 4 MG/1
1 TABLET, CHEWABLE ORAL 2 TIMES DAILY
Status: DISCONTINUED | OUTPATIENT
Start: 2019-12-13 | End: 2019-12-20 | Stop reason: HOSPADM

## 2019-12-13 RX ORDER — NYSTATIN 100000 [USP'U]/ML
3 SUSPENSION ORAL
Status: DISCONTINUED | OUTPATIENT
Start: 2019-12-13 | End: 2019-12-20 | Stop reason: HOSPADM

## 2019-12-13 RX ORDER — FENOFIBRATE 145 MG/1
145 TABLET, COATED ORAL DAILY
Status: DISCONTINUED | OUTPATIENT
Start: 2019-12-14 | End: 2019-12-20 | Stop reason: HOSPADM

## 2019-12-13 RX ORDER — SODIUM CHLORIDE 0.9 % (FLUSH) 0.9 %
10 SYRINGE (ML) INJECTION
Status: COMPLETED | OUTPATIENT
Start: 2019-12-13 | End: 2019-12-13

## 2019-12-13 RX ORDER — NAPROXEN 250 MG/1
500 TABLET ORAL 2 TIMES DAILY WITH MEALS
Status: DISCONTINUED | OUTPATIENT
Start: 2019-12-13 | End: 2019-12-20 | Stop reason: HOSPADM

## 2019-12-13 RX ORDER — SODIUM CHLORIDE 900 MG/100ML
INJECTION INTRAVENOUS
Status: DISPENSED
Start: 2019-12-13 | End: 2019-12-14

## 2019-12-13 RX ADMIN — SODIUM CHLORIDE 1000 ML: 900 INJECTION, SOLUTION INTRAVENOUS at 15:36

## 2019-12-13 RX ADMIN — ENOXAPARIN SODIUM 30 MG: 30 INJECTION SUBCUTANEOUS at 20:14

## 2019-12-13 RX ADMIN — Medication 10 ML: at 20:06

## 2019-12-13 RX ADMIN — Medication 10 ML: at 14:16

## 2019-12-13 RX ADMIN — ACETAMINOPHEN 1000 MG: 500 TABLET ORAL at 23:13

## 2019-12-13 RX ADMIN — Medication 10 ML: at 21:41

## 2019-12-13 RX ADMIN — NYSTATIN 300000 UNITS: 100000 SUSPENSION ORAL at 21:38

## 2019-12-13 RX ADMIN — VANCOMYCIN HYDROCHLORIDE 1500 MG: 10 INJECTION, POWDER, LYOPHILIZED, FOR SOLUTION INTRAVENOUS at 20:39

## 2019-12-13 RX ADMIN — PIPERACILLIN AND TAZOBACTAM 3.38 G: 3; .375 INJECTION, POWDER, LYOPHILIZED, FOR SOLUTION INTRAVENOUS at 20:01

## 2019-12-13 RX ADMIN — NAPROXEN 500 MG: 250 TABLET ORAL at 20:39

## 2019-12-13 RX ADMIN — IOPAMIDOL 80 ML: 755 INJECTION, SOLUTION INTRAVENOUS at 14:16

## 2019-12-13 NOTE — H&P
Hospitalist Admission Note    NAME: Genevieve Head   :  1950   MRN:  [id number]     Date/Time:  2019 6:45 PM    Patient PCP: Wilder Alatorre MD  ______________________________________________________________________  Given the patient's current clinical presentation, I have a high level of concern for decompensation if discharged from the emergency department. Complex decision making was performed, which includes reviewing the patient's available past medical records, laboratory results, and x-ray films. My assessment of this patient's clinical condition and my plan of care is as follows. Assessment / Plan:  HCAP in setting of immunocompromised patient( RA on prednisone and nabumetone , had embrel treatment recently)  failed Outpatient treatment for pneumonia (failed 5-day course of Augmentin plus Zithromax)  We will admit to telemetry, continue with IV vancomycin and Zosyn started by the ED physician. Follow-up blood cultures, sputum cultures and stain and respiratory panel  We will hold nabumetone, patient reported being weaned off prednisone recently. Rheumatoid arthritis  Sjogren's syndrome  Continue with naproxen        Code Status: Full code  Surrogate Decision Maker:     DVT Prophylaxis: Lovenox  GI Prophylaxis: not indicated    Baseline: Ambulatory      Subjective:   CHIEF COMPLAINT: Shortness of breath    HISTORY OF PRESENT ILLNESS:     Nishi Garcia is a 78 y.o.  female with past medical history of rheumatoid arthritis on nabumetone, Sjogren's syndrome who presents with worsening shortness of breath, cough and persistent fever and sweats. Patient presented last Friday in the ED and was diagnosed with pneumonia and was sent home with Levaquin initially which got switched to Zithromax and Augmentin. She took 5-day course of those 2 antibiotics but reported that she was still feeling short of breath with fever chills.   She denies any associated symptoms such as chest pain, nausea vomiting or diarrhea. Patient has been on prednisone until recently, was weaned off but is still on nabumetone until last week for her rheumatoid arthritis. Vital signs in the ED showed that she was tachycardic, slightly tachypneic and hypoxic in the low 90s. Her labs are unremarkable. CT of the chest showed Diffuse bilateral groundglass infiltrates. We were asked to admit for work up and evaluation of the above problems. Past Medical History:   Diagnosis Date    Arthritis     Bacterial pneumonia 6/26/2018    Chronic bilateral low back pain without sciatica 11/20/2018    Hypercholesterolemia     Long term (current) use of anticoagulants     Primary osteoarthritis involving multiple joints 11/20/2018    Sjogren's syndrome ([address] 11/20/2018    Weight loss, unintentional 11/20/2018        Past Surgical History:   Procedure Laterality Date    HX TONSILLECTOMY         Social History     Tobacco Use    Smoking status: Never Smoker    Smokeless tobacco: Never Used   Substance Use Topics    Alcohol use: Yes     Comment: rarely        Family History   Problem Relation Age of Onset    Heart Disease Maternal Grandfather     Prostate Cancer Father     Prostate Cancer Brother      Allergies   Allergen Reactions    Diclofenac Other (comments)     Nausea fever and chills    Plaquenil [Hydroxychloroquine] Rash    Sulfa (Sulfonamide Antibiotics) Unknown (comments)        Prior to Admission medications    Medication Sig Start Date End Date Taking? Authorizing Provider   nystatin (MYCOSTATIN) 100,000 unit/mL suspension Take 3 mL by mouth nightly. swish and spit   Yes Provider, Historical   acetaminophen (TYLENOL EXTRA STRENGTH) 500 mg tablet Take 1,000 mg by mouth daily as needed for Pain. Yes Provider, Historical   propylene glycol/peg 400/PF (SYSTANE, PF, OP) Administer  to both eyes daily as needed (dry eyes).    Yes Provider, Historical amoxicillin-clavulanate (AUGMENTIN) 875-125 mg per tablet Take 1 Tab by mouth two (2) times a day for 7 days. 12/8/19 12/15/19 Yes Vesta Mcdonnell MD   naproxen (NAPROSYN) 500 mg tablet Take 1 Tab by mouth two (2) times daily (with meals). 12/7/19  Yes Madai Kemp MD   zolpidem (AMBIEN) 10 mg tablet TAKE 1 TABLET BY MOUTH AT BEDTIME AS NEEDED 9/26/19  Yes Pepe Elizabeth MD   colestipol (COLESTID) 1 gram tablet TAKE 1 TABLET BY MOUTH TWICE DAILY 9/5/19  Yes Pepe Elizabeth MD   fenofibrate (LOFIBRA) 160 mg tablet TAKE 1 TABLET BY MOUTH EVERY DAY 12/7/18  Yes Pepe Elizabeth MD   nabumetone (RELAFEN) 500 mg tablet Take 500 mg by mouth two (2) times a day. 9/30/18  Yes Provider, Historical   calcium/D3/mag ox//morgan/Zn (CALTRATE + D3 PLUS MINERALS PO) Take 1 Tab by mouth daily. Yes Provider, Historical   predniSONE (DELTASONE) 10 mg tablet Take 10 mg by mouth daily (with breakfast). Other, MD Juanpablo       REVIEW OF SYSTEMS:     I am not able to complete the review of systems because:    The patient is intubated and sedated    The patient has altered mental status due to his acute medical problems    The patient has baseline aphasia from prior stroke(s)    The patient has baseline dementia and is not reliable historian    The patient is in acute medical distress and unable to provide information           Total of 12 systems reviewed as follows:       POSITIVE= underlined text  Negative = text not underlined  General:  fever, chills, sweats, generalized weakness, weight loss/gain,      loss of appetite   Eyes:    blurred vision, eye pain, loss of vision, double vision  ENT:    rhinorrhea, pharyngitis   Respiratory:   cough, sputum production, SOB, JAMES, wheezing, pleuritic pain   Cardiology:   chest pain, palpitations, orthopnea, PND, edema, syncope   Gastrointestinal:  abdominal pain , N/V, diarrhea, dysphagia, constipation, bleeding   Genitourinary:  frequency, urgency, dysuria, hematuria, incontinence   Muskuloskeletal :  arthralgia, myalgia, back pain  Hematology:  easy bruising, nose or gum bleeding, lymphadenopathy   Dermatological: rash, ulceration, pruritis, color change / jaundice  Endocrine:   hot flashes or polydipsia   Neurological:  headache, dizziness, confusion, focal weakness, paresthesia,     Speech difficulties, memory loss, gait difficulty  Psychological: Feelings of anxiety, depression, agitation    Objective:   VITALS:    Visit Vitals  /60 (BP 1 Location: Right arm, BP Patient Position: Post activity)   Pulse 94   Temp 99.2 °F (37.3 °C)   Resp 24   Ht 5' 6\" (1.676 m)   Wt 56.7 kg (125 lb)   SpO2 97%   Breastfeeding No   BMI 20.18 kg/m²       PHYSICAL EXAM:    General:    Alert, cooperative, no distress, appears stated age. HEENT: Atraumatic, anicteric sclerae, pink conjunctivae     No oral ulcers, mucosa moist, throat clear, dentition fair  Neck:  Supple, symmetrical,  thyroid: non tender  Lungs:   B/l crakles   No Wheezing or Rhonchi. No rales. Chest wall:  No tenderness  No Accessory muscle use. Heart:   Regular  rhythm,  No  murmur   No edema  Abdomen:   Soft, non-tender. Not distended. Bowel sounds normal  Extremities: No cyanosis. No clubbing,      Skin turgor normal, Capillary refill normal, Radial dial pulse 2+  Skin:     Not pale. Not Jaundiced  No rashes   Psych:  Good insight. Not depressed. Not anxious or agitated. Neurologic: EOMs intact. No facial asymmetry. No aphasia or slurred speech. Symmetrical strength, Sensation grossly intact.  Alert and oriented X 4.     _______________________________________________________________________  Care Plan discussed with:    Comments   Patient x    Family      RN x    Care Manager                    Consultant:      _______________________________________________________________________  Expected  Disposition:   Home with Family x   HH/PT/OT/RN    SNF/LTC    [facility] ________________________________________________________________________  TOTAL TIME: 65 Minutes    Critical Care Provided     Minutes non procedure based      Comments    x Reviewed previous records   >50% of visit spent in counseling and coordination of care x Discussion with patient and/or family and questions answered       ________________________________________________________________________  Signed: Gamal Lance MD    Procedures: see electronic medical records for all procedures/Xrays and details which were not copied into this note but were reviewed prior to creation of Plan. LAB DATA REVIEWED:    Recent Results (from the past 24 hour(s))   EKG, 12 LEAD, INITIAL    Collection Time: 12/13/19  1:04 PM   Result Value Ref Range    Ventricular Rate 100 BPM    Atrial Rate 100 BPM    P-R Interval 140 ms    QRS Duration 84 ms    Q-T Interval 334 ms    QTC Calculation (Bezet) 430 ms    Calculated P Axis 60 degrees    Calculated R Axis 67 degrees    Calculated T Axis 43 degrees    Diagnosis       Normal sinus rhythm  Normal ECG  When compared with ECG of 07-DEC-2019 19:50,  No significant change was found     METABOLIC PANEL, COMPREHENSIVE    Collection Time: 12/13/19  3:32 PM   Result Value Ref Range    Sodium 138 136 - 145 mmol/L    Potassium 3.5 3.5 - 5.1 mmol/L    Chloride 103 97 - 108 mmol/L    CO2 26 21 - 32 mmol/L    Anion gap 9 5 - 15 mmol/L    Glucose 86 65 - 100 mg/dL    BUN 12 6 - 20 MG/DL    Creatinine 0.56 0.55 - 1.02 MG/DL    BUN/Creatinine ratio 21 (H) 12 - 20      GFR est AA >60 >60 ml/min/1.73m2    GFR est non-AA >60 >60 ml/min/1.73m2    Calcium 8.8 8.5 - 10.1 MG/DL    Bilirubin, total 0.5 0.2 - 1.0 MG/DL    ALT (SGPT) 23 12 - 78 U/L    AST (SGOT) 34 15 - 37 U/L    Alk.  phosphatase 73 45 - 117 U/L    Protein, total 6.0 (L) 6.4 - 8.2 g/dL    Albumin 2.2 (L) 3.5 - 5.0 g/dL    Globulin 3.8 2.0 - 4.0 g/dL    A-G Ratio 0.6 (L) 1.1 - 2.2     CBC WITH AUTOMATED DIFF    Collection Time: 12/13/19 3:32 PM   Result Value Ref Range    WBC 8.2 3.6 - 11.0 K/uL    RBC 3.68 (L) 3.80 - 5.20 M/uL    HGB 10.7 (L) 11.5 - 16.0 g/dL    HCT 33.1 (L) 35.0 - 47.0 %    MCV 89.9 80.0 - 99.0 FL    MCH 29.1 26.0 - 34.0 PG    MCHC 32.3 30.0 - 36.5 g/dL    RDW 13.7 11.5 - 14.5 %    PLATELET 389 (H) 250 - 400 K/uL    MPV 9.0 8.9 - 12.9 FL    NRBC 0.0 0  WBC    ABSOLUTE NRBC 0.00 0.00 - 0.01 K/uL    NEUTROPHILS 77 (H) 32 - 75 %    LYMPHOCYTES 13 12 - 49 %    MONOCYTES 8 5 - 13 %    EOSINOPHILS 1 0 - 7 %    BASOPHILS 0 0 - 1 %    IMMATURE GRANULOCYTES 1 (H) 0.0 - 0.5 %    ABS. NEUTROPHILS 6.3 1.8 - 8.0 K/UL    ABS. LYMPHOCYTES 1.0 0.8 - 3.5 K/UL    ABS. MONOCYTES 0.6 0.0 - 1.0 K/UL    ABS. EOSINOPHILS 0.1 0.0 - 0.4 K/UL    ABS. BASOPHILS 0.0 0.0 - 0.1 K/UL    ABS. IMM.  GRANS. 0.0 0.00 - 0.04 K/UL    DF AUTOMATED     NT-PRO BNP    Collection Time: 12/13/19  3:32 PM   Result Value Ref Range    NT pro- (H) <125 PG/ML   CK W/ REFLX CKMB    Collection Time: 12/13/19  3:32 PM   Result Value Ref Range    CK 22 (L) 26 - 192 U/L   TROPONIN I    Collection Time: 12/13/19  3:32 PM   Result Value Ref Range    Troponin-I, Qt. <0.05 <0.05 ng/mL   POC LACTIC ACID    Collection Time: 12/13/19  3:42 PM   Result Value Ref Range    Lactic Acid (POC) 1.24 0.40 - 2.00 mmol/L

## 2019-12-13 NOTE — ED PROVIDER NOTES
EMERGENCY DEPARTMENT HISTORY AND PHYSICAL EXAM      Please note that this dictation was completed with [employer], the computer voice recognition software. Quite often unanticipated grammatical, syntax, homophones, and other interpretive errors are inadvertently transcribed by the computer software. Please disregard these errors and any errors that have escaped final proofreading. Thank you. Date: 12/13/2019  Patient Name: Harry Casper  Patient Age and Sex: 78 y.o. female    History of Presenting Illness     Chief Complaint   Patient presents with    Shortness of Breath     seen here 6 days ago for same. recent dx of PNA. fever       History Provided By: Patient    HPI: Harry Casper, 78 y.o. female with past medical history as documented below presents to the ED with c/o of persistent SOB, cough and weakness for the past week despite taking PO abx for PNA. Pt recently seen in ED, had full workup and found to have PNA. Pt reports taking PO Augmentin, Azithromycin and now Levofloxacin without relief of sx's. She reports SOB only with exertion, reports productive cough of yellowish sputum. Pt reports h/o RA and on chronic steroids and thus immunosuppressed. Pt denies any other alleviating or exacerbating factors. Additionally, pt specifically denies any recent fever, chills, headache, nausea, vomiting, abdominal pain, lightheadedness, dizziness, numbness, weakness, BLE swelling, heart palpitations, urinary sxs, diarrhea, constipation, melena, hematochezia. There are no other complaints, changes or physical findings at this time.      PCP: Victor Manuel Pitts MD    Past History   Past Medical History:  Past Medical History:   Diagnosis Date    Arthritis     Bacterial pneumonia 6/26/2018    Chronic bilateral low back pain without sciatica 11/20/2018    Hypercholesterolemia     Long term (current) use of anticoagulants     Primary osteoarthritis involving multiple joints 11/20/2018    Sjogren's syndrome ([facility] [address] 11/20/2018    Weight loss, unintentional 11/20/2018       Past Surgical History:  Past Surgical History:   Procedure Laterality Date    HX TONSILLECTOMY         Family History:  Family History   Problem Relation Age of Onset    Heart Disease Maternal Grandfather     Prostate Cancer Father     Prostate Cancer Brother        Social History:  Social History     Tobacco Use    Smoking status: Never Smoker    Smokeless tobacco: Never Used   Substance Use Topics    Alcohol use: Yes     Comment: rarely    Drug use: Not on file       Allergies: Allergies   Allergen Reactions    Diclofenac Other (comments)     Nausea fever and chills    Plaquenil [Hydroxychloroquine] Rash    Sulfa (Sulfonamide Antibiotics) Unknown (comments)       Current Medications:  No current facility-administered medications on file prior to encounter. Current Outpatient Medications on File Prior to Encounter   Medication Sig Dispense Refill    nystatin (MYCOSTATIN) 100,000 unit/mL suspension Take 3 mL by mouth nightly. swish and spit      acetaminophen (TYLENOL EXTRA STRENGTH) 500 mg tablet Take 1,000 mg by mouth daily as needed for Pain.  propylene glycol/peg 400/PF (SYSTANE, PF, OP) Administer  to both eyes daily as needed (dry eyes).  amoxicillin-clavulanate (AUGMENTIN) 875-125 mg per tablet Take 1 Tab by mouth two (2) times a day for 7 days. 14 Tab 0    naproxen (NAPROSYN) 500 mg tablet Take 1 Tab by mouth two (2) times daily (with meals). 20 Tab 0    zolpidem (AMBIEN) 10 mg tablet TAKE 1 TABLET BY MOUTH AT BEDTIME AS NEEDED 30 Tab 0    colestipol (COLESTID) 1 gram tablet TAKE 1 TABLET BY MOUTH TWICE DAILY 180 Tab 0    fenofibrate (LOFIBRA) 160 mg tablet TAKE 1 TABLET BY MOUTH EVERY DAY 90 Tab 3    nabumetone (RELAFEN) 500 mg tablet Take 500 mg by mouth two (2) times a day.  calcium/D3/mag ox//morgan/Zn (CALTRATE + D3 PLUS MINERALS PO) Take 1 Tab by mouth daily.       [DISCONTINUED] azithromycin (ZITHROMAX) 250 mg tablet Take 1 Tab by mouth daily for 4 days. 4 Tab 0    [DISCONTINUED] levoFLOXacin (LEVAQUIN) 750 mg tablet Take 1 Tab by mouth daily. 5 Tab 0    predniSONE (DELTASONE) 10 mg tablet Take 10 mg by mouth daily (with breakfast).  [DISCONTINUED] cetirizine (ZYRTEC) 10 mg tablet Take  by mouth.  [DISCONTINUED] hydrocortisone (HYTONE) 2.5 % lotion Apply  to affected area two (2) times a day. use thin layer 118 mL 0    [DISCONTINUED] OMEPRAZOLE PO Take  by mouth daily. Review of Systems   Review of Systems   Constitutional: Positive for chills. Negative for fever. HENT: Positive for congestion. Negative for facial swelling, rhinorrhea, sore throat, trouble swallowing and voice change. Eyes: Negative. Respiratory: Positive for cough and shortness of breath. Negative for apnea, chest tightness and wheezing. Cardiovascular: Negative. Negative for chest pain, palpitations and leg swelling. Gastrointestinal: Negative. Negative for abdominal distention, abdominal pain, blood in stool, constipation, diarrhea, nausea and vomiting. Endocrine: Negative. Negative for cold intolerance, heat intolerance and polyuria. Genitourinary: Negative. Negative for difficulty urinating, dysuria, flank pain, frequency, hematuria and urgency. Musculoskeletal: Negative. Negative for arthralgias, back pain, myalgias, neck pain and neck stiffness. Skin: Negative. Negative for color change and rash. Neurological: Negative. Negative for dizziness, syncope, facial asymmetry, speech difficulty, weakness, light-headedness, numbness and headaches. Hematological: Negative. Does not bruise/bleed easily. Psychiatric/Behavioral: Negative. Negative for confusion and self-injury. The patient is not nervous/anxious. Physical Exam   Physical Exam  Vitals signs and nursing note reviewed. Constitutional:       General: She is in acute distress.       Appearance: She is well-developed. She is ill-appearing, toxic-appearing and diaphoretic. HENT:      Head: Normocephalic and atraumatic. Mouth/Throat:      Pharynx: No oropharyngeal exudate. Eyes:      Conjunctiva/sclera: Conjunctivae normal.      Pupils: Pupils are equal, round, and reactive to light. Neck:      Musculoskeletal: Normal range of motion. Cardiovascular:      Rate and Rhythm: Normal rate and regular rhythm. Heart sounds: Normal heart sounds. No murmur. No friction rub. No gallop. Pulmonary:      Effort: Pulmonary effort is normal. Tachypnea present. No respiratory distress. Breath sounds: Normal breath sounds. No wheezing or rales. Chest:      Chest wall: No tenderness. Abdominal:      General: Bowel sounds are normal. There is no distension. Palpations: Abdomen is soft. There is no mass. Tenderness: There is no tenderness. There is no guarding or rebound. Musculoskeletal: Normal range of motion. General: No tenderness or deformity. Skin:     General: Skin is warm. Findings: No rash. Neurological:      Mental Status: She is alert and oriented to person, place, and time. Cranial Nerves: No cranial nerve deficit. Motor: No abnormal muscle tone.       Coordination: Coordination normal.      Deep Tendon Reflexes: Reflexes normal.         Diagnostic Study Results     Labs -  Recent Results (from the past 24 hour(s))   EKG, 12 LEAD, INITIAL    Collection Time: 12/13/19  1:04 PM   Result Value Ref Range    Ventricular Rate 100 BPM    Atrial Rate 100 BPM    P-R Interval 140 ms    QRS Duration 84 ms    Q-T Interval 334 ms    QTC Calculation (Bezet) 430 ms    Calculated P Axis 60 degrees    Calculated R Axis 67 degrees    Calculated T Axis 43 degrees    Diagnosis       Normal sinus rhythm  Normal ECG  When compared with ECG of 07-DEC-2019 19:50,  No significant change was found     METABOLIC PANEL, COMPREHENSIVE    Collection Time: 12/13/19  3:32 PM   Result Value Ref Range    Sodium 138 136 - 145 mmol/L    Potassium 3.5 3.5 - 5.1 mmol/L    Chloride 103 97 - 108 mmol/L    CO2 26 21 - 32 mmol/L    Anion gap 9 5 - 15 mmol/L    Glucose 86 65 - 100 mg/dL    BUN 12 6 - 20 MG/DL    Creatinine 0.56 0.55 - 1.02 MG/DL    BUN/Creatinine ratio 21 (H) 12 - 20      GFR est AA >60 >60 ml/min/1.73m2    GFR est non-AA >60 >60 ml/min/1.73m2    Calcium 8.8 8.5 - 10.1 MG/DL    Bilirubin, total 0.5 0.2 - 1.0 MG/DL    ALT (SGPT) 23 12 - 78 U/L    AST (SGOT) 34 15 - 37 U/L    Alk. phosphatase 73 45 - 117 U/L    Protein, total 6.0 (L) 6.4 - 8.2 g/dL    Albumin 2.2 (L) 3.5 - 5.0 g/dL    Globulin 3.8 2.0 - 4.0 g/dL    A-G Ratio 0.6 (L) 1.1 - 2.2     CBC WITH AUTOMATED DIFF    Collection Time: 12/13/19  3:32 PM   Result Value Ref Range    WBC 8.2 3.6 - 11.0 K/uL    RBC 3.68 (L) 3.80 - 5.20 M/uL    HGB 10.7 (L) 11.5 - 16.0 g/dL    HCT 33.1 (L) 35.0 - 47.0 %    MCV 89.9 80.0 - 99.0 FL    MCH 29.1 26.0 - 34.0 PG    MCHC 32.3 30.0 - 36.5 g/dL    RDW 13.7 11.5 - 14.5 %    PLATELET 913 (H) 131 - 400 K/uL    MPV 9.0 8.9 - 12.9 FL    NRBC 0.0 0  WBC    ABSOLUTE NRBC 0.00 0.00 - 0.01 K/uL    NEUTROPHILS 77 (H) 32 - 75 %    LYMPHOCYTES 13 12 - 49 %    MONOCYTES 8 5 - 13 %    EOSINOPHILS 1 0 - 7 %    BASOPHILS 0 0 - 1 %    IMMATURE GRANULOCYTES 1 (H) 0.0 - 0.5 %    ABS. NEUTROPHILS 6.3 1.8 - 8.0 K/UL    ABS. LYMPHOCYTES 1.0 0.8 - 3.5 K/UL    ABS. MONOCYTES 0.6 0.0 - 1.0 K/UL    ABS. EOSINOPHILS 0.1 0.0 - 0.4 K/UL    ABS. BASOPHILS 0.0 0.0 - 0.1 K/UL    ABS. IMM.  GRANS. 0.0 0.00 - 0.04 K/UL    DF AUTOMATED     NT-PRO BNP    Collection Time: 12/13/19  3:32 PM   Result Value Ref Range    NT pro- (H) <125 PG/ML   CK W/ REFLX CKMB    Collection Time: 12/13/19  3:32 PM   Result Value Ref Range    CK 22 (L) 26 - 192 U/L   TROPONIN I    Collection Time: 12/13/19  3:32 PM   Result Value Ref Range    Troponin-I, Qt. <0.05 <0.05 ng/mL   POC LACTIC ACID    Collection Time: 12/13/19  3:42 PM   Result Value Ref Range    Lactic Acid (POC) 1.24 0.40 - 2.00 mmol/L       Radiologic Studies -   XR CHEST PA LAT   Final Result   IMPRESSION: Interval resolution of right upper lobe airspace process. Persistent, likely worsening left basilar patchy airspace disease. CTA CHEST W OR W WO CONT   Final Result   IMPRESSION:   No pulmonary emboli. Diffuse bilateral groundglass infiltrates. Suspect left upper lobe nodule. Follow-up suggested. CT Results  (Last 48 hours)               12/13/19 1416  CTA CHEST W OR W WO CONT Final result    Impression:  IMPRESSION:   No pulmonary emboli. Diffuse bilateral groundglass infiltrates. Suspect left upper lobe nodule. Follow-up suggested. Narrative:  Clinical indication: Acute shortness of breath. Localizer obtained without contrast at the left lobe pulmonary arteries. Fast   injection rate of 80 cc of Isovue-370 with review of the raw data and MIP   reconstructions. No prior. CT dose reduction was achieved through the use of a   standardized protocol tailored for this examination and automatic exposure   control for dose modulation . Gatica Muck Bibasilar platelike atelectasis. No evidence for pulmonary emboli. The a heart   size is normal. No focal abnormality of the thoracic aorta. No pericardial or   pleural effusion. Apical scarring is seen. Bilateral upper lobe groundglass   infiltrates could be chronic. Spiculated infiltrate is seen in the anterior   segment of the left upper lobe and should be follow-up to exclude malignancy 9.8   mm. No shift or pneumothorax. CXR Results  (Last 48 hours)               12/13/19 1507  XR CHEST PA LAT Final result    Impression:  IMPRESSION: Interval resolution of right upper lobe airspace process. Persistent, likely worsening left basilar patchy airspace disease. Narrative:   Indication:  sob        Exam: PA and lateral views of the chest.       Direct comparison is made to prior CXR dated December 7, 2019. Findings: Cardiomediastinal silhouette is within normal limits. There is been   interval resolution of the right upper lobe patchy airspace process seen on   prior chest x-ray dated December 7, 2019. There is persistent, likely worsening   left basilar patchy airspace disease. There is no pleural fluid. There is no   pneumothorax. Medical Decision Making   I am the first provider for this patient. I reviewed the vital signs, available nursing notes, past medical history, past surgical history, family history and social history. Vital Signs-Reviewed the patient's vital signs. Patient Vitals for the past 24 hrs:   Temp Pulse Resp BP SpO2   12/13/19 1826 99.2 °F (37.3 °C) 94 20 132/60 97 %   12/13/19 1700  96 24 113/67 96 %   12/13/19 1645  94 24 132/48 97 %   12/13/19 1615  98 25 112/80 96 %   12/13/19 1600  95 24 131/65 90 %   12/13/19 1310 97.9 °F (36.6 °C) (!) 106 18 129/76 90 %     Pulse Oximetry Analysis - 90% on RA    Cardiac Monitor:   Rate: 95 bpm  Rhythm: Normal Sinus Rhythm      ED EKG interpretation:  Rhythm: normal sinus rhythm; and regular . Rate (approx.): 100; Axis: normal; P wave: normal; QRS interval: normal ; ST/T wave: normal; Other findings: normal. This EKG was interpreted by Geoffrey Abbott M.D. Records Reviewed: Nursing Notes, Old Medical Records, Previous electrocardiograms, Previous Radiology Studies and Previous Laboratory Studies    Provider Notes (Medical Decision Making):   Patient presents with acute dyspnea. DDx: asthma, copd, pna, pulmonary edema, acute bronchitis, ACS, ptx, pna. Will obtain EKG, labs, CXR, provide O2 as needed for hypoxia, treat symptomatically and reassess. Will continue to monitor closely in ED. ED Course:   Initial assessment performed. The patients presenting problems have been discussed, and they are in agreement with the care plan formulated and outlined with them.   I have encouraged them to ask questions as they arise throughout their visit. TOBACCO COUNSELING:   Upon evaluation, pt expressed that they are a current tobacco user. For approximately 10 minutes, pt has been counseled on the dangers of smoking and was encouraged to quit as soon as possible in order to decrease further risks to their health. Pt has conveyed their understanding of the risks involved should they continue to use tobacco products. ALCOHOL/SUBSTANCE ABUSE COUNSELING:  Upon evaluation, pt endorsed recent alcohol/illicit drug use. For approximately 15 minutes, pt has been counseled on the dangers of alcohol and illicit drug use on their health, and they were encouraged to quit as soon as possible in order to decrease further risks to their health. Pt has conveyed their understanding of the risks involved should they continue to use these products. HYPERTENSION COUNSELING   Education was provided to the patient today regarding their hypertension. Patient is made aware of their elevated blood pressure and is instructed to follow up this week with their Primary Care for a recheck. Patient is counseled regarding consequences of chronic, uncontrolled hypertension including kidney disease, heart disease, stroke or even death. Patient states their understanding and agrees to follow up this week. Additionally, during their visit, I discussed sodium restriction, maintaining ideal body weight and regular exercise program as physiologic means to achieve blood pressure control. The patient will strive towards this. I reviewed our electronic medical record system for any past medical records that were available that may contribute to the patient's current condition, the nursing notes and vital signs from today's visit.   Millicent Cifuentes MD    ED Orders Placed :  Orders Placed This Encounter    SEVERE SEPSIS AND SEPTIC SHOCK BUNDLE INITIATED    SEVERE SEPSIS AND SEPTIC SHOCK BUNDLE INITIATED    CULTURE, BLOOD    CULTURE, BLOOD    CULTURE, BLOOD, PAIRED    CULTURE, SPUTUM/BRONCH/OTH    RESPIRATORY PANEL,PCR,NASOPHARYNGEAL    CTA CHEST W AND W/O CONTRAST (use for PE)    XR CHEST PA LAT    METABOLIC PANEL, COMPREHENSIVE    CBC WITH AUTOMATED DIFF    NT-PRO BNP    CK W/REFLEX CKMB    TROPONIN I    Hold Sample    LACTIC ACID, PLASMA    METABOLIC PANEL, BASIC    DIET REGULAR    VITAL SIGNS - PER UNIT ROUTINE    STRICT I & O    NEUROLOGIC STATUS ASSESSMENT - PER UNIT ROUTINE    NOTIFY PROVIDER: SPECIFY Notify provider within one hour to start vasopressors if patient is unable to maintain a MAP of greater than or equal to 65 mmHg despite fluid resuscitation CONTINUOUS STAT    POC  LACTIC ACID    NOTIFY PROVIDER: SPECIFY Notify provider within one hour to start vasopressors if patient is unable to maintain a MAP of greater than or equal to 65 mmHg despite fluid resuscitation CONTINUOUS STAT    STRICT I & O    Cardiac Monitor    VITAL SIGNS PER UNIT ROUTINE    NEUROLOGIC STATUS ASSESSMENT    NOTIFY PROVIDER: VITAL SIGNS CHANGES    FULL CODE    OXIMETRY, SPOT CHECK    OXYGEN CANNULA Liters per minute: 2; Indications for O2 therapy: HYPOXIA CONTINUOUS Routine    POC LACTIC ACID    EKG, 12 LEAD, INITIAL    EKG, 12 LEAD, INITIAL    SALINE LOCK IV ONE TIME STAT    sodium chloride (NS) flush 5-10 mL    sodium chloride 0.9 % bolus infusion 1,000 mL    sodium chloride (NS) flush 10 mL    iopamidol (ISOVUE-370) 76 % injection 100 mL    vancomycin (VANCOCIN) 1,500 mg in 0.9% sodium chloride 500 mL IVPB    DISCONTD: piperacillin-tazobactam (ZOSYN) 3.375 g in 0.9% sodium chloride (MBP/ADV) 100 mL    FOLLOWED BY Linked Order Group     piperacillin-tazobactam (ZOSYN) 3.375 g in 0.9% sodium chloride (MBP/ADV) 100 mL     piperacillin-tazobactam (ZOSYN) 3.375 g in 0.9% sodium chloride (MBP/ADV) 100 mL    nystatin (MYCOSTATIN) 100,000 unit/mL suspension    acetaminophen (TYLENOL EXTRA STRENGTH) 500 mg tablet    propylene glycol/peg 400/PF (SYSTANE, PF, OP)    zolpidem (AMBIEN) tablet 10 mg    white petrolatum-mineral oil (SOOTHE NIGHT TIME) 80-20 % ophthalmic ointment    nystatin (MYCOSTATIN) 100,000 unit/mL oral suspension 300,000 Units    naproxen (NAPROSYN) tablet 500 mg    fenofibrate nanocrystallized (TRICOR) tablet 145 mg    colestipol (COLESTID) tablet 1 g    calcium-vitamin D (OS-PRISCILLA) 500 mg-200 unit tablet    acetaminophen (TYLENOL) tablet 1,000 mg    sodium chloride (NS) flush 5-40 mL    sodium chloride (NS) flush 5-40 mL    enoxaparin (LOVENOX) injection 30 mg    0.9% sodium chloride (MBP/ADV) infusion    VANCOMYCIN INFORMATION NOTE    vancomycin (VANCOCIN) 1,000 mg in 0.9% sodium chloride (MBP/ADV) 250 mL    INITIAL PHYSICIAN ORDER: INPATIENT Telemetry; 3.  Patient receiving treatment that can only be provided in an inpatient setting (further clarification in H&P documentation)    IP CONSULT TO PHARMACY - VANCOMYCIN DOSING     ED Medications Administered:  Medications   sodium chloride (NS) flush 5-10 mL (has no administration in time range)   vancomycin (VANCOCIN) 1,500 mg in 0.9% sodium chloride 500 mL IVPB (1,500 mg IntraVENous New Bag 12/13/19 2039)   piperacillin-tazobactam (ZOSYN) 3.375 g in 0.9% sodium chloride (MBP/ADV) 100 mL (3.375 g IntraVENous New Bag 12/13/19 2001)     Followed by   piperacillin-tazobactam (ZOSYN) 3.375 g in 0.9% sodium chloride (MBP/ADV) 100 mL (has no administration in time range)   zolpidem (AMBIEN) tablet 10 mg (has no administration in time range)   white petrolatum-mineral oil (SOOTHE NIGHT TIME) 80-20 % ophthalmic ointment (has no administration in time range)   nystatin (MYCOSTATIN) 100,000 unit/mL oral suspension 300,000 Units (300,000 Units Oral Given 12/13/19 2138)   naproxen (NAPROSYN) tablet 500 mg (500 mg Oral Given 12/13/19 2039)   fenofibrate nanocrystallized (TRICOR) tablet 145 mg (has no administration in time range)   colestipol (COLESTID) tablet 1 g (1 g Oral Refused 12/13/19 2014)   calcium-vitamin D (OS-PRISCILLA) 500 mg-200 unit tablet (has no administration in time range)   acetaminophen (TYLENOL) tablet 1,000 mg (has no administration in time range)   sodium chloride (NS) flush 5-40 mL (10 mL IntraVENous Given 12/13/19 2141)   sodium chloride (NS) flush 5-40 mL (10 mL IntraVENous Given 12/13/19 2006)   enoxaparin (LOVENOX) injection 30 mg (30 mg SubCUTAneous Given 12/13/19 2014)   0.9% sodium chloride (MBP/ADV) infusion (  Canceled Entry 12/13/19 2000)   VANCOMYCIN INFORMATION NOTE (has no administration in time range)   vancomycin (VANCOCIN) 1,000 mg in 0.9% sodium chloride (MBP/ADV) 250 mL (has no administration in time range)   sodium chloride 0.9 % bolus infusion 1,000 mL (0 mL IntraVENous IV Completed 12/13/19 1701)   sodium chloride (NS) flush 10 mL (10 mL IntraVENous Given 12/13/19 1416)   iopamidol (ISOVUE-370) 76 % injection 100 mL (80 mL IntraVENous Given 12/13/19 1416)         Progress Note:  CT results reviewed; given failed outpatient Rx, will need admission, treat for HCAP, pt is immunosuppressed on prednisone for chronic RA    Progress Note:  Slightly hypoxic 88-90% with exertion, will place on oxygen    Progress Note:  I have just re-evaluated the patient. Patient reports min improvement of sx's. I have reviewed Her vital signs and determined there is currently no worsening in their condition or physical exam. Results have been reviewed with them and their questions have been answered. We will continue to review further results as they come available. Consult Note:  Rosemary Palmer MD spoke with Dr. eCcil James,   Specialty: Hospitalist  Discussed pt's hx, disposition, and available diagnostic and imaging results. Reviewed care plans. Agree with management and plan thus far. Consultant will evaluate pt for admission.     CRITICAL CARE NOTE :    IMPENDING DETERIORATION -Airway, Respiratory, Cardiovascular, Metabolic and Renal  ASSOCIATED RISK FACTORS - Hypotension, Shock, Hypoxia, Dysrhythmia and Metabolic changes  MANAGEMENT- Bedside Assessment and Supervision of Care  INTERPRETATION -  Xrays, CT Scan, ECG, Blood Pressure and Cardiac Output Measures   INTERVENTIONS - hemodynamic mngmt and Metobolic interventions  CASE REVIEW - Hospitalist, Nursing and Family  TREATMENT RESPONSE -Improved  PERFORMED BY - Self    NOTES   :    I have spent 45 minutes of critical care time involved in lab review, consultations with specialist, family decision- making, bedside attention and documentation. During this entire length of time I was immediately available to the patient . Critical Care: The reason for providing this level of medical care for this critically ill patient was due to a critical illness that impaired one or more vital organ systems, such that there was a high probability of imminent or life threatening deterioration in the patient's condition. This care involved high complexity decision making to assess, manipulate, and support vital system functions, to treat this degree of vital organ system failure, and to prevent further life threatening deterioration of the patients condition. Jam Gray MD    Disposition: ADMIT  Patient is being admitted to the hospital. The results of their tests and reasons for their admission have been discussed with them and/or available family. I have discussed the case with the admitting specialist and expressed my high concern for decompensation if patient is discharged from the ED. The patient and/or available family convey agreement and understanding for the need to be admitted and for their admission diagnosis. Consultation has been made with the inpatient physician specialist for hospitalization. Diagnosis     Clinical Impression:   1. Sepsis, due to unspecified organism, unspecified whether acute organ dysfunction present ([address]    2. HCAP (healthcare-associated pneumonia)    3. Immunosuppressed status ([address]    4.  Rheumatoid arthritis with positive rheumatoid factor, involving unspecified site ([facility] [address]    5. Acute respiratory failure with hypoxia (HCC)        Attestation:  Linh Lacey MD      This note will not be viewable in MyChart.

## 2019-12-13 NOTE — PROGRESS NOTES
Pharmacy Clarification of Prior to Admission Medication Regimen     The patient was interviewed regarding clarification of the prior to admission medication regimen. Patient's  was present in room and obtained permission from patient to discuss drug regimen with visitor(s) present. Patient was questioned regarding use of any other inhalers, topical products, over the counter medications, herbal medications, vitamin products or ophthalmic/nasal/otic medication use. Information Obtained From: Patient, Patient's , Rx query    Pertinent Pharmacy Findings:  amoxicillin-clavulanate (AUGMENTIN) 875-125 mg per tablet: Patient stated she is on this medication for double pneumonia. calcium/D3/mag ox//morgan/Zn (CALTRATE + D3 PLUS MINERALS PO), colestipol (COLESTID) 1 gram tablet, fenofibrate (LOFIBRA) 160 mg tablet, nabumetone (RELAFEN) 500 mg table: patient stated she has not taken any of these agents since she had the initial rash at the beginning of November. PTA medication list was corrected to the following:     Prior to Admission Medications   Prescriptions Last Dose Informant Taking?   acetaminophen (TYLENOL EXTRA STRENGTH) 500 mg tablet 12/12/2019 at Unknown time Self Yes   Sig: Take 1,000 mg by mouth daily as needed for Pain.   amoxicillin-clavulanate (AUGMENTIN) 875-125 mg per tablet 12/12/2019 at Unknown time Self Yes   Sig: Take 1 Tab by mouth two (2) times a day for 7 days. calcium/D3/mag ox//morgan/Zn (CALTRATE + D3 PLUS MINERALS PO) 11/13/2019 at Unknown time Self Yes   Sig: Take 1 Tab by mouth daily. colestipol (COLESTID) 1 gram tablet 11/13/2019 at Unknown time Self Yes   Sig: TAKE 1 TABLET BY MOUTH TWICE DAILY   fenofibrate (LOFIBRA) 160 mg tablet 11/13/2019 at Unknown time Self Yes   Sig: TAKE 1 TABLET BY MOUTH EVERY DAY   nabumetone (RELAFEN) 500 mg tablet 11/13/2019 at Unknown time Self Yes   Sig: Take 500 mg by mouth two (2) times a day.    naproxen (NAPROSYN) 500 mg tablet 12/12/2019 at Unknown time Self Yes   Sig: Take 1 Tab by mouth two (2) times daily (with meals). nystatin (MYCOSTATIN) 100,000 unit/mL suspension 12/10/2019 Self Yes   Sig: Take 3 mL by mouth nightly. swish and spit   predniSONE (DELTASONE) 10 mg tablet 12/7/2019 Self No   Sig: Take 10 mg by mouth daily (with breakfast). propylene glycol/peg 400/PF (SYSTANE, PF, OP) 11/13/2019 at Unknown time Self Yes   Sig: Administer  to both eyes daily as needed (dry eyes).    zolpidem (AMBIEN) 10 mg tablet 10/13/2019 Self Yes   Sig: TAKE 1 TABLET BY MOUTH AT BEDTIME AS NEEDED      Facility-Administered Medications: None          Thank you,  Ollie Navarrete  Medication History Pharmacy Technician

## 2019-12-13 NOTE — ED NOTES
TRANSFER - OUT REPORT:    Verbal report given to RN on Lan Mackey  being transferred to [facility] for routine progression of care       Report consisted of patients Situation, Background, Assessment and   Recommendations(SBAR). Information from the following report(s) SBAR was reviewed with the receiving nurse. Lines:   Peripheral IV 12/13/19 Left Antecubital (Active)        Opportunity for questions and clarification was provided.       Patient transported with:   O2 @ 2 liters

## 2019-12-14 LAB
ANION GAP SERPL CALC-SCNC: 8 MMOL/L (ref 5–15)
ARTERIAL PATENCY WRIST A: YES
ATRIAL RATE: 100 BPM
B PERT DNA SPEC QL NAA+PROBE: NOT DETECTED
BASE DEFICIT BLD-SCNC: 3 MMOL/L
BDY SITE: ABNORMAL
BORDETELLA PARAPERTUSSIS PCR, BORPAR: NOT DETECTED
BUN SERPL-MCNC: 12 MG/DL (ref 6–20)
BUN/CREAT SERPL: 18 (ref 12–20)
C PNEUM DNA SPEC QL NAA+PROBE: NOT DETECTED
CALCIUM SERPL-MCNC: 7.7 MG/DL (ref 8.5–10.1)
CALCULATED P AXIS, ECG09: 60 DEGREES
CALCULATED R AXIS, ECG10: 67 DEGREES
CALCULATED T AXIS, ECG11: 43 DEGREES
CHLORIDE SERPL-SCNC: 109 MMOL/L (ref 97–108)
CO2 SERPL-SCNC: 22 MMOL/L (ref 21–32)
CREAT SERPL-MCNC: 0.67 MG/DL (ref 0.55–1.02)
DIAGNOSIS, 93000: NORMAL
FLUAV H1 2009 PAND RNA SPEC QL NAA+PROBE: NOT DETECTED
FLUAV H1 RNA SPEC QL NAA+PROBE: NOT DETECTED
FLUAV H3 RNA SPEC QL NAA+PROBE: NOT DETECTED
FLUAV SUBTYP SPEC NAA+PROBE: NOT DETECTED
FLUBV RNA SPEC QL NAA+PROBE: NOT DETECTED
GAS FLOW.O2 O2 DELIVERY SYS: ABNORMAL L/MIN
GLUCOSE SERPL-MCNC: 94 MG/DL (ref 65–100)
HADV DNA SPEC QL NAA+PROBE: NOT DETECTED
HCO3 BLD-SCNC: 21 MMOL/L (ref 22–26)
HCOV 229E RNA SPEC QL NAA+PROBE: NOT DETECTED
HCOV HKU1 RNA SPEC QL NAA+PROBE: NOT DETECTED
HCOV NL63 RNA SPEC QL NAA+PROBE: NOT DETECTED
HCOV OC43 RNA SPEC QL NAA+PROBE: NOT DETECTED
HMPV RNA SPEC QL NAA+PROBE: NOT DETECTED
HPIV1 RNA SPEC QL NAA+PROBE: NOT DETECTED
HPIV2 RNA SPEC QL NAA+PROBE: NOT DETECTED
HPIV3 RNA SPEC QL NAA+PROBE: NOT DETECTED
HPIV4 RNA SPEC QL NAA+PROBE: NOT DETECTED
M PNEUMO DNA SPEC QL NAA+PROBE: NOT DETECTED
O2/TOTAL GAS SETTING VFR VENT: 50 %
P-R INTERVAL, ECG05: 140 MS
PCO2 BLD: 28.5 MMHG (ref 35–45)
PH BLD: 7.47 [PH] (ref 7.35–7.45)
PO2 BLD: 68 MMHG (ref 80–100)
POTASSIUM SERPL-SCNC: 3.3 MMOL/L (ref 3.5–5.1)
Q-T INTERVAL, ECG07: 334 MS
QRS DURATION, ECG06: 84 MS
QTC CALCULATION (BEZET), ECG08: 430 MS
RSV RNA SPEC QL NAA+PROBE: NOT DETECTED
RV+EV RNA SPEC QL NAA+PROBE: NOT DETECTED
SAO2 % BLD: 95 % (ref 92–97)
SODIUM SERPL-SCNC: 139 MMOL/L (ref 136–145)
SPECIMEN TYPE: ABNORMAL
TOTAL RESP. RATE, ITRR: 20
VENTRICULAR RATE, ECG03: 100 BPM

## 2019-12-14 PROCEDURE — 36600 WITHDRAWAL OF ARTERIAL BLOOD: CPT

## 2019-12-14 PROCEDURE — 82803 BLOOD GASES ANY COMBINATION: CPT

## 2019-12-14 PROCEDURE — 74011250637 HC RX REV CODE- 250/637: Performed by: INTERNAL MEDICINE

## 2019-12-14 PROCEDURE — 80048 BASIC METABOLIC PNL TOTAL CA: CPT

## 2019-12-14 PROCEDURE — 36415 COLL VENOUS BLD VENIPUNCTURE: CPT

## 2019-12-14 PROCEDURE — 74011250636 HC RX REV CODE- 250/636: Performed by: INTERNAL MEDICINE

## 2019-12-14 PROCEDURE — 77010033678 HC OXYGEN DAILY

## 2019-12-14 PROCEDURE — 74011000258 HC RX REV CODE- 258: Performed by: INTERNAL MEDICINE

## 2019-12-14 PROCEDURE — 74011250636 HC RX REV CODE- 250/636: Performed by: FAMILY MEDICINE

## 2019-12-14 PROCEDURE — 94760 N-INVAS EAR/PLS OXIMETRY 1: CPT

## 2019-12-14 PROCEDURE — 65660000000 HC RM CCU STEPDOWN

## 2019-12-14 RX ORDER — SODIUM CHLORIDE 9 MG/ML
100 INJECTION, SOLUTION INTRAVENOUS CONTINUOUS
Status: DISCONTINUED | OUTPATIENT
Start: 2019-12-14 | End: 2019-12-15

## 2019-12-14 RX ORDER — HEPARIN SODIUM 5000 [USP'U]/ML
5000 INJECTION, SOLUTION INTRAVENOUS; SUBCUTANEOUS EVERY 12 HOURS
Status: DISCONTINUED | OUTPATIENT
Start: 2019-12-14 | End: 2019-12-20 | Stop reason: HOSPADM

## 2019-12-14 RX ORDER — KETOROLAC TROMETHAMINE 30 MG/ML
30 INJECTION, SOLUTION INTRAMUSCULAR; INTRAVENOUS
Status: COMPLETED | OUTPATIENT
Start: 2019-12-14 | End: 2019-12-14

## 2019-12-14 RX ADMIN — CALCIUM CARBONATE 500 MG (1,250 MG)-VITAMIN D3 200 UNIT TABLET 1 TABLET: at 08:41

## 2019-12-14 RX ADMIN — NAPROXEN 500 MG: 250 TABLET ORAL at 17:11

## 2019-12-14 RX ADMIN — PIPERACILLIN AND TAZOBACTAM 3.38 G: 3; .375 INJECTION, POWDER, LYOPHILIZED, FOR SOLUTION INTRAVENOUS at 17:12

## 2019-12-14 RX ADMIN — VANCOMYCIN HYDROCHLORIDE 1000 MG: 1 INJECTION, POWDER, LYOPHILIZED, FOR SOLUTION INTRAVENOUS at 08:45

## 2019-12-14 RX ADMIN — PIPERACILLIN AND TAZOBACTAM 3.38 G: 3; .375 INJECTION, POWDER, LYOPHILIZED, FOR SOLUTION INTRAVENOUS at 12:44

## 2019-12-14 RX ADMIN — POTASSIUM BICARBONATE 40 MEQ: 782 TABLET, EFFERVESCENT ORAL at 11:21

## 2019-12-14 RX ADMIN — SODIUM CHLORIDE 500 ML: 900 INJECTION, SOLUTION INTRAVENOUS at 00:38

## 2019-12-14 RX ADMIN — SODIUM CHLORIDE 100 ML/HR: 900 INJECTION, SOLUTION INTRAVENOUS at 16:02

## 2019-12-14 RX ADMIN — Medication 10 ML: at 05:37

## 2019-12-14 RX ADMIN — PIPERACILLIN AND TAZOBACTAM 3.38 G: 3; .375 INJECTION, POWDER, LYOPHILIZED, FOR SOLUTION INTRAVENOUS at 02:38

## 2019-12-14 RX ADMIN — SODIUM CHLORIDE 100 ML/HR: 900 INJECTION, SOLUTION INTRAVENOUS at 00:44

## 2019-12-14 RX ADMIN — KETOROLAC TROMETHAMINE 30 MG: 30 INJECTION, SOLUTION INTRAMUSCULAR at 00:54

## 2019-12-14 RX ADMIN — HEPARIN SODIUM 5000 UNITS: 5000 INJECTION INTRAVENOUS; SUBCUTANEOUS at 20:14

## 2019-12-14 RX ADMIN — Medication 10 ML: at 16:02

## 2019-12-14 RX ADMIN — NAPROXEN 500 MG: 250 TABLET ORAL at 07:49

## 2019-12-14 RX ADMIN — NYSTATIN 300000 UNITS: 100000 SUSPENSION ORAL at 21:42

## 2019-12-14 RX ADMIN — VANCOMYCIN HYDROCHLORIDE 1000 MG: 1 INJECTION, POWDER, LYOPHILIZED, FOR SOLUTION INTRAVENOUS at 20:14

## 2019-12-14 NOTE — ROUTINE PROCESS
Bedside and Verbal shift change report given to [address] [facility] (oncoming nurse) by Sofi Howell RN (offgoing nurse). Report included the following information SBAR, Kardex, Recent Results, Med Rec Status and Cardiac Rhythm ST. 
  
[facility] Phone:   [phone] 
  
  
Significant changes during shift:  no more episodes of low O2, temp normal. Patient rested throughout shift. Seen by RD. Down to 3 L NC 
  
  
Patient Information 
  
Rajesh Early 
78 y.o. 
12/13/2019  2:11 PM by Gayle Fang MD. Rajesh Early was admitted from Home 
  
Problem List 
  
     
Patient Active Problem List  
  Diagnosis Date Noted  Rheumatoid arthritis with positive rheumatoid factor ([address] 10/01/2019  
    Priority: 1 - One  Sjogren's syndrome ([address] 11/20/2018  
    Priority: 1 - One  
 Hypercholesterolemia 11/20/2018  
    Priority: 1 - One  
 Primary osteoarthritis involving multiple joints 11/20/2018  
    Priority: 1 - One  Chronic bilateral low back pain without sciatica 11/20/2018  
    Priority: 1 - One  Weight loss, unintentional 11/20/2018  
    Priority: 1 - One  Bacterial pneumonia 06/26/2018  
    Priority: 1 - One  Sepsis ([address] 12/13/2019  Right lower quadrant abdominal pain 04/26/2018  Pain in both lower extremities 04/26/2018  Ulcer of mouth 04/26/2018  
  
    
Past Medical History:  
Diagnosis Date  Arthritis    
 Bacterial pneumonia 6/26/2018  Chronic bilateral low back pain without sciatica 11/20/2018  Hypercholesterolemia    
 Long term (current) use of anticoagulants    
 Primary osteoarthritis involving multiple joints 11/20/2018  Sjogren's syndrome ([address] 11/20/2018  Weight loss, unintentional 11/20/2018  
  
  
  
Core Measures: 
  
CVA: No No 
CHF:No No 
PNA:Yes Yes 
  
Post Op Surgical (If Applicable):  
  
Number times ambulated in hallway past shift:  0 Number of times OOB to chair past shift:   0 
NG Tube: No 
Incentive Spirometer: No 
Drains: No   Volume  0 
 Dressing Present:  No 
Flatus:  Not applicable 
  
Activity Status: 
  
OOB to Chair No 
Ambulated this shift Yes Bed Rest No 
  
Supplemental O2: (If Applicable) 
  
NC Yes NRB No 
Venti-mask Yes 50 % On 12 Liters/min 
  
  
LINES AND DRAINS: 
  
Central Line? No  
PICC LINE? No  
Urinary Catheter? No  
DVT prophylaxis: 
  
DVT prophylaxis Med- Yes DVT prophylaxis SCD or FEDERICO- No  
  
Wounds: (If Applicable) 
  
Wounds- No 
  
Location 0 
  
Patient Safety: 
  
Falls Score Total Score: 1 Safety Level_______ Bed Alarm On? No 
Sitter?  No 
  
Plan for upcoming shift: oxygen, IV antibiotics, blood works 
  
  
  
Discharge Plan: Yes TBD 
  
Active Consults: 
None

## 2019-12-14 NOTE — PROGRESS NOTES
Asked to assess patient by primary nurse. Patient febrile and tachycardic. Primary nurse had received appropriate orders from physician. Will follow up.

## 2019-12-14 NOTE — PROGRESS NOTES
Hospitalist Progress Note    NAME: Vivek Palomino   :  1950   MRN:  [id number]       Assessment / Plan:    Community-acquired pneumonia  · Failed outpatient treatment with Augmentin and azithromycin, it was documented that she completed 5 days  · Curb 65 score of 1  · Immunocompromise patient, rheumatoid arthritis on immunosuppressive medication including steroids  · Was started on vancomycin and Zosyn  · Follow-up with blood cultures and sputum cultures  · She will require repeating chest x-ray 1 to 2 weeks after discharge to ensure resolution    History of RA with Sjogren's syndrome  · Continue home meds      18.5 - 24.9 Normal weight / Body mass index is 20.18 kg/m². Code status: Full  Prophylaxis: lovenox  Recommended Disposition: Home w/Family     Subjective:     Patient was seen and examined this morning. No acute events overnight. Her  was at bedside, all questions were answered. Reported that she feels okay. Review of Systems:  Symptom Y/N Comments  Symptom Y/N Comments   Fever/Chills n   Chest Pain n    Poor Appetite n   Edema n    Cough n   Abdominal Pain n    Sputum n   Joint Pain n    SOB/JAMES n   Pruritis/Rash n    Nausea/vomit n   Tolerating PT/OT y    Diarrhea n   Tolerating Diet y    Constipation n   Other         Objective:     VITALS:   Last 24hrs VS reviewed since prior progress note.  Most recent are:  Patient Vitals for the past 24 hrs:   Temp Pulse Resp BP SpO2   19 0731 98 °F (36.7 °C) 90 18 (!) 83/51 93 %   19 0501 98.1 °F (36.7 °C) 88 18 96/49 94 %   19 0056     95 %   19 0015 (!) 104.1 °F (40.1 °C)       19 2308 100.2 °F (37.9 °C) (!) 113 20 145/90 93 %   19 1826 99.2 °F (37.3 °C) 94 20 132/60 97 %   19 1700  96 24 113/67 96 %   19 1645  94 24 132/48 97 %   19 1615  98 25 112/80 96 %   19 1600  95 24 131/65 90 %   19 1310 97.9 °F (36.6 °C) (!) 106 18 129/76 90 %       Intake/Output Summary (Last 24 hours) at 12/14/2019 0945  Last data filed at 12/13/2019 2039  Gross per 24 hour   Intake 840 ml   Output    Net 840 ml        PHYSICAL EXAM:  General: WD, WN. Alert, cooperative, no acute distress    EENT:  EOMI. Anicteric sclerae. MMM  Resp:  CTA bilaterally, no wheezing or rales. No accessory muscle use  CV:  Regular  rhythm,  No edema  GI:  Soft, Non distended, Non tender.  +Bowel sounds  Neurologic:  Alert and oriented X 3, normal speech,   Psych:   Good insight. Not anxious nor agitated  Skin:  No rashes. No jaundice    Reviewed most current lab test results and cultures  YES  Reviewed most current radiology test results   YES  Review and summation of old records today    NO  Reviewed patient's current orders and MAR    YES  PMH/SH reviewed - no change compared to H&P  ________________________________________________________________________  Care Plan discussed with:    Comments   Patient x    Family  x    RN x    Care Manager     Consultant                        Multidiciplinary team rounds were held today with , nursing, pharmacist and clinical coordinator. Patient's plan of care was discussed; medications were reviewed and discharge planning was addressed. ________________________________________________________________________  Total NON critical care TIME:  35 Minutes    Total CRITICAL CARE TIME Spent:   Minutes non procedure based      Comments   >50% of visit spent in counseling and coordination of care     ________________________________________________________________________  Jose Armando Solis MD     Procedures: see electronic medical records for all procedures/Xrays and details which were not copied into this note but were reviewed prior to creation of Plan. LABS:  I reviewed today's most current labs and imaging studies.   Pertinent labs include:  Recent Labs     12/13/19  1532   WBC 8.2   HGB 10.7*   HCT 33.1*   *     Recent Labs     12/14/19  0148 12/13/19  1532    138   K 3.3* 3.5   * 103   CO2 22 26   GLU 94 86   BUN 12 12   CREA 0.67 0.56   CA 7.7* 8.8   ALB  --  2.2*   TBILI  --  0.5   SGOT  --  34   ALT  --  23       Signed: Bobbi Husain MD

## 2019-12-14 NOTE — PROGRESS NOTES
[address] MRN [id number]  Consult ID [id number]  Facility Time Zone ET  Date and Time of Request 12/14/2019 12:18:32 AM  Requesting Clinician [phone]  Patient Name Aye Tim  Date of Birth [id number]  Gender Female  Clinical Note  Clinical Note Per RN, Pt is admitted for sepsis, she is sustaining in the 133's with a fever of  104. Tylenol has been given at 11 PM with no relief. Patient is now on 12 L, 50%  with the venti mask. Chart Reviewed. Patient has sepsis due to pneumonia, recommend apply BIPAP, ABG stat. IV  Toradol 30 mg once, IVF bolus, IVFs. monitor condition, if worsening condition transfer to ICU.

## 2019-12-14 NOTE — PROGRESS NOTES
Problem: Falls - Risk of  Goal: *Absence of Falls  Description  Document Go Sherman Fall Risk and appropriate interventions in the flowsheet.   Outcome: Progressing Towards Goal  Note: Fall Risk Interventions:            Medication Interventions: Patient to call before getting OOB                   Problem: Patient Education: Go to Patient Education Activity  Goal: Patient/Family Education  Outcome: Progressing Towards Goal

## 2019-12-14 NOTE — PROGRESS NOTES
Initial Nutrition Assessment:    INTERVENTIONS/RECOMMENDATIONS:   · Meals/Snacks: General/healthful diet: Continue Regular diet    ASSESSMENT:   Patient medically noted for sepsis and pneumonia. PMH RA and Sjogren's syndrome. Receiving a regular diet. Patient reports a fairly good appetite. Reports gradual weight loss over the past year and half. No significant weight changes recently per chart review. Drinks ensure every now and then. Explained variety of supplements available but patient declined for now. Encouraged intake of meals. MST referral received for EN/TPN PTA; Patient reports no history of feeding tube/TPN. Diet Order: Regular  % Eaten:    Patient Vitals for the past 72 hrs:   % Diet Eaten   12/13/19 2001 75 %       Pertinent Medications: [x]Reviewed []Other: [place], tricor  Pertinent Labs: [x]Reviewed []Other: K+ 3.3  Food Allergies: [x]None []Yes:    Last BM: 12/13  []Active     []Hyperactive  []Hypoactive       [] Absent BS  Skin:    [x] Intact   [] Incision  [] Breakdown: [] Edema []Other:    Anthropometrics:   Height: 5' 6\" (167.6 cm) Weight: 56.7 kg (125 lb)   IBW (%IBW):   ( ) UBW (%UBW):   (  %)   Last Weight Metrics:  Weight Loss Metrics 12/13/2019 12/7/2019 11/3/2019 10/1/2019 4/23/2019 11/20/2018 10/2/2018   Today's Wt 125 lb 123 lb 0.3 oz 124 lb 12.5 oz 129 lb 6.4 oz 129 lb 10.1 oz 134 lb 131 lb   BMI 20.18 kg/m2 19.86 kg/m2 20.14 kg/m2 20.89 kg/m2 20.92 kg/m2 22.3 kg/m2 21.8 kg/m2       BMI: Body mass index is 20.18 kg/m². This BMI is indicative of:   []Underweight    [x]Normal    []Overweight    [] Obesity   [] Extreme Obesity (BMI>40)     Estimated Nutrition Needs (Based on):   1439 Kcals/day(BMR (1107) x 1. 3AF) , 57 g(1.0 g/kg bw) Protein  Carbohydrate:  At Least 130 g/day  Fluids: 1450 mL/day (1ml/kcal)    Pt expected to meet estimated nutrient needs: [x]Yes []No    NUTRITION DIAGNOSES:   Problem:  No nutritional diagnosis at this time      Etiology: related to Signs/Symptoms: as evidenced by        NUTRITION INTERVENTIONS:  Meals/Snacks: General/healthful diet                  GOAL:   PO intake >50% of meals next 3-5 days    LEARNING NEEDS (Diet, Food/Nutrient-Drug Interaction):    [x] None Identified   [] Identified and Education Provided/Documented   [] Identified and Pt declined/was not appropriate     Cultural, Shinto, OR Ethnic Dietary Needs:    [x] None Identified   [] Identified and Addressed     [x] Interdisciplinary Care Plan Reviewed/Documented    [x] Discharge Planning:  Regular diet      MONITORING /EVALUATION:   Food/Nutrient Intake Outcomes:  Total energy intake  Physical Signs/Symptoms Outcomes: Weight/weight change, Electrolyte and renal profile    NUTRITION RISK:    [x] Patient At Nutritional Risk              [] Patient Not at Nutritional Risk    PT SEEN FOR:    []  MD Consult: []Calorie Count      []Diabetic Diet Education        []Diet Education     []Electrolyte Management     []General Nutrition Management and Supplements     []Management of Tube Feeding     []TPN Recommendations    [x]  RN Referral:  [x]MST score >=2     [x]Enteral/Parenteral Nutrition PTA     []Pregnant: Gestational DM or Multigestation     []Pressure Ulcer/Wound Care needs        []  Low BMI  []  DELIA Bean [id number]  Pager [phone]    Weekend Pager [phone]

## 2019-12-14 NOTE — PROGRESS NOTES
* No surgery found *  * No surgery found *  Bedside and Verbal shift change report given to [address] (oncoming nurse) by Roverto Palumbo RN (offgoing nurse).  Report included the following information SBAR, Kardex, Recent Results, Med Rec Status and Cardiac Rhythm [facility] Phone:   [phone]      Significant changes during shift:  Admission, pt with low grade fever, saturation was low increase oxygen still low, put pt on ventimask 50% and saturation was 91 to 92 %, tachycardic 124, incoming nurse already text to MD and also call RRT nurse to come to see pt, because we repeat the temperature and was 103.8       Patient Information    Josh Wilson  78 y.o.  12/13/2019  2:11 PM by Benji Leiva MD. Josh Wilson was admitted from Home    Problem List    Patient Active Problem List    Diagnosis Date Noted    Rheumatoid arthritis with positive rheumatoid factor ([address] 10/01/2019     Priority: 1 - One    Sjogren's syndrome ([address] 11/20/2018     Priority: 1 - One    Hypercholesterolemia 11/20/2018     Priority: 1 - One    Primary osteoarthritis involving multiple joints 11/20/2018     Priority: 1 - One    Chronic bilateral low back pain without sciatica 11/20/2018     Priority: 1 - One    Weight loss, unintentional 11/20/2018     Priority: 1 - One    Bacterial pneumonia 06/26/2018     Priority: 1 - One    Sepsis ([address] 12/13/2019    Right lower quadrant abdominal pain 04/26/2018    Pain in both lower extremities 04/26/2018    Ulcer of mouth 04/26/2018     Past Medical History:   Diagnosis Date    Arthritis     Bacterial pneumonia 6/26/2018    Chronic bilateral low back pain without sciatica 11/20/2018    Hypercholesterolemia     Long term (current) use of anticoagulants     Primary osteoarthritis involving multiple joints 11/20/2018    Sjogren's syndrome ([address] 11/20/2018    Weight loss, unintentional 11/20/2018         Core Measures:    CVA: No No  CHF:No No  PNA:Yes Yes    Post Op Surgical (If Applicable): Number times ambulated in hallway past shift:  0  Number of times OOB to chair past shift:   0  NG Tube: No  Incentive Spirometer: No  Drains: No   Volume  0  Dressing Present:  No  Flatus:  Not applicable    Activity Status:    OOB to Chair No  Ambulated this shift Yes   Bed Rest No    Supplemental O2: (If Applicable)    NC Yes  NRB No  Venti-mask Yes 50 %  On 12 Liters/min      LINES AND DRAINS:    Central Line? No   PICC LINE? No   Urinary Catheter? No   DVT prophylaxis:    DVT prophylaxis Med- Yes  DVT prophylaxis SCD or FEDERICO- No     Wounds: (If Applicable)    Wounds- No    Location 0    Patient Safety:    Falls Score Total Score: 1  Safety Level_______  Bed Alarm On? No  Sitter?  No    Plan for upcoming shift: oxygen, IV antibiotics, blood works        Discharge Plan: Yes TBD    Active Consults:  None

## 2019-12-15 ENCOUNTER — APPOINTMENT (OUTPATIENT)
Dept: GENERAL RADIOLOGY | Age: 69
DRG: 193 | End: 2019-12-15
Attending: INTERNAL MEDICINE
Payer: MEDICARE

## 2019-12-15 ENCOUNTER — APPOINTMENT (OUTPATIENT)
Dept: CT IMAGING | Age: 69
DRG: 193 | End: 2019-12-15
Attending: INTERNAL MEDICINE
Payer: MEDICARE

## 2019-12-15 LAB
ANION GAP SERPL CALC-SCNC: 7 MMOL/L (ref 5–15)
ARTERIAL PATENCY WRIST A: YES
BASE DEFICIT BLD-SCNC: 4 MMOL/L
BDY SITE: ABNORMAL
BUN SERPL-MCNC: 15 MG/DL (ref 6–20)
BUN/CREAT SERPL: 25 (ref 12–20)
CALCIUM SERPL-MCNC: 8.9 MG/DL (ref 8.5–10.1)
CHLORIDE SERPL-SCNC: 111 MMOL/L (ref 97–108)
CO2 SERPL-SCNC: 22 MMOL/L (ref 21–32)
CREAT SERPL-MCNC: 0.59 MG/DL (ref 0.55–1.02)
D DIMER PPP FEU-MCNC: 3.11 MG/L FEU (ref 0–0.65)
DATE LAST DOSE: NORMAL
GAS FLOW.O2 O2 DELIVERY SYS: ABNORMAL L/MIN
GLUCOSE SERPL-MCNC: 90 MG/DL (ref 65–100)
HCO3 BLD-SCNC: 19.8 MMOL/L (ref 22–26)
O2/TOTAL GAS SETTING VFR VENT: 50 %
PCO2 BLD: 29.5 MMHG (ref 35–45)
PH BLD: 7.44 [PH] (ref 7.35–7.45)
PO2 BLD: 51 MMHG (ref 80–100)
POTASSIUM SERPL-SCNC: 3.8 MMOL/L (ref 3.5–5.1)
REPORTED DOSE,DOSE: NORMAL UNITS
REPORTED DOSE/TIME,TMG: NORMAL
SAO2 % BLD: 87 % (ref 92–97)
SODIUM SERPL-SCNC: 140 MMOL/L (ref 136–145)
SPECIMEN TYPE: ABNORMAL
TOTAL RESP. RATE, ITRR: 20
VANCOMYCIN TROUGH SERPL-MCNC: 9.4 UG/ML (ref 5–10)

## 2019-12-15 PROCEDURE — 74011250636 HC RX REV CODE- 250/636: Performed by: HOSPITALIST

## 2019-12-15 PROCEDURE — 85379 FIBRIN DEGRADATION QUANT: CPT

## 2019-12-15 PROCEDURE — 94760 N-INVAS EAR/PLS OXIMETRY 1: CPT

## 2019-12-15 PROCEDURE — 71275 CT ANGIOGRAPHY CHEST: CPT

## 2019-12-15 PROCEDURE — 74011636320 HC RX REV CODE- 636/320: Performed by: INTERNAL MEDICINE

## 2019-12-15 PROCEDURE — 82803 BLOOD GASES ANY COMBINATION: CPT

## 2019-12-15 PROCEDURE — 36600 WITHDRAWAL OF ARTERIAL BLOOD: CPT

## 2019-12-15 PROCEDURE — 80202 ASSAY OF VANCOMYCIN: CPT

## 2019-12-15 PROCEDURE — 74011250637 HC RX REV CODE- 250/637: Performed by: INTERNAL MEDICINE

## 2019-12-15 PROCEDURE — 77010033678 HC OXYGEN DAILY

## 2019-12-15 PROCEDURE — 36415 COLL VENOUS BLD VENIPUNCTURE: CPT

## 2019-12-15 PROCEDURE — 74011250636 HC RX REV CODE- 250/636: Performed by: INTERNAL MEDICINE

## 2019-12-15 PROCEDURE — 74011250637 HC RX REV CODE- 250/637: Performed by: HOSPITALIST

## 2019-12-15 PROCEDURE — 74011000258 HC RX REV CODE- 258: Performed by: INTERNAL MEDICINE

## 2019-12-15 PROCEDURE — 80048 BASIC METABOLIC PNL TOTAL CA: CPT

## 2019-12-15 PROCEDURE — 71045 X-RAY EXAM CHEST 1 VIEW: CPT

## 2019-12-15 PROCEDURE — 65660000000 HC RM CCU STEPDOWN

## 2019-12-15 RX ORDER — SODIUM CHLORIDE 0.9 % (FLUSH) 0.9 %
10 SYRINGE (ML) INJECTION
Status: COMPLETED | OUTPATIENT
Start: 2019-12-15 | End: 2019-12-15

## 2019-12-15 RX ORDER — FUROSEMIDE 10 MG/ML
20 INJECTION INTRAMUSCULAR; INTRAVENOUS
Status: COMPLETED | OUTPATIENT
Start: 2019-12-15 | End: 2019-12-15

## 2019-12-15 RX ORDER — POTASSIUM CHLORIDE 20 MEQ/1
40 TABLET, EXTENDED RELEASE ORAL
Status: COMPLETED | OUTPATIENT
Start: 2019-12-15 | End: 2019-12-15

## 2019-12-15 RX ORDER — FUROSEMIDE 10 MG/ML
20 INJECTION INTRAMUSCULAR; INTRAVENOUS ONCE
Status: COMPLETED | OUTPATIENT
Start: 2019-12-15 | End: 2019-12-15

## 2019-12-15 RX ADMIN — FUROSEMIDE 20 MG: 10 INJECTION, SOLUTION INTRAMUSCULAR; INTRAVENOUS at 19:26

## 2019-12-15 RX ADMIN — NAPROXEN 500 MG: 250 TABLET ORAL at 12:59

## 2019-12-15 RX ADMIN — POTASSIUM CHLORIDE 40 MEQ: 20 TABLET, EXTENDED RELEASE ORAL at 03:15

## 2019-12-15 RX ADMIN — PIPERACILLIN AND TAZOBACTAM 3.38 G: 3; .375 INJECTION, POWDER, LYOPHILIZED, FOR SOLUTION INTRAVENOUS at 12:47

## 2019-12-15 RX ADMIN — FENOFIBRATE 145 MG: 145 TABLET ORAL at 12:43

## 2019-12-15 RX ADMIN — HEPARIN SODIUM 5000 UNITS: 5000 INJECTION INTRAVENOUS; SUBCUTANEOUS at 19:26

## 2019-12-15 RX ADMIN — NYSTATIN 300000 UNITS: 100000 SUSPENSION ORAL at 23:07

## 2019-12-15 RX ADMIN — FUROSEMIDE 20 MG: 10 INJECTION, SOLUTION INTRAMUSCULAR; INTRAVENOUS at 03:15

## 2019-12-15 RX ADMIN — NAPROXEN 500 MG: 250 TABLET ORAL at 19:03

## 2019-12-15 RX ADMIN — Medication 10 ML: at 06:53

## 2019-12-15 RX ADMIN — COLESTIPOL HYDROCHLORIDE 1 G: 1 TABLET, FILM COATED ORAL at 19:03

## 2019-12-15 RX ADMIN — PIPERACILLIN AND TAZOBACTAM 3.38 G: 3; .375 INJECTION, POWDER, LYOPHILIZED, FOR SOLUTION INTRAVENOUS at 19:05

## 2019-12-15 RX ADMIN — Medication 10 ML: at 19:04

## 2019-12-15 RX ADMIN — Medication 10 ML: at 18:04

## 2019-12-15 RX ADMIN — HEPARIN SODIUM 5000 UNITS: 5000 INJECTION INTRAVENOUS; SUBCUTANEOUS at 12:44

## 2019-12-15 RX ADMIN — CALCIUM CARBONATE 500 MG (1,250 MG)-VITAMIN D3 200 UNIT TABLET 1 TABLET: at 12:43

## 2019-12-15 RX ADMIN — IOPAMIDOL 100 ML: 755 INJECTION, SOLUTION INTRAVENOUS at 18:04

## 2019-12-15 RX ADMIN — VANCOMYCIN HYDROCHLORIDE 1000 MG: 1 INJECTION, POWDER, LYOPHILIZED, FOR SOLUTION INTRAVENOUS at 23:07

## 2019-12-15 RX ADMIN — COLESTIPOL HYDROCHLORIDE 1 G: 1 TABLET, FILM COATED ORAL at 12:43

## 2019-12-15 RX ADMIN — PIPERACILLIN AND TAZOBACTAM 3.38 G: 3; .375 INJECTION, POWDER, LYOPHILIZED, FOR SOLUTION INTRAVENOUS at 02:55

## 2019-12-15 RX ADMIN — Medication 10 ML: at 23:07

## 2019-12-15 NOTE — PROGRESS NOTES
Responded to RRT which was called due to worsening hypoxia. On bedside eval the patient is on a venturi mask and in respiratory distress.     Visit Vitals  /64   Pulse (!) 103   Temp 97.6 °F (36.4 °C)   Resp 28   Ht 5' 6\" (1.676 m)   Wt 60.6 kg (133 lb 11.2 oz)   SpO2 98% Comment: with non rebreather mask   Breastfeeding No   BMI 21.58 kg/m²   O2 sats 96% on 50% O2 now increased to 100    Heart RR tachycardic no MRG  Lungs with diffuse bilateral crackles  No peripheral edema    ABG reviewed    Acute respiratory failure with hypoxia, likely due to pulmonary edema  20 mg IV furosemide now  Hold IV fluids  STAT CXR  May need high flow O2

## 2019-12-15 NOTE — PROGRESS NOTES
Problem: Falls - Risk of  Goal: *Absence of Falls  Description  Document Herber Martines Fall Risk and appropriate interventions in the flowsheet.   Outcome: Progressing Towards Goal  Note: Fall Risk Interventions:            Medication Interventions: Evaluate medications/consider consulting pharmacy, Patient to call before getting OOB, Teach patient to arise slowly                   Problem: Patient Education: Go to Patient Education Activity  Goal: Patient/Family Education  Outcome: Progressing Towards Goal

## 2019-12-15 NOTE — ROUTINE PROCESS
Bedside and Verbal shift change report given to Rene Madera RN (oncoming nurse) by Iftikhar Lanier RN (offgoing nurse). Report included the following information SBAR, Kardex, Recent Results, Med Rec Status and [facility]   [phone] 
  
  
Significant changes during shift:  Rapid response on overnight due to desaturation to the 70's. NRB @15L. CXR showed pulmonary edema. Fluids discontinued. 20mg IV Lasix administered. Patient voided 900 by end of shift.  
  
Patient Information 
  
Priyate Factor 
78 y.o. 
12/13/2019  2:11 PM by Meliza Camarena MD. Priyate Tremayne was admitted from Home 
  
Problem List 
  
     
Patient Active Problem List  
  Diagnosis Date Noted  Rheumatoid arthritis with positive rheumatoid factor ([address] 10/01/2019  
    Priority: 1 - One  Sjogren's syndrome ([address] 11/20/2018  
    Priority: 1 - One  
 Hypercholesterolemia 11/20/2018  
    Priority: 1 - One  
 Primary osteoarthritis involving multiple joints 11/20/2018  
    Priority: 1 - One  Chronic bilateral low back pain without sciatica 11/20/2018  
    Priority: 1 - One  Weight loss, unintentional 11/20/2018  
    Priority: 1 - One  Bacterial pneumonia 06/26/2018  
    Priority: 1 - One  Sepsis ([address] 12/13/2019  Right lower quadrant abdominal pain 04/26/2018  Pain in both lower extremities 04/26/2018  Ulcer of mouth 04/26/2018  
  
    
Past Medical History:  
Diagnosis Date  Arthritis    
 Bacterial pneumonia 6/26/2018  Chronic bilateral low back pain without sciatica 11/20/2018  Hypercholesterolemia    
 Long term (current) use of anticoagulants    
 Primary osteoarthritis involving multiple joints 11/20/2018  Sjogren's syndrome ([address] 11/20/2018  Weight loss, unintentional 11/20/2018  
  
  
  
Core Measures: 
  
CVA: No No 
CHF:No No 
PNA:Yes Yes 
  
Post Op Surgical (If Applicable):  
  
Number times ambulated in hallway past shift:  0 
 Number of times OOB to chair past shift:   0 
NG Tube: No 
Incentive Spirometer: No 
Drains: No   Volume  0 Dressing Present:  No 
Flatus:  Not applicable 
  
Activity Status: 
  
OOB to Chair No 
Ambulated this shift Yes Bed Rest No 
  
Supplemental O2: (If Applicable) 
  
NC Yes NRB No 
Venti-mask Yes 50 % On 12 Liters/min 
  
  
LINES AND DRAINS: 
  
Central Line? No  
PICC LINE? No  
Urinary Catheter? No  
DVT prophylaxis: 
  
DVT prophylaxis Med- Yes DVT prophylaxis SCD or FEDERICO- No  
  
Wounds: (If Applicable) 
  
Wounds- No 
  
Location 0 
  
Patient Safety: 
  
Falls Score Total Score: 1 Safety Level_______ Bed Alarm On? No 
Sitter?  No 
  
Plan for upcoming shift: oxygen, IV antibiotics, blood works 
  
  
  
Discharge Plan: Yes TBD 
  
Active Consults: 
None

## 2019-12-15 NOTE — PROGRESS NOTES
Hospitalist Progress Note    NAME: Kyrie Plasencia   :  1950   MRN:  [id number]       Assessment / Plan:    Community-acquired pneumonia with Acute Hypoxic respiratory failure  · Failed outpatient treatment with Augmentin and azithromycin, it was documented that she completed 5 days  · Curb 65 score of 1  · CT \"Diffuse bilateral groundglass infiltrates\"  · Consult Pulm  · Immunocompromise patient, rheumatoid arthritis on immunosuppressive medication including steroids  · Was started on vancomycin and Zosyn  · Follow-up with blood cultures and sputum cultures  · She will require repeating chest x-ray 1 to 2 weeks after discharge to ensure resolution    History of RA with Sjogren's syndrome  · Continue home meds      18.5 - 24.9 Normal weight / Body mass index is 21.58 kg/m². Code status: Full  Prophylaxis: lovenox  Recommended Disposition: Home w/Family     Subjective:     Patient was seen and examined this morning. No acute events overnight. Her  was at bedside, all questions were answered. Reported that  \" I feel better today\"  Review of Systems:  Symptom Y/N Comments  Symptom Y/N Comments   Fever/Chills n   Chest Pain n    Poor Appetite n   Edema n    Cough n   Abdominal Pain n    Sputum n   Joint Pain n    SOB/JAMES n   Pruritis/Rash n    Nausea/vomit n   Tolerating PT/OT y    Diarrhea n   Tolerating Diet y    Constipation n   Other         Objective:     VITALS:   Last 24hrs VS reviewed since prior progress note.  Most recent are:  Patient Vitals for the past 24 hrs:   Temp Pulse Resp BP SpO2   12/15/19 1133 99 °F (37.2 °C) 99 24 (!) 123/92 96 %   12/15/19 0805     92 %   12/15/19 0755     98 %   12/15/19 0718 98.9 °F (37.2 °C) (!) 102 26 111/76 96 %   12/15/19 0405 98.3 °F (36.8 °C) 94 (!) 32 129/84 100 %   12/15/19 0305     98 %   12/15/19 0255 97.6 °F (36.4 °C) (!) 103 28 142/64 (!) 85 %   12/15/19 0227  96 18 114/77 90 %   19 2255 98.3 °F (36.8 °C) (!) 106 18 128/73 91 %   12/14/19 1833 98.1 °F (36.7 °C) 100 18 98/53 90 %   12/14/19 1505 98.2 °F (36.8 °C) (!) 58 20 96/56 90 %       Intake/Output Summary (Last 24 hours) at 12/15/2019 1324  Last data filed at 12/15/2019 0300  Gross per 24 hour   Intake 1097 ml   Output    Net 1097 ml        PHYSICAL EXAM:  General: WD, WN. Alert, cooperative, no acute distress    EENT:  EOMI. Anicteric sclerae. MMM  Resp:  CTA bilaterally, no wheezing or rales. No accessory muscle use  CV:  Regular  rhythm,  No edema  GI:  Soft, Non distended, Non tender.  +Bowel sounds  Neurologic:  Alert and oriented X 3, normal speech,   Psych:   Good insight. Not anxious nor agitated  Skin:  No rashes. No jaundice    Reviewed most current lab test results and cultures  YES  Reviewed most current radiology test results   YES  Review and summation of old records today    NO  Reviewed patient's current orders and MAR    YES  PMH/ reviewed - no change compared to H&P  ________________________________________________________________________  Care Plan discussed with:    Comments   Patient x    Family  x    RN x    Care Manager     Consultant                        Multidiciplinary team rounds were held today with , nursing, pharmacist and clinical coordinator. Patient's plan of care was discussed; medications were reviewed and discharge planning was addressed. ________________________________________________________________________  Total NON critical care TIME:  35 Minutes    Total CRITICAL CARE TIME Spent:   Minutes non procedure based      Comments   >50% of visit spent in counseling and coordination of care     ________________________________________________________________________  Yolis Briones MD     Procedures: see electronic medical records for all procedures/Xrays and details which were not copied into this note but were reviewed prior to creation of Plan.       LABS:  I reviewed today's most current labs and imaging studies.   Pertinent labs include:  Recent Labs     12/13/19  1532   WBC 8.2   HGB 10.7*   HCT 33.1*   *     Recent Labs     12/15/19  0244 12/14/19  0148 12/13/19  1532    139 138   K 3.8 3.3* 3.5   * 109* 103   CO2 22 22 26   GLU 90 94 86   BUN 15 12 12   CREA 0.59 0.67 0.56   CA 8.9 7.7* 8.8   ALB  --   --  2.2*   TBILI  --   --  0.5   SGOT  --   --  34   ALT  --   --  23       Signed: Adams Muñoz MD

## 2019-12-15 NOTE — PROGRESS NOTES
0249 Rapid Response Called due to decreased Sat's. Pt is currently on 50% Ventimask. Alert and responsive. ABG's drawn. The Hospitalist, Dr. Vickie Albarado arrived and ordered stat chest x-ray. Order for lasix and potassium was received. See MAR. Will continue to monitor for now.

## 2019-12-15 NOTE — PROGRESS NOTES
RN informed telemed team as patient has worsening hypoxia, on 2 L start of shift now on 15 L venti mask with Pox 90%, no distress    Stat orders placed for ABG, Ddimer and pCXR, informed RN about orders and need for patient to be evaluated by inhouse doc due to change in status ( ? BIPAP , ? ICU transfer )

## 2019-12-15 NOTE — PROGRESS NOTES
Reason for Admission:   Sepsis                   RRAT Score:    9                 Plan for utilizing home health:   No HH needs identified at this time - eligible for [facility] Sepsis                      Current Advanced Directive/Advance Care Plan: Full Code - ACP on file                         Transition of Care Plan:  TBD - Follow-up Care Appointments, [facility] for Sepsis if pt agreeable    79 yo  female admitted on 12/13/19 for Sepsis. [facility] pt. Lives in two-story house (2 SCAR, 14 interior steps) with spouse in [place]. Bed/bath on 2nd level. No reported hx of HH, SNF, or acute inpatient rehab. No reported DME at home. Pt independent in ADLs/IADLs to include driving. Spouse to provide transportation at discharge. Preferred Rx is [facility] ([address]). CM met with pt to complete initial assessment, dc planning. Pt is alert & oriented x 4, sitting in bed with ventimask on for oxygen support. Spouse at bedside. Pt currently sees Dr. Jazmin Castro (PCP) but has had significant difficulty getting appointments with provider and wants to switch to new PCP - reported she has new patient appointment scheduled for 12/31 with Dr. Gabriella Wilkes (may need to verify this later). Pt is currently on 6 LPM of oxygen (as of 1:30PM today). Due to pt's diagnosis, Medicare will NOT cover home oxygen for pt as they do not cover for PNA. Pt will need to be weaned off of continuous oxygen or pt and family will need to pay privately. No PT/OT consults at this time. CM will continue to follow-up to ensure additional CM needs are met:  - Home Oxygen Test (will need to be weaned off or private pay d/t diagnosis)  - [facility] visit for Sepsis PNA              Care Management Interventions  PCP Verified by CM: Yes(New Patient Appointment with Dr. Gabriella Wilkes on 12/31)  Palliative Care Criteria Met (RRAT>21 & CHF Dx)?: No  Mode of Transport at Discharge:  Other (see comment)(by private vehicle with spouse)  Transition of Care Consult (CM Consult): Discharge Planning  Discharge Durable Medical Equipment: No  Health Maintenance Reviewed: Yes  Physical Therapy Consult: No  Occupational Therapy Consult: No  Speech Therapy Consult: No  Current Support Network: Lives with Spouse, Own Home(Two-story house (2 SCAR, 14 interior steps) with spouse; bed/bath on 2nd level)  Confirm Follow Up Transport: Self  Plan discussed with Pt/Family/Caregiver: Yes  Discharge Location  Discharge Placement: (TBD)    ROBERT Mora [employer], [address] [place] [address]  [phone]

## 2019-12-15 NOTE — PROGRESS NOTES
RAPID RESPONSE TEAM FOLLOW UP    Rounded on pt d/t recent RRT for hypoxia. Pt's O2 sats low 90's on 6L NC. NAD. Discussed with primary nurse. Discussed with Dr. Claudia Lenz. New orders received for CTA PE study. Pt placed back on venti mask to meet oxygen needs. Visit Vitals  BP (!) 123/92 Comment: Nurse notified   Pulse 99   Temp 99 °F (37.2 °C)   Resp 24   Ht 5' 6\" (1.676 m)   Wt 60.6 kg (133 lb 11.2 oz)   SpO2 92%   Breastfeeding No   BMI 21.58 kg/m²       Romelia Manuel RN  RRT, [id number]    ADDENDUM    1709  Following up on status of CTA PE study. Pt still waiting to go. Primary nurse to call ct. [phone]  CTA indicates new pleural effusions. Discussed with Dr. Claudia Lenz. Orders received for Lasix 20 mg IVP x1. Primary nurse to document output.

## 2019-12-16 LAB
ANION GAP SERPL CALC-SCNC: 7 MMOL/L (ref 5–15)
BUN SERPL-MCNC: 15 MG/DL (ref 6–20)
BUN/CREAT SERPL: 24 (ref 12–20)
CALCIUM SERPL-MCNC: 8.8 MG/DL (ref 8.5–10.1)
CHLORIDE SERPL-SCNC: 108 MMOL/L (ref 97–108)
CO2 SERPL-SCNC: 26 MMOL/L (ref 21–32)
CREAT SERPL-MCNC: 0.63 MG/DL (ref 0.55–1.02)
ERYTHROCYTE [SEDIMENTATION RATE] IN BLOOD: 71 MM/HR (ref 0–30)
GLUCOSE SERPL-MCNC: 96 MG/DL (ref 65–100)
POTASSIUM SERPL-SCNC: 3.5 MMOL/L (ref 3.5–5.1)
PROCALCITONIN SERPL-MCNC: 3.24 NG/ML
SODIUM SERPL-SCNC: 141 MMOL/L (ref 136–145)

## 2019-12-16 PROCEDURE — 94760 N-INVAS EAR/PLS OXIMETRY 1: CPT

## 2019-12-16 PROCEDURE — 74011250636 HC RX REV CODE- 250/636: Performed by: INTERNAL MEDICINE

## 2019-12-16 PROCEDURE — 74011250637 HC RX REV CODE- 250/637: Performed by: INTERNAL MEDICINE

## 2019-12-16 PROCEDURE — 85652 RBC SED RATE AUTOMATED: CPT

## 2019-12-16 PROCEDURE — 36415 COLL VENOUS BLD VENIPUNCTURE: CPT

## 2019-12-16 PROCEDURE — 84145 PROCALCITONIN (PCT): CPT

## 2019-12-16 PROCEDURE — 65660000000 HC RM CCU STEPDOWN

## 2019-12-16 PROCEDURE — 77010033678 HC OXYGEN DAILY

## 2019-12-16 PROCEDURE — 74011000258 HC RX REV CODE- 258: Performed by: INTERNAL MEDICINE

## 2019-12-16 PROCEDURE — 80048 BASIC METABOLIC PNL TOTAL CA: CPT

## 2019-12-16 RX ADMIN — NAPROXEN 500 MG: 250 TABLET ORAL at 17:08

## 2019-12-16 RX ADMIN — Medication 10 ML: at 21:48

## 2019-12-16 RX ADMIN — PIPERACILLIN AND TAZOBACTAM 3.38 G: 3; .375 INJECTION, POWDER, LYOPHILIZED, FOR SOLUTION INTRAVENOUS at 11:51

## 2019-12-16 RX ADMIN — HEPARIN SODIUM 5000 UNITS: 5000 INJECTION INTRAVENOUS; SUBCUTANEOUS at 08:14

## 2019-12-16 RX ADMIN — CALCIUM CARBONATE 500 MG (1,250 MG)-VITAMIN D3 200 UNIT TABLET 1 TABLET: at 08:14

## 2019-12-16 RX ADMIN — VANCOMYCIN HYDROCHLORIDE 1000 MG: 1 INJECTION, POWDER, LYOPHILIZED, FOR SOLUTION INTRAVENOUS at 08:14

## 2019-12-16 RX ADMIN — ACETAMINOPHEN 1000 MG: 500 TABLET ORAL at 10:58

## 2019-12-16 RX ADMIN — HEPARIN SODIUM 5000 UNITS: 5000 INJECTION INTRAVENOUS; SUBCUTANEOUS at 21:48

## 2019-12-16 RX ADMIN — Medication 10 ML: at 17:06

## 2019-12-16 RX ADMIN — NAPROXEN 500 MG: 250 TABLET ORAL at 10:58

## 2019-12-16 RX ADMIN — METHYLPREDNISOLONE SODIUM SUCCINATE 40 MG: 40 INJECTION, POWDER, FOR SOLUTION INTRAMUSCULAR; INTRAVENOUS at 11:50

## 2019-12-16 RX ADMIN — NYSTATIN 300000 UNITS: 100000 SUSPENSION ORAL at 21:48

## 2019-12-16 RX ADMIN — Medication 10 ML: at 06:49

## 2019-12-16 RX ADMIN — VANCOMYCIN HYDROCHLORIDE 1000 MG: 1 INJECTION, POWDER, LYOPHILIZED, FOR SOLUTION INTRAVENOUS at 21:47

## 2019-12-16 RX ADMIN — PIPERACILLIN AND TAZOBACTAM 3.38 G: 3; .375 INJECTION, POWDER, LYOPHILIZED, FOR SOLUTION INTRAVENOUS at 02:44

## 2019-12-16 RX ADMIN — METHYLPREDNISOLONE SODIUM SUCCINATE 40 MG: 40 INJECTION, POWDER, FOR SOLUTION INTRAMUSCULAR; INTRAVENOUS at 17:06

## 2019-12-16 RX ADMIN — PIPERACILLIN AND TAZOBACTAM 3.38 G: 3; .375 INJECTION, POWDER, LYOPHILIZED, FOR SOLUTION INTRAVENOUS at 17:06

## 2019-12-16 NOTE — ROUTINE PROCESS
Bedside and Verbal shift change report given to [facility], RN (oncoming nurse) by Cielo Ogden RN (offgoing nurse). Report included the following information SBAR, Kardex, Recent Results, Med Rec Status and [facility]iac [facility]   [phone] 
  
  
Significant changes during shift:  Patient holding saturations in the 90's on 12L per venti mask. No distress noted. Vital signs stable. Continued IV antibiotics for pneumonia.  
 
Patient Information 
  
Joel Morel 
78 y.o. 
12/13/2019  2:11 PM by Mingo Rivas MD. Joel Morel was admitted from Home 
  
Problem List 
  
     
Patient Active Problem List  
  Diagnosis Date Noted  Rheumatoid arthritis with positive rheumatoid factor ([address] 10/01/2019  
    Priority: 1 - One  Sjogren's syndrome ([address] 11/20/2018  
    Priority: 1 - One  
 Hypercholesterolemia 11/20/2018  
    Priority: 1 - One  
 Primary osteoarthritis involving multiple joints 11/20/2018  
    Priority: 1 - One  Chronic bilateral low back pain without sciatica 11/20/2018  
    Priority: 1 - One  Weight loss, unintentional 11/20/2018  
    Priority: 1 - One  Bacterial pneumonia 06/26/2018  
    Priority: 1 - One  Sepsis ([address] 12/13/2019  Right lower quadrant abdominal pain 04/26/2018  Pain in both lower extremities 04/26/2018  Ulcer of mouth 04/26/2018  
  
    
Past Medical History:  
Diagnosis Date  Arthritis    
 Bacterial pneumonia 6/26/2018  Chronic bilateral low back pain without sciatica 11/20/2018  Hypercholesterolemia    
 Long term (current) use of anticoagulants    
 Primary osteoarthritis involving multiple joints 11/20/2018  Sjogren's syndrome ([address] 11/20/2018  Weight loss, unintentional 11/20/2018  
  
  
  
Core Measures: 
  
CVA: No No 
CHF:No No 
PNA:Yes Yes 
  
Post Op Surgical (If Applicable):  
  
Number times ambulated in hallway past shift:  0 Number of times OOB to chair past shift:   0 
NG Tube: No 
Incentive Spirometer: No 
 Drains: No   Volume  0 Dressing Present:  No 
Flatus:  Not applicable 
  
Activity Status: 
  
OOB to Chair No 
Ambulated this shift Yes Bed Rest No 
  
Supplemental O2: (If Applicable) 
  
NC Yes NRB No 
Venti-mask Yes 50 % On 12 Liters/min 
  
  
LINES AND DRAINS: 
  
Central Line? No  
PICC LINE? No  
Urinary Catheter? No  
DVT prophylaxis: 
  
DVT prophylaxis Med- Yes DVT prophylaxis SCD or FEDERICO- No  
  
Wounds: (If Applicable) 
  
Wounds- No 
  
Location 0 
  
Patient Safety: 
  
Falls Score Total Score: 1 Safety Level_______ Bed Alarm On? No 
Sitter?  No 
  
Plan for upcoming shift: oxygen, IV antibiotics, blood works, continue to wean oxygen 
  
  
  
Discharge Plan: Yes TBD 
  
Active Consults: 
None

## 2019-12-16 NOTE — ROUTINE PROCESS
Bedside and Verbal shift change report given to Mary Hale, [facility] nurse) by Ollie Hauser RN (offgoing nurse). Report included the following information SBAR, Kardex, Recent Results, Med Rec Status and Cardiac Rhythm [facility] Phone:[phone] 
  
  
Significant changes during shift:  Patient holding saturations in the 90's on 6L NC only. Seen by pulmonology, started on IV steroids  
  
Patient Information 
  
Zbigniew Smith 
78 y.o. 
12/13/2019  2:11 PM by Vick Lewis MD. Lana Mendoza was admitted from Home 
  
Problem List 
  
         
Patient Active Problem List  
  Diagnosis Date Noted  Rheumatoid arthritis with positive rheumatoid factor ([address] 10/01/2019  
    Priority: 1 - One  Sjogren's syndrome ([address] 11/20/2018  
    Priority: 1 - One  
 Hypercholesterolemia 11/20/2018  
    Priority: 1 - One  
 Primary osteoarthritis involving multiple joints 11/20/2018  
    Priority: 1 - One  Chronic bilateral low back pain without sciatica 11/20/2018  
    Priority: 1 - One  Weight loss, unintentional 11/20/2018  
    Priority: 1 - One  Bacterial pneumonia 06/26/2018  
    Priority: 1 - One  Sepsis ([address] 12/13/2019  Right lower quadrant abdominal pain 04/26/2018  Pain in both lower extremities 04/26/2018  Ulcer of mouth 04/26/2018  
  
       
Past Medical History:  
Diagnosis Date  Arthritis    
 Bacterial pneumonia 6/26/2018  Chronic bilateral low back pain without sciatica 11/20/2018  Hypercholesterolemia    
 Long term (current) use of anticoagulants    
 Primary osteoarthritis involving multiple joints 11/20/2018  Sjogren's syndrome ([address] 11/20/2018  Weight loss, unintentional 11/20/2018  
  
  
  
Core Measures: 
  
CVA: No No 
CHF:No No 
PNA:Yes Yes 
  
Post Op Surgical (If Applicable):  
  
Number times ambulated in hallway past shift:  0 
Number of times OOB to chair past shift:   0 
NG Tube: No 
Incentive Spirometer: No 
Drains: No   Volume  0 
 Dressing Present:  No 
Flatus:  Not applicable 
  
Activity Status: 
  
OOB to [employer] Ambulated this shift Yes  
Bed Rest No 
  
Supplemental Z4: (LN Applicable) 
  
NC Yes NRB No 
Venti-mask no On 6 L 
  
  
LINES AND DRAINS: 
  
Central Line? No  
PICC LINE? No  
Urinary Catheter? No 
  
DVT prophylaxis: 
  
DVT prophylaxis Med- Yes DVT prophylaxis SCD or FEDERICO- No  
  
Wounds: (If Applicable) 
  
Wounds- No 
  
Location 0 
  
Patient Safety: 
  
Falls Score Total Score: 1 Safety Level_______ Bed Alarm On? No 
Sitter? No 
  
Plan for upcoming shift: oxygen, IV antibiotics, IV steroids   
  
  
Discharge Plan: Yes TBD 
  
Active Consults: 
None

## 2019-12-16 NOTE — PROGRESS NOTES
Hospitalist Progress Note    NAME: Edie Hale   :  1950   MRN:  [id number]       Assessment / Plan:    Community-acquired pneumonia with Acute Hypoxic respiratory failure  · Failed outpatient treatment with Augmentin and azithromycin, it was documented that she completed 5 days  · Curb 65 score of 1  · CT \"Diffuse bilateral groundglass infiltrates\" on admission  · CTA on December 15, 2019 did not show PE however it did show worsening airspace opacity  · Consult Pulm  · Immunocompromise patient, rheumatoid arthritis on immunosuppressive medication including steroids, will start stress dose steroids treating possible RA flare affecting her lungs, ESR 71  · Was started on vancomycin and Zosyn on admission. · Follow-up with blood cultures and sputum cultures  · She will require repeating chest x-ray 1 to 2 weeks after discharge to ensure resolution    History of RA with Sjogren's syndrome  · Continue home meds  · Will start stress dose steroids considering RA flare      18.5 - 24.9 Normal weight / Body mass index is 21.1 kg/m². Code status: Full  Prophylaxis: lovenox  Recommended Disposition: Home w/Family     Subjective:     Patient was seen and examined this morning. No acute events overnight. Her  was at bedside, all questions were answered. Reported that  \" I feel better today\"  Review of Systems:  Symptom Y/N Comments  Symptom Y/N Comments   Fever/Chills n   Chest Pain n    Poor Appetite n   Edema n    Cough n   Abdominal Pain n    Sputum n   Joint Pain n    SOB/JAMES n   Pruritis/Rash n    Nausea/vomit n   Tolerating PT/OT y    Diarrhea n   Tolerating Diet y    Constipation n   Other         Objective:     VITALS:   Last 24hrs VS reviewed since prior progress note.  Most recent are:  Patient Vitals for the past 24 hrs:   Temp Pulse Resp BP SpO2   19 0813 97.6 °F (36.4 °C) 94 16 109/57 95 %   19 0325 98.1 °F (36.7 °C) (!) 101 19 105/56 92 %   12/15/19 2308 98.2 °F (36.8 °C) (!) 103 20 95/61 98 %   12/15/19 1901 98.8 °F (37.1 °C) (!) 109 22 109/76 97 %   12/15/19 1555 98.9 °F (37.2 °C) (!) 110 22 102/69 95 %   12/15/19 1552     95 %   12/15/19 1330     92 %   12/15/19 1133 99 °F (37.2 °C) 99 24 (!) 123/92 96 %       Intake/Output Summary (Last 24 hours) at 12/16/2019 0835  Last data filed at 12/16/2019 9884  Gross per 24 hour   Intake    Output 500 ml   Net -500 ml        PHYSICAL EXAM:  General: WD, WN. Alert, cooperative, no acute distress    EENT:  EOMI. Anicteric sclerae. MMM  Resp:  CTA bilaterally, no wheezing or rales. No accessory muscle use  CV:  Regular  rhythm,  No edema  GI:  Soft, Non distended, Non tender.  +Bowel sounds  Neurologic:  Alert and oriented X 3, normal speech,   Psych:   Good insight. Not anxious nor agitated  Skin:  No rashes. No jaundice    Reviewed most current lab test results and cultures  YES  Reviewed most current radiology test results   YES  Review and summation of old records today    NO  Reviewed patient's current orders and MAR    YES  PMH/SH reviewed - no change compared to H&P  ________________________________________________________________________  Care Plan discussed with:    Comments   Patient x    Family  x    RN x    Care Manager     Consultant                        Multidiciplinary team rounds were held today with , nursing, pharmacist and clinical coordinator. Patient's plan of care was discussed; medications were reviewed and discharge planning was addressed.      ________________________________________________________________________  Total NON critical care TIME:  35 Minutes    Total CRITICAL CARE TIME Spent:   Minutes non procedure based      Comments   >50% of visit spent in counseling and coordination of care     ________________________________________________________________________  Hao Crocker MD     Procedures: see electronic medical records for all procedures/Xrays and details which were not copied into this note but were reviewed prior to creation of Plan. LABS:  I reviewed today's most current labs and imaging studies.   Pertinent labs include:  Recent Labs     12/13/19  1532   WBC 8.2   HGB 10.7*   HCT 33.1*   *     Recent Labs     12/16/19  0329 12/15/19  0244 12/14/19  0148 12/13/19  1532    140 139 138   K 3.5 3.8 3.3* 3.5    111* 109* 103   CO2 26 22 22 26   GLU 96 90 94 86   BUN 15 15 12 12   CREA 0.63 0.59 0.67 0.56   CA 8.8 8.9 7.7* 8.8   ALB  --   --   --  2.2*   TBILI  --   --   --  0.5   SGOT  --   --   --  34   ALT  --   --   --  23       Signed: Cristhian Villela MD

## 2019-12-16 NOTE — CONSULTS
[facility]  Pulmonary, Critical Care, and Sleep Medicine    Initial Patient Consult    Name: Miguel Angel Hart MRN: [id number]   : 1950 Hospital: [facility]   Date: 2019        IMPRESSION:   · Multiple Bilateral Pulmonary Infiltrates  · Rheumatoid Arthritis,ON Enbrel, has been on steroids. (had positive rheumatoid factor and +KAREN. · Had a total body  rash to plaquenil-has medrol dose pack, Zyrtec, pepcid. · Immunocompromised has been on long term steroids. · Anemia  · Sjogrens  · Dysphagia, has been seen by Dr. Ramon Mills in past.   · GERD  ·  Allergic Rhinitis  · Raynauds involving the legs. · Nonsmoker  · Recent Oral Thrush  · DJD of her cervical spine: Occasional mild lower back pain. RECOMMENDATIONS:   · Will check pro calcitonin level now. · Wean oxygen as tolerated  · On Nystatin  · ON heparin 5000 units SC q8hrs. · ON Empiric Zosyn and Vanc. · Continue solumedrol. · Obtained medical records and reviewed from DR. Kelsie Royal from [facility] rheumatologist.     Subjective: This patient has been seen and evaluated at the request of Dr. Laura Lew for above. Patient is a 78 y.o. female   Who has been sick with different issues over the last 2 months. Was felt to have a rash to plaquenil. Then started have a worsening urticaria. Was placed on meds for an allergic reaction and streroids. Took a while to improve. Then she developed an cough and was felt to have possible pneumonia. She was given a z pack. Did  Not really improve was Rx levoflox but after reading possible side effects she elected not to take the levoflox. Has some dysphagia and choking sensation with taking solid foods. Has been having issues with swallowing, sores in her mouth. Burning tongue. Has been loosing weight. Has been to ER several times for respiratory difficulty. Has been sick for the last 4-6 weeks. Nonproductive cough and increasing dyspnea. Past Medical History:   Diagnosis Date    Arthritis     Bacterial pneumonia 6/26/2018    Chronic bilateral low back pain without sciatica 11/20/2018    Hypercholesterolemia     Long term (current) use of anticoagulants     Primary osteoarthritis involving multiple joints 11/20/2018    Sjogren's syndrome ([address] 11/20/2018    Weight loss, unintentional 11/20/2018      Past Surgical History:   Procedure Laterality Date    HX TONSILLECTOMY        Prior to Admission medications    Medication Sig Start Date End Date Taking? Authorizing Provider   nystatin (MYCOSTATIN) 100,000 unit/mL suspension Take 3 mL by mouth nightly. swish and spit   Yes Provider, Historical   acetaminophen (TYLENOL EXTRA STRENGTH) 500 mg tablet Take 1,000 mg by mouth daily as needed for Pain. Yes Provider, Historical   propylene glycol/peg 400/PF (SYSTANE, PF, OP) Administer  to both eyes daily as needed (dry eyes). Yes Provider, Historical   amoxicillin-clavulanate (AUGMENTIN) 875-125 mg per tablet Take 1 Tab by mouth two (2) times a day for 7 days. 12/8/19 12/15/19 Yes Lynnette Eubanks MD   naproxen (NAPROSYN) 500 mg tablet Take 1 Tab by mouth two (2) times daily (with meals). 12/7/19  Yes Kaley Manuel MD   zolpidem (AMBIEN) 10 mg tablet TAKE 1 TABLET BY MOUTH AT BEDTIME AS NEEDED 9/26/19  Yes Carey Lovett MD   colestipol (COLESTID) 1 gram tablet TAKE 1 TABLET BY MOUTH TWICE DAILY 9/5/19  Yes Carey Lovett MD   fenofibrate (LOFIBRA) 160 mg tablet TAKE 1 TABLET BY MOUTH EVERY DAY 12/7/18  Yes Carey Lovett MD   nabumetone (RELAFEN) 500 mg tablet Take 500 mg by mouth two (2) times a day. 9/30/18  Yes Provider, Historical   calcium/D3/mag ox//morgan/Zn (CALTRATE + D3 PLUS MINERALS PO) Take 1 Tab by mouth daily. Yes Provider, Historical   predniSONE (DELTASONE) 10 mg tablet Take 10 mg by mouth daily (with breakfast).     Other, MD Juanpablo     Allergies   Allergen Reactions    Diclofenac Other (comments) Nausea fever and chills    Plaquenil [Hydroxychloroquine] Rash    Sulfa (Sulfonamide Antibiotics) Unknown (comments)      Social History     Tobacco Use    Smoking status: Never Smoker    Smokeless tobacco: Never Used   Substance Use Topics    Alcohol use: Yes     Comment: rarely      Family History   Problem Relation Age of Onset    Heart Disease Maternal Grandfather     Prostate Cancer Father     Prostate Cancer Brother         Current Facility-Administered Medications   Medication Dose Route Frequency    methylPREDNISolone (PF) (SOLU-MEDROL) injection 40 mg  40 mg IntraVENous Q6H    heparin (porcine) injection 5,000 Units  5,000 Units SubCUTAneous Q12H    piperacillin-tazobactam (ZOSYN) 3.375 g in 0.9% sodium chloride (MBP/ADV) 100 mL  3.375 g IntraVENous Q8H    nystatin (MYCOSTATIN) 100,000 unit/mL oral suspension 300,000 Units  3 mL Oral QHS    naproxen (NAPROSYN) tablet 500 mg  500 mg Oral BID WITH MEALS    fenofibrate nanocrystallized (TRICOR) tablet 145 mg  145 mg Oral DAILY    colestipol (COLESTID) tablet 1 g  1 g Oral BID    calcium-vitamin D (OS-PRISCILLA) 500 mg-200 unit tablet  1 Tab Oral DAILY    sodium chloride (NS) flush 5-40 mL  5-40 mL IntraVENous Q8H    vancomycin (VANCOCIN) 1,000 mg in 0.9% sodium chloride (MBP/ADV) 250 mL  1,000 mg IntraVENous Q12H       Review of Systems:  Constitutional: positive for fatigue and malaise  Eyes: negative  Ears, nose, mouth, throat, and face: negative  Respiratory: positive for cough or dyspnea on exertion  Cardiovascular: negative  Gastrointestinal: positive for dysphagia, dyspepsia and reflux symptoms  Genitourinary:negative  Integument/breast: positive for rash  Hematologic/lymphatic: negative  Musculoskeletal:negative  Neurological: negative  Behavioral/Psych: negative  Endocrine: negative  Allergic/Immunologic: negative    Objective:   Vital Signs:    Visit Vitals  /67 (BP 1 Location: Right arm, BP Patient Position: At rest)   Pulse (!) 104 Temp 97.2 °F (36.2 °C)   Resp 16   Ht 5' 6\" (1.676 m)   Wt 59.3 kg (130 lb 11.2 oz)   SpO2 94%   Breastfeeding No   BMI 21.10 kg/m²       O2 Device: Ventimask   O2 Flow Rate (L/min): 12 l/min   Temp (24hrs), Av.1 °F (36.7 °C), Min:97.2 °F (36.2 °C), Max:98.9 °F (37.2 °C)       Intake/Output:   Last shift:      No intake/output data recorded. Last 3 shifts:  1901 -  0700  In: 1097 [I.V.:1097]  Out: 500 [Urine:500]    Intake/Output Summary (Last 24 hours) at 2019 1401  Last data filed at 2019 4495  Gross per 24 hour   Intake    Output 500 ml   Net -500 ml      Physical Exam:   General:  Alert, cooperative, no distress, appears stated age. On NC oxygen. Conversant. Was eating lunch when seen. Head:  Normocephalic, without obvious abnormality, atraumatic. Eyes:  Conjunctivae/corneas clear. PERRL, EOMs intact. Nose: Nares normal. Septum midline. Mucosa normal. No drainage or sinus tenderness. Throat: Lips, mucosa, and tongue normal. Teeth and gums normal.   Neck: Supple, symmetrical, trachea midline, no adenopathy, thyroid: no enlargment/tenderness/nodules, no carotid bruit and no JVD. Back:   Symmetric, no curvature. ROM normal.   Lungs:   Clear to auscultation bilaterally. Chest wall:  No tenderness or deformity. Heart:  Regular rate and rhythm, S1, S2 normal, no murmur, click, rub or gallop. Abdomen:   Soft, non-tender. Bowel sounds normal. No masses,  No organomegaly. Extremities: Extremities normal, atraumatic, no cyanosis or edema. Pulses: 2+ and symmetric all extremities. Skin: Skin color, texture, turgor normal. No rashes or lesions   Lymph nodes: Cervical, supraclavicular, and axillary nodes normal.   Neurologic: Grossly nonfocal, motor is intact. Psych: is intact. No overt anxiety or depression.       Data review:     Recent Results (from the past 24 hour(s))   SED RATE (ESR)    Collection Time: 19  3:29 AM   Result Value Ref Range    Sed rate, automated 71 (H) 0 - 30 mm/hr   METABOLIC PANEL, BASIC    Collection Time: 12/16/19  3:29 AM   Result Value Ref Range    Sodium 141 136 - 145 mmol/L    Potassium 3.5 3.5 - 5.1 mmol/L    Chloride 108 97 - 108 mmol/L    CO2 26 21 - 32 mmol/L    Anion gap 7 5 - 15 mmol/L    Glucose 96 65 - 100 mg/dL    BUN 15 6 - 20 MG/DL    Creatinine 0.63 0.55 - 1.02 MG/DL    BUN/Creatinine ratio 24 (H) 12 - 20      GFR est AA >60 >60 ml/min/1.73m2    GFR est non-AA >60 >60 ml/min/1.73m2    Calcium 8.8 8.5 - 10.1 MG/DL       Imaging:  I have personally reviewed the patients radiographs and have reviewed the reports:  12-15-19: CT of chest: COMPARISON: 12/13/2019     TECHNIQUE: Unenhanced localizing CT imaging of the pulmonary arteries is  followed by bolus injection of 100 mL Isovue 370 contrast IV, with thin section  axial Chest CT obtained and 3D image post processing performed including coronal  MIPS. CT dose reduction was achieved through use of a standardized protocol  tailored for this examination and automatic exposure control for dose  modulation.     FINDINGS: There is no pulmonary embolism. There is no acute cor pulmonale. There  is no aortic dissection or aneurysm.     Mosaic patchy airspace disease bilaterally has progressed but is nonspecific.     There are new small pleural effusions.     There is no pneumothorax. There is no pericardial fluid. There is no significant  adenopathy.      Visualized thyroid and lower neck soft tissues are unremarkable for age.     IMPRESSION:   1. No pulmonary embolus. 2. Increased nonspecific airspace disease. 3. New pleural effusions.         Minus MD Fly

## 2019-12-16 NOTE — PROGRESS NOTES
RAPID RESPONSE TEAM FOLLOW UP    Rounded on pt d/t recent rapid response for respiratory distress. Discussed with primary nurse. Has no acute concerns. Pt voided 550cc and reports improved respiratory effort. Pt continues to be on venti mask @ 50%. VSS. MEWS 3.    Visit Vitals  /56 (BP 1 Location: Right arm, BP Patient Position: At rest)   Pulse (!) 101   Temp 98.1 °F (36.7 °C)   Resp 19   Ht 5' 6\" (1.676 m)   Wt 59.3 kg (130 lb 11.2 oz)   SpO2 92%   Breastfeeding No   BMI 21.10 kg/m²     No RRT interventions currently indicated.       Jaci Sanchez RN  RRT, [id number]

## 2019-12-16 NOTE — PROGRESS NOTES
RAPID RESPONSE TEAM-Follow Up    Rounded on patient due to recent rapid response for respiratory distress on 12/15/19. Discussed with primary RN, Lizy Bond. No acute concerns. No patient complaints. Vitals stable. MEWS 3. Patient remains on 50% venti-mask, but exhibits no signs of distress or difficulty breathing. No RRT interventions indicated at this time. RN encouraged to call with any questions or concerns.     [facility]  Rapid Response RN  Danna Cleveland

## 2019-12-16 NOTE — ROUTINE PROCESS
Bedside and Verbal shift change report given to Silvana Aschoff, RN (oncoming nurse) by Minerva Contreras RN (offgoing nurse). Report included the following information SBAR, Kardex, Recent Results, Med Rec Status and [facility]   [phone] 
  
  
Significant changes during shift:  CTA performed  
Patient Information 
  
Lorena Davis 
78 y.o. 
12/13/2019  2:11 PM by Kathrine Bailey MD. Lorena Davis was admitted from Home 
  
Problem List 
  
     
Patient Active Problem List  
  Diagnosis Date Noted  Rheumatoid arthritis with positive rheumatoid factor ([address] 10/01/2019  
    Priority: 1 - One  Sjogren's syndrome ([address] 11/20/2018  
    Priority: 1 - One  
 Hypercholesterolemia 11/20/2018  
    Priority: 1 - One  
 Primary osteoarthritis involving multiple joints 11/20/2018  
    Priority: 1 - One  Chronic bilateral low back pain without sciatica 11/20/2018  
    Priority: 1 - One  Weight loss, unintentional 11/20/2018  
    Priority: 1 - One  Bacterial pneumonia 06/26/2018  
    Priority: 1 - One  Sepsis ([address] 12/13/2019  Right lower quadrant abdominal pain 04/26/2018  Pain in both lower extremities 04/26/2018  Ulcer of mouth 04/26/2018  
  
    
Past Medical History:  
Diagnosis Date  Arthritis    
 Bacterial pneumonia 6/26/2018  Chronic bilateral low back pain without sciatica 11/20/2018  Hypercholesterolemia    
 Long term (current) use of anticoagulants    
 Primary osteoarthritis involving multiple joints 11/20/2018  Sjogren's syndrome ([address] 11/20/2018  Weight loss, unintentional 11/20/2018  
  
  
  
Core Measures: 
  
CVA: No No 
CHF:No No 
PNA:Yes Yes 
  
Post Op Surgical (If Applicable):  
  
Number times ambulated in hallway past shift:  0 Number of times OOB to chair past shift:   0 
NG Tube: No 
Incentive Spirometer: No 
Drains: No   Volume  0 Dressing Present:  No 
Flatus:  Not applicable 
  
Activity Status: 
  
OOB to Chair No 
 Ambulated this shift Yes Bed Rest No 
  
Supplemental O2: (If Applicable) 
  
NC Yes NRB No 
Venti-mask Yes 50 % On 12 Liters/min 
  
  
LINES AND DRAINS: 
  
Central Line? No  
PICC LINE? No  
Urinary Catheter? No  
DVT prophylaxis: 
  
DVT prophylaxis Med- Yes DVT prophylaxis SCD or FEDERICO- No  
  
Wounds: (If Applicable) 
  
Wounds- No 
  
Location 0 
  
Patient Safety: 
  
Falls Score Total Score: 1 Safety Level_______ Bed Alarm On? No 
Sitter?  No 
  
Plan for upcoming shift: oxygen, IV antibiotics, blood works 
  
  
  
Discharge Plan: Yes TBD 
  
Active Consults: 
None

## 2019-12-16 NOTE — PROGRESS NOTES
Problem: Falls - Risk of  Goal: *Absence of Falls  Description  Document Erinn Frias Fall Risk and appropriate interventions in the flowsheet.   Outcome: Progressing Towards Goal  Note: Fall Risk Interventions:            Medication Interventions: Evaluate medications/consider consulting pharmacy, Patient to call before getting OOB                   Problem: Patient Education: Go to Patient Education Activity  Goal: Patient/Family Education  Outcome: Progressing Towards Goal     Problem: Pneumonia: Day 1  Goal: Off Pathway (Use only if patient is Off Pathway)  Outcome: Progressing Towards Goal  Goal: Activity/Safety  Outcome: Progressing Towards Goal  Goal: Consults, if ordered  Outcome: Progressing Towards Goal  Goal: Diagnostic Test/Procedures  Outcome: Progressing Towards Goal  Goal: Nutrition/Diet  Outcome: Progressing Towards Goal  Goal: Medications  Outcome: Progressing Towards Goal  Goal: Respiratory  Outcome: Progressing Towards Goal  Goal: Treatments/Interventions/Procedures  Outcome: Progressing Towards Goal  Goal: Psychosocial  Outcome: Progressing Towards Goal  Goal: *Oxygen saturation within defined limits  Outcome: Progressing Towards Goal  Goal: *Influenza vaccine administered (October-March)  Outcome: Progressing Towards Goal  Goal: *Pneumoccocal vaccine administered  Outcome: Progressing Towards Goal  Goal: *Hemodynamically stable  Outcome: Progressing Towards Goal  Goal: *Demonstrates progressive activity  Outcome: Progressing Towards Goal  Goal: *Tolerating diet  Outcome: Progressing Towards Goal     Problem: Pneumonia: Day 2  Goal: Off Pathway (Use only if patient is Off Pathway)  Outcome: Progressing Towards Goal  Goal: Activity/Safety  Outcome: Progressing Towards Goal  Goal: Consults, if ordered  Outcome: Progressing Towards Goal  Goal: Diagnostic Test/Procedures  Outcome: Progressing Towards Goal  Goal: Nutrition/Diet  Outcome: Progressing Towards Goal  Goal: Discharge Planning  Outcome: Progressing Towards Goal  Goal: Medications  Outcome: Progressing Towards Goal  Goal: Respiratory  Outcome: Progressing Towards Goal  Goal: Treatments/Interventions/Procedures  Outcome: Progressing Towards Goal  Goal: Psychosocial  Outcome: Progressing Towards Goal  Goal: *Oxygen saturation within defined limits  Outcome: Progressing Towards Goal  Goal: *Hemodynamically stable  Outcome: Progressing Towards Goal  Goal: *Demonstrates progressive activity  Outcome: Progressing Towards Goal  Goal: *Tolerating diet  Outcome: Progressing Towards Goal  Goal: *Optimal pain control at patient's stated goal  Outcome: Progressing Towards Goal     Problem: Pneumonia: Day 3  Goal: Off Pathway (Use only if patient is Off Pathway)  Outcome: Progressing Towards Goal  Goal: Activity/Safety  Outcome: Progressing Towards Goal  Goal: Consults, if ordered  Outcome: Progressing Towards Goal  Goal: Diagnostic Test/Procedures  Outcome: Progressing Towards Goal  Goal: Nutrition/Diet  Outcome: Progressing Towards Goal  Goal: Discharge Planning  Outcome: Progressing Towards Goal  Goal: Medications  Outcome: Progressing Towards Goal  Goal: Respiratory  Outcome: Progressing Towards Goal  Goal: Treatments/Interventions/Procedures  Outcome: Progressing Towards Goal  Goal: Psychosocial  Outcome: Progressing Towards Goal  Goal: *Oxygen saturation within defined limits  Outcome: Progressing Towards Goal  Goal: *Hemodynamically stable  Outcome: Progressing Towards Goal  Goal: *Demonstrates progressive activity  Outcome: Progressing Towards Goal  Goal: *Tolerating diet  Outcome: Progressing Towards Goal  Goal: *Describes available resources and support systems  Outcome: Progressing Towards Goal  Goal: *Optimal pain control at patient's stated goal  Outcome: Progressing Towards Goal

## 2019-12-17 LAB
ALBUMIN SERPL-MCNC: 1.8 G/DL (ref 3.5–5)
ALBUMIN/GLOB SERPL: 0.5 {RATIO} (ref 1.1–2.2)
ALP SERPL-CCNC: 81 U/L (ref 45–117)
ALT SERPL-CCNC: 17 U/L (ref 12–78)
ANION GAP SERPL CALC-SCNC: 5 MMOL/L (ref 5–15)
AST SERPL-CCNC: 33 U/L (ref 15–37)
BASOPHILS # BLD: 0 K/UL (ref 0–0.1)
BASOPHILS NFR BLD: 0 % (ref 0–1)
BILIRUB SERPL-MCNC: 0.4 MG/DL (ref 0.2–1)
BUN SERPL-MCNC: 19 MG/DL (ref 6–20)
BUN/CREAT SERPL: 42 (ref 12–20)
CALCIUM SERPL-MCNC: 9.5 MG/DL (ref 8.5–10.1)
CHLORIDE SERPL-SCNC: 111 MMOL/L (ref 97–108)
CO2 SERPL-SCNC: 27 MMOL/L (ref 21–32)
CREAT SERPL-MCNC: 0.45 MG/DL (ref 0.55–1.02)
DATE LAST DOSE: ABNORMAL
DIFFERENTIAL METHOD BLD: ABNORMAL
EOSINOPHIL # BLD: 0 K/UL (ref 0–0.4)
EOSINOPHIL NFR BLD: 0 % (ref 0–7)
ERYTHROCYTE [DISTWIDTH] IN BLOOD BY AUTOMATED COUNT: 13.9 % (ref 11.5–14.5)
GLOBULIN SER CALC-MCNC: 3.6 G/DL (ref 2–4)
GLUCOSE SERPL-MCNC: 122 MG/DL (ref 65–100)
HCT VFR BLD AUTO: 30.8 % (ref 35–47)
HGB BLD-MCNC: 9.9 G/DL (ref 11.5–16)
IMM GRANULOCYTES # BLD AUTO: 0.1 K/UL (ref 0–0.04)
IMM GRANULOCYTES NFR BLD AUTO: 1 % (ref 0–0.5)
LYMPHOCYTES # BLD: 1.3 K/UL (ref 0.8–3.5)
LYMPHOCYTES NFR BLD: 14 % (ref 12–49)
MAGNESIUM SERPL-MCNC: 1.8 MG/DL (ref 1.6–2.4)
MCH RBC QN AUTO: 28.9 PG (ref 26–34)
MCHC RBC AUTO-ENTMCNC: 32.1 G/DL (ref 30–36.5)
MCV RBC AUTO: 89.8 FL (ref 80–99)
MONOCYTES # BLD: 0.2 K/UL (ref 0–1)
MONOCYTES NFR BLD: 2 % (ref 5–13)
NEUTS SEG # BLD: 8.1 K/UL (ref 1.8–8)
NEUTS SEG NFR BLD: 83 % (ref 32–75)
NRBC # BLD: 0 K/UL (ref 0–0.01)
NRBC BLD-RTO: 0 PER 100 WBC
PHOSPHATE SERPL-MCNC: 3.6 MG/DL (ref 2.6–4.7)
PLATELET # BLD AUTO: 558 K/UL (ref 150–400)
PMV BLD AUTO: 9.2 FL (ref 8.9–12.9)
POTASSIUM SERPL-SCNC: 4 MMOL/L (ref 3.5–5.1)
PROCALCITONIN SERPL-MCNC: 2.37 NG/ML
PROT SERPL-MCNC: 5.4 G/DL (ref 6.4–8.2)
RBC # BLD AUTO: 3.43 M/UL (ref 3.8–5.2)
REPORTED DOSE,DOSE: ABNORMAL UNITS
REPORTED DOSE/TIME,TMG: ABNORMAL
SODIUM SERPL-SCNC: 143 MMOL/L (ref 136–145)
VANCOMYCIN TROUGH SERPL-MCNC: 10.8 UG/ML (ref 5–10)
WBC # BLD AUTO: 9.7 K/UL (ref 3.6–11)

## 2019-12-17 PROCEDURE — 65660000000 HC RM CCU STEPDOWN

## 2019-12-17 PROCEDURE — 74011250636 HC RX REV CODE- 250/636: Performed by: INTERNAL MEDICINE

## 2019-12-17 PROCEDURE — 77010033678 HC OXYGEN DAILY

## 2019-12-17 PROCEDURE — 36415 COLL VENOUS BLD VENIPUNCTURE: CPT

## 2019-12-17 PROCEDURE — 85025 COMPLETE CBC W/AUTO DIFF WBC: CPT

## 2019-12-17 PROCEDURE — 94760 N-INVAS EAR/PLS OXIMETRY 1: CPT

## 2019-12-17 PROCEDURE — 83735 ASSAY OF MAGNESIUM: CPT

## 2019-12-17 PROCEDURE — 80202 ASSAY OF VANCOMYCIN: CPT

## 2019-12-17 PROCEDURE — 84100 ASSAY OF PHOSPHORUS: CPT

## 2019-12-17 PROCEDURE — 74011250637 HC RX REV CODE- 250/637: Performed by: INTERNAL MEDICINE

## 2019-12-17 PROCEDURE — 84145 PROCALCITONIN (PCT): CPT

## 2019-12-17 PROCEDURE — 80053 COMPREHEN METABOLIC PANEL: CPT

## 2019-12-17 PROCEDURE — 74011000258 HC RX REV CODE- 258: Performed by: INTERNAL MEDICINE

## 2019-12-17 RX ADMIN — METHYLPREDNISOLONE SODIUM SUCCINATE 40 MG: 40 INJECTION, POWDER, FOR SOLUTION INTRAMUSCULAR; INTRAVENOUS at 17:12

## 2019-12-17 RX ADMIN — HEPARIN SODIUM 5000 UNITS: 5000 INJECTION INTRAVENOUS; SUBCUTANEOUS at 08:33

## 2019-12-17 RX ADMIN — METHYLPREDNISOLONE SODIUM SUCCINATE 40 MG: 40 INJECTION, POWDER, FOR SOLUTION INTRAMUSCULAR; INTRAVENOUS at 00:32

## 2019-12-17 RX ADMIN — Medication 10 ML: at 00:31

## 2019-12-17 RX ADMIN — NAPROXEN 500 MG: 250 TABLET ORAL at 08:33

## 2019-12-17 RX ADMIN — METHYLPREDNISOLONE SODIUM SUCCINATE 40 MG: 40 INJECTION, POWDER, FOR SOLUTION INTRAMUSCULAR; INTRAVENOUS at 11:40

## 2019-12-17 RX ADMIN — CALCIUM CARBONATE 500 MG (1,250 MG)-VITAMIN D3 200 UNIT TABLET 1 TABLET: at 09:00

## 2019-12-17 RX ADMIN — VANCOMYCIN HYDROCHLORIDE 1000 MG: 1 INJECTION, POWDER, LYOPHILIZED, FOR SOLUTION INTRAVENOUS at 08:33

## 2019-12-17 RX ADMIN — PIPERACILLIN AND TAZOBACTAM 3.38 G: 3; .375 INJECTION, POWDER, LYOPHILIZED, FOR SOLUTION INTRAVENOUS at 03:02

## 2019-12-17 RX ADMIN — NAPROXEN 500 MG: 250 TABLET ORAL at 17:12

## 2019-12-17 RX ADMIN — METHYLPREDNISOLONE SODIUM SUCCINATE 40 MG: 40 INJECTION, POWDER, FOR SOLUTION INTRAMUSCULAR; INTRAVENOUS at 05:45

## 2019-12-17 RX ADMIN — PIPERACILLIN AND TAZOBACTAM 3.38 G: 3; .375 INJECTION, POWDER, LYOPHILIZED, FOR SOLUTION INTRAVENOUS at 11:40

## 2019-12-17 RX ADMIN — Medication 10 ML: at 17:12

## 2019-12-17 RX ADMIN — NYSTATIN 300000 UNITS: 100000 SUSPENSION ORAL at 21:40

## 2019-12-17 RX ADMIN — HEPARIN SODIUM 5000 UNITS: 5000 INJECTION INTRAVENOUS; SUBCUTANEOUS at 21:32

## 2019-12-17 RX ADMIN — Medication 10 ML: at 05:45

## 2019-12-17 RX ADMIN — Medication 10 ML: at 21:40

## 2019-12-17 RX ADMIN — VANCOMYCIN HYDROCHLORIDE 1000 MG: 1 INJECTION, POWDER, LYOPHILIZED, FOR SOLUTION INTRAVENOUS at 21:39

## 2019-12-17 RX ADMIN — PIPERACILLIN AND TAZOBACTAM 3.38 G: 3; .375 INJECTION, POWDER, LYOPHILIZED, FOR SOLUTION INTRAVENOUS at 17:12

## 2019-12-17 NOTE — PROGRESS NOTES
Problem: Patient Education: Go to Patient Education Activity  Goal: Patient/Family Education  Outcome: Progressing Towards Goal     Problem: Pneumonia: Day 3  Goal: Off Pathway (Use only if patient is Off Pathway)  Outcome: Progressing Towards Goal  Goal: Activity/Safety  Outcome: Progressing Towards Goal  Goal: Consults, if ordered  Outcome: Progressing Towards Goal  Goal: Diagnostic Test/Procedures  Outcome: Progressing Towards Goal  Goal: Nutrition/Diet  Outcome: Progressing Towards Goal  Goal: Discharge Planning  Outcome: Progressing Towards Goal  Goal: Medications  Outcome: Progressing Towards Goal  Goal: Respiratory  Outcome: Progressing Towards Goal  Goal: Treatments/Interventions/Procedures  Outcome: Progressing Towards Goal  Goal: Psychosocial  Outcome: Progressing Towards Goal  Goal: *Oxygen saturation within defined limits  Outcome: Progressing Towards Goal  Goal: *Hemodynamically stable  Outcome: Progressing Towards Goal  Goal: *Demonstrates progressive activity  Outcome: Progressing Towards Goal  Goal: *Tolerating diet  Outcome: Progressing Towards Goal  Goal: *Describes available resources and support systems  Outcome: Progressing Towards Goal  Goal: *Optimal pain control at patient's stated goal  Outcome: Progressing Towards Goal

## 2019-12-17 NOTE — ROUTINE PROCESS
Bedside and Verbal shift change report given to Rico Hussein RN (oncoming nurse) by Rosario uSazo RN (offgoing nurse). Report included the following information SBAR, Kardex, Recent Results, Med Rec Status and Cardiac Rhythm ST. 
  
[facility] Phone:[phone] 
  
  
Significant changes during shift:   
None overnight. No emergent events. IV abx administered as ordered. Pt afebrile. Maintains comfortable breathing (diminished lungs with crackles per auscultation) with 6 L/min O2.  
  
Patient Information 
  
Judy Rosales 
78 y.o. 
12/13/2019  2:11 PM by Vick Simms MD. Lana Mendoza was admitted from Home 
  
Problem List 
  
         
Patient Active Problem List  
  Diagnosis Date Noted  Rheumatoid arthritis with positive rheumatoid factor ([address] 10/01/2019  
    Priority: 1 - One  Sjogren's syndrome ([address] 11/20/2018  
    Priority: 1 - One  
 Hypercholesterolemia 11/20/2018  
    Priority: 1 - One  
 Primary osteoarthritis involving multiple joints 11/20/2018  
    Priority: 1 - One  Chronic bilateral low back pain without sciatica 11/20/2018  
    Priority: 1 - One  Weight loss, unintentional 11/20/2018  
    Priority: 1 - One  Bacterial pneumonia 06/26/2018  
    Priority: 1 - One  Sepsis ([address] 12/13/2019  Right lower quadrant abdominal pain 04/26/2018  Pain in both lower extremities 04/26/2018  Ulcer of mouth 04/26/2018  
  
       
Past Medical History:  
Diagnosis Date  Arthritis    
 Bacterial pneumonia 6/26/2018  Chronic bilateral low back pain without sciatica 11/20/2018  Hypercholesterolemia    
 Long term (current) use of anticoagulants    
 Primary osteoarthritis involving multiple joints 11/20/2018  Sjogren's syndrome ([address] 11/20/2018  Weight loss, unintentional 11/20/2018  
  
  
  
Core Measures: 
  
CVA: No No 
CHF:No No 
PNA:Yes Yes 
  
Post Op Surgical (If Applicable):  
  
Number times ambulated in hallway past shift:  0 
 Number of times OOB to chair past shift:   0 
NG Tube: No 
Incentive Spirometer: No 
Drains: No   Volume  0 Dressing Present:  No 
Flatus:  Not applicable 
  
Activity Status: 
  
OOB to ECO2 Plastics Ambulated this shift Yes  
Bed Rest No 
  
Supplemental E7: (ZB Applicable) 
  
NC Yes NRB No 
Venti-mask no On 6 L 
  
  
LINES AND DRAINS: 
  
Central Line? No  
PICC LINE? No  
Urinary Catheter? No 
  
DVT prophylaxis: 
  
DVT prophylaxis Med- Yes DVT prophylaxis SCD or FEDERICO- No  
  
Wounds: (If Applicable) 
  
Wounds- No 
  
Location 0 
  
Patient Safety: 
  
Falls Score Total Score: 1 Safety Level_______ Bed Alarm On? No 
Sitter? No 
  
Plan for upcoming shift: oxygen, IV antibiotics, IV steroids   
  
  
Discharge Plan: Yes TBD 
  
Active Consults: 
None

## 2019-12-17 NOTE — PROGRESS NOTES
[facility]  Pulmonary, Critical Care, and Sleep Medicine    Patient Consult    Name: Zbigniew Smith MRN: [id number]   : 1950 Hospital: [facility]   Date: 2019        IMPRESSION:   · Multiple Bilateral Pulmonary Infiltrates, Seems clinically to be improving. Suspected Pneumonia in chronically immunosuppresed pt. · Rheumatoid Arthritis,ON Enbrel, has been on steroids. (had positive rheumatoid factor and +KAREN. · Had a total body  rash to plaquenil-has medrol dose pack, Zyrtec, pepcid.-This has resolved. · Immunocompromised has been on long term steroids. · Anemia  · Sjogrens  · Dysphagia, has been seen by Dr. Rancho Dubon in past-seems ok at present. · GERD  ·  Allergic Rhinitis  · Raynauds involving the legs. · Nonsmoker  · Recent Oral Thrush  · DJD of her cervical spine: Occasional mild lower back pain. RECOMMENDATIONS:   · Will check pro calcitonin again in am.   · Wean oxygen as tolerated  · On Nystatin  · ON heparin 5000 units SC q8hrs. · ON Empiric Zosyn and Vanc. · Continue solumedrol. · Obtained medical records and reviewed from DR. Karis Canales from [place], Pt's rheumatologist.     Subjective:     Last 24 hrs: Pt has made some improvement. Was able to get out of bed. Slept ok last pm.   NO chest pain, Has as dry cough. No leg pain. NO issues with dysphagia today. This patient has been seen and evaluated at the request of Dr. Devin Griffiths for above. Patient is a 78 y.o. female   Who has been sick with different issues over the last 2 months. Was felt to have a rash to plaquenil. Then started have a worsening urticaria. Was placed on meds for an allergic reaction and streroids. Took a while to improve. Then she developed an cough and was felt to have possible pneumonia. She was given a z pack. Did  Not really improve was Rx levoflox but after reading possible side effects she elected not to take the levoflox.      Has some dysphagia and choking sensation with taking solid foods. Has been having issues with swallowing, sores in her mouth. Burning tongue. Has been loosing weight. Has been to ER several times for respiratory difficulty. Has been sick for the last 4-6 weeks. Nonproductive cough and increasing dyspnea. Past Medical History:   Diagnosis Date    Arthritis     Bacterial pneumonia 6/26/2018    Chronic bilateral low back pain without sciatica 11/20/2018    Hypercholesterolemia     Long term (current) use of anticoagulants     Primary osteoarthritis involving multiple joints 11/20/2018    Sjogren's syndrome ([facility] [address] 11/20/2018    Weight loss, unintentional 11/20/2018      Past Surgical History:   Procedure Laterality Date    HX TONSILLECTOMY        Prior to Admission medications    Medication Sig Start Date End Date Taking? Authorizing Provider   nystatin (MYCOSTATIN) 100,000 unit/mL suspension Take 3 mL by mouth nightly. swish and spit   Yes Provider, Historical   acetaminophen (TYLENOL EXTRA STRENGTH) 500 mg tablet Take 1,000 mg by mouth daily as needed for Pain. Yes Provider, Historical   propylene glycol/peg 400/PF (SYSTANE, PF, OP) Administer  to both eyes daily as needed (dry eyes). Yes Provider, Historical   naproxen (NAPROSYN) 500 mg tablet Take 1 Tab by mouth two (2) times daily (with meals). 12/7/19  Yes Willi Min MD   zolpidem (AMBIEN) 10 mg tablet TAKE 1 TABLET BY MOUTH AT BEDTIME AS NEEDED 9/26/19  Yes Jessica Mejia MD   colestipol (COLESTID) 1 gram tablet TAKE 1 TABLET BY MOUTH TWICE DAILY 9/5/19  Yes Jessica Mejia MD   fenofibrate (LOFIBRA) 160 mg tablet TAKE 1 TABLET BY MOUTH EVERY DAY 12/7/18  Yes Jessica Mejia MD   nabumetone (RELAFEN) 500 mg tablet Take 500 mg by mouth two (2) times a day. 9/30/18  Yes Provider, Historical   calcium/D3/mag ox//morgan/Zn (CALTRATE + D3 PLUS MINERALS PO) Take 1 Tab by mouth daily.    Yes Provider, Historical   predniSONE (DELTASONE) 10 mg tablet Take 10 mg by mouth daily (with breakfast).     Other, MD Juanpablo     Allergies   Allergen Reactions    Diclofenac Other (comments)     Nausea fever and chills    Plaquenil [Hydroxychloroquine] Rash    Sulfa (Sulfonamide Antibiotics) Unknown (comments)      Social History     Tobacco Use    Smoking status: Never Smoker    Smokeless tobacco: Never Used   Substance Use Topics    Alcohol use: Yes     Comment: rarely      Family History   Problem Relation Age of Onset    Heart Disease Maternal Grandfather     Prostate Cancer Father     Prostate Cancer Brother         Current Facility-Administered Medications   Medication Dose Route Frequency    PHARMACY INFORMATION NOTE  1 Each Other ONCE    methylPREDNISolone (PF) (SOLU-MEDROL) injection 40 mg  40 mg IntraVENous Q6H    heparin (porcine) injection 5,000 Units  5,000 Units SubCUTAneous Q12H    piperacillin-tazobactam (ZOSYN) 3.375 g in 0.9% sodium chloride (MBP/ADV) 100 mL  3.375 g IntraVENous Q8H    nystatin (MYCOSTATIN) 100,000 unit/mL oral suspension 300,000 Units  3 mL Oral QHS    naproxen (NAPROSYN) tablet 500 mg  500 mg Oral BID WITH MEALS    fenofibrate nanocrystallized (TRICOR) tablet 145 mg  145 mg Oral DAILY    colestipol (COLESTID) tablet 1 g  1 g Oral BID    calcium-vitamin D (OS-PRISCILLA) 500 mg-200 unit tablet  1 Tab Oral DAILY    sodium chloride (NS) flush 5-40 mL  5-40 mL IntraVENous Q8H    vancomycin (VANCOCIN) 1,000 mg in 0.9% sodium chloride (MBP/ADV) 250 mL  1,000 mg IntraVENous Q12H       Review of Systems:  Constitutional: positive for fatigue and malaise  Eyes: negative  Ears, nose, mouth, throat, and face: negative  Respiratory: positive for cough or dyspnea on exertion  Cardiovascular: negative  Gastrointestinal: positive for dysphagia, dyspepsia and reflux symptoms  Genitourinary:negative  Integument/breast: positive for rash  Hematologic/lymphatic: negative  Musculoskeletal:negative  Neurological: negative  Behavioral/Psych: negative  Endocrine: negative  Allergic/Immunologic: negative    Objective:   Vital Signs:    Visit Vitals  /67 (BP 1 Location: Right arm, BP Patient Position: At rest)   Pulse 77   Temp 97.8 °F (36.6 °C)   Resp 16   Ht 5' 6\" (1.676 m)   Wt 59.3 kg (130 lb 11.2 oz)   SpO2 99%   Breastfeeding No   BMI 21.10 kg/m²       O2 Device: Ventimask   O2 Flow Rate (L/min): 12 l/min   Temp (24hrs), Av.7 °F (36.5 °C), Min:97.2 °F (36.2 °C), Max:98.4 °F (36.9 °C)       Intake/Output:   Last shift:      No intake/output data recorded. Last 3 shifts: 12/15 1901 -  0700  In: -   Out: 750 [Urine:750]    Intake/Output Summary (Last 24 hours) at 2019 1052  Last data filed at 2019 0340  Gross per 24 hour   Intake    Output 250 ml   Net -250 ml      Physical Exam:   General:  Alert, cooperative, no distress, appears stated age. On NC oxygen. Conversant. Was eating lunch when seen. Head:  Normocephalic, without obvious abnormality, atraumatic. Eyes:  Conjunctivae/corneas clear. PERRL, EOMs intact. Nose: Nares normal. Septum midline. Mucosa normal. No drainage or sinus tenderness. Throat: Lips, mucosa, and tongue normal. Teeth and gums normal.   Neck: Supple, symmetrical, trachea midline, no adenopathy, thyroid: no enlargment/tenderness/nodules, no carotid bruit and no JVD. Back:   Symmetric, no curvature. ROM normal.   Lungs:   Clear to auscultation bilaterally. Chest wall:  No tenderness or deformity. Heart:  Regular rate and rhythm, S1, S2 normal, no murmur, click, rub or gallop. Abdomen:   Soft, non-tender. Bowel sounds normal. No masses,  No organomegaly. Extremities: Extremities normal, atraumatic, no cyanosis or edema. Pulses: 2+ and symmetric all extremities. Skin: Skin color, texture, turgor normal. No rashes or lesions   Lymph nodes: Cervical, supraclavicular, and axillary nodes normal.   Neurologic: Grossly nonfocal, motor is intact.    Psych: is intact. No overt anxiety or depression. Data review:     Recent Results (from the past 24 hour(s))   PROCALCITONIN    Collection Time: 12/16/19  3:47 PM   Result Value Ref Range    Procalcitonin 3.24 ng/mL   PROCALCITONIN    Collection Time: 12/17/19  5:57 AM   Result Value Ref Range    Procalcitonin 2.37 ng/mL   CBC WITH AUTOMATED DIFF    Collection Time: 12/17/19  5:57 AM   Result Value Ref Range    WBC 9.7 3.6 - 11.0 K/uL    RBC 3.43 (L) 3.80 - 5.20 M/uL    HGB 9.9 (L) 11.5 - 16.0 g/dL    HCT 30.8 (L) 35.0 - 47.0 %    MCV 89.8 80.0 - 99.0 FL    MCH 28.9 26.0 - 34.0 PG    MCHC 32.1 30.0 - 36.5 g/dL    RDW 13.9 11.5 - 14.5 %    PLATELET 360 (H) 503 - 400 K/uL    MPV 9.2 8.9 - 12.9 FL    NRBC 0.0 0  WBC    ABSOLUTE NRBC 0.00 0.00 - 0.01 K/uL    NEUTROPHILS 83 (H) 32 - 75 %    LYMPHOCYTES 14 12 - 49 %    MONOCYTES 2 (L) 5 - 13 %    EOSINOPHILS 0 0 - 7 %    BASOPHILS 0 0 - 1 %    IMMATURE GRANULOCYTES 1 (H) 0.0 - 0.5 %    ABS. NEUTROPHILS 8.1 (H) 1.8 - 8.0 K/UL    ABS. LYMPHOCYTES 1.3 0.8 - 3.5 K/UL    ABS. MONOCYTES 0.2 0.0 - 1.0 K/UL    ABS. EOSINOPHILS 0.0 0.0 - 0.4 K/UL    ABS. BASOPHILS 0.0 0.0 - 0.1 K/UL    ABS. IMM. GRANS. 0.1 (H) 0.00 - 0.04 K/UL    DF AUTOMATED     METABOLIC PANEL, COMPREHENSIVE    Collection Time: 12/17/19  5:57 AM   Result Value Ref Range    Sodium 143 136 - 145 mmol/L    Potassium 4.0 3.5 - 5.1 mmol/L    Chloride 111 (H) 97 - 108 mmol/L    CO2 27 21 - 32 mmol/L    Anion gap 5 5 - 15 mmol/L    Glucose 122 (H) 65 - 100 mg/dL    BUN 19 6 - 20 MG/DL    Creatinine 0.45 (L) 0.55 - 1.02 MG/DL    BUN/Creatinine ratio 42 (H) 12 - 20      GFR est AA >60 >60 ml/min/1.73m2    GFR est non-AA >60 >60 ml/min/1.73m2    Calcium 9.5 8.5 - 10.1 MG/DL    Bilirubin, total 0.4 0.2 - 1.0 MG/DL    ALT (SGPT) 17 12 - 78 U/L    AST (SGOT) 33 15 - 37 U/L    Alk.  phosphatase 81 45 - 117 U/L    Protein, total 5.4 (L) 6.4 - 8.2 g/dL    Albumin 1.8 (L) 3.5 - 5.0 g/dL    Globulin 3.6 2.0 - 4.0 g/dL    A-G Ratio 0.5 (L) 1.1 - 2.2     MAGNESIUM    Collection Time: 12/17/19  5:57 AM   Result Value Ref Range    Magnesium 1.8 1.6 - 2.4 mg/dL   PHOSPHORUS    Collection Time: 12/17/19  5:57 AM   Result Value Ref Range    Phosphorus 3.6 2.6 - 4.7 MG/DL       Imaging:  I have personally reviewed the patients radiographs and have reviewed the reports:  12-15-19: CT of chest: COMPARISON: 12/13/2019     TECHNIQUE: Unenhanced localizing CT imaging of the pulmonary arteries is  followed by bolus injection of 100 mL Isovue 370 contrast IV, with thin section  axial Chest CT obtained and 3D image post processing performed including coronal  MIPS. CT dose reduction was achieved through use of a standardized protocol  tailored for this examination and automatic exposure control for dose  modulation.     FINDINGS: There is no pulmonary embolism. There is no acute cor pulmonale. There  is no aortic dissection or aneurysm.     Mosaic patchy airspace disease bilaterally has progressed but is nonspecific.     There are new small pleural effusions.     There is no pneumothorax. There is no pericardial fluid. There is no significant  adenopathy.      Visualized thyroid and lower neck soft tissues are unremarkable for age.     IMPRESSION:   1. No pulmonary embolus. 2. Increased nonspecific airspace disease. 3. New pleural effusions.         Pauline Morales MD

## 2019-12-17 NOTE — PROGRESS NOTES
RAPID RESPONSE TEAM-Follow Up    Rounded on patient due to recent rapid response for respiratory distress on 12/15/19. Discussed with primary RN, Luke Mireles. No acute concerns. No patient complaints. Vitals stable. MEWS 1. Patient has been downgraded to a nasal cannula and is tolerating it well. No RRT interventions indicated at this time. RN encouraged to call with any questions or concerns.     1956 \[facility]\""  Rapid Response RN  Carlene Batista

## 2019-12-17 NOTE — PROGRESS NOTES
Hospitalist Progress Note    NAME: Vivek Palomino   :  1950   MRN:  [id number]       Assessment / Plan:    Community-acquired pneumonia with Acute Hypoxic respiratory failure  Multiple Bilateral Pulmonary infiltrates. Immunocompromised due to prolonged steroids and RA    Failed outpatient treatment with Augmentin and azithromycin, it was documented that she completed 5 days  Was on NRB --> 5 L NC  CT \"Diffuse bilateral groundglass infiltrates\" on admission  CTA on December 15, 2019 did not show PE however it did show worsening airspace opacity  Pulmonary on board  Immunocompromise patient, rheumatoid arthritis on immunosuppressive medication including steroids,  Continue steroids  On Abx, continue   Follow-up with blood cultures and sputum cultures  She will require repeating chest x-ray 1 to 2 weeks after discharge to ensure resolution    History of RA with Sjogren's syndrome  Continue home meds  conitnue      18.5 - 24.9 Normal weight / Body mass index is 21.1 kg/m². Code status: Full  Prophylaxis: lovenox  Recommended Disposition: Home w/Family     Subjective:     Patient was seen and examined this morning. No acute events overnight. on 5 L NC. Feeling better. Review of Systems:  Symptom Y/N Comments  Symptom Y/N Comments   Fever/Chills n   Chest Pain     Poor Appetite    Edema     Cough y   Abdominal Pain     Sputum y   Joint Pain     SOB/JAMES y   Pruritis/Rash     Nausea/vomit    Tolerating PT/OT     Diarrhea    Tolerating Diet     Constipation    Other         Objective:     VITALS:   Last 24hrs VS reviewed since prior progress note.  Most recent are:  Patient Vitals for the past 24 hrs:   Temp Pulse Resp BP SpO2   19 1153 97.4 °F (36.3 °C) 77 18 112/64 97 %   19 0811 97.8 °F (36.6 °C) 77 16 116/67 99 %   19 0337 97.7 °F (36.5 °C) 90 18 108/71 95 %   19 2304 97.4 °F (36.3 °C) 95 18 112/77 94 %   19 1927 97.6 °F (36.4 °C) 90 16 139/86 99 %   19 1538 98.4 °F (36.9 °C) 100 18 133/83 97 %       Intake/Output Summary (Last 24 hours) at 12/17/2019 1337  Last data filed at 12/17/2019 0340  Gross per 24 hour   Intake    Output 250 ml   Net -250 ml        PHYSICAL EXAM:  General: WD, WN. Alert, cooperative, no acute distress    EENT:  EOMI. Anicteric sclerae. MMM  Resp:  B/l lower lobe rales. No wheezing  CV:  Regular  rhythm,  No edema  GI:  Soft, Non distended, Non tender.  +Bowel sounds  Neurologic:  Alert and oriented X 3, normal speech,   Psych:   Good insight. Not anxious nor agitated  Skin:  No rashes. No jaundice    Reviewed most current lab test results and cultures  YES  Reviewed most current radiology test results   YES  Review and summation of old records today    NO  Reviewed patient's current orders and MAR    YES  PMH/SH reviewed - no change compared to H&P  ________________________________________________________________________  Care Plan discussed with:    Comments   Patient x    Family  x    RN x    Care Manager     Consultant                        Multidiciplinary team rounds were held today with , nursing, pharmacist and clinical coordinator. Patient's plan of care was discussed; medications were reviewed and discharge planning was addressed. ________________________________________________________________________  Total NON critical care TIME:  25 Minutes    Total CRITICAL CARE TIME Spent:   Minutes non procedure based      Comments   >50% of visit spent in counseling and coordination of care     ________________________________________________________________________  Dena Pace MD     Procedures: see electronic medical records for all procedures/Xrays and details which were not copied into this note but were reviewed prior to creation of Plan. LABS:  I reviewed today's most current labs and imaging studies.   Pertinent labs include:  Recent Labs     12/17/19  0557   WBC 9.7   HGB 9.9*   HCT 30.8*   * Recent Labs     12/17/19  0557 12/16/19  0329 12/15/19  0244    141 140   K 4.0 3.5 3.8   * 108 111*   CO2 27 26 22   * 96 90   BUN 19 15 15   CREA 0.45* 0.63 0.59   CA 9.5 8.8 8.9   MG 1.8  --   --    PHOS 3.6  --   --    ALB 1.8*  --   --    TBILI 0.4  --   --    SGOT 33  --   --    ALT 17  --   --        Signed: Beckie Alexandra MD

## 2019-12-17 NOTE — CDMP QUERY
Patient admitted with pneumonia. Noted documentation of pulmonary edema in RRT progress notes on 12/14 for respiratory failure/distress. If possible, please document in progress notes and d/c summary if you are evaluating and /or treating any of the following: 
 
Acute Pulmonary edema resolved  from 12/14 Chronic Pulmonary edema resolved from 12/14 Unsepicfied Pulmonary edema resolved from 12/14 Unable to determine Pulmonary edema from 12/14 Other pulmonary edema from 12/14 The medical record reflects the following: 
  Risk Factors: presents with pneumonia Clinical Indicator-- AHRF due to due to pulmonary edema;  patient has worsening hypoxia, on 2 L start of shift now on 15 L venti mask;  
  Treatment:stat chest xray, Lasix 20mg IV BID; Oxygen as needed. Thank you Katie Hobbs RN/CCDS 
[phone]

## 2019-12-17 NOTE — ROUTINE PROCESS
Bedside and Verbal shift change report given to GEOFF Franklin (oncoming nurse) by Farnaz Vieyra RN (offgoing nurse). Report included the following information SBAR, Kardex, Recent Results, Med Rec Status and Cardiac Rhythm ST. 
  
Zone [id number]:5882 
  
  
Significant changes during shift:  Patient's O2 sats in 90s on 4L NC. Seen by pulmonology.   
  
Patient Information 
  
Tiffany Bliss 
78 y.o. 
12/13/2019  2:11 PM by Vick Botello MD. Lana Mendoza was admitted from Home 
  
Problem List 
  
         
Patient Active Problem List  
  Diagnosis Date Noted  Rheumatoid arthritis with positive rheumatoid factor ([address] 10/01/2019  
    Priority: 1 - One  Sjogren's syndrome ([address] 11/20/2018  
    Priority: 1 - One  
 Hypercholesterolemia 11/20/2018  
    Priority: 1 - One  
 Primary osteoarthritis involving multiple joints 11/20/2018  
    Priority: 1 - One  Chronic bilateral low back pain without sciatica 11/20/2018  
    Priority: 1 - One  Weight loss, unintentional 11/20/2018  
    Priority: 1 - One  Bacterial pneumonia 06/26/2018  
    Priority: 1 - One  Sepsis ([address] 12/13/2019  Right lower quadrant abdominal pain 04/26/2018  Pain in both lower extremities 04/26/2018  Ulcer of mouth 04/26/2018  
  
       
Past Medical History:  
Diagnosis Date  Arthritis    
 Bacterial pneumonia 6/26/2018  Chronic bilateral low back pain without sciatica 11/20/2018  Hypercholesterolemia    
 Long term (current) use of anticoagulants    
 Primary osteoarthritis involving multiple joints 11/20/2018  Sjogren's syndrome ([address] 11/20/2018  Weight loss, unintentional 11/20/2018  
  
  
  
Core Measures: 
  
CVA: No No 
CHF:No No 
PNA:Yes Yes 
  
Post Op Surgical (If Applicable):  
  
Number times ambulated in hallway past shift:  0 
Number of times OOB to chair past shift:   0 
NG Tube: No 
Incentive Spirometer: No 
Drains: No   Volume  0 Dressing Present:  No 
Flatus:  Not applicable 
  
 Activity Status: 
  
OOB to eventblimp Ambulated this shift Yes  
Bed Rest No 
  
Supplemental Y8: (DA Applicable) 
  
NC Yes NRB No 
Venti-mask no On 6 L 
  
  
LINES AND DRAINS: 
  
Central Line? No  
PICC LINE? No  
Urinary Catheter? No 
  
DVT prophylaxis: 
  
DVT prophylaxis Med- Yes DVT prophylaxis SCD or [employer]- No  
  
Wounds: (If Applicable) 
  
Wounds- No 
  
Location 0 
  
Patient Safety: 
  
Falls Score Total Score: 1 Safety Level_______ Bed Alarm On? No 
Sitter? No 
  
Plan for upcoming shift: oxygen, IV antibiotics, IV steroids   
  
  
Discharge Plan: Yes TBD 
  
Active Consults: 
None

## 2019-12-18 LAB
ANION GAP SERPL CALC-SCNC: 5 MMOL/L (ref 5–15)
BUN SERPL-MCNC: 23 MG/DL (ref 6–20)
BUN/CREAT SERPL: 42 (ref 12–20)
CALCIUM SERPL-MCNC: 9.3 MG/DL (ref 8.5–10.1)
CHLORIDE SERPL-SCNC: 110 MMOL/L (ref 97–108)
CO2 SERPL-SCNC: 27 MMOL/L (ref 21–32)
CREAT SERPL-MCNC: 0.55 MG/DL (ref 0.55–1.02)
GLUCOSE SERPL-MCNC: 119 MG/DL (ref 65–100)
POTASSIUM SERPL-SCNC: 3.9 MMOL/L (ref 3.5–5.1)
SODIUM SERPL-SCNC: 142 MMOL/L (ref 136–145)

## 2019-12-18 PROCEDURE — 80048 BASIC METABOLIC PNL TOTAL CA: CPT

## 2019-12-18 PROCEDURE — 65660000000 HC RM CCU STEPDOWN

## 2019-12-18 PROCEDURE — 97165 OT EVAL LOW COMPLEX 30 MIN: CPT | Performed by: OCCUPATIONAL THERAPIST

## 2019-12-18 PROCEDURE — 74011250636 HC RX REV CODE- 250/636: Performed by: INTERNAL MEDICINE

## 2019-12-18 PROCEDURE — 97535 SELF CARE MNGMENT TRAINING: CPT | Performed by: OCCUPATIONAL THERAPIST

## 2019-12-18 PROCEDURE — 97116 GAIT TRAINING THERAPY: CPT

## 2019-12-18 PROCEDURE — 74011000258 HC RX REV CODE- 258: Performed by: INTERNAL MEDICINE

## 2019-12-18 PROCEDURE — 36415 COLL VENOUS BLD VENIPUNCTURE: CPT

## 2019-12-18 PROCEDURE — 77010033678 HC OXYGEN DAILY

## 2019-12-18 PROCEDURE — 97161 PT EVAL LOW COMPLEX 20 MIN: CPT

## 2019-12-18 PROCEDURE — 74011250637 HC RX REV CODE- 250/637: Performed by: INTERNAL MEDICINE

## 2019-12-18 PROCEDURE — 94760 N-INVAS EAR/PLS OXIMETRY 1: CPT

## 2019-12-18 RX ADMIN — PIPERACILLIN AND TAZOBACTAM 3.38 G: 3; .375 INJECTION, POWDER, LYOPHILIZED, FOR SOLUTION INTRAVENOUS at 17:02

## 2019-12-18 RX ADMIN — PIPERACILLIN AND TAZOBACTAM 3.38 G: 3; .375 INJECTION, POWDER, LYOPHILIZED, FOR SOLUTION INTRAVENOUS at 10:18

## 2019-12-18 RX ADMIN — VANCOMYCIN HYDROCHLORIDE 1000 MG: 1 INJECTION, POWDER, LYOPHILIZED, FOR SOLUTION INTRAVENOUS at 21:14

## 2019-12-18 RX ADMIN — CALCIUM CARBONATE 500 MG (1,250 MG)-VITAMIN D3 200 UNIT TABLET 1 TABLET: at 08:18

## 2019-12-18 RX ADMIN — Medication 10 ML: at 06:43

## 2019-12-18 RX ADMIN — HEPARIN SODIUM 5000 UNITS: 5000 INJECTION INTRAVENOUS; SUBCUTANEOUS at 08:19

## 2019-12-18 RX ADMIN — HEPARIN SODIUM 5000 UNITS: 5000 INJECTION INTRAVENOUS; SUBCUTANEOUS at 21:14

## 2019-12-18 RX ADMIN — METHYLPREDNISOLONE SODIUM SUCCINATE 40 MG: 40 INJECTION, POWDER, FOR SOLUTION INTRAMUSCULAR; INTRAVENOUS at 00:48

## 2019-12-18 RX ADMIN — NAPROXEN 500 MG: 250 TABLET ORAL at 08:18

## 2019-12-18 RX ADMIN — PIPERACILLIN AND TAZOBACTAM 3.38 G: 3; .375 INJECTION, POWDER, LYOPHILIZED, FOR SOLUTION INTRAVENOUS at 02:25

## 2019-12-18 RX ADMIN — METHYLPREDNISOLONE SODIUM SUCCINATE 40 MG: 40 INJECTION, POWDER, FOR SOLUTION INTRAMUSCULAR; INTRAVENOUS at 06:43

## 2019-12-18 RX ADMIN — METHYLPREDNISOLONE SODIUM SUCCINATE 40 MG: 40 INJECTION, POWDER, FOR SOLUTION INTRAMUSCULAR; INTRAVENOUS at 21:14

## 2019-12-18 RX ADMIN — Medication 10 ML: at 17:02

## 2019-12-18 RX ADMIN — VANCOMYCIN HYDROCHLORIDE 1000 MG: 1 INJECTION, POWDER, LYOPHILIZED, FOR SOLUTION INTRAVENOUS at 08:19

## 2019-12-18 RX ADMIN — NAPROXEN 500 MG: 250 TABLET ORAL at 17:02

## 2019-12-18 NOTE — PROGRESS NOTES
Pharmacy Automatic Renal Dosing Protocol - Antimicrobials    Indication for Antimicrobials: CAP    Current Regimen of Each Antimicrobial:  Zosyn 3.375 gm q8h, day 1, 19 - Day 6  Vancomycin 1000 mg IV Q12H (Start 19 - Day 6    Previous Antimicrobial Therapy:  Vancomycin 1500 mg (25mg/kg) x 1, 19 1800    Levels  12/15 12:56 ==9.4 (Extrapolated = 14.34  12 10.8 (Extrapolated = 11.81)    Significant Cultures:    paired blood cultures NG - pending   resp panel - NG - Final      Radiology / Imaging results: (X-ray, CT scan or MRI): CTA: IMPRESSION:  No pulmonary emboli. Diffuse bilateral groundglass infiltrates. Suspect left upper lobe nodule. Follow-up suggested. Paralysis, amputations, malnutrition: n/a    Labs:  Recent Labs     19  0557 19  0329 12/15/19  0244   CREA 0.45* 0.63 0.59   BUN 19 15 15   WBC 9.7  --   --      Temp (24hrs), Av.7 °F (36.5 °C), Min:97.2 °F (36.2 °C), Max:98.4 °F (36.9 °C)    Creatinine Clearance (mL/min) or Dialysis: 70 ml/min    Impression/Plan:     Continue vancomycin 1g IV q12  Continue Zosyn as ordered  Likely can stop soon Day 6. BMP ordered daily     Pharmacy will follow daily and adjust medications as appropriate for renal function and/or serum levels. Thank you,  Chavo Moeller, MALLORYD    Recommended duration of therapy  [email or web address]/[facility]/Pharmacy/Clinical%20Companion/[email or web address]. docx    Renal Dosing  [email or web address]/[place]/[facility]/Pharmacy/Clinical%20Companion/Renal%[email or web address]. pdf

## 2019-12-18 NOTE — PROGRESS NOTES
Hospitalist Progress Note    NAME: Lan Mackey   :  1950   MRN:  [id number]       Assessment / Plan:    Community-acquired pneumonia with Acute Hypoxic respiratory failure, improving  Multiple Bilateral Pulmonary infiltrates. Immunocompromised due to prolonged steroids and RA    Failed outpatient treatment with Augmentin and azithromycin, it was documented that she completed 5 days  Was on NRB --> 5 L NC->3 L NC  CT \"Diffuse bilateral groundglass infiltrates\" on admission  CTA on December 15, 2019 did not show PE however it did show worsening airspace opacity  Pulmonary on board  Immunocompromise patient, rheumatoid arthritis on immunosuppressive medication including steroids,  Continue steroids  On Abx, continue IV ABx  Follow-up with blood cultures and sputum cultures  She will require repeating chest x-ray 1 to 2 weeks after discharge to ensure resolution    History of RA with Sjogren's syndrome  Continue home meds  conitnue      18.5 - 24.9 Normal weight / Body mass index is 22.52 kg/m². Code status: Full  Prophylaxis: lovenox  Recommended Disposition: Home w/Family          Subjective:     Patient was seen and examined this morning. On 3 L NC,   feels a lot better. No complains. Review of Systems:  Symptom Y/N Comments  Symptom Y/N Comments   Fever/Chills n   Chest Pain     Poor Appetite    Edema     Cough n   Abdominal Pain     Sputum    Joint Pain     SOB/JAMES y   Pruritis/Rash     Nausea/vomit    Tolerating PT/OT     Diarrhea    Tolerating Diet     Constipation    Other         Objective:     VITALS:   Last 24hrs VS reviewed since prior progress note.  Most recent are:  Patient Vitals for the past 24 hrs:   Temp Pulse Resp BP SpO2   19 0728 97.7 °F (36.5 °C) 89 18 124/65 97 %   19 0359 98.1 °F (36.7 °C) 90 18 109/65 96 %   19 2317 97.3 °F (36.3 °C) 88 16 121/66 100 %   19 1933 97.6 °F (36.4 °C) 99 16 127/68 95 %   19 1516 97.6 °F (36.4 °C) 100 18 115/66 99 % 12/17/19 1153 97.4 °F (36.3 °C) 77 18 112/64 97 %     No intake or output data in the 24 hours ending 12/18/19 1007     PHYSICAL EXAM:  General: WD, WN. Alert, cooperative, no acute distress    EENT:  EOMI. Anicteric sclerae. MMM  Resp:  Lungs clear. No wheezing  CV:  Regular  rhythm,  trace edema b/l legs  GI:  Soft, Non distended, Non tender.  +Bowel sounds  Neurologic:  Alert and oriented X 3, normal speech,   Psych:   Good insight. Not anxious nor agitated  Skin:  No rashes. No jaundice    Reviewed most current lab test results and cultures  YES  Reviewed most current radiology test results   YES  Review and summation of old records today    NO  Reviewed patient's current orders and MAR    YES  PMH/SH reviewed - no change compared to H&P  ________________________________________________________________________  Care Plan discussed with:    Comments   Patient x    Family  x    RN x    Care Manager     Consultant                        Multidiciplinary team rounds were held today with , nursing, pharmacist and clinical coordinator. Patient's plan of care was discussed; medications were reviewed and discharge planning was addressed. ________________________________________________________________________  Total NON critical care TIME:  35 Minutes    Total CRITICAL CARE TIME Spent:   Minutes non procedure based      Comments   >50% of visit spent in counseling and coordination of care     ________________________________________________________________________  Ivan Villela MD     Procedures: see electronic medical records for all procedures/Xrays and details which were not copied into this note but were reviewed prior to creation of Plan. LABS:  I reviewed today's most current labs and imaging studies.   Pertinent labs include:  Recent Labs     12/17/19  0557   WBC 9.7   HGB 9.9*   HCT 30.8*   *     Recent Labs     12/18/19  0356 12/17/19  0557 12/16/19  0329    143 141   K 3.9 4.0 3.5   * 111* 108   CO2 27 27 26   * 122* 96   BUN 23* 19 15   CREA 0.55 0.45* 0.63   CA 9.3 9.5 8.8   MG  --  1.8  --    PHOS  --  3.6  --    ALB  --  1.8*  --    TBILI  --  0.4  --    SGOT  --  33  --    ALT  --  17  --        Signed: Svetlana Roman MD

## 2019-12-18 NOTE — PROGRESS NOTES
Bedside and Verbal shift change report given to GEOFF Franklin (oncoming nurse) by GEOFF valenzuela (offgoing nurse). Report included the following information SBAR, Kardex, Recent Results, Med Rec Status and Cardiac Rhythm ST.     Zone [id number]:[id number]        Significant changes during shift:  Patient's O2 sats in 90s on 4L NC.  Seen by pulmonology.       Patient Information     Elisa Mohrar  78 y.o.  12/13/2019  2:11 PM by Vick Gallegos MD. Lana Mendoza was admitted from Home     Problem List               Patient Active Problem List     Diagnosis Date Noted    Rheumatoid arthritis with positive rheumatoid factor ([address] 10/01/2019       Priority: 1 - One    Sjogren's syndrome ([address] 11/20/2018       Priority: 1 - One    Hypercholesterolemia 11/20/2018       Priority: 1 - One    Primary osteoarthritis involving multiple joints 11/20/2018       Priority: 1 - One    Chronic bilateral low back pain without sciatica 11/20/2018       Priority: 1 - One    Weight loss, unintentional 11/20/2018       Priority: 1 - One    Bacterial pneumonia 06/26/2018       Priority: 1 - One    Sepsis ([address] 12/13/2019    Right lower quadrant abdominal pain 04/26/2018    Pain in both lower extremities 04/26/2018    Ulcer of mouth 04/26/2018              Past Medical History:   Diagnosis Date    Arthritis      Bacterial pneumonia 6/26/2018    Chronic bilateral low back pain without sciatica 11/20/2018    Hypercholesterolemia      Long term (current) use of anticoagulants      Primary osteoarthritis involving multiple joints 11/20/2018    Sjogren's syndrome ([address] 11/20/2018    Weight loss, unintentional 11/20/2018            Core Measures:     CVA: No No  CHF:No No  PNA:Yes Yes     Post Op Surgical (If Applicable):      Number times ambulated in hallway past shift:  0  Number of times OOB to chair past shift:   0  NG Tube: No  Incentive Spirometer: No  Drains: No   Volume  0  Dressing Present:  No  Flatus:  Not applicable     Activity Status:     OOB to Chair Yes  Ambulated this shift Yes   Bed Rest No     Supplemental D9: (XE Applicable)     NC Yes  NRB No  Venti-mask no   On 6 L        LINES AND DRAINS:     Central Line? No   PICC LINE? No   Urinary Catheter? No     DVT prophylaxis:     DVT prophylaxis Med- Yes  DVT prophylaxis SCD or FEDERICO- No      Wounds: (If Applicable)     Wounds- No     Location 0     Patient Safety:     Falls Score Total Score: 1  Safety Level_______  Bed Alarm On? No  Sitter? No     Plan for upcoming shift: oxygen, IV antibiotics, IV steroids           Discharge Plan: Yes TBD     Active Consults:  None

## 2019-12-18 NOTE — PROGRESS NOTES
Problem: Self Care Deficits Care Plan (Adult)  Goal: *Acute Goals and Plan of Care (Insert Text)  Description    FUNCTIONAL STATUS PRIOR TO ADMISSION: Patient was independent and active without use of DME. Pt lives with her  who is able to assist her as needed. Pt was driving, and providing care to her 80year old mother. HOME SUPPORT: The patient lived with  but did not require assist.    Occupational Therapy Goals  Initiated 12/18/2019  1. Patient will perform grooming in standing, including set up, with independence within 7 day(s). 2.  Patient will perform bathing with independence within 7 day(s). 3.  Patient will perform upper body dressing and lower body dressing with independence within 7 day(s). 4.  Patient will perform toilet transfers with independence within 7 day(s). 5.  Patient will perform all aspects of toileting with independence within 7 day(s). 6.  Patient will participate in upper extremity therapeutic exercise/activities with independence for 10 minutes within 7 day(s). 7.  Patient will utilize energy conservation techniques during functional activities with verbal cues within 7 day(s). Outcome: Not Met   OCCUPATIONAL THERAPY EVALUATION  Patient: Patricia Jones (29 y.o. female)  Date: 12/18/2019  Primary Diagnosis: Sepsis ([facility] [A41.9]        Precautions: fall, standard       ASSESSMENT  Based on the objective data described below, the patient presents with generalized weakness, impaired endurance, shallow breathing and new use of O2 on 1 liter this date, all impairing safety and independence when performing adls and functional mobility . Pt moves very slowly and cautiously; she reports being ill for many weeks PTA. Pt is functioning below her independent baseline and may benefit from [facility] therapy vs. outpatient therapy depending on her progress. She talked about initiating an exercise program at some point.      Current Level of Function Impacting Discharge (ADLs/self-care): generalized weakness requiring supervision for safety and will need assistance for IADLs. Functional Outcome Measure: The patient scored Total: 55/100 on the Barthel Index outcome measure which is indicative of 45% impaired ability to care for basic self needs/dependency on others; inferred  dependency on others for instrumental ADLs. Other factors to consider for discharge: reports RA diagnosed 3 years ago, may benefit from education on wellness and disease process     Patient will benefit from skilled therapy intervention to address the above noted impairments. PLAN :  Recommendations and Planned Interventions: self care training, functional mobility training, therapeutic exercise, balance training, therapeutic activities, endurance activities, patient education, home safety training, and family training/education    Frequency/Duration: Patient will be followed by occupational therapy 3 times a week to address goals. Recommendation for discharge: (in order for the patient to meet his/her long term goals)  To be determined: may benefit from [facility] vs. Outpatient therapy---will follow to determine progress toward goals.  is able to assist pt at home. This discharge recommendation:  Has not yet been discussed the attending provider and/or case management    IF patient discharges home will need the following DME: to be determined (TBD)  May benefit from a reacher       SUBJECTIVE:   Patient stated Jolly Gracia had a fever of 104.     OBJECTIVE DATA SUMMARY:   HISTORY:   Past Medical History:   Diagnosis Date    Arthritis     Bacterial pneumonia 6/26/2018    Chronic bilateral low back pain without sciatica 11/20/2018    Hypercholesterolemia     Long term (current) use of anticoagulants     Primary osteoarthritis involving multiple joints 11/20/2018    Sjogren's syndrome ([facility] 11/20/2018    Weight loss, unintentional 11/20/2018     Past Surgical History: Procedure Laterality Date    HX TONSILLECTOMY         Expanded or extensive additional review of patient history: RA history    Home Situation  Home Environment: Private residence  # Steps to Enter: 2  Rails to Enter: Yes  Hand Rails : Left  One/Two Story Residence: Two story  # of Interior Steps: 15  Interior Rails: Right  Lift Chair Available: No  Living Alone: No  Support Systems: Spouse/Significant Other/Partner  Patient Expects to be Discharged to[de-identified] Private residence  Current DME Used/Available at Home: None  Tub or Shower Type: Shower    Hand dominance: Right    EXAMINATION OF PERFORMANCE DEFICITS:  Cognitive/Behavioral Status:  Neurologic State: Alert; Appropriate for age  Orientation Level: Appropriate for age  Cognition: Follows commands  Perception: Appears intact  Perseveration: No perseveration noted      Edema: none observed    Hearing: Auditory  Auditory Impairment: None    Vision/Perceptual:    Tracking: (not formally assessed)                           Corrective Lenses: (none present)    Range of Motion:  BUES:   AROM: Within functional limits                         Strength:  Generalized weakness overall  Strength: Generally decreased, functional                Coordination:  Coordination: Within functional limits  Fine Motor Skills-Upper: Left Intact; Right Intact    Gross Motor Skills-Upper: Left Intact; Right Intact    Tone & Sensation:    Tone: Normal  Sensation: Intact(has reynauds in hands)                      Balance:  Sitting: Intact  Standing: Impaired  Standing - Static: Good(reaches for support of open door in standing)  Standing - Dynamic : Fair(tends to move slowly and cautiously, unsteady when bending f)    Functional Mobility and Transfers for ADLs:  Bed Mobility:  Rolling: (pt seated in chair upon arrival)  Supine to Sit: Independent  Scooting: Independent    Transfers:  Sit to Stand: Supervision  Stand to Sit: Supervision  Bed to Chair: Stand-by assistance;Contact guard assistance  Bathroom Mobility: Supervision/set up;Stand-by assistance;Contact guard assistance  Toilet Transfer : Supervision  Assistive Device : (none)    ADL Assessment:  Feeding: Independent    Oral Facial Hygiene/Grooming: Stand-by assistance;Setup         Upper Body Dressing: Stand-by assistance;Setup    Lower Body Dressing: Supervision;Stand-by assistance;Setup    Toileting: Independent                ADL Intervention and task modifications:   Pt educated in PLB and deep breathing and used biofeedback with dynamap to increase O2 sats during seated and standing. Pt did not complain of SOB, O2 sats decreased to 85% during standing adls- if the dynamap was reading correctly. O2 sats quickly increased to 90 plus with rest and initiation of PLB. Pt able to increase O2 sats at rest on 1 L nasal cannula to 98% using breathing techniques. Initiated education on importance of gentle ROM / exercise for maintaining joint integrity. Encouraged balancing rest and activity during adls/IADLs. Pt performed standing adls reaching into cabinets, drawers, reaching down to the floor, etc.  Educated on placing most used objects within reach and having assistance at home. Encouraged pt to perform adl mobility at her usual pace  (that she reported was fast), but she could not increase her speed despite CGA and encouragement. Pt seems to lack confidence in her mobility skills at this time. Educated on benefits of activity and detriment of bedrest.                                    Cognitive Retraining  Safety/Judgement: Awareness of environment; Fall prevention; Insight into deficits    Therapeutic Exercise:  Encouraged scapular squeezes, BUEs shoulder flexion, abduction and shoulder rotation to lower back. Encouraged seated external rotation of hips to use crossed leg technique for lower body adls. Encouraged knee flex/ext, heel raises and toe taps.   Pt verbalized understanding, but anticipate that pt will need reinforcement. Functional Measure:  Barthel Index:    Bathin  Bladder: 5  Bowels: 10  Groomin  Dressing: 10  Feeding: 10  Mobility: 0  Stairs: 0  Toilet Use: 5  Transfer (Bed to Chair and Back): 10  Total: 55/100        The Barthel ADL Index: Guidelines  1. The index should be used as a record of what a patient does, not as a record of what a patient could do. 2. The main aim is to establish degree of independence from any help, physical or verbal, however minor and for whatever reason. 3. The need for supervision renders the patient not independent. 4. A patient's performance should be established using the best available evidence. Asking the patient, friends/relatives and nurses are the usual sources, but direct observation and common sense are also important. However direct testing is not needed. 5. Usually the patient's performance over the preceding 24-48 hours is important, but occasionally longer periods will be relevant. 6. Middle categories imply that the patient supplies over 50 per cent of the effort. 7. Use of aids to be independent is allowed. Kristy Rodriguez., Barthel, D.W. ([id number]). Functional evaluation: the Barthel Index. [address] [facility] (14)2. [facility] RACHID Martin, Verenice Dinero., Segun Castillo.[facility], [address] [place] [address] (). Measuring the change indisability after inpatient rehabilitation; comparison of the responsiveness of the Barthel Index and Functional [place] Measure. Journal of Neurology, Neurosurgery, and Psychiatry, 66(4), 792-213. KARISHMA Person.EARLE, MICHEAL Cole, & Kiran Pike M.A. (2004.) Assessment of post-stroke quality of life in cost-effectiveness studies: The usefulness of the Barthel Index and the EuroQoL-5D.  Quality of Life Research, 15, 239-06         Occupational Therapy Evaluation Charge Determination   History Examination Decision-Making   MEDIUM Complexity : Expanded review of history including physical, cognitive and psychosocial  history  MEDIUM Complexity : 3-5 performance deficits relating to physical, cognitive , or psychosocial skils that result in activity limitations and / or participation restrictions MEDIUM Complexity : Patient may present with comorbidities that affect occupational performnce. Miniml to moderate modification of tasks or assistance (eg, physical or verbal ) with assesment(s) is necessary to enable patient to complete evaluation       Based on the above components, the patient evaluation is determined to be of the following complexity level: MEDIUM  Pain Rating:  No complaints of pain    Activity Tolerance:   Fair  noted mild decrease in O2 sats during activity. Educated on breathing techniques to increase. Pt verbalized understanding but will need reinforcement. Please refer to the flowsheet for vital signs taken during this treatment. After treatment patient left in no apparent distress:    Sitting in chair and Call bell within reach    COMMUNICATION/EDUCATION:   The patients plan of care was discussed with: Registered Nurse. Patient/family agree to work toward stated goals and plan of care. This patients plan of care is appropriate for delegation to [facility].     Thank you for this referral.  Liseth Contreras OTR/L  Time Calculation: 43 mins

## 2019-12-18 NOTE — PROGRESS NOTES
[facility]  Pulmonary, Critical Care, and Sleep Medicine    Patient Consult    Name: Miguel Angel Hart MRN: [id number]   : 1950 Hospital: [facility]   Date: 2019        IMPRESSION:   · Multiple Bilateral Pulmonary Infiltrates, Seems clinically to be improving. Suspected Pneumonia in chronically immunosuppresed pt. She has been making clinical progress this week. · Rheumatoid Arthritis,ON Enbrel, has been on steroids. (had positive rheumatoid factor and +KAREN. · Acute Hypoxic respiratory failure, Has been on 3L [place] oxygen. Seems to be improving. · Had a total body  rash to plaquenil-has medrol dose pack, Zyrtec, pepcid.-This has resolved. · Immunocompromised has been on long term steroids. · Anemia  · Sjogrens  · Dysphagia, has been seen by Dr. Ramon Mills in past-seems ok at present. · GERD  ·  Allergic Rhinitis  · Raynauds involving the legs. · Nonsmoker  · Recent Oral Thrush  · DJD of her cervical spine: Occasional mild lower back pain. RECOMMENDATIONS:   · Wean oxygen as tolerated  · Can repeat CXR PA and Lateral tomorrow. · On Nystatin  · ON heparin 5000 units SC q8hrs. · ON Empiric Zosyn and Vanc. · Continue solumedrol. Subjective:     Last 24 hrs: Pt has made some improvement. Was able to get out of bed. Slept ok last pm.   NO chest pain, Has as dry cough. No leg pain. NO issues with dysphagia today. This patient has been seen and evaluated at the request of Dr. Laura Lew for above. Patient is a 78 y.o. female   Who has been sick with different issues over the last 2 months. Was felt to have a rash to plaquenil. Then started have a worsening urticaria. Was placed on meds for an allergic reaction and streroids. Took a while to improve. Then she developed an cough and was felt to have possible pneumonia. She was given a z pack.  Did  Not really improve was Rx levoflox but after reading possible side effects she elected not to take the levoflox. Has some dysphagia and choking sensation with taking solid foods. Has been having issues with swallowing, sores in her mouth. Burning tongue. Has been loosing weight. Has been to ER several times for respiratory difficulty. Has been sick for the last 4-6 weeks. Nonproductive cough and increasing dyspnea. Past Medical History:   Diagnosis Date    Arthritis     Bacterial pneumonia 6/26/2018    Chronic bilateral low back pain without sciatica 11/20/2018    Hypercholesterolemia     Long term (current) use of anticoagulants     Primary osteoarthritis involving multiple joints 11/20/2018    Sjogren's syndrome ([facility] [address] 11/20/2018    Weight loss, unintentional 11/20/2018      Past Surgical History:   Procedure Laterality Date    HX TONSILLECTOMY        Prior to Admission medications    Medication Sig Start Date End Date Taking? Authorizing Provider   nystatin (MYCOSTATIN) 100,000 unit/mL suspension Take 3 mL by mouth nightly. swish and spit   Yes Provider, Historical   acetaminophen (TYLENOL EXTRA STRENGTH) 500 mg tablet Take 1,000 mg by mouth daily as needed for Pain. Yes Provider, Historical   propylene glycol/peg 400/PF (SYSTANE, PF, OP) Administer  to both eyes daily as needed (dry eyes). Yes Provider, Historical   naproxen (NAPROSYN) 500 mg tablet Take 1 Tab by mouth two (2) times daily (with meals). 12/7/19  Yes Shanae Puckett MD   zolpidem (AMBIEN) 10 mg tablet TAKE 1 TABLET BY MOUTH AT BEDTIME AS NEEDED 9/26/19  Yes Victor Manuel Pitts MD   colestipol (COLESTID) 1 gram tablet TAKE 1 TABLET BY MOUTH TWICE DAILY 9/5/19  Yes Victor Manuel Pitts MD   fenofibrate (LOFIBRA) 160 mg tablet TAKE 1 TABLET BY MOUTH EVERY DAY 12/7/18  Yes Victor Manuel Pitts MD   nabumetone (RELAFEN) 500 mg tablet Take 500 mg by mouth two (2) times a day. 9/30/18  Yes Provider, Historical   calcium/D3/mag ox//morgan/Zn (CALTRATE + D3 PLUS MINERALS PO) Take 1 Tab by mouth daily.    Yes Provider, Historical   predniSONE (DELTASONE) 10 mg tablet Take 10 mg by mouth daily (with breakfast).     Other, MD Juanpablo     Allergies   Allergen Reactions    Diclofenac Other (comments)     Nausea fever and chills    Plaquenil [Hydroxychloroquine] Rash    Sulfa (Sulfonamide Antibiotics) Unknown (comments)      Social History     Tobacco Use    Smoking status: Never Smoker    Smokeless tobacco: Never Used   Substance Use Topics    Alcohol use: Yes     Comment: rarely      Family History   Problem Relation Age of Onset    Heart Disease Maternal Grandfather     Prostate Cancer Father     Prostate Cancer Brother         Current Facility-Administered Medications   Medication Dose Route Frequency    methylPREDNISolone (PF) (SOLU-MEDROL) injection 40 mg  40 mg IntraVENous Q6H    heparin (porcine) injection 5,000 Units  5,000 Units SubCUTAneous Q12H    piperacillin-tazobactam (ZOSYN) 3.375 g in 0.9% sodium chloride (MBP/ADV) 100 mL  3.375 g IntraVENous Q8H    nystatin (MYCOSTATIN) 100,000 unit/mL oral suspension 300,000 Units  3 mL Oral QHS    naproxen (NAPROSYN) tablet 500 mg  500 mg Oral BID WITH MEALS    fenofibrate nanocrystallized (TRICOR) tablet 145 mg  145 mg Oral DAILY    colestipol (COLESTID) tablet 1 g  1 g Oral BID    calcium-vitamin D (OS-PRISCILLA) 500 mg-200 unit tablet  1 Tab Oral DAILY    sodium chloride (NS) flush 5-40 mL  5-40 mL IntraVENous Q8H    vancomycin (VANCOCIN) 1,000 mg in 0.9% sodium chloride (MBP/ADV) 250 mL  1,000 mg IntraVENous Q12H       Review of Systems:  Constitutional: positive for fatigue and malaise  Eyes: negative  Ears, nose, mouth, throat, and face: negative  Respiratory: positive for cough or dyspnea on exertion  Cardiovascular: negative  Gastrointestinal: positive for dysphagia, dyspepsia and reflux symptoms  Genitourinary:negative  Integument/breast: positive for rash  Hematologic/lymphatic: negative  Musculoskeletal:negative  Neurological: negative  Behavioral/Psych: negative  Endocrine: negative  Allergic/Immunologic: negative    Objective:   Vital Signs:    Visit Vitals  /65   Pulse 89   Temp 97.7 °F (36.5 °C)   Resp 18   Ht 5' 6\" (1.676 m)   Wt 63.3 kg (139 lb 8 oz)   SpO2 97%   Breastfeeding No   BMI 22.52 kg/m²       O2 Device: Nasal cannula   O2 Flow Rate (L/min): 3 l/min   Temp (24hrs), Av.6 °F (36.4 °C), Min:97.3 °F (36.3 °C), Max:98.1 °F (36.7 °C)       Intake/Output:   Last shift:      No intake/output data recorded. Last 3 shifts:  1901 -  0700  In: -   Out: 250 [Urine:250]  No intake or output data in the 24 hours ending 19 1031   Physical Exam:   General:  Alert, cooperative, no distress, appears stated age. On NC oxygen. Conversant. Was eating lunch when seen. Head:  Normocephalic, without obvious abnormality, atraumatic. Eyes:  Conjunctivae/corneas clear. PERRL, EOMs intact. Nose: Nares normal. Septum midline. Mucosa normal. No drainage or sinus tenderness. Throat: Lips, mucosa, and tongue normal. Teeth and gums normal.   Neck: Supple, symmetrical, trachea midline, no adenopathy, thyroid: no enlargment/tenderness/nodules, no carotid bruit and no JVD. Back:   Symmetric, no curvature. ROM normal.   Lungs:   Clear to auscultation bilaterally. Chest wall:  No tenderness or deformity. Heart:  Regular rate and rhythm, S1, S2 normal, no murmur, click, rub or gallop. Abdomen:   Soft, non-tender. Bowel sounds normal. No masses,  No organomegaly. Extremities: Extremities normal, atraumatic, no cyanosis or edema. Pulses: 2+ and symmetric all extremities. Skin: Skin color, texture, turgor normal. No rashes or lesions   Lymph nodes: Cervical, supraclavicular, and axillary nodes normal.   Neurologic: Grossly nonfocal, motor is intact. Psych: is intact. No overt anxiety or depression.       Data review:     Recent Results (from the past 24 hour(s))   Naomi Peterson    Collection Time: 19  9:27 PM   Result Value Ref Range    Vancomycin,trough 10.8 (H) 5.0 - 10.0 ug/mL    Reported dose date: NOT PROVIDED      Reported dose time: NOT PROVIDED      Reported dose: NOT PROVIDED UNITS   METABOLIC PANEL, BASIC    Collection Time: 12/18/19  3:56 AM   Result Value Ref Range    Sodium 142 136 - 145 mmol/L    Potassium 3.9 3.5 - 5.1 mmol/L    Chloride 110 (H) 97 - 108 mmol/L    CO2 27 21 - 32 mmol/L    Anion gap 5 5 - 15 mmol/L    Glucose 119 (H) 65 - 100 mg/dL    BUN 23 (H) 6 - 20 MG/DL    Creatinine 0.55 0.55 - 1.02 MG/DL    BUN/Creatinine ratio 42 (H) 12 - 20      GFR est AA >60 >60 ml/min/1.73m2    GFR est non-AA >60 >60 ml/min/1.73m2    Calcium 9.3 8.5 - 10.1 MG/DL       Imaging:  I have personally reviewed the patients radiographs and have reviewed the reports:  12-15-19: CT of chest: COMPARISON: 12/13/2019     TECHNIQUE: Unenhanced localizing CT imaging of the pulmonary arteries is  followed by bolus injection of 100 mL Isovue 370 contrast IV, with thin section  axial Chest CT obtained and 3D image post processing performed including coronal  MIPS. CT dose reduction was achieved through use of a standardized protocol  tailored for this examination and automatic exposure control for dose  modulation.     FINDINGS: There is no pulmonary embolism. There is no acute cor pulmonale. There  is no aortic dissection or aneurysm.     Mosaic patchy airspace disease bilaterally has progressed but is nonspecific.     There are new small pleural effusions.     There is no pneumothorax. There is no pericardial fluid. There is no significant  adenopathy.      Visualized thyroid and lower neck soft tissues are unremarkable for age.     IMPRESSION:   1. No pulmonary embolus. 2. Increased nonspecific airspace disease. 3. New pleural effusions.         Nanette Nunez MD

## 2019-12-18 NOTE — PROGRESS NOTES
Problem: Mobility Impaired (Adult and Pediatric)  Goal: *Acute Goals and Plan of Care (Insert Text)  Description  FUNCTIONAL STATUS PRIOR TO ADMISSION: Patient was independent and active without use of DME. Retired however stays active during the day. Denies home O2 use. Does all cooking, cleaning, laundry, etc. Still driving. HOME SUPPORT PRIOR TO ADMISSION: The patient lived with  but did not require assist.  is retired and able to provide 24hr assist.     Physical Therapy Goals  Initiated 12/18/2019  1. Patient will move from supine to sit and sit to supine , scoot up and down and roll side to side in bed with independence within 7 day(s). 2.  Patient will transfer from bed to chair and chair to bed with independence using the least restrictive device within 7 day(s). 3.  Patient will perform sit to stand with independence within 7 day(s). 4.  Patient will ambulate with independence for 300 feet with the least restrictive device within 7 day(s). 5.  Patient will ascend/descend 14 stairs with 1 handrail(s) with independence within 7 day(s). Outcome: Progressing Towards Goal   PHYSICAL THERAPY EVALUATION  Patient: Josh Wilson (9 y.o. female)  Date: 12/18/2019  Primary Diagnosis: [facility] [A41.9]        Precautions:          ASSESSMENT  Based on the objective data described below, the patient presents with JAMES, decreased endurance/activity tolerance, increased fatigue, generalized weakness, and overall mild impairments in functional mobility. Pt tolerated therapy session well however remains limited by respiratory status, requiring frequent seated rest breaks b/t activity. Ambulation occurred on 2L O2 with O2 sats 88% post activity, recovering to 95-6% w/ seated rest. Anticipate pt will continue to progress well and be appropriate to return home w/ no additional services.      Current Level of Function Impacting Discharge (mobility/balance): SB/CGAx1 during ambulation without device, supervision for sit<>stand transfers    Functional Outcome Measure: The patient scored 55/100 on the Barthel Index outcome measure which is indicative of moderate impairment in ADLs and functional mobility. Other factors to consider for discharge: previously independent and did not use home O2     Patient will benefit from skilled therapy intervention to address the above noted impairments. PLAN :  Recommendations and Planned Interventions: bed mobility training, transfer training, gait training, therapeutic exercises, patient and family training/education, and therapeutic activities      Frequency/Duration: Patient will be followed by physical therapy:  3 times a week to address goals. Recommendation for discharge: (in order for the patient to meet his/her long term goals): Anticipate no skilled therapy needs at discharge. This discharge recommendation:  Has been made in collaboration with the attending provider and/or case management    IF patient discharges home will need the following DME: none         SUBJECTIVE:   Patient stated Crow Mueller had a full body rash back in November and it left my legs feeling like sandpaper.     OBJECTIVE DATA SUMMARY:   HISTORY:    Past Medical History:   Diagnosis Date    Arthritis     Bacterial pneumonia 6/26/2018    Chronic bilateral low back pain without sciatica 11/20/2018    Hypercholesterolemia     Long term (current) use of anticoagulants     Primary osteoarthritis involving multiple joints 11/20/2018    Sjogren's syndrome ([address] 11/20/2018    Weight loss, unintentional 11/20/2018     Past Surgical History:   Procedure Laterality Date    HX TONSILLECTOMY         Personal factors and/or comorbidities impacting plan of care:     Home Situation  Home Environment: Private residence  # Steps to Enter: 2  Rails to Enter: Yes  Hand Rails : Left  One/Two Story Residence: Two story  # of Interior Steps: 14  Interior Rails: Right  Lift Chair Available: No  Living Alone: No  Support Systems: Spouse/Significant Other/Partner  Patient Expects to be Discharged to[de-identified] Private residence  Current DME Used/Available at Home: None  Tub or Shower Type: Shower    EXAMINATION/PRESENTATION/DECISION MAKING:   Critical Behavior:              Hearing: Auditory  Auditory Impairment: None  Skin:  intact  Edema: none noted   Range Of Motion:  AROM: Within functional limits                       Strength:    Strength: Generally decreased, functional                    Tone & Sensation:   Tone: Normal              Sensation: Intact               Coordination:  Coordination: Within functional limits  Vision:      Functional Mobility:  Bed Mobility:  Rolling: Independent  Supine to Sit: Independent     Scooting: Independent  Transfers:  Sit to Stand: Supervision  Stand to Sit: Supervision        Bed to Chair: Stand-by assistance;Contact guard assistance              Balance:   Sitting: Intact  Standing: Impaired  Standing - Static: Good  Standing - Dynamic : Fair  Ambulation/Gait Training:  Distance (ft): 120 Feet (ft)(60ft x2 w/ seated rest break b/t)  Assistive Device: Gait belt  Ambulation - Level of Assistance: Stand-by assistance;Contact guard assistance        Gait Abnormalities: Decreased step clearance;Trunk sway increased        Base of Support: Narrowed     Speed/Karime: Pace decreased (<100 feet/min)  Step Length: Left shortened;Right shortened                     Functional Measure:  Barthel Index:    Bathin  Bladder: 5  Bowels: 10  Groomin  Dressing: 10  Feeding: 10  Mobility: 0  Stairs: 0  Toilet Use: 5  Transfer (Bed to Chair and Back): 10  Total: 55/100       The Barthel ADL Index: Guidelines  1. The index should be used as a record of what a patient does, not as a record of what a patient could do. 2. The main aim is to establish degree of independence from any help, physical or verbal, however minor and for whatever reason.   3. The need for supervision renders the patient not independent. 4. A patient's performance should be established using the best available evidence. Asking the patient, friends/relatives and nurses are the usual sources, but direct observation and common sense are also important. However direct testing is not needed. 5. Usually the patient's performance over the preceding 24-48 hours is important, but occasionally longer periods will be relevant. 6. Middle categories imply that the patient supplies over 50 per cent of the effort. 7. Use of aids to be independent is allowed. [place] Gamb[place]., BarthelCAROL ([id number]). Functional evaluation: the Barthel Index. [facility] (14)2. Gaeler Dallas suri RACHID Martin, Sy Amador, Heber Sahu, [place], [address] (1999). Measuring the change indisability after inpatient rehabilitation; comparison of the responsiveness of the Barthel Index and Functional [place] Measure. Journal of Neurology, Neurosurgery, and Psychiatry, 66(4), 813-586. Byron Monahan, N.J.A, MICHEAL Cole, & Manoj Nguyen MRadamesA. (2004.) Assessment of post-stroke quality of life in cost-effectiveness studies: The usefulness of the Barthel Index and the EuroQoL-5D.  Quality of Life Research, 15, 147-03           Physical Therapy Evaluation Charge Determination   History Examination Presentation Decision-Making   MEDIUM  Complexity : 1-2 comorbidities / personal factors will impact the outcome/ POC  MEDIUM Complexity : 3 Standardized tests and measures addressing body structure, function, activity limitation and / or participation in recreation  MEDIUM Complexity : Evolving with changing characteristics  MEDIUM Complexity : FOTO score of 26-74      Based on the above components, the patient evaluation is determined to be of the following complexity level: MEDIUM    Pain Rating:  Denied complaints of pain    Activity Tolerance:   desaturates with exertion and requires oxygen  Please refer to the flowsheet for vital signs taken during this treatment. After treatment patient left in no apparent distress:   Sitting in chair and Call bell within reach    COMMUNICATION/EDUCATION:   The patients plan of care was discussed with: Registered Nurse. Fall prevention education was provided and the patient/caregiver indicated understanding., Patient/family have participated as able in goal setting and plan of care. , and Patient/family agree to work toward stated goals and plan of care.     Thank you for this referral.  Aubrie Johnston, PT, DPT   Time Calculation: 23 mins

## 2019-12-19 ENCOUNTER — APPOINTMENT (OUTPATIENT)
Dept: GENERAL RADIOLOGY | Age: 69
DRG: 193 | End: 2019-12-19
Attending: INTERNAL MEDICINE
Payer: MEDICARE

## 2019-12-19 ENCOUNTER — PATIENT OUTREACH (OUTPATIENT)
Dept: CASE MANAGEMENT | Age: 69
End: 2019-12-19

## 2019-12-19 LAB
ANION GAP SERPL CALC-SCNC: 6 MMOL/L (ref 5–15)
BACTERIA SPEC CULT: NORMAL
BACTERIA SPEC CULT: NORMAL
BASOPHILS # BLD: 0 K/UL (ref 0–0.1)
BASOPHILS NFR BLD: 0 % (ref 0–1)
BUN SERPL-MCNC: 25 MG/DL (ref 6–20)
BUN/CREAT SERPL: 45 (ref 12–20)
CALCIUM SERPL-MCNC: 9.4 MG/DL (ref 8.5–10.1)
CHLORIDE SERPL-SCNC: 110 MMOL/L (ref 97–108)
CO2 SERPL-SCNC: 27 MMOL/L (ref 21–32)
CREAT SERPL-MCNC: 0.55 MG/DL (ref 0.55–1.02)
DIFFERENTIAL METHOD BLD: ABNORMAL
EOSINOPHIL # BLD: 0 K/UL (ref 0–0.4)
EOSINOPHIL NFR BLD: 0 % (ref 0–7)
ERYTHROCYTE [DISTWIDTH] IN BLOOD BY AUTOMATED COUNT: 14.1 % (ref 11.5–14.5)
GLUCOSE SERPL-MCNC: 108 MG/DL (ref 65–100)
HCT VFR BLD AUTO: 31.7 % (ref 35–47)
HGB BLD-MCNC: 9.7 G/DL (ref 11.5–16)
IMM GRANULOCYTES # BLD AUTO: 0.2 K/UL (ref 0–0.04)
IMM GRANULOCYTES NFR BLD AUTO: 2 % (ref 0–0.5)
LYMPHOCYTES # BLD: 1.4 K/UL (ref 0.8–3.5)
LYMPHOCYTES NFR BLD: 9 % (ref 12–49)
MCH RBC QN AUTO: 28 PG (ref 26–34)
MCHC RBC AUTO-ENTMCNC: 30.6 G/DL (ref 30–36.5)
MCV RBC AUTO: 91.6 FL (ref 80–99)
MONOCYTES # BLD: 0.5 K/UL (ref 0–1)
MONOCYTES NFR BLD: 3 % (ref 5–13)
NEUTS SEG # BLD: 12.4 K/UL (ref 1.8–8)
NEUTS SEG NFR BLD: 86 % (ref 32–75)
NRBC # BLD: 0 K/UL (ref 0–0.01)
NRBC BLD-RTO: 0 PER 100 WBC
PLATELET # BLD AUTO: 549 K/UL (ref 150–400)
PMV BLD AUTO: 9.4 FL (ref 8.9–12.9)
POTASSIUM SERPL-SCNC: 3.9 MMOL/L (ref 3.5–5.1)
RBC # BLD AUTO: 3.46 M/UL (ref 3.8–5.2)
SERVICE CMNT-IMP: NORMAL
SERVICE CMNT-IMP: NORMAL
SODIUM SERPL-SCNC: 143 MMOL/L (ref 136–145)
WBC # BLD AUTO: 14.5 K/UL (ref 3.6–11)

## 2019-12-19 PROCEDURE — 77010033678 HC OXYGEN DAILY

## 2019-12-19 PROCEDURE — 65660000000 HC RM CCU STEPDOWN

## 2019-12-19 PROCEDURE — 74011250636 HC RX REV CODE- 250/636: Performed by: INTERNAL MEDICINE

## 2019-12-19 PROCEDURE — 85025 COMPLETE CBC W/AUTO DIFF WBC: CPT

## 2019-12-19 PROCEDURE — 97116 GAIT TRAINING THERAPY: CPT

## 2019-12-19 PROCEDURE — 74011000258 HC RX REV CODE- 258: Performed by: INTERNAL MEDICINE

## 2019-12-19 PROCEDURE — 74011636637 HC RX REV CODE- 636/637: Performed by: INTERNAL MEDICINE

## 2019-12-19 PROCEDURE — 80048 BASIC METABOLIC PNL TOTAL CA: CPT

## 2019-12-19 PROCEDURE — 94760 N-INVAS EAR/PLS OXIMETRY 1: CPT

## 2019-12-19 PROCEDURE — 36415 COLL VENOUS BLD VENIPUNCTURE: CPT

## 2019-12-19 PROCEDURE — 71046 X-RAY EXAM CHEST 2 VIEWS: CPT

## 2019-12-19 PROCEDURE — 74011250637 HC RX REV CODE- 250/637: Performed by: INTERNAL MEDICINE

## 2019-12-19 RX ORDER — PREDNISONE 20 MG/1
20 TABLET ORAL 2 TIMES DAILY WITH MEALS
Status: DISCONTINUED | OUTPATIENT
Start: 2019-12-19 | End: 2019-12-20 | Stop reason: HOSPADM

## 2019-12-19 RX ADMIN — PIPERACILLIN AND TAZOBACTAM 3.38 G: 3; .375 INJECTION, POWDER, LYOPHILIZED, FOR SOLUTION INTRAVENOUS at 17:16

## 2019-12-19 RX ADMIN — NAPROXEN 500 MG: 250 TABLET ORAL at 08:43

## 2019-12-19 RX ADMIN — Medication 10 ML: at 06:00

## 2019-12-19 RX ADMIN — NYSTATIN 300000 UNITS: 100000 SUSPENSION ORAL at 21:26

## 2019-12-19 RX ADMIN — HEPARIN SODIUM 5000 UNITS: 5000 INJECTION INTRAVENOUS; SUBCUTANEOUS at 21:26

## 2019-12-19 RX ADMIN — METHYLPREDNISOLONE SODIUM SUCCINATE 40 MG: 40 INJECTION, POWDER, FOR SOLUTION INTRAMUSCULAR; INTRAVENOUS at 08:44

## 2019-12-19 RX ADMIN — PIPERACILLIN AND TAZOBACTAM 3.38 G: 3; .375 INJECTION, POWDER, LYOPHILIZED, FOR SOLUTION INTRAVENOUS at 02:33

## 2019-12-19 RX ADMIN — PREDNISONE 20 MG: 20 TABLET ORAL at 17:12

## 2019-12-19 RX ADMIN — Medication 10 ML: at 17:14

## 2019-12-19 RX ADMIN — CALCIUM CARBONATE 500 MG (1,250 MG)-VITAMIN D3 200 UNIT TABLET 1 TABLET: at 08:43

## 2019-12-19 RX ADMIN — PIPERACILLIN AND TAZOBACTAM 3.38 G: 3; .375 INJECTION, POWDER, LYOPHILIZED, FOR SOLUTION INTRAVENOUS at 12:34

## 2019-12-19 RX ADMIN — NAPROXEN 500 MG: 250 TABLET ORAL at 17:12

## 2019-12-19 RX ADMIN — Medication 10 ML: at 21:26

## 2019-12-19 RX ADMIN — HEPARIN SODIUM 5000 UNITS: 5000 INJECTION INTRAVENOUS; SUBCUTANEOUS at 08:49

## 2019-12-19 RX ADMIN — VANCOMYCIN HYDROCHLORIDE 1000 MG: 1 INJECTION, POWDER, LYOPHILIZED, FOR SOLUTION INTRAVENOUS at 08:49

## 2019-12-19 NOTE — PROGRESS NOTES
OT Note:    Chart reviewed and attempted to see Pt for therapy. Pt was visiting with family on arrival. Pt very politely declined OT this date and verbalized she doesn't feel she needs follow up from OT, stating she feels her only deficit is with her activity tolerance/endurance. Pt states she is mobilizing much better today and not experiencing difficulty with any of her ADL tasks. Pt stated, \"I know I just need to take my time and not rush when I get home. \" Per PT note, Pt is now mobilizing with Modified [place] and taking standing rest breaks PRN to maintain her oxygen saturation. Educated Pt about the importance of continuing to use PLB techniques and activity pacing strategies as she resumes her daily self-care/ADL routine. Per Pt request, and after reviewing Pts excellent progress with mobility/activity tolerance during PT, OT will sign off. Please re-consult if Pts medical course changes.     Thank you,    Ayaan Rhodes, OTR/L

## 2019-12-19 NOTE — PROGRESS NOTES
Problem: Falls - Risk of  Goal: *Absence of Falls  Description  Document Roseanne Ruts Fall Risk and appropriate interventions in the flowsheet. Outcome: Progressing Towards Goal  Note: Fall Risk Interventions:  Mobility Interventions: Patient to call before getting OOB         Medication Interventions: Evaluate medications/consider consulting pharmacy, Patient to call before getting OOB    Elimination Interventions: Patient to call for help with toileting needs              Problem: Patient Education: Go to Patient Education Activity  Goal: Patient/Family Education  Outcome: Progressing Towards Goal     Problem: Pressure Injury - Risk of  Goal: *Prevention of pressure injury  Description  Document Wayne Scale and appropriate interventions in the flowsheet.   Outcome: Progressing Towards Goal  Note: Pressure Injury Interventions:  Sensory Interventions: Assess changes in LOC    Moisture Interventions: Check for incontinence Q2 hours and as needed    Activity Interventions: Increase time out of bed    Mobility Interventions: Pressure redistribution bed/mattress (bed type)         Friction and Shear Interventions: Feet elevated on foot rest                Problem: Patient Education: Go to Patient Education Activity  Goal: Patient/Family Education  Outcome: Progressing Towards Goal

## 2019-12-19 NOTE — PROGRESS NOTES
Hospitalist Progress Note    NAME: Lane Dietrich   :  1950   MRN:  [id number]       Assessment / Plan:    Community-acquired pneumonia with Acute Hypoxic respiratory failure, improving  Multiple Bilateral Pulmonary infiltrates. Immunocompromised due to prolonged steroids and RA    Failed outpatient treatment with Augmentin and azithromycin, it was documented that she completed 5 days  Was on NRB --> 5 L NC->1 L NC  CT \"Diffuse bilateral groundglass infiltrates\" on admission  CTA on December 15, 2019 did not show PE however it did show worsening airspace opacity  Pulmonary on board  Immunocompromise patient, rheumatoid arthritis on immunosuppressive medication including steroids,  Continue steroids  On Abx, continue IV ABx, one more day of IV abx  She will require repeating chest x-ray 1 to 2 weeks after discharge to ensure resolution  Repeat CXR today    History of RA with Sjogren's syndrome  Continue home meds  conitnue      18.5 - 24.9 Normal weight / Body mass index is 22.55 kg/m². Code status: Full  Prophylaxis: lovenox  Recommended Disposition: Home w/Family     Improving, hopefully we can wean down o2 and possibly Home tomorrow     Subjective:     Patient was seen and examined this morning. On 1 L NC. Cough improved. Review of Systems:  Symptom Y/N Comments  Symptom Y/N Comments   Fever/Chills n   Chest Pain     Poor Appetite    Edema     Cough n   Abdominal Pain     Sputum    Joint Pain     SOB/JAMES y   Pruritis/Rash     Nausea/vomit    Tolerating PT/OT     Diarrhea    Tolerating Diet     Constipation    Other         Objective:     VITALS:   Last 24hrs VS reviewed since prior progress note.  Most recent are:  Patient Vitals for the past 24 hrs:   Temp Pulse Resp BP SpO2   19 0750 97.4 °F (36.3 °C) 79 20 115/65 94 %   19 0332 97.6 °F (36.4 °C) 77 19 106/62 93 %   19 2313 97.5 °F (36.4 °C) 75 18 111/65 94 %   19 98.3 °F (36.8 °C) 83 18 122/63 100 %   19 1558  80   97 %   12/18/19 1525 98.7 °F (37.1 °C) 84 18 115/58 97 %   12/18/19 1138 98.2 °F (36.8 °C) 96 18 132/71 99 %       Intake/Output Summary (Last 24 hours) at 12/19/2019 0955  Last data filed at 12/19/2019 0644  Gross per 24 hour   Intake    Output 250 ml   Net -250 ml        PHYSICAL EXAM:  General: WD, WN. Alert, cooperative, no acute distress    EENT:  EOMI. Anicteric sclerae. MMM  Resp:  Lungs clear. No wheezing  CV:  Regular  rhythm,  trace edema b/l legs  GI:  Soft, Non distended, Non tender.  +Bowel sounds  Neurologic:  Alert and oriented X 3, normal speech,   Psych:   Good insight. Not anxious nor agitated  Skin:  No rashes. No jaundice    Reviewed most current lab test results and cultures  YES  Reviewed most current radiology test results   YES  Review and summation of old records today    NO  Reviewed patient's current orders and MAR    YES  PMH/ reviewed - no change compared to H&P  ________________________________________________________________________  Care Plan discussed with:    Comments   Patient x    Family  x    RN x    Care Manager     Consultant                        Multidiciplinary team rounds were held today with , nursing, pharmacist and clinical coordinator. Patient's plan of care was discussed; medications were reviewed and discharge planning was addressed. ________________________________________________________________________  Total NON critical care TIME:  25 Minutes    Total CRITICAL CARE TIME Spent:   Minutes non procedure based      Comments   >50% of visit spent in counseling and coordination of care     ________________________________________________________________________  Nash Bro MD     Procedures: see electronic medical records for all procedures/Xrays and details which were not copied into this note but were reviewed prior to creation of Plan. LABS:  I reviewed today's most current labs and imaging studies.   Pertinent labs include:  Recent Labs     12/19/19  0421 12/17/19  0557   WBC 14.5* 9.7   HGB 9.7* 9.9*   HCT 31.7* 30.8*   * 558*     Recent Labs     12/19/19 0421 12/18/19 0356 12/17/19  0557    142 143   K 3.9 3.9 4.0   * 110* 111*   CO2 27 27 27   * 119* 122*   BUN 25* 23* 19   CREA 0.55 0.55 0.45*   CA 9.4 9.3 9.5   MG  --   --  1.8   PHOS  --   --  3.6   ALB  --   --  1.8*   TBILI  --   --  0.4   SGOT  --   --  33   ALT  --   --  17       Signed: Michelle Damon MD

## 2019-12-19 NOTE — ROUTINE PROCESS
Bedside and Verbal shift change report given to Kenny Daley RN (oncoming nurse) by GEOFF Prince (offgoing nurse). Report included the following information SBAR, Kardex, Recent Results, Med Rec Status and Cardiac Rhythm [facility] Phone: 
  
[phone] 
  
  
Significant changes during shift:   
Seen by OT, RD, pulmonary, PT 
  
Patient Information 
  
Patricia Jones 
78 y.o. 
12/13/2019  2:11 PM by Vick Raymond MD. Lana Mendoza was admitted from Home 
  
Problem List 
  
         
Patient Active Problem List  
  Diagnosis Date Noted  Rheumatoid arthritis with positive rheumatoid factor ([address] 10/01/2019  
    Priority: 1 - One  Sjogren's syndrome ([address] 11/20/2018  
    Priority: 1 - One  
 Hypercholesterolemia 11/20/2018  
    Priority: 1 - One  
 Primary osteoarthritis involving multiple joints 11/20/2018  
    Priority: 1 - One  Chronic bilateral low back pain without sciatica 11/20/2018  
    Priority: 1 - One  Weight loss, unintentional 11/20/2018  
    Priority: 1 - One  Bacterial pneumonia 06/26/2018  
    Priority: 1 - One  Sepsis ([address] 12/13/2019  Right lower quadrant abdominal pain 04/26/2018  Pain in both lower extremities 04/26/2018  Ulcer of mouth 04/26/2018  
  
       
Past Medical History:  
Diagnosis Date  Arthritis    
 Bacterial pneumonia 6/26/2018  Chronic bilateral low back pain without sciatica 11/20/2018  Hypercholesterolemia    
 Long term (current) use of anticoagulants    
 Primary osteoarthritis involving multiple joints 11/20/2018  Sjogren's syndrome ([address] 11/20/2018  Weight loss, unintentional 11/20/2018  
  
  
  
Core Measures: 
  
CVA: No No 
CHF:No No 
PNA:Yes Yes 
  
Post Op Surgical (If Applicable):  
  
Number times ambulated in hallway past shift:  0 
Number of times OOB to chair past shift:   0 
NG Tube: No 
Incentive Spirometer: No 
Drains: No   Volume  0 Dressing Present:  No 
Flatus:  Not applicable 
  
Activity Status: 
  
 OOB to [employer]itazz Ambulated this shift Yes  
Bed Rest No 
  
Supplemental X1: (VM Applicable) 
  
NC Yes NRB No 
Venti-mask no On 1 L 
  
  
LINES AND DRAINS: 
  
Central Line? No  
PICC LINE? No  
Urinary Catheter? No 
  
DVT prophylaxis: 
  
DVT prophylaxis Med- Yes DVT prophylaxis SCD or FEDERICO- No  
  
Wounds: (If Applicable) 
  
Wounds- No 
  
Location 0 
  
Patient Safety: 
  
Falls Score Total Score: 1 Safety Level_______ Bed Alarm On? No 
Sitter? No 
  
Plan for upcoming shift: oxygen, IV antibiotics, IV steroids   
  
  
Discharge Plan: Yes TBD 
  
Active Consults: 
None

## 2019-12-19 NOTE — PROGRESS NOTES
Transitional Care Team: Initial G Note    Date of Assessment: 12/19/19  Time of Assessment:  12:40 PM    Zoë Pitt is a 78 y.o. female inpatient at  [facility]. Assessment & Plan   Pt A+O. Sitting in bedside chair with room air. Denies dyspnea. Per nagi CXR is much improved with potential for discharge tomorrow. Discussed recommendation to have follow up CXR in a few weeks, and if prescribed oral antibiotics on discharge to complete entire course. Py may benefit with hospital to home visit         Primary Diagnosis: pneumonia  Advance Directive:  AMD with named proxey. Current Code Status:  full    Referral to Hospice/ Palliative Care Appropriate: no.  Awareness of Medical Conditions: (Trajectory of illness and pts expectations). Pt hopeful this is a one time occurrence and not a progressive disease. At risk with her RA treatment    Discharge Needs: (to include safety issues) [facility] visit  Barriers Identified: none  Patient is willing to go to SNF/Inpatient Rehab if recommended: not needed    Medication Review:  Await AVS.    Can patient afford medications:  yes. Patient is Compliant with Medication regimen:yes    Who manages medications at home: self    Best Patient Contact Number:   [phone]     [facility] [place] [facility] (Healthy Understanding of Goals) program introduced to patient/family. The Transitional Care Team bridges the gaps in care and education surrounding discharge from the acute care facility. The objective is to empower the patient and family in taking a proactive role in preventing readmission within the first thirty days after discharge. The team is also involved in the efforts to reduce readmission to the acute care setting after stabilization and discharge from the acute care environment either to skilled nursing facilities or community.      [facility] [place] [facility] RN/NP will return with [facility] [place] [facility] Calendar/ follow up appointments/ Ambulatory Nurse Navigator name and contact information when the patient is ready for discharge. Future Appointments   Date Time Provider Homa Martin   12/31/2019  1:30 PM MD JOHNNA [address]       Non-[facility] follow up appointment(s):none yet    [facility]: call information given to on discharge calendar      Patient education focused on readmission zones as described as: The Red Zone: High risk for readmission, days 1-21  The Yellow Zone: Moderate  risk for readmission, days 22-29   The Green Zone: Lower risk for readmission, days                30 and after    [facility] [place] attempt to follow the patient from a distance while inpatient as well as be available for further transition/disposition needs. The [facility] team will continue to offer support during the 30- 90 day discharge from acute care setting. Will notify Ambulatory {Blank Single Select Template:[id number][de-identified] \"MICHAELA   RN.     Past Medical History:   Diagnosis Date    Arthritis     Bacterial pneumonia 6/26/2018    Chronic bilateral low back pain without sciatica 11/20/2018    Hypercholesterolemia     Long term (current) use of anticoagulants     Primary osteoarthritis involving multiple joints 11/20/2018    Sjogren's syndrome ([facility] 11/20/2018    Weight loss, unintentional 11/20/2018

## 2019-12-19 NOTE — PROGRESS NOTES
Problem: Mobility Impaired (Adult and Pediatric)  Goal: *Acute Goals and Plan of Care (Insert Text)  Description  FUNCTIONAL STATUS PRIOR TO ADMISSION: Patient was independent and active without use of DME. Retired however stays active during the day. Denies home O2 use. Does all cooking, cleaning, laundry, etc. Still driving. HOME SUPPORT PRIOR TO ADMISSION: The patient lived with  but did not require assist.  is retired and able to provide 24hr assist.     Physical Therapy Goals  Initiated 12/18/2019  1. Patient will move from supine to sit and sit to supine , scoot up and down and roll side to side in bed with independence within 7 day(s). 2.  Patient will transfer from bed to chair and chair to bed with independence using the least restrictive device within 7 day(s). 3.  Patient will perform sit to stand with independence within 7 day(s). 4.  Patient will ambulate with independence for 300 feet with the least restrictive device within 7 day(s). 5.  Patient will ascend/descend 14 stairs with 1 handrail(s) with independence within 7 day(s). Outcome: Progressing Towards Goal   PHYSICAL THERAPY TREATMENT  Patient: Chrissy Otto (89 y.o. female)  Date: 12/19/2019  Diagnosis: Sepsis ([facility] [A41.9]   <principal problem not specified>       Precautions:    Chart, physical therapy assessment, plan of care and goals were reviewed. ASSESSMENT  Pt continues to make slow, steady gains towards therapy goals and overall functional mobility, demonstrating improved activity tolerance, improved endurance, improvement in SOB/JAMES, and overall improved strength this date. Pt able to progress ambulation trial to a distance of 140ft x2 however required seated rest break b/t secondary to fatigue. Gait speed remains decreased however steady overall with no LOB/balance deficits noted. All mobility occurred on 1L O2 with O2 sats 90% post activity.  Pt remains appropriate to return home w/ assist of  however may benefit from OP PT for general strengthening and to facilitate return to prior level of activity/independence. Current Level of Function Impacting Discharge (mobility/balance): standbyA for ambulation without device, independent w/ bed mobility and sit<>stand transfers    Other factors to consider for discharge: completely independent prior to admission         PLAN :  Patient continues to benefit from skilled intervention to address the above impairments. Continue treatment per established plan of care. to address goals. Recommendation for discharge: (in order for the patient to meet his/her long term goals)  Outpatient physical therapy follow up recommended for general strengthening      This discharge recommendation:  Has been made in collaboration with the attending provider and/or case management    IF patient discharges home will need the following DME: none       SUBJECTIVE:   Patient stated I went down for an xray earlier and they said it looked better.     OBJECTIVE DATA SUMMARY:   Critical Behavior:  Neurologic State: Alert, Eyes open spontaneously  Orientation Level: Appropriate for age, Oriented X4  Cognition: Appropriate decision making, Appropriate for age attention/concentration, Appropriate safety awareness, Follows commands  Safety/Judgement: Awareness of environment, Fall prevention, Insight into deficits  Functional Mobility Training:  Bed Mobility:  Rolling: Independent  Supine to Sit: Independent     Scooting: Independent        Transfers:  Sit to Stand: Modified independent  Stand to Sit: Modified independent        Bed to Chair: Stand-by assistance                    Balance:  Sitting: Intact  Standing: Impaired  Standing - Static: Good  Standing - Dynamic : Fair  Ambulation/Gait Training:  Distance (ft): 280 Feet (ft)(140ft x2 w/ seated rest break b/t)  Assistive Device: Gait belt  Ambulation - Level of Assistance: Stand-by assistance        Gait Abnormalities: Decreased step clearance        Base of Support: Narrowed     Speed/Karime: Pace decreased (<100 feet/min)  Step Length: Left shortened;Right shortened                    Pain Rating:  Denied complaints of pain    Activity Tolerance:   O2 sats 90% post activity on 1L O2, all other VSS   Please refer to the flowsheet for vital signs taken during this treatment.     After treatment patient left in no apparent distress:   Sitting in chair, Call bell within reach, and Caregiver / family present    COMMUNICATION/COLLABORATION:   The patients plan of care was discussed with: Registered Nurse    Julius Palmer, PT, DPT   Time Calculation: 23 mins

## 2019-12-19 NOTE — PROGRESS NOTES
OT Note:    Chart reviewed. Pt currently CHUCKIE for chest XR. Will defer and continue to follow.      [facility], OTR/L

## 2019-12-19 NOTE — PROGRESS NOTES
Nutrition Assessment:    INTERVENTIONS/RECOMMENDATIONS:   Continue Regular diet    ASSESSMENT:   Chart reviewed; medically noted for sepsis, pneumonia, and acute respiratory failure. Patient sitting in chair at time of visit. Reports a good appetite and eating well. Food has been good and using menu/room service. Patient had no nutrition questions or concerns. Encouraged intake of meals. Diet Order: Regular  % Eaten:  No data found. Pertinent Medications: [x] Reviewed []Other: [place]-jo[place], Tricor, Solu-medrol   Pertinent Labs: [x]Reviewed  []Other:   Food Allergies: [x]None []Yes:     Last BM:    []Active     []Hyperactive  []Hypoactive       [] Absent  BS  Skin:    [x] Intact   [] Incision  [] Breakdown   []Edema   []Other:    Anthropometrics: Height: 5' 6\" (167.6 cm) Weight: 63.4 kg (139 lb 11.2 oz)    IBW (%IBW):   ( ) UBW (%UBW):   (  %)    BMI: Body mass index is 22.55 kg/m². This BMI is indicative of:  []Underweight   [x]Normal   []Overweight   [] Obesity   [] Extreme Obesity (BMI>40)  Last Weight Metrics:  Weight Loss Metrics 12/19/2019 12/7/2019 11/3/2019 10/1/2019 4/23/2019 11/20/2018 10/2/2018   Today's Wt 139 lb 11.2 oz 123 lb 0.3 oz 124 lb 12.5 oz 129 lb 6.4 oz 129 lb 10.1 oz 134 lb 131 lb   BMI 22.55 kg/m2 19.86 kg/m2 20.14 kg/m2 20.89 kg/m2 20.92 kg/m2 22.3 kg/m2 21.8 kg/m2       Estimated Nutrition Needs (Based on): 6151 Kcals/day(BMR (1167) x 1. 3AF) , 63 g(1.0 g/kg bw) Protein  Carbohydrate: At Least 130 g/day  Fluids: 1500 mL/day     Pt expected to meet estimated nutrient needs: [x]Yes []No    NUTRITION DIAGNOSES:   Problem:  No nutritional diagnosis at this time      Etiology: related to       Signs/Symptoms: as evidenced by        NUTRITION INTERVENTIONS:  Meals/Snacks: General/healthful diet                  GOAL:   PO intake >70% of meals next 5-7 days    NUTRITION MONITORING AND EVALUATION   Food/Nutrient Intake Outcomes:  Total energy intake  Physical Signs/Symptoms Outcomes: Weight/weight change, Electrolyte and renal profile    Previous Goal Met:   [x] Met              [] Progressing Towards Goal              [] Not Progressing Towards Goal   Previous Recommendations:   [x] Implemented          [] Not Implemented          [] Not Applicable    LEARNING NEEDS (Diet, Food/Nutrient-Drug Interaction):    [x] None Identified   [] Identified and Education Provided/Documented   [] Identified and Pt declined/was not appropriate     Cultural, Mandaen, OR Ethnic Dietary Needs:    [x] None Identified   [] Identified and Addressed     [x] Interdisciplinary Care Plan Reviewed/Documented    [x] Discharge Planning: Regular diet    [] Participated in Interdisciplinary Rounds    NUTRITION RISK:    [] Patient At Nutritional Risk             [x] Patient Not At [address]  Ext [phone]  Pager [phone]    Weekend Pager [phone]

## 2019-12-19 NOTE — ROUTINE PROCESS
Bedside and Verbal shift change report given to Franko Dominguez RN (oncoming nurse) by Carmen Romo RN (offgoing nurse). Report included the following information SBAR, Kardex, Recent Results, Med Rec Status and Cardiac Rhythm [facility] 
 
[phone] 
  
  
Significant changes during shift:   
None overnight. Patient Information 
  
Elisa Mcknight 
78 y.o. 
12/13/2019  2:11 PM by Vick Gallegos MD. Lana Mendoza was admitted from Home 
  
Problem List 
  
         
Patient Active Problem List  
  Diagnosis Date Noted  Rheumatoid arthritis with positive rheumatoid factor ([address] 10/01/2019  
    Priority: 1 - One  Sjogren's syndrome ([address] 11/20/2018  
    Priority: 1 - One  
 Hypercholesterolemia 11/20/2018  
    Priority: 1 - One  
 Primary osteoarthritis involving multiple joints 11/20/2018  
    Priority: 1 - One  Chronic bilateral low back pain without sciatica 11/20/2018  
    Priority: 1 - One  Weight loss, unintentional 11/20/2018  
    Priority: 1 - One  Bacterial pneumonia 06/26/2018  
    Priority: 1 - One  Sepsis ([address] 12/13/2019  Right lower quadrant abdominal pain 04/26/2018  Pain in both lower extremities 04/26/2018  Ulcer of mouth 04/26/2018  
  
       
Past Medical History:  
Diagnosis Date  Arthritis    
 Bacterial pneumonia 6/26/2018  Chronic bilateral low back pain without sciatica 11/20/2018  Hypercholesterolemia    
 Long term (current) use of anticoagulants    
 Primary osteoarthritis involving multiple joints 11/20/2018  Sjogren's syndrome ([address] 11/20/2018  Weight loss, unintentional 11/20/2018  
  
  
  
Core Measures: 
  
CVA: No No 
CHF:No No 
PNA:Yes Yes 
  
Post Op Surgical (If Applicable):  
  
Number times ambulated in hallway past shift:  0 
Number of times OOB to chair past shift:   0 
NG Tube: No 
Incentive Spirometer: No 
Drains: No   Volume  0 Dressing Present:  No 
Flatus:  Not applicable 
  
Activity Status: 
  
 OOB to [employer] Ambulated this shift Yes  
Bed Rest No 
  
Supplemental H2: (SF Applicable) 
  
NC Yes NRB No 
Venti-mask no On 6 L 
  
  
LINES AND DRAINS: 
  
Central Line? No  
PICC LINE? No  
Urinary Catheter? No 
  
DVT prophylaxis: 
  
DVT prophylaxis Med- Yes DVT prophylaxis SCD or FEDERICO- No  
  
Wounds: (If Applicable) 
  
Wounds- No 
  
Location 0 
  
Patient Safety: 
  
Falls Score Total Score: 1 Safety Level_______ Bed Alarm On? No 
Sitter? No 
  
Plan for upcoming shift: oxygen, IV antibiotics, IV steroids   
  
  
Discharge Plan: Yes TBD 
  
Active Consults: 
None

## 2019-12-19 NOTE — PROGRESS NOTES
[facility]  Pulmonary, Critical Care, and Sleep Medicine    Patient Consult    Name: Yong Cat MRN: [id number]   : 1950 Hospital: [facility]   Date: 2019        IMPRESSION:   · Multiple Bilateral Pulmonary Infiltrates, Seems clinically to be improving. Suspected Pneumonia in chronically immunosuppresed pt. She has been making clinical progress this week. Her CXR from today reveals decreased infiltrates and appears much improved. · Rheumatoid Arthritis,ON Enbrel, has been on steroids. (had positive rheumatoid factor and +KAREN. · Acute Hypoxic respiratory failure, Has been on 3L NC oxygen. Seems to be improving. Was weaned to 1L NC and has been able to walk with PT without issues. · Had a total body  rash to plaquenil-has medrol dose pack, Zyrtec, pepcid.-This has resolved. · Immunocompromised has been on long term steroids. · Anemia  · Sjogrens  · Dysphagia, has been seen by Dr. Hunter Jerry in past-seems ok at present. · GERD  ·  Allergic Rhinitis  · Raynauds involving the legs. · Nonsmoker  · Recent Oral Thrush  · DJD of her cervical spine: Occasional mild lower back pain. · Discussed with Dr. Greta Vasquez today. RECOMMENDATIONS:   · Wean oxygen as tolerated, may need to go home with oxygen. · On Nystatin  · ON heparin 5000 units SC q8hrs. · ON Empiric Zosyn and Vanc, Will stop Vanc today. Continue zosyn for now. · Will place on prednisone. · Anticipate home in next 1-2 days. Subjective:     Last 24 hrs: Pt is feeling better. Still has raynauds. NO dysphagia. No odynophagia. No sinus issues. NO GERD. Denies any chest pain. Has been walking with PT and was on 1L NC oxygen. 19: Pt has made some improvement. Was able to get out of bed. Slept ok last pm.   NO chest pain, Has as dry cough. No leg pain. NO issues with dysphagia today.      This patient has been seen and evaluated at the request of Dr. Claudia Lenz for above. Patient is a 78 y.o. female   Who has been sick with different issues over the last 2 months. Was felt to have a rash to plaquenil. Then started have a worsening urticaria. Was placed on meds for an allergic reaction and streroids. Took a while to improve. Then she developed an cough and was felt to have possible pneumonia. She was given a z pack. Did  Not really improve was Rx levoflox but after reading possible side effects she elected not to take the levoflox. Has some dysphagia and choking sensation with taking solid foods. Has been having issues with swallowing, sores in her mouth. Burning tongue. Has been loosing weight. Has been to ER several times for respiratory difficulty. Has been sick for the last 4-6 weeks. Nonproductive cough and increasing dyspnea. Past Medical History:   Diagnosis Date    Arthritis     Bacterial pneumonia 6/26/2018    Chronic bilateral low back pain without sciatica 11/20/2018    Hypercholesterolemia     Long term (current) use of anticoagulants     Primary osteoarthritis involving multiple joints 11/20/2018    Sjogren's syndrome ([facility] [address] 11/20/2018    Weight loss, unintentional 11/20/2018      Past Surgical History:   Procedure Laterality Date    HX TONSILLECTOMY        Prior to Admission medications    Medication Sig Start Date End Date Taking? Authorizing Provider   nystatin (MYCOSTATIN) 100,000 unit/mL suspension Take 3 mL by mouth nightly. swish and spit   Yes Provider, Historical   acetaminophen (TYLENOL EXTRA STRENGTH) 500 mg tablet Take 1,000 mg by mouth daily as needed for Pain. Yes Provider, Historical   propylene glycol/peg 400/PF (SYSTANE, PF, OP) Administer  to both eyes daily as needed (dry eyes). Yes Provider, Historical   naproxen (NAPROSYN) 500 mg tablet Take 1 Tab by mouth two (2) times daily (with meals).  12/7/19  Yes Kyle Wills MD   zolpidem (AMBIEN) 10 mg tablet TAKE 1 TABLET BY MOUTH AT BEDTIME AS NEEDED 9/26/19  Yes Daniel Sloan, Lowell Zayas MD   colestipol (COLESTID) 1 gram tablet TAKE 1 TABLET BY MOUTH TWICE DAILY 9/5/19  Yes Carey Lovett MD   fenofibrate (LOFIBRA) 160 mg tablet TAKE 1 TABLET BY MOUTH EVERY DAY 12/7/18  Yes Carey Lovett MD   nabumetone (RELAFEN) 500 mg tablet Take 500 mg by mouth two (2) times a day. 9/30/18  Yes Provider, Historical   calcium/D3/mag ox//morgan/Zn (CALTRATE + D3 PLUS MINERALS PO) Take 1 Tab by mouth daily. Yes Provider, Historical   predniSONE (DELTASONE) 10 mg tablet Take 10 mg by mouth daily (with breakfast).     Other, MD Juanpablo     Allergies   Allergen Reactions    Diclofenac Other (comments)     Nausea fever and chills    Plaquenil [Hydroxychloroquine] Rash    Sulfa (Sulfonamide Antibiotics) Unknown (comments)      Social History     Tobacco Use    Smoking status: Never Smoker    Smokeless tobacco: Never Used   Substance Use Topics    Alcohol use: Yes     Comment: rarely      Family History   Problem Relation Age of Onset    Heart Disease Maternal Grandfather     Prostate Cancer Father     Prostate Cancer Brother         Current Facility-Administered Medications   Medication Dose Route Frequency    methylPREDNISolone (PF) (SOLU-MEDROL) injection 40 mg  40 mg IntraVENous Q12H    heparin (porcine) injection 5,000 Units  5,000 Units SubCUTAneous Q12H    piperacillin-tazobactam (ZOSYN) 3.375 g in 0.9% sodium chloride (MBP/ADV) 100 mL  3.375 g IntraVENous Q8H    nystatin (MYCOSTATIN) 100,000 unit/mL oral suspension 300,000 Units  3 mL Oral QHS    naproxen (NAPROSYN) tablet 500 mg  500 mg Oral BID WITH MEALS    fenofibrate nanocrystallized (TRICOR) tablet 145 mg  145 mg Oral DAILY    colestipol (COLESTID) tablet 1 g  1 g Oral BID    calcium-vitamin D (OS-PRISCILLA) 500 mg-200 unit tablet  1 Tab Oral DAILY    sodium chloride (NS) flush 5-40 mL  5-40 mL IntraVENous Q8H    vancomycin (VANCOCIN) 1,000 mg in 0.9% sodium chloride (MBP/ADV) 250 mL  1,000 mg IntraVENous Q12H Review of Systems:  Constitutional: positive for fatigue and malaise  Eyes: negative  Ears, nose, mouth, throat, and face: negative  Respiratory: positive for cough or dyspnea on exertion  Cardiovascular: negative  Gastrointestinal: positive for dysphagia, dyspepsia and reflux symptoms  Genitourinary:negative  Integument/breast: positive for rash  Hematologic/lymphatic: negative  Musculoskeletal:negative  Neurological: negative  Behavioral/Psych: negative  Endocrine: negative  Allergic/Immunologic: negative    Objective:   Vital Signs:    Visit Vitals  /65 (BP 1 Location: Left arm, BP Patient Position: At rest)   Pulse 79   Temp 97.4 °F (36.3 °C)   Resp 20   Ht 5' 6\" (1.676 m)   Wt 63.4 kg (139 lb 11.2 oz)   SpO2 94%   Breastfeeding No   BMI 22.55 kg/m²       O2 Device: Nasal cannula   O2 Flow Rate (L/min): 1 l/min   Temp (24hrs), Av.9 °F (36.6 °C), Min:97.4 °F (36.3 °C), Max:98.7 °F (37.1 °C)       Intake/Output:   Last shift:      No intake/output data recorded. Last 3 shifts:  1901 -  0700  In: -   Out: 250 [Urine:250]    Intake/Output Summary (Last 24 hours) at 2019 1246  Last data filed at 2019 0644  Gross per 24 hour   Intake    Output 250 ml   Net -250 ml      Physical Exam:   General:  Alert, cooperative, no distress, appears stated age. On NC oxygen at 1L. Conversant. Sitting up in chair.  was at bedside. Head:  Normocephalic, without obvious abnormality, atraumatic. Eyes:  Conjunctivae/corneas clear. PERRL, EOMs intact. Nose: Nares normal. Septum midline. Mucosa normal. No drainage or sinus tenderness. Throat: Lips, mucosa, and tongue normal. Teeth and gums normal.   Neck: Supple, symmetrical, trachea midline, no adenopathy, thyroid: no enlargment/tenderness/nodules, no carotid bruit and no JVD. Back:   Symmetric, no curvature. ROM normal.   Lungs:   Clear to auscultation bilaterally. Chest wall:  No tenderness or deformity.    Heart:  Regular rate and rhythm, S1, S2 normal, no murmur, click, rub or gallop. Abdomen:   Soft, non-tender. Bowel sounds normal. No masses,  No organomegaly. Extremities: Extremities normal, atraumatic, no cyanosis or edema. Pulses: 2+ and symmetric all extremities. Skin: Skin color, texture, turgor normal. No rashes or lesions   Lymph nodes: Cervical, supraclavicular, and axillary nodes normal.   Neurologic: Grossly nonfocal, motor is intact. Psych: is intact. No overt anxiety or depression. Data review:     Recent Results (from the past 24 hour(s))   METABOLIC PANEL, BASIC    Collection Time: 12/19/19  4:21 AM   Result Value Ref Range    Sodium 143 136 - 145 mmol/L    Potassium 3.9 3.5 - 5.1 mmol/L    Chloride 110 (H) 97 - 108 mmol/L    CO2 27 21 - 32 mmol/L    Anion gap 6 5 - 15 mmol/L    Glucose 108 (H) 65 - 100 mg/dL    BUN 25 (H) 6 - 20 MG/DL    Creatinine 0.55 0.55 - 1.02 MG/DL    BUN/Creatinine ratio 45 (H) 12 - 20      GFR est AA >60 >60 ml/min/1.73m2    GFR est non-AA >60 >60 ml/min/1.73m2    Calcium 9.4 8.5 - 10.1 MG/DL   CBC WITH AUTOMATED DIFF    Collection Time: 12/19/19  4:21 AM   Result Value Ref Range    WBC 14.5 (H) 3.6 - 11.0 K/uL    RBC 3.46 (L) 3.80 - 5.20 M/uL    HGB 9.7 (L) 11.5 - 16.0 g/dL    HCT 31.7 (L) 35.0 - 47.0 %    MCV 91.6 80.0 - 99.0 FL    MCH 28.0 26.0 - 34.0 PG    MCHC 30.6 30.0 - 36.5 g/dL    RDW 14.1 11.5 - 14.5 %    PLATELET 102 (H) 262 - 400 K/uL    MPV 9.4 8.9 - 12.9 FL    NRBC 0.0 0  WBC    ABSOLUTE NRBC 0.00 0.00 - 0.01 K/uL    NEUTROPHILS 86 (H) 32 - 75 %    LYMPHOCYTES 9 (L) 12 - 49 %    MONOCYTES 3 (L) 5 - 13 %    EOSINOPHILS 0 0 - 7 %    BASOPHILS 0 0 - 1 %    IMMATURE GRANULOCYTES 2 (H) 0.0 - 0.5 %    ABS. NEUTROPHILS 12.4 (H) 1.8 - 8.0 K/UL    ABS. LYMPHOCYTES 1.4 0.8 - 3.5 K/UL    ABS. MONOCYTES 0.5 0.0 - 1.0 K/UL    ABS. EOSINOPHILS 0.0 0.0 - 0.4 K/UL    ABS. BASOPHILS 0.0 0.0 - 0.1 K/UL    ABS. IMM.  GRANS. 0.2 (H) 0.00 - 0.04 K/UL    DF AUTOMATED Imaging:  I have personally reviewed the patients radiographs and have reviewed the reports:  12-15-19: CT of chest: COMPARISON: 12/13/2019     TECHNIQUE: Unenhanced localizing CT imaging of the pulmonary arteries is  followed by bolus injection of 100 mL Isovue 370 contrast IV, with thin section  axial Chest CT obtained and 3D image post processing performed including coronal  MIPS. CT dose reduction was achieved through use of a standardized protocol  tailored for this examination and automatic exposure control for dose  modulation.     FINDINGS: There is no pulmonary embolism. There is no acute cor pulmonale. There  is no aortic dissection or aneurysm.     Mosaic patchy airspace disease bilaterally has progressed but is nonspecific.     There are new small pleural effusions.     There is no pneumothorax. There is no pericardial fluid. There is no significant  adenopathy.      Visualized thyroid and lower neck soft tissues are unremarkable for age.     IMPRESSION:   1. No pulmonary embolus. 2. Increased nonspecific airspace disease. 3. New pleural effusions.         Kinjal Olvera MD

## 2019-12-20 ENCOUNTER — HOME HEALTH ADMISSION (OUTPATIENT)
Dept: HOME HEALTH SERVICES | Facility: HOME HEALTH | Age: 69
End: 2019-12-20

## 2019-12-20 ENCOUNTER — PATIENT OUTREACH (OUTPATIENT)
Dept: CASE MANAGEMENT | Age: 69
End: 2019-12-20

## 2019-12-20 VITALS
TEMPERATURE: 97.9 F | BODY MASS INDEX: 22.43 KG/M2 | HEIGHT: 66 IN | DIASTOLIC BLOOD PRESSURE: 61 MMHG | HEART RATE: 78 BPM | WEIGHT: 139.6 LBS | OXYGEN SATURATION: 94 % | RESPIRATION RATE: 18 BRPM | SYSTOLIC BLOOD PRESSURE: 128 MMHG

## 2019-12-20 LAB
ANION GAP SERPL CALC-SCNC: 4 MMOL/L (ref 5–15)
BASOPHILS # BLD: 0 K/UL (ref 0–0.1)
BASOPHILS NFR BLD: 0 % (ref 0–1)
BUN SERPL-MCNC: 24 MG/DL (ref 6–20)
BUN/CREAT SERPL: 37 (ref 12–20)
CALCIUM SERPL-MCNC: 8.7 MG/DL (ref 8.5–10.1)
CHLORIDE SERPL-SCNC: 108 MMOL/L (ref 97–108)
CO2 SERPL-SCNC: 30 MMOL/L (ref 21–32)
CREAT SERPL-MCNC: 0.65 MG/DL (ref 0.55–1.02)
DIFFERENTIAL METHOD BLD: ABNORMAL
EOSINOPHIL # BLD: 0 K/UL (ref 0–0.4)
EOSINOPHIL NFR BLD: 0 % (ref 0–7)
ERYTHROCYTE [DISTWIDTH] IN BLOOD BY AUTOMATED COUNT: 14 % (ref 11.5–14.5)
GLUCOSE SERPL-MCNC: 115 MG/DL (ref 65–100)
HCT VFR BLD AUTO: 31.9 % (ref 35–47)
HGB BLD-MCNC: 9.9 G/DL (ref 11.5–16)
IMM GRANULOCYTES # BLD AUTO: 0 K/UL (ref 0–0.04)
IMM GRANULOCYTES NFR BLD AUTO: 0 % (ref 0–0.5)
LYMPHOCYTES # BLD: 1.8 K/UL (ref 0.8–3.5)
LYMPHOCYTES NFR BLD: 12 % (ref 12–49)
MCH RBC QN AUTO: 28.6 PG (ref 26–34)
MCHC RBC AUTO-ENTMCNC: 31 G/DL (ref 30–36.5)
MCV RBC AUTO: 92.2 FL (ref 80–99)
METAMYELOCYTES NFR BLD MANUAL: 1 %
MONOCYTES # BLD: 0.4 K/UL (ref 0–1)
MONOCYTES NFR BLD: 3 % (ref 5–13)
MYELOCYTES NFR BLD MANUAL: 2 %
NEUTS SEG # BLD: 12.1 K/UL (ref 1.8–8)
NEUTS SEG NFR BLD: 82 % (ref 32–75)
NRBC # BLD: 0 K/UL (ref 0–0.01)
NRBC BLD-RTO: 0 PER 100 WBC
PLATELET # BLD AUTO: 534 K/UL (ref 150–400)
PMV BLD AUTO: 9.2 FL (ref 8.9–12.9)
POTASSIUM SERPL-SCNC: 4.2 MMOL/L (ref 3.5–5.1)
RBC # BLD AUTO: 3.46 M/UL (ref 3.8–5.2)
RBC MORPH BLD: ABNORMAL
SODIUM SERPL-SCNC: 142 MMOL/L (ref 136–145)
WBC # BLD AUTO: 14.7 K/UL (ref 3.6–11)

## 2019-12-20 PROCEDURE — 94760 N-INVAS EAR/PLS OXIMETRY 1: CPT

## 2019-12-20 PROCEDURE — 74011250636 HC RX REV CODE- 250/636: Performed by: INTERNAL MEDICINE

## 2019-12-20 PROCEDURE — 85025 COMPLETE CBC W/AUTO DIFF WBC: CPT

## 2019-12-20 PROCEDURE — 74011636637 HC RX REV CODE- 636/637: Performed by: INTERNAL MEDICINE

## 2019-12-20 PROCEDURE — 74011250637 HC RX REV CODE- 250/637: Performed by: INTERNAL MEDICINE

## 2019-12-20 PROCEDURE — 80048 BASIC METABOLIC PNL TOTAL CA: CPT

## 2019-12-20 PROCEDURE — 74011000258 HC RX REV CODE- 258: Performed by: INTERNAL MEDICINE

## 2019-12-20 PROCEDURE — 36415 COLL VENOUS BLD VENIPUNCTURE: CPT

## 2019-12-20 RX ORDER — LEVOFLOXACIN 750 MG/1
750 TABLET ORAL DAILY
Qty: 4 TAB | Refills: 0 | Status: SHIPPED | OUTPATIENT
Start: 2019-12-20 | End: 2019-12-20

## 2019-12-20 RX ORDER — NYSTATIN 100000 [USP'U]/ML
500000 SUSPENSION ORAL 4 TIMES DAILY
Qty: 140 ML | Refills: 0 | Status: SHIPPED | OUTPATIENT
Start: 2019-12-20 | End: 2019-12-27

## 2019-12-20 RX ORDER — DOXYCYCLINE 100 MG/1
100 TABLET ORAL 2 TIMES DAILY
Qty: 8 TAB | Refills: 0 | Status: SHIPPED | OUTPATIENT
Start: 2019-12-20 | End: 2019-12-24

## 2019-12-20 RX ORDER — PREDNISONE 10 MG/1
TABLET ORAL
Qty: 40 TAB | Refills: 0 | Status: SHIPPED | OUTPATIENT
Start: 2019-12-20 | End: 2020-03-09

## 2019-12-20 RX ADMIN — CALCIUM CARBONATE 500 MG (1,250 MG)-VITAMIN D3 200 UNIT TABLET 1 TABLET: at 08:37

## 2019-12-20 RX ADMIN — PIPERACILLIN AND TAZOBACTAM 3.38 G: 3; .375 INJECTION, POWDER, LYOPHILIZED, FOR SOLUTION INTRAVENOUS at 01:47

## 2019-12-20 RX ADMIN — PREDNISONE 20 MG: 20 TABLET ORAL at 08:37

## 2019-12-20 RX ADMIN — Medication 10 ML: at 01:49

## 2019-12-20 RX ADMIN — NAPROXEN 500 MG: 250 TABLET ORAL at 08:36

## 2019-12-20 RX ADMIN — PIPERACILLIN AND TAZOBACTAM 3.38 G: 3; .375 INJECTION, POWDER, LYOPHILIZED, FOR SOLUTION INTRAVENOUS at 10:53

## 2019-12-20 RX ADMIN — HEPARIN SODIUM 5000 UNITS: 5000 INJECTION INTRAVENOUS; SUBCUTANEOUS at 08:37

## 2019-12-20 NOTE — PROGRESS NOTES
I have reviewed discharge instructions with the patient. The patient verbalized understanding. Reviewed follow up appointments and medications. IV removed

## 2019-12-20 NOTE — PROGRESS NOTES
Problem: Falls - Risk of  Goal: *Absence of Falls  Description  Document Erinn Frias Fall Risk and appropriate interventions in the flowsheet.   Outcome: Progressing Towards Goal  Note: Fall Risk Interventions:  Mobility Interventions: Communicate number of staff needed for ambulation/transfer         Medication Interventions: Assess postural VS orthostatic hypotension    Elimination Interventions: Patient to call for help with toileting needs              Problem: Patient Education: Go to Patient Education Activity  Goal: Patient/Family Education  Outcome: Progressing Towards Goal     Problem: Pneumonia: Day 1  Goal: Off Pathway (Use only if patient is Off Pathway)  Outcome: Progressing Towards Goal  Goal: Activity/Safety  Outcome: Progressing Towards Goal  Goal: Consults, if ordered  Outcome: Progressing Towards Goal  Goal: Diagnostic Test/Procedures  Outcome: Progressing Towards Goal  Goal: Nutrition/Diet  Outcome: Progressing Towards Goal  Goal: Medications  Outcome: Progressing Towards Goal  Goal: Respiratory  Outcome: Progressing Towards Goal  Goal: Treatments/Interventions/Procedures  Outcome: Progressing Towards Goal  Goal: Psychosocial  Outcome: Progressing Towards Goal  Goal: *Oxygen saturation within defined limits  Outcome: Progressing Towards Goal  Goal: *Influenza vaccine administered (October-March)  Outcome: Progressing Towards Goal  Goal: *Pneumoccocal vaccine administered  Outcome: Progressing Towards Goal  Goal: *Hemodynamically stable  Outcome: Progressing Towards Goal  Goal: *Demonstrates progressive activity  Outcome: Progressing Towards Goal  Goal: *Tolerating diet  Outcome: Progressing Towards Goal     Problem: Pneumonia: Day 2  Goal: Off Pathway (Use only if patient is Off Pathway)  Outcome: Progressing Towards Goal  Goal: Activity/Safety  Outcome: Progressing Towards Goal  Goal: Consults, if ordered  Outcome: Progressing Towards Goal  Goal: Diagnostic Test/Procedures  Outcome: Progressing Towards Goal  Goal: Nutrition/Diet  Outcome: Progressing Towards Goal  Goal: Discharge Planning  Outcome: Progressing Towards Goal  Goal: Medications  Outcome: Progressing Towards Goal  Goal: Respiratory  Outcome: Progressing Towards Goal  Goal: Treatments/Interventions/Procedures  Outcome: Progressing Towards Goal  Goal: Psychosocial  Outcome: Progressing Towards Goal  Goal: *Oxygen saturation within defined limits  Outcome: Progressing Towards Goal  Goal: *Hemodynamically stable  Outcome: Progressing Towards Goal  Goal: *Demonstrates progressive activity  Outcome: Progressing Towards Goal  Goal: *Tolerating diet  Outcome: Progressing Towards Goal  Goal: *Optimal pain control at patient's stated goal  Outcome: Progressing Towards Goal     Problem: Pneumonia: Day 3  Goal: Off Pathway (Use only if patient is Off Pathway)  Outcome: Progressing Towards Goal  Goal: Activity/Safety  Outcome: Progressing Towards Goal  Goal: Consults, if ordered  Outcome: Progressing Towards Goal  Goal: Diagnostic Test/Procedures  Outcome: Progressing Towards Goal  Goal: Nutrition/Diet  Outcome: Progressing Towards Goal  Goal: Discharge Planning  Outcome: Progressing Towards Goal  Goal: Medications  Outcome: Progressing Towards Goal  Goal: Respiratory  Outcome: Progressing Towards Goal  Goal: Treatments/Interventions/Procedures  Outcome: Progressing Towards Goal  Goal: Psychosocial  Outcome: Progressing Towards Goal  Goal: *Oxygen saturation within defined limits  Outcome: Progressing Towards Goal  Goal: *Hemodynamically stable  Outcome: Progressing Towards Goal  Goal: *Demonstrates progressive activity  Outcome: Progressing Towards Goal  Goal: *Tolerating diet  Outcome: Progressing Towards Goal  Goal: *Describes available resources and support systems  Outcome: Progressing Towards Goal  Goal: *Optimal pain control at patient's stated goal  Outcome: Progressing Towards Goal     Problem: Pressure Injury - Risk of  Goal: *Prevention of pressure injury  Description  Document Wayne Scale and appropriate interventions in the flowsheet.   Outcome: Progressing Towards Goal  Note: Pressure Injury Interventions:  Sensory Interventions: Assess changes in LOC    Moisture Interventions: Check for incontinence Q2 hours and as needed    Activity Interventions: Increase time out of bed    Mobility Interventions: HOB 30 degrees or less    Nutrition Interventions: Document food/fluid/supplement intake    Friction and Shear Interventions: HOB 30 degrees or less

## 2019-12-20 NOTE — DISCHARGE INSTRUCTIONS
HOSPITALIST DISCHARGE INSTRUCTIONS    NAME: Enid Kapoor   :  1950   MRN:  [id number]     Date/Time:  2019 10:00 AM    ADMIT DATE: 2019   DISCHARGE DATE: 2019     Community-acquired pneumonia with Acute Hypoxic respiratory failure, improving  Multiple Bilateral Pulmonary infiltrates. Immunocompromised due to prolonged steroids and RA  History of RA with Sjogren's syndrome      · It is important that you take the medication exactly as they are prescribed. · Keep your medication in the bottles provided by the pharmacist and keep a list of the medication names, dosages, and times to be taken in your wallet. · Do not take other medications without consulting your doctor. What to do at [address]:  Resume previous diet    Recommended activity: Activity as tolerated      If you have questions regarding the hospital related prescriptions or hospital related issues please call [facility] at [phone]. You can always direct your questions to your primary care doctor if you are unable to reach your hospital physician; your PCP works as an extension of your hospital doctor just like your hospital doctor is an extension of your PCP for your time at the hospital [facility])    If you experience any of the following symptoms then please call your primary care physician or return to the emergency room if you cannot get hold of your doctor:    Fever, chills, nausea, vomiting, or persistent diarrhea  Worsening weakness or new problems with your speech or balance  Dark stools or visible blood in your stools  New Leg swelling or shortness of breath as these could be signs of a clot    Additional Instructions:      Bring these papers with you to your follow up appointments.  The papers will help your doctors be sure to continue the care plan from the hospital.              Information obtained by :  I understand that if any problems occur once I am at home I am to contact my physician. I understand and acknowledge receipt of the instructions indicated above.                                                                                                                                            Physician's or R.N.'s Signature                                                                  Date/Time                                                                                                                                              Patient or Representative Signature

## 2019-12-20 NOTE — PROGRESS NOTES
Transitional Care Team: [facility] Discharge Note    Date of Assessment: 12/20/19  Time of Assessment:  10:46 AM      Pako Gunderson is a 78 y.o. female inpatient at [facility] with pneumonia on  12/13. Assessment & Plan   Pt A+O. Denies dyspnea. MD signs discharge. Pt has follow up with PCP, and will follow up with her rheumatologist to coordinate her medications. Pt states will complete antibiotics if ordered            Current Code Status:  full    Medication Reconciliation:  Await AVS.     Can patient afford medications:  yes    Who manages medications at home self    Emergency Contact spouse Myles Quick ( [phone])    Chart reviewed by me and HUG (Healthy Understanding of Goals) program introduced to patient/family. The Transitional Care Team bridges the gaps in care and education surrounding discharge from the acute care facility. The objective is to empower the patient and family in taking a proactive role in the task of preventing readmission within the first thirty days after discharge from the acute care setting, The team is also involved in the efforts to reduce readmission to the acute care setting after stabilization and discharge from the acute care environment either to skilled nursing facilities or community. Discharge With The Following Arrangements in place:    Future Appointments   Date Time Provider Homa Martin   12/31/2019  1:30 PM Kimberley Plummer MD [facility]       Non-[facility] follow up appointment(s): none    [facility] call information given to pt    Patient / Family was instructed on specific signs/symptoms to look for with regards to worsening of their medical conditions. Learning was assessed using teach back. The patient / family have added upcoming appointment dates to their [facility] Calendar prior to discharge. Patient education focused on readmission zones as described as:     The Red Zone: High risk for readmission, days 1-21   The Yellow Zone: Moderate  risk for readmission, days 22-29   The Green Zone: Lower risk for readmission, days 30 and after    The [facility] [place] Team will follow patient from a distance while inpatient as well as be available for further transition disposition as needed. The [facility] TEAM will continue to offer support during the 30- 90 day discharge from acute care setting. Notified Ambulatory {Blank Single Select Template:20061[de-identified] ,MICHAELA RN.       Signed By: Madhuri Ramirez RN     December 20, 2019

## 2019-12-20 NOTE — PROGRESS NOTES
Patient's sats is 94% on room air at rest. Patient's sats is 88% (lowest)- 93% (highest)  on room air during ambulation.

## 2019-12-20 NOTE — ROUTINE PROCESS
Bedside and Verbal shift change report given to Ana TELLEZ (oncoming nurse) by Grabiel (offgoing nurse). Report included the following information SBAR, Kardex, Recent Results, Med Rec Status and Cardiac Rhythm [facility] 
  
[phone] 
  
  
Significant changes during shift:   
Patient continued on IV antibiotics per MD orders.  
  
Patient Information 
  
Chrissy Otto 
78 y.o. 
12/13/2019  2:11 PM by Vick Keating Res, MD. Lana Mendoza was admitted from Home 
  
Problem List 
  
         
Patient Active Problem List  
  Diagnosis Date Noted  Rheumatoid arthritis with positive rheumatoid factor ([address] 10/01/2019  
    Priority: 1 - One  Sjogren's syndrome ([address] 11/20/2018  
    Priority: 1 - One  
 Hypercholesterolemia 11/20/2018  
    Priority: 1 - One  
 Primary osteoarthritis involving multiple joints 11/20/2018  
    Priority: 1 - One  Chronic bilateral low back pain without sciatica 11/20/2018  
    Priority: 1 - One  Weight loss, unintentional 11/20/2018  
    Priority: 1 - One  Bacterial pneumonia 06/26/2018  
    Priority: 1 - One  Sepsis ([address] 12/13/2019  Right lower quadrant abdominal pain 04/26/2018  Pain in both lower extremities 04/26/2018  Ulcer of mouth 04/26/2018  
  
       
Past Medical History:  
Diagnosis Date  Arthritis    
 Bacterial pneumonia 6/26/2018  Chronic bilateral low back pain without sciatica 11/20/2018  Hypercholesterolemia    
 Long term (current) use of anticoagulants    
 Primary osteoarthritis involving multiple joints 11/20/2018  Sjogren's syndrome ([address] 11/20/2018  Weight loss, unintentional 11/20/2018  
  
  
  
Core Measures: 
  
CVA: No No 
CHF:No No 
PNA:Yes Yes 
  
Post Op Surgical (If Applicable):  
  
Number times ambulated in hallway past shift:  0 
Number of times OOB to chair past shift:   0 
NG Tube: No 
Incentive Spirometer: No 
Drains: No   Volume  0 Dressing Present:  No 
Flatus:  Not applicable 
  
 Activity Status: 
  
OOB to Floq Ambulated this shift Yes  
Bed Rest No 
  
Supplemental Z2: (ZE Applicable) 
  
NC Yes NRB No 
Venti-mask no On 1 L 
  
  
LINES AND DRAINS: 
  
Central Line? No  
PICC LINE? No  
Urinary Catheter? No 
  
DVT prophylaxis: 
  
DVT prophylaxis Med- Yes DVT prophylaxis SCD or [employer]- No  
  
Wounds: (If Applicable) 
  
Wounds- No 
  
Location 0 
  
Patient Safety: 
  
Falls Score Total Score: 1 Safety Level_______ Bed Alarm On? No 
Sitter? No 
  
Plan for upcoming shift: oxygen, IV antibiotics,  
  
  
  
Discharge Plan: Yes TBD 
  
Active Consults: 
None

## 2019-12-20 NOTE — DISCHARGE SUMMARY
Hospitalist Discharge Summary     Patient ID:  Sukhi Hahn  [id number]  78 y.o.  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  female with past medical history of rheumatoid arthritis on nabumetone, Sjogren's syndrome who presents with worsening shortness of breath, cough and persistent fever and sweats. Patient presented last Friday in the ED and was diagnosed with pneumonia and was sent home with Levaquin initially which got switched to Zithromax and Augmentin. She took 5-day course of those 2 antibiotics but reported that she was still feeling short of breath with fever chills. She denies any associated symptoms such as chest pain, nausea vomiting or diarrhea. Patient has been on prednisone until recently, was weaned off but is still on nabumetone until last week for her rheumatoid arthritis. Vital signs in the ED showed that she was tachycardic, slightly tachypneic and hypoxic in the low 90s. Her labs are unremarkable. CT of the chest showed Diffuse bilateral groundglass infiltrates. ______________________________________________________________________  DISCHARGE SUMMARY/HOSPITAL COURSE:  for full details see H&P, daily progress notes, labs, consult notes. Community-acquired pneumonia with Acute Hypoxic respiratory failure, improving  Multiple Bilateral Pulmonary infiltrates.   Immunocompromised due to prolonged steroids and RA     Failed outpatient treatment with Augmentin and azithromycin, it was documented that she completed 5 days  Was on NRB --> 5 L NC->1 L NC  CT \"Diffuse bilateral groundglass infiltrates\" on admission  CTA on December 15, 2019 did not show PE however it did show worsening airspace opacity  Pulmonary on board   Immunocompromise patient, rheumatoid arthritis on immunosuppressive medication including steroids,  Continue steroids  On Abx, continue IV ABx, one more day of IV abx  She will require repeating chest x-ray 1 to 2 weeks after discharge to ensure resolution  \\Repeat chest x-ray showed improvement, her lung sounds were clear on day of discharge  She will be given doxycycline for 4 more days  She was advised to hold Enbrel until she is see rheumatology     History of RA with Sjogren's syndrome         _______________________________________________________________________  Patient seen and examined by me on discharge day. Pertinent Findings:  Gen:    Not in distress  Chest: Clear lungs  CVS:   Regular rhythm. No edema  Abd:  Soft, not distended, not tender  Neuro:  Alert, oriented  _______________________________________________________________________  DISCHARGE MEDICATIONS:   Current Discharge Medication List      START taking these medications    Details   !! nystatin (MYCOSTATIN) 100,000 unit/mL suspension Take 5 mL by mouth four (4) times daily for 7 days. swish and spit  Qty: 140 mL, Refills: 0      doxycycline (ADOXA) 100 mg tablet Take 1 Tab by mouth two (2) times a day for 4 days. Qty: 8 Tab, Refills: 0       !! - Potential duplicate medications found. Please discuss with provider. CONTINUE these medications which have CHANGED    Details   predniSONE (DELTASONE) 10 mg tablet Take 1 tablet twice daily for next 5 days, then take 1 tablet daily afterwards. Qty: 40 Tab, Refills: 0         CONTINUE these medications which have NOT CHANGED    Details   !! nystatin (MYCOSTATIN) 100,000 unit/mL suspension Take 3 mL by mouth nightly.  swish and spit      acetaminophen (TYLENOL EXTRA STRENGTH) 500 mg tablet Take 1,000 mg by mouth daily as needed for Pain. propylene glycol/peg 400/PF (SYSTANE, PF, OP) Administer  to both eyes daily as needed (dry eyes). naproxen (NAPROSYN) 500 mg tablet Take 1 Tab by mouth two (2) times daily (with meals). Qty: 20 Tab, Refills: 0      zolpidem (AMBIEN) 10 mg tablet TAKE 1 TABLET BY MOUTH AT BEDTIME AS NEEDED  Qty: 30 Tab, Refills: 0    Comments: This request is for a new prescription for a controlled substance as required by Federal/State law. Associated Diagnoses: Insomnia, unspecified type      colestipol (COLESTID) 1 gram tablet TAKE 1 TABLET BY MOUTH TWICE DAILY  Qty: 180 Tab, Refills: 0      fenofibrate (LOFIBRA) 160 mg tablet TAKE 1 TABLET BY MOUTH EVERY DAY  Qty: 90 Tab, Refills: 3      nabumetone (RELAFEN) 500 mg tablet Take 500 mg by mouth two (2) times a day. calcium/D3/mag ox//morgan/Zn (CALTRATE + D3 PLUS MINERALS PO) Take 1 Tab by mouth daily. !! - Potential duplicate medications found. Please discuss with provider. STOP taking these medications       amoxicillin-clavulanate (AUGMENTIN) 875-125 mg per tablet Comments:   Reason for Stopping:         azithromycin (ZITHROMAX) 250 mg tablet Comments:   Reason for Stopping:                 Patient Follow Up Instructions: Activity: Activity as tolerated  Diet: Regular Diet    Follow-up Information     Follow up With Specialties Details Why Contact Info    Melvi Hernández MD Internal Medicine Go on 12/31/2019 For scheduled new patient appointment at 1:30PM  [address] [place] [address]  [phone]      [facility], In-Home Clinical Assessments On 12/22/2019 Expect a phone call from [place] to schedule a follow up visit with you in 24-48 hours. [facility]  [phone]        ________________________________________________________________    Risk of deterioration: Low    Condition at Discharge:  Stable  __________________________________________________________________    Disposition  Home with family, no needs    ____________________________________________________________________    Code Status: Full Code  ___________________________________________________________________      Total time in minutes spent coordinating this discharge (includes going over instructions, follow-up, prescriptions, and preparing report for sign off to her PCP) :  38 minutes    Signed:  Tyson Can MD

## 2019-12-23 ENCOUNTER — PATIENT OUTREACH (OUTPATIENT)
Dept: INTERNAL MEDICINE CLINIC | Age: 69
End: 2019-12-23

## 2019-12-23 NOTE — PROGRESS NOTES
Hospital Discharge Follow-Up      Date/Time:  2019 10:26 AM    Patient was admitted to [facility] on 19 and discharged on 19 for Sepsis due to CAP. The physician discharge summary was available at the time of outreach. Patient was contacted within 1 business days of discharge. Top Challenges reviewed with the provider   Admitted for PNA- Multiple Bilateral Pulmonary Infiltrates,  New patient with Dr. Jaylin Ochoa formerly saw Dr. Mariah Gregg  Swallowing issues reported but no SLP eval?  Rheumatoid Arthritis,ON Enbrel, has been on steroids. (had positive rheumatoid factor and +KAREN. *needs repeat CXR in 1-2 weeks*   Seen by [facility] on    Advance Care Planning:   Does patient have an Advance Directive:  reviewed and current        Method of communication with provider :chart routing    Inpatient RRAT score: 15  Was this a readmission? no   Patient stated reason for the readmission: n/a    Care Transition Nurse (CTN) contacted the patient by telephone to perform post hospital discharge assessment. Verified name and  with patient as identifiers. Provided introduction to self, and explanation of the CTN role. Patient received hospital discharge instructions. CTN reviewed discharge instructions and red flags with patient who verbalized understanding. Patient given an opportunity to ask questions and does not have any further questions or concerns at this time. The patient agrees to contact the PCP office for questions related to their healthcare. CTN provided contact information for future reference.     Disease Specific:   Sepsis    Patients top risk factors for readmission:  lack of knowledge about disease    Home Health orders at discharge: [address] [place] [address] 114: [facility]  Date of initial visit: 19    Durable Medical Equipment ordered at discharge:none    Medication(s):   New Medications at Discharge:   !! nystatin (MYCOSTATIN) 100,000 unit/mL suspension Take 5 mL by mouth four (4) times daily for 7 days. swish and spit  Qty: 140 mL, Refills: 0       doxycycline (ADOXA) 100 mg tablet Take 1 Tab by mouth two (2) times a day for 4 days. Qty: 8 Tab, Refills: 0           Changed Medications at Discharge:   predniSONE (DELTASONE) 10 mg tablet Take 1 tablet twice daily for next 5 days, then take 1 tablet daily afterwards. Qty: 40 Tab, Refills: 0       Discontinued Medications at Discharge:   amoxicillin-clavulanate (AUGMENTIN) 875-125 mg per tablet Comments:   Reason for Stopping:             azithromycin (ZITHROMAX) 250 mg tablet Comments:   Reason for Stopping:                Medication reconciliation was performed with patient, who verbalizes understanding of administration of home medications. There were no barriers to obtaining medications identified at this time. Referral to Pharm D needed: no     Current Outpatient Medications   Medication Sig    predniSONE (DELTASONE) 10 mg tablet Take 1 tablet twice daily for next 5 days, then take 1 tablet daily afterwards.  nystatin (MYCOSTATIN) 100,000 unit/mL suspension Take 5 mL by mouth four (4) times daily for 7 days. swish and spit    doxycycline (ADOXA) 100 mg tablet Take 1 Tab by mouth two (2) times a day for 4 days.  nystatin (MYCOSTATIN) 100,000 unit/mL suspension Take 3 mL by mouth nightly. swish and spit    acetaminophen (TYLENOL EXTRA STRENGTH) 500 mg tablet Take 1,000 mg by mouth daily as needed for Pain.  propylene glycol/peg 400/PF (SYSTANE, PF, OP) Administer  to both eyes daily as needed (dry eyes).  naproxen (NAPROSYN) 500 mg tablet Take 1 Tab by mouth two (2) times daily (with meals).  zolpidem (AMBIEN) 10 mg tablet TAKE 1 TABLET BY MOUTH AT BEDTIME AS NEEDED    colestipol (COLESTID) 1 gram tablet TAKE 1 TABLET BY MOUTH TWICE DAILY    fenofibrate (LOFIBRA) 160 mg tablet TAKE 1 TABLET BY MOUTH EVERY DAY    nabumetone (RELAFEN) 500 mg tablet Take 500 mg by mouth two (2) times a day.  calcium/D3/mag ox//morgan/Zn (CALTRATE + D3 PLUS MINERALS PO) Take 1 Tab by mouth daily. No current facility-administered medications for this visit. There are no discontinued medications. [facility] follow up appointment(s):   Future Appointments   Date Time Provider Homa Veronica   12/24/2019 To Be Determined ANEESH Davis   12/31/2019  1:30 PM MD Uziel Parker 86      Non-[facility] follow up appointment(s): [place] 1/16   formerly [facility]:  Patient seen by [facility] on 12/22      Goals      Prevent complications post hospitalization. 12/23/19  - patient lives with  and drives  - confirmed medication and that patient will finish course   - patient is new to Dr. Amina Barrera - appt on 12/31 - needs f/u CXR  In 1-2 weeks  - seen by [facility] 12/22  - Reviewed red flag s/s with patient, nausea, vomiting, inability to pass urine/stool, SOB, mental status change, chest pain, fever.   - patient reports that her swallowing has improved  - rheumatologist appt on 1/16  - patient is to contact Dr. Patricia Graf office to see if he would like her to schedule f/u  - patient reports no red flag s/s at this time. Patient will contact Md/CTN if red flag s/s arise. CTN to w/u ~ 7-10 days.  AR

## 2019-12-24 ENCOUNTER — HOME CARE VISIT (OUTPATIENT)
Dept: SCHEDULING | Facility: HOME HEALTH | Age: 69
End: 2019-12-24

## 2019-12-24 PROCEDURE — G0299 HHS/HOSPICE OF RN EA 15 MIN: HCPCS

## 2019-12-30 PROBLEM — R13.19 OTHER DYSPHAGIA: Status: ACTIVE | Noted: 2019-12-30

## 2019-12-30 PROBLEM — M50.30 DDD (DEGENERATIVE DISC DISEASE), CERVICAL: Status: ACTIVE | Noted: 2019-12-30

## 2019-12-30 PROBLEM — I73.00 RAYNAUD'S DISEASE WITHOUT GANGRENE: Status: ACTIVE | Noted: 2019-12-30

## 2019-12-31 ENCOUNTER — OFFICE VISIT (OUTPATIENT)
Dept: INTERNAL MEDICINE CLINIC | Age: 69
End: 2019-12-31

## 2019-12-31 VITALS
HEIGHT: 66 IN | TEMPERATURE: 98.4 F | HEART RATE: 93 BPM | DIASTOLIC BLOOD PRESSURE: 62 MMHG | SYSTOLIC BLOOD PRESSURE: 108 MMHG | WEIGHT: 124.4 LBS | RESPIRATION RATE: 16 BRPM | BODY MASS INDEX: 19.99 KG/M2 | OXYGEN SATURATION: 97 %

## 2019-12-31 DIAGNOSIS — R13.10 DYSPHAGIA, UNSPECIFIED TYPE: ICD-10-CM

## 2019-12-31 DIAGNOSIS — K14.6 BURNING TONGUE: ICD-10-CM

## 2019-12-31 DIAGNOSIS — M06.9 RHEUMATOID ARTHRITIS, INVOLVING UNSPECIFIED SITE, UNSPECIFIED RHEUMATOID FACTOR PRESENCE: ICD-10-CM

## 2019-12-31 DIAGNOSIS — R63.4 WEIGHT LOSS: ICD-10-CM

## 2019-12-31 DIAGNOSIS — Z23 ENCOUNTER FOR IMMUNIZATION: ICD-10-CM

## 2019-12-31 DIAGNOSIS — J18.9 UNRESOLVED PNEUMONIA: Primary | ICD-10-CM

## 2019-12-31 DIAGNOSIS — J18.9 COMMUNITY ACQUIRED PNEUMONIA, UNSPECIFIED LATERALITY: Primary | ICD-10-CM

## 2019-12-31 RX ORDER — OMEPRAZOLE 20 MG/1
20 CAPSULE, DELAYED RELEASE ORAL DAILY
Qty: 30 CAP | Refills: 5 | Status: SHIPPED | OUTPATIENT
Start: 2019-12-31 | End: 2020-08-26

## 2019-12-31 RX ORDER — GABAPENTIN 100 MG/1
100 CAPSULE ORAL 3 TIMES DAILY
Qty: 90 CAP | Refills: 3 | Status: SHIPPED | OUTPATIENT
Start: 2019-12-31 | End: 2020-05-25

## 2019-12-31 NOTE — PROGRESS NOTES
HISTORY OF PRESENT ILLNESS  Stu Palmer is a 78 y.o. female. HPI   Pt here to establish care and MICHAELA, here with her husbands  Former PCP Dr Arya Dickerson    Hx RA, Sjogrens syndrome-Dr Sonny Wilson on enbrel recently fpr RA and rash( rash to plaquenil PTA). Was not on long term steroids  PCP on record: Norma Garcia MD     Admitted with CAP and respiratory failure and b/l infiltrates  Treated iv abx then oral doxy   Needs repeat CXR--pleural effusions    02 weaned off  Prednisone taper and now on 10 mg every day-sees Henry Chávez in 2 weeks  No cough now and no sob with acitivites in the house    Lost 20 lbs in past year she attirbutes to burning sensation of tip of tongue after eating or drinking--  Has dry mouth and took pilocarpine in the past which did not help  Has intermittent GERD with dysphagia sometimes to liquids or solids, not daily     2 daughters  Never smoker, rare etoh  Retired [address] date: 12/13/2019  Discharge date and time: 12/20/2019     DISCHARGE DIAGNOSIS:     Community-acquired pneumonia with Acute Hypoxic respiratory failure,   Multiple Bilateral Pulmonary infiltrates  Immunocompromised due to prolonged steroids and RA  History of RA with Sjogren's syndrome           CONSULTATIONS:  IP CONSULT TO PULMONOLOGY     Excerpted HPI from H&P of Maite Leonard MD:  Lana Mendoza is a 78 y.o.   female with past medical history of rheumatoid arthritis on nabumetone, Sjogren's syndrome who presents with worsening shortness of breath, cough and persistent fever and sweats.  Patient presented last Friday in the ED and was diagnosed with pneumonia and was sent home with Levaquin initially which got switched to Zithromax and Augmentin.  She took 5-day course of those 2 antibiotics but reported that she was still feeling short of breath with fever chills. She denies any associated symptoms such as chest pain, nausea vomiting or diarrhea.   Patient has been on prednisone until recently, was weaned off but is still on nabumetone until last week for her rheumatoid arthritis. Vital signs in the ED showed that she was tachycardic, slightly tachypneic and hypoxic in the low 90s.  Her labs are unremarkable.  CT of the chest showed Diffuse bilateral groundglass infiltrates.     ______________________________________________________________________  DISCHARGE SUMMARY/HOSPITAL COURSE:  for full details see H&P, daily progress notes, labs, consult notes.            Community-acquired pneumonia with Acute Hypoxic respiratory failure, improving  Multiple Bilateral Pulmonary infiltrates. Immunocompromised due to prolonged steroids and RA     Failed outpatient treatment with Augmentin and azithromycin, it was documented that she completed 5 days  Was on NRB --> 5 L NC->1 L NC  CT \"Diffuse bilateral groundglass infiltrates\" on admission  CTA on December 15, 2019 did not show PE however it did show worsening airspace opacity  Pulmonary on board   Immunocompromise patient, rheumatoid arthritis on immunosuppressive medication including steroids,  Continue steroids  On Abx, continue IV ABx, one more day of IV abx  She will require repeating chest x-ray 1 to 2 weeks after discharge to ensure resolution  \\Repeat chest x-ray showed improvement, her lung sounds were clear on day of discharge  She will be given doxycycline for 4 more days  She was advised to hold Enbrel until she is see rheumatology     History of RA with Sjogren's syndrome           _______________________________________________________________________  Patient seen and examined by me on discharge day. Pertinent Findings:  Gen:    Not in distress  Chest:  Clear lungs  CVS:    Regular rhythm.   No edema  Abd:     Soft, not distended, not tender  Neuro:  Alert, oriented  _______________________________________________________________________  DISCHARGE MEDICATIONS:         Current Discharge Medication List             START taking these medications     Details   !! nystatin (MYCOSTATIN) 100,000 unit/mL suspension Take 5 mL by mouth four (4) times daily for 7 days. swish and spit  Qty: 140 mL, Refills: 0       doxycycline (ADOXA) 100 mg tablet Take 1 Tab by mouth two (2) times a day for 4 days. Qty: 8 Tab, Refills: 0        !! - Potential duplicate medications found. Please discuss with provider.             CONTINUE these medications which have CHANGED     Details   predniSONE (DELTASONE) 10 mg tablet Take 1 tablet twice daily for next 5 days, then take 1 tablet daily afterwards. Qty: 40 Tab, Refills: 0                 CONTINUE these medications which have NOT CHANGED     Details   !! nystatin (MYCOSTATIN) 100,000 unit/mL suspension Take 3 mL by mouth nightly. swish and spit       acetaminophen (TYLENOL EXTRA STRENGTH) 500 mg tablet Take 1,000 mg by mouth daily as needed for Pain.       propylene glycol/peg 400/PF (SYSTANE, PF, OP) Administer  to both eyes daily as needed (dry eyes).       naproxen (NAPROSYN) 500 mg tablet Take 1 Tab by mouth two (2) times daily (with meals). Qty: 20 Tab, Refills: 0       zolpidem (AMBIEN) 10 mg tablet TAKE 1 TABLET BY MOUTH AT BEDTIME AS NEEDED  Qty: 30 Tab, Refills: 0     Comments: This request is for a new prescription for a controlled substance as required by Federal/State law. Associated Diagnoses: Insomnia, unspecified type       colestipol (COLESTID) 1 gram tablet TAKE 1 TABLET BY MOUTH TWICE DAILY  Qty: 180 Tab, Refills: 0       fenofibrate (LOFIBRA) 160 mg tablet TAKE 1 TABLET BY MOUTH EVERY DAY  Qty: 90 Tab, Refills: 3       nabumetone (RELAFEN) 500 mg tablet Take 500 mg by mouth two (2) times a day.       calcium/D3/mag ox//morgan/Zn (CALTRATE + D3 PLUS MINERALS PO) Take 1 Tab by mouth daily.        !! - Potential duplicate medications found.  Please discuss with provider.            STOP taking these medications         amoxicillin-clavulanate (AUGMENTIN) 875-125 mg per tablet Comments:   Reason for Stopping:            azithromycin (ZITHROMAX) 250 mg tablet Comments:   Reason for Stopping:                       Patient Follow Up Instructions: Activity: Activity as tolerated  Diet: Regular Diet              Follow-up Information      Follow up With Specialties Details Why Contact Angelic Collier MD Internal Medicine Go on 12/31/2019 For scheduled new patient appointment at 1:30PM  [address] [facility]  [phone]        [facility], In-Home Clinical Assessments On 12/22/2019 Expect a phone call from [place] to schedule a follow up visit with you in 24-48 hours. [facility] [address] [facility]  [phone]          ________________________________________________________________     Risk of deterioration: Low     Condition at Discharge:  Stable  __________________________________________________________________     Disposition  Home with family, no needs     ____________________________________________________________________     Code Status: Full Code  ___________________________________________________________________      Patient Active Problem List    Diagnosis Date Noted    Rheumatoid arthritis with positive rheumatoid factor ([facility] [address] [address] 10/01/2019     Priority: 1 - One    Sjogren's syndrome ([facility] [address] 11/20/2018     Priority: 1 - One    Hypercholesterolemia 11/20/2018     Priority: 1 - One    Primary osteoarthritis involving multiple joints 11/20/2018     Priority: 1 - One    Chronic bilateral low back pain without sciatica 11/20/2018     Priority: 1 - One    Weight loss, unintentional 11/20/2018     Priority: 1 - One    Bacterial pneumonia 06/26/2018     Priority: 1 - One    Raynaud's disease without gangrene 12/30/2019    Other dysphagia 12/30/2019    DDD (degenerative disc disease), cervical 12/30/2019    Sepsis ([facility] [address] 12/13/2019    Right lower quadrant abdominal pain 04/26/2018  Pain in both lower extremities 04/26/2018    Ulcer of mouth 04/26/2018     Current Outpatient Medications   Medication Sig Dispense Refill    omeprazole (PRILOSEC) 20 mg capsule Take 1 Cap by mouth daily. 30 Cap 5    gabapentin (NEURONTIN) 100 mg capsule Take 1 Cap by mouth three (3) times daily. Max Daily Amount: 300 mg. 90 Cap 3    predniSONE (DELTASONE) 10 mg tablet Take 1 tablet twice daily for next 5 days, then take 1 tablet daily afterwards. 40 Tab 0    nystatin (MYCOSTATIN) 100,000 unit/mL suspension Take 3 mL by mouth nightly. swish and spit      acetaminophen (TYLENOL EXTRA STRENGTH) 500 mg tablet Take 1,000 mg by mouth daily as needed for Pain.  propylene glycol/peg 400/PF (SYSTANE, PF, OP) Administer  to both eyes daily as needed (dry eyes).  zolpidem (AMBIEN) 10 mg tablet TAKE 1 TABLET BY MOUTH AT BEDTIME AS NEEDED 30 Tab 0    colestipol (COLESTID) 1 gram tablet TAKE 1 TABLET BY MOUTH TWICE DAILY 180 Tab 0    fenofibrate (LOFIBRA) 160 mg tablet TAKE 1 TABLET BY MOUTH EVERY DAY 90 Tab 3    nabumetone (RELAFEN) 500 mg tablet Take 500 mg by mouth two (2) times a day.  calcium/D3/mag ox//morgan/Zn (CALTRATE + D3 PLUS MINERALS PO) Take 1 Tab by mouth daily.  naproxen (NAPROSYN) 500 mg tablet Take 1 Tab by mouth two (2) times daily (with meals).  20 Tab 0     Allergies   Allergen Reactions    Diclofenac Other (comments)     Nausea fever and chills    Plaquenil [Hydroxychloroquine] Rash    Sulfa (Sulfonamide Antibiotics) Unknown (comments)     Past Medical History:   Diagnosis Date    Arthritis     Bacterial pneumonia 6/26/2018    Chronic bilateral low back pain without sciatica 11/20/2018    Hypercholesterolemia     Long term (current) use of anticoagulants     Primary osteoarthritis involving multiple joints 11/20/2018    Sjogren's syndrome ([address] 11/20/2018    Weight loss, unintentional 11/20/2018     Past Surgical History:   Procedure Laterality Date    HX TONSILLECTOMY       Family History   Problem Relation Age of Onset    Heart Disease Maternal Grandfather     Prostate Cancer Father     Prostate Cancer Brother      Social History     Tobacco Use    Smoking status: Never Smoker    Smokeless tobacco: Never Used   Substance Use Topics    Alcohol use: Yes     Comment: rarely      Lab Results   Component Value Date/Time    WBC 14.7 (H) 12/20/2019 01:48 AM    WBC (POC) 7.3 11/20/2018 04:33 PM    HGB 9.9 (L) 12/20/2019 01:48 AM    HGB (POC) 12.2 11/20/2018 04:33 PM    HCT 31.9 (L) 12/20/2019 01:48 AM    HCT (POC) 37.0 11/20/2018 04:33 PM    PLATELET 335 (H) 39/99/4218 01:48 AM    PLATELET (POC) 369.0 11/20/2018 04:33 PM    MCV 92.2 12/20/2019 01:48 AM    MCV (POC) 89.0 11/20/2018 04:33 PM     Lab Results   Component Value Date/Time    Glucose 115 (H) 12/20/2019 01:48 AM    LDL, calculated 88 11/20/2018 04:00 PM    Creatinine 0.65 12/20/2019 01:48 AM      Lab Results   Component Value Date/Time    Cholesterol, total 188 11/20/2018 04:00 PM    HDL Cholesterol 68 11/20/2018 04:00 PM    LDL, calculated 88 11/20/2018 04:00 PM    Triglyceride 159 (H) 11/20/2018 04:00 PM     Lab Results   Component Value Date/Time    GFR est non-AA >60 12/20/2019 01:48 AM    GFR est AA >60 12/20/2019 01:48 AM    Creatinine 0.65 12/20/2019 01:48 AM    BUN 24 (H) 12/20/2019 01:48 AM    Sodium 142 12/20/2019 01:48 AM    Potassium 4.2 12/20/2019 01:48 AM    Chloride 108 12/20/2019 01:48 AM    CO2 30 12/20/2019 01:48 AM    Magnesium 1.8 12/17/2019 05:57 AM    Phosphorus 3.6 12/17/2019 05:57 AM        Review of Systems   Constitutional: Positive for weight loss. Negative for chills, fever and malaise/fatigue. HENT: Negative. Burning tip of tongue, dry mouth and dry eyes   Eyes: Negative for blurred vision and double vision. Respiratory: Negative for cough and shortness of breath. Cardiovascular: Negative for chest pain and palpitations.    Gastrointestinal: Negative for abdominal pain, blood in stool, constipation, diarrhea, melena, nausea and vomiting. Genitourinary: Negative for dysuria, frequency, hematuria and urgency. Musculoskeletal: Positive for joint pain. Negative for back pain, falls and myalgias. Some hip pain   Skin: Negative. Neurological: Negative for dizziness, tremors and headaches. Psychiatric/Behavioral: Negative. Physical Exam  Vitals signs and nursing note reviewed. Constitutional:       Appearance: She is well-developed. Comments: Appears stated age   HENT:      Head: Normocephalic. Right Ear: Tympanic membrane normal.      Left Ear: Tympanic membrane normal.      Nose: Nose normal.      Mouth/Throat:      Mouth: Mucous membranes are dry. Pharynx: Oropharynx is clear. Comments: No tongue lesions or ulcers  Eyes:      Conjunctiva/sclera: Conjunctivae normal.      Pupils: Pupils are equal, round, and reactive to light. Cardiovascular:      Rate and Rhythm: Normal rate and regular rhythm. Pulses: Normal pulses. Heart sounds: Normal heart sounds. No murmur. No friction rub. No gallop. Pulmonary:      Effort: Pulmonary effort is normal. No respiratory distress. Breath sounds: Normal breath sounds. No wheezing. Abdominal:      General: Bowel sounds are normal.      Palpations: Abdomen is soft. Musculoskeletal:      Comments: No hand joint synovitis   Skin:     General: Skin is warm. Neurological:      General: No focal deficit present. Mental Status: She is alert. Psychiatric:         Mood and Affect: Mood normal.         Behavior: Behavior normal.         Thought Content: Thought content normal.         ASSESSMENT and PLAN  Diagnoses and all orders for this visit:    1. Community acquired pneumonia, unspecified laterality  -     XR CHEST PA LAT; Future--residual left ASDZ, repeat cxr 8 weeks  -     XR CHEST PA LAT;  Future   Should be able to stop prednisone but pt will check with rheum MD --had severe hives PTA   Sat 97% RA   Clinical improvement   No restrictions  2. Encounter for immunization  -     PNEUMOCOCCAL POLYSACCHARIDE VACCINE, 23-VALENT, ADULT OR IMMUNOSUPPRESSED PT DOSE,    3. Burning tongue  -     VITAMIN B12  -     gabapentin (NEURONTIN) 100 mg capsule; Take 1 Cap by mouth three (3) times daily. Max Daily Amount: 300 mg.    4. GERD/ dysphagia   Restart PPI   Refer to GI for repaet egd if not impoved withj weight loss and dysphagia    5  Weight loss   D/t above and burning tongue syndrome   F/u weight loss    6. Rheumatoid Arthtritis   Was controlled on nambutone and plaquenil PTA   F/u Dr Duane Del Castillo  Other orders  -     omeprazole (PRILOSEC) 20 mg capsule; Take 1 Cap by mouth daily. Follow-up and Dispositions    · Return in about 3 months (around 3/31/2020) for medicare wellness x30 min----------------.

## 2019-12-31 NOTE — LETTER
12/31/2019 4:30 PM 
 
Ms. Kiara Musa [address] [facility] [address] Dear Kiara Musa: 
 
Please find your most recent results below. Resulted Orders XR CHEST PA LAT (Exam End: 12/31/2019  2:45 PM) Narrative INDICATION:  f/u pneumonia EXAM: Chest 2 views. Comparison 12/19/2019 FINDINGS: Cardiac mediastinal silhouette is within normal limits. The pulmonary 
vasculature is normal. There is improved aeration at the bilateral lung bases 
with persistent patchy airspace disease in the left mid chest. No pneumothorax. No pleural effusion. Impression IMPRESSION: 
1. Improving patchy bilateral airspace disease with residual airspace disease in 
the left mid chest. Recommend follow-up in 6-8 weeks to ensure resolution RECOMMENDATIONS: 
None. Keep up the good work! Repeat Chest xray in about 8 weeks please. Please call me if you have any questions: [phone] Sincerely, 
 
 
Ghazal Frederick MD

## 2019-12-31 NOTE — PROGRESS NOTES
Identified pt with two pt identifiers(name and ). Reviewed record in preparation for visit and have obtained necessary documentation. Chief Complaint   [facility]   [facility]      Visit Vitals  /62 (BP 1 Location: Left arm, BP Patient Position: Sitting)   Pulse 93   Temp 98.4 °F (36.9 °C) (Oral)   Resp 16   Ht 5' 6\" (1.676 m)   Wt 124 lb 6.4 oz (56.4 kg)   SpO2 97%   BMI 20.08 kg/m²       Pain Scale: 0 - No pain/10  Pain Location:     Health Maintenance Due   Topic    DTaP/Tdap/Td series (1 - Tdap)    Shingrix Vaccine Age 50> (1 of 2)    GLAUCOMA SCREENING Q2Y     Pneumococcal 65+ years (2 of 2 - PPSV23)    MEDICARE YEARLY EXAM        Coordination of Care Questionnaire:  :   1) Have you been to an emergency room, urgent care, or hospitalized since your last visit? If yes, where when, and reason for visit? Yes, Sepsis, ED [facility] 2019      2. Have seen or consulted any other health care provider since your last visit? If yes, where when, and reason for visit? NO      3) Do you have an Advanced Directive/ Living Will in place? YES  If yes, do we have a copy on file YES  If no, would you like information NO    Patient is accompanied by  I have received verbal consent from Amparo Kanner to discuss any/all medical information while they are present in the room.

## 2020-01-01 LAB — VIT B12 SERPL-MCNC: 774 PG/ML (ref 232–1245)

## 2020-01-02 ENCOUNTER — TELEPHONE (OUTPATIENT)
Dept: INTERNAL MEDICINE CLINIC | Age: 70
End: 2020-01-02

## 2020-01-02 NOTE — TELEPHONE ENCOUNTER
----- Message from Casey Reagan sent at 12/31/2019  7:46 PM EST -----  Regarding: Dr. Thais Talbert  Patient Returning Call    Caller's first and last name and relationship (if not the patient): n/a    Best contact number(s): [phone]    Whose call is being returned: n/a    Details to clarify the request: Pt missed a call regarding her test results.

## 2020-01-02 NOTE — TELEPHONE ENCOUNTER
Called, spoke to pt. Two identifiers confirmed. Notified pt of lab results/recommendations per Dr. Tatiana Alonzo. Pt verbalized understanding of information discussed w/ no further questions at this time.

## 2020-01-02 NOTE — TELEPHONE ENCOUNTER
----- Message from Maxime Beckett sent at 12/31/2019  4:54 PM EST -----  Regarding: Dr Negin Raman Message/Vendor Calls    Caller's first and last name:      Reason for call:  Pt received message and does she need more antibiotics?       Callback required yes/no and why:  yes      Best contact number(s):[phone]      Details to clarify the request:      Maxime Beckett

## 2020-01-07 NOTE — PROGRESS NOTES
This note will not be viewable in [address] Subjective:     Mrs. Edu Reina resents to the office today in follow-up of pneumonia. The patient states that on June 5 she developed fever and chills. She left the next day and drove to [facility] but on 7 June she was feeling worse and went to an urgent care center. Her x-ray was negative and they felt that she probably had early pneumonia. She was started on doxycycline for 5 days. She returned on the following week and became ill again and was seen at [facility] on 6/15 where an x-ray revealed pneumonia and she also had a urinary tract infection. She was treated with Levaquin for 7 days which she finished 6 or 7 days ago. The patient denies any further fevers or residual cough but is generally had some fatigue. She is having no pleuritic pain or other symptoms. Past Medical History:   Diagnosis Date    Arthritis     Bacterial pneumonia 6/26/2018    Hypercholesterolemia     Long term (current) use of anticoagulants      Past Surgical History:   Procedure Laterality Date    HX TONSILLECTOMY       Allergies   Allergen Reactions    Sulfa (Sulfonamide Antibiotics) Unknown (comments)     Current Outpatient Prescriptions   Medication Sig Dispense Refill    colestipol (COLESTID) 1 gram tablet TAKE 1 TABLET BY MOUTH 2 TIMES DAILY 60 Tab 3    pilocarpine (SALAGEN) 5 mg tablet TAKE 1 TABLET BY MOUTH 3 TIMES A DAY  5    calcium/D3/mag ox//morgan/Zn (CALTRATE + D3 PLUS MINERALS PO) Take  by mouth.  gabapentin (NEURONTIN) 100 mg capsule Take  by mouth three (3) times daily.  fenofibrate (LOFIBRA) 160 mg tablet TAKE 1 TABLET BY MOUTH EVERY DAY 30 Tab 6    meloxicam (MOBIC) 15 mg tablet       zolpidem (AMBIEN) 10 mg tablet Take 1 Tab by mouth nightly as needed for Sleep. Max Daily Amount: 10 mg. 30 Tab 0    geriatric multivitamins-min (CENTRAL-DANIA) tab Take  by mouth.  OMEPRAZOLE PO Take  by mouth.        Social History     Social History    Marital status:      Spouse name: N/A    Number of children: N/A    Years of education: N/A     Social History Main Topics    Smoking status: Never Smoker    Smokeless tobacco: Never Used    Alcohol use Yes      Comment: rarely    Drug use: None    Sexual activity: Not Asked     Other Topics Concern    None     Social History Narrative     Family History   Problem Relation Age of Onset    Heart Disease Maternal Grandfather        Review of Systems:  GEN: no weight loss, weight gain, fatigue or night sweats  CV: no PND, orthopnea, or palpitations  Resp: no dyspnea on exertion, no cough  Abd: no nausea, vomiting or diarrhea  EXT: denies edema, claudication  Endocrine: no hair loss, excessive thirst or polyuria  Neurological ROS: no TIA or stroke symptoms  ROS otherwise negative      Objective:     Visit Vitals    /68 (BP 1 Location: Left arm, BP Patient Position: Sitting)    Pulse 78    Ht 5' 6\" (1.676 m)    Wt 137 lb (62.1 kg)    SpO2 98%    BMI 22.11 kg/m2     Body mass index is 22.11 kg/(m^2). General:   alert, cooperative and no distress   Eyes: conjunctivae/sclerae clear. PERRL, EOM's intact   Mouth:  No oral lesions, no pharyngeal erythema, no exudates   Neck: Trachea midline, no thyromegaly, no bruits   Heart: S1 and S2 normal,no murmurs noted    Lungs: Clear to auscultation bilaterally, no increased work of breathing   Abdomen: Soft, nontender. Normal bowel sounds   Extremities: No edema or cyanosis   Neuro: ..alert, oriented x3,speech normal in context and clarity, cranial nerves II-XII intact,motor strength: full proximally and distally,gait: normal  reflexes: full and symmetric     Physical exam otherwise negative         Assessment/Plan:     Diagnoses and all orders for this visit:    Bacterial pneumonia  -     XR CHEST PA LAT; Future        Other instructions: The x-ray is reviewed and is clear.     No change in her current medication is made    She may liberalize her activity as tolerated    She is in need of an annual Medicare wellness exam in order to review vaccinations etc. this will be scheduled    Follow-up as above    Follow-up Disposition:  Return for As previously scheduled.     Jorgito Herrera MD none

## 2020-01-08 ENCOUNTER — PATIENT OUTREACH (OUTPATIENT)
Dept: INTERNAL MEDICINE CLINIC | Age: 70
End: 2020-01-08

## 2020-01-22 ENCOUNTER — PATIENT OUTREACH (OUTPATIENT)
Dept: INTERNAL MEDICINE CLINIC | Age: 70
End: 2020-01-22

## 2020-01-22 NOTE — PROGRESS NOTES
Patient has graduated from the Transitions of Care Coordination  program on 01/22/20. Patient was not referred to the [facility] team for further management. Goals Addressed                 This Visit's Progress     COMPLETED: Prevent complications post hospitalization. 12/23/19  - patient lives with  and drives  - confirmed medication and that patient will finish course   - patient is new to Dr. Jaciel Kent - appt on 12/31 - needs f/u CXR  In 1-2 weeks  - seen by [facility] 12/22  - Reviewed red flag s/s with patient, nausea, vomiting, inability to pass urine/stool, SOB, mental status change, chest pain, fever.   - patient reports that her swallowing has improved  - rheumatologist appt on 1/16  - patient is to contact Dr. Abdias Rivera office to see if he would like her to schedule f/u  - patient reports no red flag s/s at this time. Patient will contact Md/CTN if red flag s/s arise. CTN to w/u ~ 7-10 days. AR      01/08/20  LM for patient to return call. Patient attended PCP f/u on 12/31. Patient also had f/u CXR. - needs another CXR in 8 weeks  - GI f/u for weight lose. - Make appt with Dr. Chris Conde  - CTN to attempt to reach patient in 1 week if patient does not return call.  AR              Patients upcoming visits:    Future Appointments   Date Time Provider Homa Martin   3/9/2020 10:00 AM Crystal Mon MD [address]

## 2020-01-31 RX ORDER — FENOFIBRATE 160 MG/1
TABLET ORAL
Qty: 90 TAB | Refills: 3 | Status: SHIPPED | OUTPATIENT
Start: 2020-01-31 | End: 2021-01-28

## 2020-03-03 ENCOUNTER — HOSPITAL ENCOUNTER (OUTPATIENT)
Dept: MAMMOGRAPHY | Age: 70
Discharge: HOME | End: 2020-03-03
Attending: INTERNAL MEDICINE
Payer: MEDICARE

## 2020-03-03 DIAGNOSIS — Z12.31 VISIT FOR SCREENING MAMMOGRAM: ICD-10-CM

## 2020-03-03 PROCEDURE — 77067 SCR MAMMO BI INCL CAD: CPT

## 2020-03-07 NOTE — PROGRESS NOTES
HISTORY OF PRESENT ILLNESS  Lindsay Thompson is a 78 y.o. female. HPI     F/u CAP with b/l pulmonary infiltrates , recent anemia, HLD and medicare wellness-------  Due for repeat cxr   cxr 12/31/19  IMPRESSION  IMPRESSION:  1. Improving patchy bilateral airspace disease with residual airspace disease in  the left mid chest. Recommend follow-up in 6-8 weeks to ensure resolution    Sees Saira for low back pain and RA-takes neuronitin and relafen twice daily for pain  Has intermittent right sciatica  intermittnet tingligng sensation right forearm to thumb  Pt reports burning sensation on tongue is some better since restarting oral pilocarpine/salagen  Last OV  Pt here to establish care and MICHAELA, here with her husbands  Former PCP Dr Huber Zuniga     Hx RA, Sjogrens syndrome-Dr Shiva Delcid on enbrel recently fpr RA and rash( rash to plaquenil PTA).  Was not on long term steroids  PCP on record: Lee, Heather Goldberg, MD     Admitted with CAP and respiratory failure and b/l infiltrates  Treated iv abx then oral doxy   Needs repeat CXR--pleural effusions     02 weaned off  Prednisone taper and now on 10 mg every day-sees Henry Chávez in 2 weeks  No cough now and no sob with acitivites in the house     Lost 20 lbs in past year she attirbutes to burning sensation of tip of tongue after eating or drinking--  Has dry mouth and took pilocarpine in the past which did not help  Has intermittent GERD with dysphagia sometimes to liquids or solids, not daily      2 daughters  Never smoker, rare etoh  Retired   Patient Active Problem List    Diagnosis Date Noted    Rheumatoid arthritis with positive rheumatoid factor ([address] 10/01/2019     Priority: 1 - One    Sjogren's syndrome ([address] 11/20/2018     Priority: 1 - One    Hypercholesterolemia 11/20/2018     Priority: 1 - One    Primary osteoarthritis involving multiple joints 11/20/2018     Priority: 1 - One    Chronic bilateral low back pain without sciatica 11/20/2018 Priority: 1 - One    Weight loss, unintentional 11/20/2018     Priority: 1 - One    Bacterial pneumonia 06/26/2018     Priority: 1 - One    Raynaud's disease without gangrene 12/30/2019    Other dysphagia 12/30/2019    DDD (degenerative disc disease), cervical 12/30/2019    Sepsis ([facility] 12/13/2019    Right lower quadrant abdominal pain 04/26/2018    Pain in both lower extremities 04/26/2018    Ulcer of mouth 04/26/2018     Current Outpatient Medications   Medication Sig Dispense Refill    fenofibrate (LOFIBRA) 160 mg tablet TAKE 1 TABLET BY MOUTH EVERY DAY 90 Tab 3    omeprazole (PRILOSEC) 20 mg capsule Take 1 Cap by mouth daily. 30 Cap 5    gabapentin (NEURONTIN) 100 mg capsule Take 1 Cap by mouth three (3) times daily. Max Daily Amount: 300 mg. 90 Cap 3    predniSONE (DELTASONE) 10 mg tablet Take 1 tablet twice daily for next 5 days, then take 1 tablet daily afterwards. 40 Tab 0    nystatin (MYCOSTATIN) 100,000 unit/mL suspension Take 3 mL by mouth nightly. swish and spit      acetaminophen (TYLENOL EXTRA STRENGTH) 500 mg tablet Take 1,000 mg by mouth daily as needed for Pain.  propylene glycol/peg 400/PF (SYSTANE, PF, OP) Administer  to both eyes daily as needed (dry eyes).  naproxen (NAPROSYN) 500 mg tablet Take 1 Tab by mouth two (2) times daily (with meals). 20 Tab 0    zolpidem (AMBIEN) 10 mg tablet TAKE 1 TABLET BY MOUTH AT BEDTIME AS NEEDED 30 Tab 0    colestipol (COLESTID) 1 gram tablet TAKE 1 TABLET BY MOUTH TWICE DAILY 180 Tab 0    nabumetone (RELAFEN) 500 mg tablet Take 500 mg by mouth two (2) times a day.  calcium/D3/mag ox//morgan/Zn (CALTRATE + D3 PLUS MINERALS PO) Take 1 Tab by mouth daily.        Allergies   Allergen Reactions    Diclofenac Other (comments)     Nausea fever and chills    Plaquenil [Hydroxychloroquine] Rash    Sulfa (Sulfonamide Antibiotics) Unknown (comments)     Social History     Tobacco Use    Smoking status: Never Smoker    Smokeless tobacco: Never Used   Substance Use Topics    Alcohol use: Yes     Comment: rarely      Lab Results   Component Value Date/Time    WBC 14.7 (H) 12/20/2019 01:48 AM    WBC (POC) 7.3 11/20/2018 04:33 PM    HGB 9.9 (L) 12/20/2019 01:48 AM    HGB (POC) 12.2 11/20/2018 04:33 PM    HCT 31.9 (L) 12/20/2019 01:48 AM    HCT (POC) 37.0 11/20/2018 04:33 PM    PLATELET 689 (H) 79/12/3339 01:48 AM    PLATELET (POC) 950.1 11/20/2018 04:33 PM    MCV 92.2 12/20/2019 01:48 AM    MCV (POC) 89.0 11/20/2018 04:33 PM     Lab Results   Component Value Date/Time    Glucose 115 (H) 12/20/2019 01:48 AM    LDL, calculated 88 11/20/2018 04:00 PM    Creatinine 0.65 12/20/2019 01:48 AM      Lab Results   Component Value Date/Time    Cholesterol, total 188 11/20/2018 04:00 PM    HDL Cholesterol 68 11/20/2018 04:00 PM    LDL, calculated 88 11/20/2018 04:00 PM    Triglyceride 159 (H) 11/20/2018 04:00 PM     Lab Results   Component Value Date/Time    GFR est non-AA >60 12/20/2019 01:48 AM    GFR est AA >60 12/20/2019 01:48 AM    Creatinine 0.65 12/20/2019 01:48 AM    BUN 24 (H) 12/20/2019 01:48 AM    Sodium 142 12/20/2019 01:48 AM    Potassium 4.2 12/20/2019 01:48 AM    Chloride 108 12/20/2019 01:48 AM    CO2 30 12/20/2019 01:48 AM    Magnesium 1.8 12/17/2019 05:57 AM    Phosphorus 3.6 12/17/2019 05:57 AM        ROS    Physical Exam  Vitals signs and nursing note reviewed. Constitutional:       Appearance: She is well-developed. Comments: Appears stated age   Cardiovascular:      Rate and Rhythm: Normal rate and regular rhythm. Heart sounds: Normal heart sounds. No murmur. No friction rub. No gallop. Pulmonary:      Effort: Pulmonary effort is normal. No respiratory distress. Breath sounds: Normal breath sounds. No wheezing. Abdominal:      General: Bowel sounds are normal.      Palpations: Abdomen is soft. Neurological:      Mental Status: She is alert.          ASSESSMENT and PLAN  Diagnoses and all orders for this visit: 1. Pure hypercholesterolemia  -     LIPID PANEL    2. Anemia, unspecified type  -     CBC W/O DIFF  -     IRON  -     FERRITIN    3. Community acquired pneumonia, unspecified laterality  -     XR CHEST PA LAT; Future    4. Scalp lesion  -     REFERRAL TO DERMATOLOGY    5. Medicare annual wellness visit, subsequent      Follow-up and Dispositions    · Return in about 6 months (around 9/9/2020) for f/u HLD . This is the Subsequent Medicare Annual Wellness Exam, performed 12 months or more after the Initial AWV or the last Subsequent AWV    I have reviewed the patient's medical history in detail and updated the computerized patient record.      History     Patient Active Problem List   Diagnosis Code    Right lower quadrant abdominal pain R10.31    Pain in both lower extremities M79.604, M79.605    Ulcer of mouth K12.1    Bacterial pneumonia J15.9    Sjogren's syndrome ([facility] M35.00    Hypercholesterolemia E78.00    Primary osteoarthritis involving multiple joints M15.0    Chronic bilateral low back pain without sciatica M54.5, G89.29    Weight loss, unintentional R63.4    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) M05.9    Sepsis (HCC) A41.9    Raynaud's disease without gangrene I73.00    Other dysphagia R13.19    DDD (degenerative disc disease), cervical M50.30     Past Medical History:   Diagnosis Date    Arthritis     Bacterial pneumonia 6/26/2018    Chronic bilateral low back pain without sciatica 11/20/2018    Hypercholesterolemia     Long term (current) use of anticoagulants     Primary osteoarthritis involving multiple joints 11/20/2018    Sjogren's syndrome ([facility] 11/20/2018    Weight loss, unintentional 11/20/2018      Past Surgical History:   Procedure Laterality Date    HX TONSILLECTOMY       Current Outpatient Medications   Medication Sig Dispense Refill    fenofibrate (LOFIBRA) 160 mg tablet TAKE 1 TABLET BY MOUTH EVERY DAY 90 Tab 3    omeprazole (PRILOSEC) 20 mg capsule Take 1 Cap by mouth daily. 30 Cap 5    gabapentin (NEURONTIN) 100 mg capsule Take 1 Cap by mouth three (3) times daily. Max Daily Amount: 300 mg. 90 Cap 3    acetaminophen (TYLENOL EXTRA STRENGTH) 500 mg tablet Take 1,000 mg by mouth daily as needed for Pain.  propylene glycol/peg 400/PF (SYSTANE, PF, OP) Administer  to both eyes daily as needed (dry eyes).  zolpidem (AMBIEN) 10 mg tablet TAKE 1 TABLET BY MOUTH AT BEDTIME AS NEEDED 30 Tab 0    colestipol (COLESTID) 1 gram tablet TAKE 1 TABLET BY MOUTH TWICE DAILY 180 Tab 0    nabumetone (RELAFEN) 500 mg tablet Take 500 mg by mouth two (2) times a day.  calcium/D3/mag ox//morgan/Zn (CALTRATE + D3 PLUS MINERALS PO) Take 1 Tab by mouth daily.  nystatin (MYCOSTATIN) 100,000 unit/mL suspension Take 3 mL by mouth nightly. swish and spit       Allergies   Allergen Reactions    Diclofenac Other (comments)     Nausea fever and chills    Plaquenil [Hydroxychloroquine] Rash    Sulfa (Sulfonamide Antibiotics) Unknown (comments)       Family History   Problem Relation Age of Onset    Heart Disease Maternal Grandfather     Prostate Cancer Father     Prostate Cancer Brother      Social History     Tobacco Use    Smoking status: Never Smoker    Smokeless tobacco: Never Used   Substance Use Topics    Alcohol use: Not Currently     Comment: rarely       Depression Risk Factor Screening:     3 most recent PHQ Screens 3/9/2020   Little interest or pleasure in doing things Not at all   Feeling down, depressed, irritable, or hopeless Not at all   Total Score PHQ 2 0       Alcohol Risk Factor Screening:   Do you average 1 drink per night or more than 7 drinks a week:  No    On any one occasion in the past three months have you have had more than 3 drinks containing alcohol:  No      Functional Ability and Level of Safety:   Hearing: Hearing is good. Activities of Daily Living:   The home contains: handrails  Patient does total self care    Ambulation: with no difficulty    Fall Risk:  Fall Risk Assessment, last 12 mths 3/9/2020   Able to walk? Yes   Fall in past 12 months? No       Abuse Screen:  Patient is not abused    Cognitive Screening   Has your family/caregiver stated any concerns about your memory: no  Cognitive Screening: Normal - serial 3    Patient Care Team   Patient Care Team:  Federico Duffy MD as PCP - General (Internal Medicine)  Federico Duffy MD as PCP [facility]  Min Gray MD (General Surgery)  Amish Collazo MD (Rheumatology)  Sandra Madera MD (Pulmonary Disease)    Assessment/Plan   Education and counseling provided:  Are appropriate based on today's review and evaluation  shingrix and tdap recommended    Diagnoses and all orders for this visit:    1. Pure hypercholesterolemia  -     LIPID PANEL   On fenofibrate and colestid  2. Anemia, unspecified type  -     CBC W/O DIFF  -     IRON  -     FERRITIN   ? Dilutional during hospitalization for CAP? Last colon 11-30-15 normal -Dr Micha Mcnulty  3. Community acquired pneumonia, unspecified laterality  -     XR CHEST PA LAT;  Future        Health Maintenance Due   Topic Date Due    DTaP/Tdap/Td series (1 - Tdap) 12/05/1960    Shingrix Vaccine Age 50> (1 of 2) 12/05/1999    GLAUCOMA SCREENING Q2Y  12/05/2014    Medicare Yearly Exam  11/21/2019

## 2020-03-09 ENCOUNTER — HOSPITAL ENCOUNTER (OUTPATIENT)
Dept: LAB | Age: 70
Discharge: HOME | End: 2020-03-09
Payer: MEDICARE

## 2020-03-09 ENCOUNTER — OFFICE VISIT (OUTPATIENT)
Dept: INTERNAL MEDICINE CLINIC | Age: 70
End: 2020-03-09

## 2020-03-09 VITALS
BODY MASS INDEX: 20.89 KG/M2 | HEART RATE: 80 BPM | TEMPERATURE: 97.8 F | DIASTOLIC BLOOD PRESSURE: 64 MMHG | SYSTOLIC BLOOD PRESSURE: 99 MMHG | HEIGHT: 66 IN | OXYGEN SATURATION: 97 % | RESPIRATION RATE: 16 BRPM | WEIGHT: 130 LBS

## 2020-03-09 DIAGNOSIS — Z00.00 MEDICARE ANNUAL WELLNESS VISIT, SUBSEQUENT: ICD-10-CM

## 2020-03-09 DIAGNOSIS — L98.9 SCALP LESION: ICD-10-CM

## 2020-03-09 DIAGNOSIS — R91.8 LUNG NODULE, MULTIPLE: Primary | ICD-10-CM

## 2020-03-09 DIAGNOSIS — J18.9 COMMUNITY ACQUIRED PNEUMONIA, UNSPECIFIED LATERALITY: ICD-10-CM

## 2020-03-09 DIAGNOSIS — D64.9 ANEMIA, UNSPECIFIED TYPE: ICD-10-CM

## 2020-03-09 DIAGNOSIS — J18.9 UNRESOLVED PNEUMONIA: Primary | ICD-10-CM

## 2020-03-09 DIAGNOSIS — E78.00 PURE HYPERCHOLESTEROLEMIA: Primary | ICD-10-CM

## 2020-03-09 PROCEDURE — 80061 LIPID PANEL: CPT

## 2020-03-09 PROCEDURE — 82728 ASSAY OF FERRITIN: CPT

## 2020-03-09 PROCEDURE — 85027 COMPLETE CBC AUTOMATED: CPT

## 2020-03-09 PROCEDURE — 83540 ASSAY OF IRON: CPT

## 2020-03-09 NOTE — PATIENT INSTRUCTIONS
Office Policies Phone calls/patient messages: Please allow up to 24 hours for someone in the office to contact you about your call or message. Be mindful your provider may be out of the office or your message may require further review. We encourage you to use [employer] for your messages as this is a faster, more efficient way to communicate with our office Medication Refills: 
         
Prescription medications require 48-72 business hours to process. We encourage you to use [employer] for your refills. For controlled medications: Please allow 72 business hours to process. Certain medications may require you to  a written prescription at our office. NO narcotic/controlled medications will be prescribed after 4pm Monday through Friday or on weekends Form/Paperwork Completion: 
         
Please note a $25 fee may incur for all paperwork for completed by our providers. We ask that you allow 7-10 business days. Pre-payment is due prior to picking up/faxing the completed form. You may also download your forms to [employer] to have your doctor print off. Medicare Wellness Visit, Female The best way to live healthy is to have a lifestyle where you eat a well-balanced diet, exercise regularly, limit alcohol use, and quit all forms of tobacco/nicotine, if applicable. Regular preventive services are another way to keep healthy. Preventive services (vaccines, screening tests, monitoring & exams) can help personalize your care plan, which helps you manage your own care. Screening tests can find health problems at the earliest stages, when they are easiest to treat. [facility] follows the current, evidence-based guidelines published by the [facility] [place] (USPSTF) when recommending preventive services for our patients.  Because we follow these guidelines, sometimes recommendations change over time as research supports it. (For example, mammograms used to be recommended annually. Even though Medicare will still pay for an annual mammogram, the newer guidelines recommend a mammogram every two years for women of average risk). Of course, you and your doctor may decide to screen more often for some diseases, based on your risk and your co-morbidities (chronic disease you are already diagnosed with). Preventive services for you include: - Medicare offers their members a free annual wellness visit, which is time for you and your primary care provider to discuss and plan for your preventive service needs. Take advantage of this benefit every year! 
-All adults over the age of 72 should receive the recommended pneumonia vaccines. Current USPSTF guidelines recommend a series of two vaccines for the best pneumonia protection.  
-All adults should have a flu vaccine yearly and a tetanus vaccine every 10 years.  
-All adults age 48 and older should receive the shingles vaccines (series of two vaccines). -All adults age 38-68 who are overweight should have a diabetes screening test once every three years.  
-All adults born between [birth year] and 1965 should be screened once for Hepatitis C. 
-Other screening tests and preventive services for persons with diabetes include: an eye exam to screen for diabetic retinopathy, a kidney function test, a foot exam, and stricter control over your cholesterol.  
-Cardiovascular screening for adults with routine risk involves an electrocardiogram (ECG) at intervals determined by your doctor.  
-Colorectal cancer screenings should be done for adults age 54-65 with no increased risk factors for colorectal cancer. There are a number of acceptable methods of screening for this type of cancer. Each test has its own benefits and drawbacks.  Discuss with your doctor what is most appropriate for you during your annual wellness visit. The different tests include: colonoscopy (considered the best screening method), a fecal occult blood test, a fecal DNA test, and sigmoidoscopy. 
 
-A bone mass density test is recommended when a woman turns 65 to screen for osteoporosis. This test is only recommended one time, as a screening. Some providers will use this same test as a disease monitoring tool if you already have osteoporosis. -Breast cancer screenings are recommended every other year for women of normal risk, age 54-69. 
-Cervical cancer screenings for women over age 72 are only recommended with certain risk factors. Here is a list of your current Health Maintenance items (your personalized list of preventive services) with a due date: 
Health Maintenance Due Topic Date Due  
 DTaP/Tdap/Td  (1 - Tdap) 12/05/1960  Shingles Vaccine (1 of 2) 12/05/1999  Glaucoma Screening   12/05/2014 Ulises Viera Annual Well Visit  11/21/2019

## 2020-03-10 LAB
CHOLEST SERPL-MCNC: 175 MG/DL (ref 100–199)
ERYTHROCYTE [DISTWIDTH] IN BLOOD BY AUTOMATED COUNT: 13.2 % (ref 11.7–15.4)
FERRITIN SERPL-MCNC: 59 NG/ML (ref 15–150)
HCT VFR BLD AUTO: 36.7 % (ref 34–46.6)
HDLC SERPL-MCNC: 73 MG/DL
HGB BLD-MCNC: 12.2 G/DL (ref 11.1–15.9)
IRON SERPL-MCNC: 78 UG/DL (ref 27–139)
LDLC SERPL CALC-MCNC: 72 MG/DL (ref 0–99)
MCH RBC QN AUTO: 28.7 PG (ref 26.6–33)
MCHC RBC AUTO-ENTMCNC: 33.2 G/DL (ref 31.5–35.7)
MCV RBC AUTO: 86 FL (ref 79–97)
PLATELET # BLD AUTO: 296 X10E3/UL (ref 150–450)
RBC # BLD AUTO: 4.25 X10E6/UL (ref 3.77–5.28)
TRIGL SERPL-MCNC: 152 MG/DL (ref 0–149)
VLDLC SERPL CALC-MCNC: 30 MG/DL (ref 5–40)
WBC # BLD AUTO: 5.3 X10E3/UL (ref 3.4–10.8)

## 2020-03-13 ENCOUNTER — HOSPITAL ENCOUNTER (OUTPATIENT)
Dept: CT IMAGING | Age: 70
Discharge: HOME | End: 2020-03-13
Attending: NURSE PRACTITIONER
Payer: MEDICARE

## 2020-03-13 DIAGNOSIS — R91.8 PULMONARY NODULES: ICD-10-CM

## 2020-03-13 PROCEDURE — 71250 CT THORAX DX C-: CPT

## 2020-05-25 DIAGNOSIS — K14.6 BURNING TONGUE: ICD-10-CM

## 2020-05-25 RX ORDER — GABAPENTIN 100 MG/1
CAPSULE ORAL
Qty: 90 CAP | Refills: 5 | Status: SHIPPED | OUTPATIENT
Start: 2020-05-25 | End: 2020-12-28 | Stop reason: SDUPTHER

## 2020-06-18 ENCOUNTER — TELEPHONE (OUTPATIENT)
Dept: INTERNAL MEDICINE CLINIC | Age: 70
End: 2020-06-18

## 2020-06-18 DIAGNOSIS — Z20.822 CLOSE EXPOSURE TO COVID-19 VIRUS: Primary | ICD-10-CM

## 2020-06-18 NOTE — TELEPHONE ENCOUNTER
Sharon Cole MD  You 31 minutes ago (3:13 PM)     Can you ask he why--is she acutely ill, high risk exposure. Of she just wants to know if she has been exposed    Routing comment      Called, spoke to pt. Two identifiers confirmed. Pt stated she was very sick in December and cares for her elderly mother, so she just wanted to find out if she had been exposed. Notified pt a message will be sent to Dr. Rocio Rodriguez. Pt verbalized understanding of information discussed w/ no further questions at this time.

## 2020-06-18 NOTE — TELEPHONE ENCOUNTER
Patient states she needs a call back to discuss how she could go about getting the COVID-19 Antibody test done. Please call.  Thank you

## 2020-06-19 NOTE — TELEPHONE ENCOUNTER
Sharon Cole MD  You 15 hours ago (4:45 PM)     ordered    Routing comment      Pt notified. Lab orders mailed to pt.

## 2020-06-26 ENCOUNTER — HOSPITAL ENCOUNTER (OUTPATIENT)
Dept: LAB | Age: 70
Discharge: HOME | End: 2020-06-26
Payer: MEDICARE

## 2020-06-26 PROCEDURE — 36415 COLL VENOUS BLD VENIPUNCTURE: CPT

## 2020-06-27 LAB
SARS-COV-2 AB, IGG, CORG1M: NEGATIVE
SARS-COV-2 AB, IGM, CVABMT: NEGATIVE

## 2020-06-29 NOTE — PROGRESS NOTES
Spoke with patient using 2 identifiers patient was informed of lab result. Patient verbalized understanding.

## 2020-07-01 ENCOUNTER — HOSPITAL ENCOUNTER (OUTPATIENT)
Dept: CT IMAGING | Age: 70
Discharge: HOME | End: 2020-07-01
Attending: NURSE PRACTITIONER
Payer: MEDICARE

## 2020-07-01 DIAGNOSIS — R91.1 PULMONARY NODULE: ICD-10-CM

## 2020-07-01 PROCEDURE — 71250 CT THORAX DX C-: CPT

## 2020-08-26 RX ORDER — OMEPRAZOLE 20 MG/1
CAPSULE, DELAYED RELEASE ORAL
Qty: 30 CAP | Refills: 5 | Status: SHIPPED | OUTPATIENT
Start: 2020-08-26 | End: 2021-04-09 | Stop reason: SDUPTHER

## 2020-09-07 NOTE — PROGRESS NOTES
HISTORY OF PRESENT ILLNESS  Lan Mackey is a 78 y.o. female. HPI   Lan Mackey is a 78 y.o. female being evaluated by a Virtual Visit (video visit) encounter to address concerns as mentioned above. A caregiver was present when appropriate. Due to this being a TeleHealth encounter (During KTBEK-14 public health emergency), evaluation of the following organ systems was limited: Vitals/Constitutional/EENT/Resp/CV/GI//MS/Neuro/Skin/Heme-Lymph-Imm. Pursuant to the emergency declaration under the [facility], [address] [place] [address] authority and the [employer] Act, this Virtual Visit was conducted with patient's (and/or legal guardian's) consent, to reduce the risk of exposure to COVID-19 and provide necessary medical care. Services were provided through a video synchronous discussion virtually to substitute for in-person encounter. --Fernanda Carreon MD on 9/7/2020 at 12:18 PM    An electronic signature was used to authenticate this note. F/u CAP with b/l pulmonary infiltrates , recent anemia, HLD  Last CXR-2 persisitent nodular densities on CXR-referred to pulm MD ---Dr Hao Sinclair monitoring with repeat CT's    Last LDL 72  Sees Dr Demetra Pelaez for RA-appt next month-on relafen    Saw ortho Dr Deandra Samaniego for hand OA        Last OV    Due for repeat cxr   cxr 12/31/19  IMPRESSION  IMPRESSION:  1.  Improving patchy bilateral airspace disease with residual airspace disease in  the left mid chest. Recommend follow-up in 6-8 weeks to ensure resolution     Sees Saira for low back pain and RA-takes neuronitin and relafen twice daily for pain  Has intermittent right sciatica  intermittnet tingligng sensation right forearm to thumb  Pt reports burning sensation on tongue is some better since restarting oral pilocarpine/salagen    Patient Active Problem List    Diagnosis Date Noted    Rheumatoid arthritis with positive rheumatoid factor ([facility] 10/01/2019     Priority: 1 - One    Sjogren's syndrome ([facility] 11/20/2018     Priority: 1 - One    Hypercholesterolemia 11/20/2018     Priority: 1 - One    Primary osteoarthritis involving multiple joints 11/20/2018     Priority: 1 - One    Chronic bilateral low back pain without sciatica 11/20/2018     Priority: 1 - One    Weight loss, unintentional 11/20/2018     Priority: 1 - One    Bacterial pneumonia 06/26/2018     Priority: 1 - One    Raynaud's disease without gangrene 12/30/2019    Other dysphagia 12/30/2019    DDD (degenerative disc disease), cervical 12/30/2019    Sepsis ([facility] 12/13/2019    Right lower quadrant abdominal pain 04/26/2018    Pain in both lower extremities 04/26/2018    Ulcer of mouth 04/26/2018     Current Outpatient Medications   Medication Sig Dispense Refill    Biotin 2,500 mcg cap Take  by mouth.  zolpidem (AMBIEN) 10 mg tablet TAKE 1 TABLET BY MOUTH AT BEDTIME AS NEEDED 30 Tab 1    omeprazole (PRILOSEC) 20 mg capsule TAKE 1 CAPSULE BY MOUTH EVERY DAY 30 Cap 5    gabapentin (NEURONTIN) 100 mg capsule TAKE 1 CAPSULE BY MOUTH THREE TIMES DAILY 90 Cap 5    colestipoL (COLESTID) 1 gram tablet TAKE 1 TABLET BY MOUTH TWICE DAILY 180 Tab 3    fenofibrate (LOFIBRA) 160 mg tablet TAKE 1 TABLET BY MOUTH EVERY DAY 90 Tab 3    acetaminophen (TYLENOL EXTRA STRENGTH) 500 mg tablet Take 1,000 mg by mouth daily as needed for Pain.  propylene glycol/peg 400/PF (SYSTANE, PF, OP) Administer  to both eyes daily as needed (dry eyes).  nabumetone (RELAFEN) 500 mg tablet Take 500 mg by mouth two (2) times a day.  calcium/D3/mag ox//morgan/Zn (CALTRATE + D3 PLUS MINERALS PO) Take 1 Tab by mouth daily.  nystatin (MYCOSTATIN) 100,000 unit/mL suspension Take 3 mL by mouth nightly.  swish and spit       Allergies   Allergen Reactions    Diclofenac Other (comments)     Nausea fever and chills  \"PATIENT CAN TAKE THE TOPICAL CREAM\"    Plaquenil [Hydroxychloroquine] Rash  Sulfa (Sulfonamide Antibiotics) Unknown (comments)      Lab Results   Component Value Date/Time    WBC 5.3 03/09/2020 10:51 AM    WBC (POC) 7.3 11/20/2018 04:33 PM    HGB 12.2 03/09/2020 10:51 AM    HGB (POC) 12.2 11/20/2018 04:33 PM    HCT 36.7 03/09/2020 10:51 AM    HCT (POC) 37.0 11/20/2018 04:33 PM    PLATELET 902 52/76/4764 10:51 AM    PLATELET (POC) 178.7 11/20/2018 04:33 PM    MCV 86 03/09/2020 10:51 AM    MCV (POC) 89.0 11/20/2018 04:33 PM     Lab Results   Component Value Date/Time    Glucose 115 (H) 12/20/2019 01:48 AM    LDL, calculated 72 03/09/2020 10:51 AM    Creatinine 0.65 12/20/2019 01:48 AM      Lab Results   Component Value Date/Time    Cholesterol, total 175 03/09/2020 10:51 AM    HDL Cholesterol 73 03/09/2020 10:51 AM    LDL, calculated 72 03/09/2020 10:51 AM    Triglyceride 152 (H) 03/09/2020 10:51 AM     Lab Results   Component Value Date/Time    GFR est non-AA >60 12/20/2019 01:48 AM    GFR est AA >60 12/20/2019 01:48 AM    Creatinine 0.65 12/20/2019 01:48 AM    BUN 24 (H) 12/20/2019 01:48 AM    Sodium 142 12/20/2019 01:48 AM    Potassium 4.2 12/20/2019 01:48 AM    Chloride 108 12/20/2019 01:48 AM    CO2 30 12/20/2019 01:48 AM    Magnesium 1.8 12/17/2019 05:57 AM    Phosphorus 3.6 12/17/2019 05:57 AM        ROS    Physical Exam  Constitutional:       Appearance: Normal appearance. Pulmonary:      Effort: Pulmonary effort is normal.   Neurological:      Mental Status: She is alert. ASSESSMENT and PLAN  Diagnoses and all orders for this visit:    1. Abnormal urine odor  -     UA/M W/RFLX CULTURE, COMP    2. Insomnia, unspecified type  -     zolpidem (AMBIEN) 10 mg tablet; TAKE 1 TABLET BY MOUTH AT BEDTIME AS NEEDED    3. Pure hypercholesterolemia   LDL at goal on mediciens  4. Lung nodules   F/u PUlm MD  5. Rheumatoid arthritis, involving unspecified site, unspecified rheumatoid factor presence (HCC)   On nsaids prn   F/u beny CHRISTIANSEN next month  6.  Preveintive    Recommended flu shot and complete shingrix    rtc 6 months medicare wellness

## 2020-09-08 ENCOUNTER — VIRTUAL VISIT (OUTPATIENT)
Dept: INTERNAL MEDICINE CLINIC | Age: 70
End: 2020-09-08
Payer: MEDICARE

## 2020-09-08 DIAGNOSIS — E78.00 PURE HYPERCHOLESTEROLEMIA: ICD-10-CM

## 2020-09-08 DIAGNOSIS — M06.9 RHEUMATOID ARTHRITIS, INVOLVING UNSPECIFIED SITE, UNSPECIFIED RHEUMATOID FACTOR PRESENCE: ICD-10-CM

## 2020-09-08 DIAGNOSIS — G47.00 INSOMNIA, UNSPECIFIED TYPE: ICD-10-CM

## 2020-09-08 DIAGNOSIS — R82.90 ABNORMAL URINE ODOR: Primary | ICD-10-CM

## 2020-09-08 DIAGNOSIS — R91.8 LUNG NODULES: ICD-10-CM

## 2020-09-08 PROCEDURE — G9899 SCRN MAM PERF RSLTS DOC: HCPCS | Performed by: INTERNAL MEDICINE

## 2020-09-08 PROCEDURE — G8432 DEP SCR NOT DOC, RNG: HCPCS | Performed by: INTERNAL MEDICINE

## 2020-09-08 PROCEDURE — G8427 DOCREV CUR MEDS BY ELIG CLIN: HCPCS | Performed by: INTERNAL MEDICINE

## 2020-09-08 PROCEDURE — G8399 PT W/DXA RESULTS DOCUMENT: HCPCS | Performed by: INTERNAL MEDICINE

## 2020-09-08 PROCEDURE — 1090F PRES/ABSN URINE INCON ASSESS: CPT | Performed by: INTERNAL MEDICINE

## 2020-09-08 PROCEDURE — G8420 CALC BMI NORM PARAMETERS: HCPCS | Performed by: INTERNAL MEDICINE

## 2020-09-08 PROCEDURE — G8536 NO DOC ELDER MAL SCRN: HCPCS | Performed by: INTERNAL MEDICINE

## 2020-09-08 PROCEDURE — 3017F COLORECTAL CA SCREEN DOC REV: CPT | Performed by: INTERNAL MEDICINE

## 2020-09-08 PROCEDURE — 1101F PT FALLS ASSESS-DOCD LE1/YR: CPT | Performed by: INTERNAL MEDICINE

## 2020-09-08 PROCEDURE — 99214 OFFICE O/P EST MOD 30 MIN: CPT | Performed by: INTERNAL MEDICINE

## 2020-09-08 RX ORDER — GLUCOSAMINE/CHONDR SU A SOD 750-600 MG
1 TABLET ORAL DAILY
COMMUNITY
End: 2022-06-09

## 2020-09-08 RX ORDER — ZOLPIDEM TARTRATE 10 MG/1
TABLET ORAL
Qty: 30 TAB | Refills: 1 | Status: SHIPPED | OUTPATIENT
Start: 2020-09-08

## 2020-09-28 ENCOUNTER — TELEPHONE (OUTPATIENT)
Dept: INTERNAL MEDICINE CLINIC | Age: 70
End: 2020-09-28

## 2020-09-29 ENCOUNTER — HOSPITAL ENCOUNTER (OUTPATIENT)
Dept: LAB | Age: 70
Discharge: HOME | End: 2020-09-29
Payer: MEDICARE

## 2020-09-29 PROCEDURE — 87088 URINE BACTERIA CULTURE: CPT

## 2020-09-29 PROCEDURE — 87077 CULTURE AEROBIC IDENTIFY: CPT

## 2020-09-29 PROCEDURE — 87186 SC STD MICRODIL/AGAR DIL: CPT

## 2020-09-29 PROCEDURE — 81001 URINALYSIS AUTO W/SCOPE: CPT

## 2020-09-29 PROCEDURE — 87086 URINE CULTURE/COLONY COUNT: CPT

## 2020-10-02 LAB
APPEARANCE UR: CLEAR
BACTERIA #/AREA URNS HPF: ABNORMAL /[HPF]
BACTERIA UR CULT: ABNORMAL
BILIRUB UR QL STRIP: NEGATIVE
CASTS URNS QL MICRO: ABNORMAL /LPF
COLOR UR: YELLOW
EPI CELLS #/AREA URNS HPF: ABNORMAL /HPF (ref 0–10)
GLUCOSE UR QL: NEGATIVE
HGB UR QL STRIP: NEGATIVE
KETONES UR QL STRIP: NEGATIVE
LEUKOCYTE ESTERASE UR QL STRIP: ABNORMAL
MICRO URNS: ABNORMAL
MUCOUS THREADS URNS QL MICRO: PRESENT
NITRITE UR QL STRIP: NEGATIVE
PH UR STRIP: 5.5 [PH] (ref 5–7.5)
PROT UR QL STRIP: NEGATIVE
RBC #/AREA URNS HPF: ABNORMAL /HPF (ref 0–2)
SP GR UR: 1.02 (ref 1–1.03)
URINALYSIS REFLEX , 377201: ABNORMAL
UROBILINOGEN UR STRIP-MCNC: 0.2 MG/DL (ref 0.2–1)
WBC #/AREA URNS HPF: ABNORMAL /HPF (ref 0–5)

## 2020-10-02 RX ORDER — CIPROFLOXACIN 250 MG/1
250 TABLET, FILM COATED ORAL 2 TIMES DAILY
Qty: 10 TAB | Refills: 0 | Status: SHIPPED | OUTPATIENT
Start: 2020-10-02 | End: 2020-10-07

## 2020-12-22 ENCOUNTER — TELEPHONE (OUTPATIENT)
Dept: INTERNAL MEDICINE CLINIC | Age: 70
End: 2020-12-22

## 2020-12-22 DIAGNOSIS — R82.90 MALODOROUS URINE: Primary | ICD-10-CM

## 2020-12-22 NOTE — TELEPHONE ENCOUNTER
----- Message from Bev Montoya sent at 12/22/2020 12:54 PM EST -----  Regarding: Dr. Marilee Sullivan telephone  Caller's first and last name:   Reason for call: Returning UTI symptoms   Callback required yes/no and why: y  Best contact number(s): [phone]  Details to clarify the request: Patient was prescribed medication for a uti and she got better but she now feels like it is back     Copy/paste [place]

## 2020-12-22 NOTE — TELEPHONE ENCOUNTER
Called, spoke to pt. Two identifiers confirmed. Pt requesting another lab order faxed to [id number] [facility] to check urine again. Notified pt lab will be ordered. Pt verbalized understanding of information discussed w/ no further questions at this time.     UA/culture ordered per VO from Dr. Essence Cheung.

## 2020-12-28 ENCOUNTER — HOSPITAL ENCOUNTER (OUTPATIENT)
Dept: CT IMAGING | Age: 70
Discharge: HOME | End: 2020-12-28
Attending: NURSE PRACTITIONER
Payer: MEDICARE

## 2020-12-28 ENCOUNTER — HOSPITAL ENCOUNTER (OUTPATIENT)
Dept: LAB | Age: 70
Discharge: HOME | End: 2020-12-28
Payer: MEDICARE

## 2020-12-28 DIAGNOSIS — K14.6 BURNING TONGUE: ICD-10-CM

## 2020-12-28 DIAGNOSIS — R91.8 PULMONARY NODULES: ICD-10-CM

## 2020-12-28 PROCEDURE — 81001 URINALYSIS AUTO W/SCOPE: CPT

## 2020-12-28 PROCEDURE — 71250 CT THORAX DX C-: CPT

## 2020-12-28 RX ORDER — GABAPENTIN 100 MG/1
CAPSULE ORAL
Qty: 90 CAP | Refills: 5 | Status: SHIPPED | OUTPATIENT
Start: 2020-12-28 | End: 2022-05-01 | Stop reason: ALTCHOICE

## 2020-12-28 NOTE — TELEPHONE ENCOUNTER
Future Appointments:  Future Appointments   Date Time Provider Homa Veronica   3/8/2021 10:00 AM Shereen Cruz MD [facility] BS AMB        Last Appointment With Me:  9/8/2020     Requested Prescriptions      No prescriptions requested or ordered in this encounter

## 2020-12-29 LAB
APPEARANCE UR: CLEAR
BACTERIA #/AREA URNS HPF: NORMAL /[HPF]
BILIRUB UR QL STRIP: NEGATIVE
CASTS URNS QL MICRO: NORMAL /LPF
COLOR UR: YELLOW
EPI CELLS #/AREA URNS HPF: NORMAL /HPF (ref 0–10)
GLUCOSE UR QL: NEGATIVE
HGB UR QL STRIP: NEGATIVE
KETONES UR QL STRIP: NEGATIVE
LEUKOCYTE ESTERASE UR QL STRIP: NEGATIVE
MICRO URNS: NORMAL
MICRO URNS: NORMAL
MUCOUS THREADS URNS QL MICRO: PRESENT
NITRITE UR QL STRIP: NEGATIVE
PH UR STRIP: 7.5 [PH] (ref 5–7.5)
PROT UR QL STRIP: NEGATIVE
RBC #/AREA URNS HPF: NORMAL /HPF (ref 0–2)
SP GR UR: 1.01 (ref 1–1.03)
URINALYSIS REFLEX, 377202: NORMAL
UROBILINOGEN UR STRIP-MCNC: 0.2 MG/DL (ref 0.2–1)
WBC #/AREA URNS HPF: NORMAL /HPF (ref 0–5)

## 2020-12-29 NOTE — TELEPHONE ENCOUNTER
Spoke with patient using 2 identifiers. Patient was informed of recent labs. Patient verbalized understanding.

## 2021-01-05 ENCOUNTER — TRANSCRIBE ORDER (OUTPATIENT)
Dept: SCHEDULING | Age: 71
End: 2021-01-05

## 2021-01-05 DIAGNOSIS — R91.8 PULMONARY NODULES: Primary | ICD-10-CM

## 2021-01-28 RX ORDER — FENOFIBRATE 160 MG/1
TABLET ORAL
Qty: 90 TAB | Refills: 3 | Status: SHIPPED | OUTPATIENT
Start: 2021-01-28 | End: 2022-03-14 | Stop reason: SDUPTHER

## 2021-02-11 ENCOUNTER — TRANSCRIBE ORDER (OUTPATIENT)
Dept: SCHEDULING | Age: 71
End: 2021-02-11

## 2021-02-11 DIAGNOSIS — Z12.31 SCREENING MAMMOGRAM FOR HIGH-RISK PATIENT: Primary | ICD-10-CM

## 2021-03-07 NOTE — PROGRESS NOTES
HISTORY OF PRESENT ILLNESS  Yakov Werner is a 69 y.o. female. HPI   Hx RA, Sjogrens syndrome-Dr Raheem Pepper on enbrel recently for RA and rash( rash to plaquenil PTA). Was not on long term steroids, hx CAP hospitalized last year, insomnia HLD lung nodules (Dr Sara Artis) and medicare wellness    RA sxs -off enbrel. Takes relafen now  Had right lower back rib /back injection-months ago. Has had pain in right lower rib area x years intermittently  stable left lung nodules but new 5 mm right lung density on CT 3 mos ago-Dr Nataliia Tony    Last LDL 72 on colestipol and tricor    Had colonoscopy recently-no polyps, repeat in 10 years      Has RUQ swelling and pain intermittently occurrred last week --had prior US -\"dangling rib\" per pt.  Has gallstones and sludge  Has had diffiuculty swallowing solids and even liquids-had egd 2014-wnl-- [facility]    Patient Active Problem List    Diagnosis Date Noted    Rheumatoid arthritis with positive rheumatoid factor ([address] 10/01/2019     Priority: 1 - One    Sjogren's syndrome ([address] 11/20/2018     Priority: 1 - One    Hypercholesterolemia 11/20/2018     Priority: 1 - One    Primary osteoarthritis involving multiple joints 11/20/2018     Priority: 1 - One    Chronic bilateral low back pain without sciatica 11/20/2018     Priority: 1 - One    Weight loss, unintentional 11/20/2018     Priority: 1 - One    Bacterial pneumonia 06/26/2018     Priority: 1 - One    Raynaud's disease without gangrene 12/30/2019    Other dysphagia 12/30/2019    DDD (degenerative disc disease), cervical 12/30/2019    Sepsis ([address] 12/13/2019    Right lower quadrant abdominal pain 04/26/2018    Pain in both lower extremities 04/26/2018    Ulcer of mouth 04/26/2018     Current Outpatient Medications   Medication Sig Dispense Refill    fenofibrate (LOFIBRA) 160 mg tablet TAKE 1 TABLET BY MOUTH EVERY DAY 90 Tab 3    gabapentin (NEURONTIN) 100 mg capsule TAKE 1 CAPSULE BY MOUTH THREE TIMES DAILY 90 Cap 5    Biotin 2,500 mcg cap Take  by mouth.  zolpidem (AMBIEN) 10 mg tablet TAKE 1 TABLET BY MOUTH AT BEDTIME AS NEEDED 30 Tab 1    omeprazole (PRILOSEC) 20 mg capsule TAKE 1 CAPSULE BY MOUTH EVERY DAY 30 Cap 5    colestipoL (COLESTID) 1 gram tablet TAKE 1 TABLET BY MOUTH TWICE DAILY 180 Tab 3    nystatin (MYCOSTATIN) 100,000 unit/mL suspension Take 3 mL by mouth nightly. swish and spit      acetaminophen (TYLENOL EXTRA STRENGTH) 500 mg tablet Take 1,000 mg by mouth daily as needed for Pain.  propylene glycol/peg 400/PF (SYSTANE, PF, OP) Administer  to both eyes daily as needed (dry eyes).  nabumetone (RELAFEN) 500 mg tablet Take 500 mg by mouth two (2) times a day.  calcium/D3/mag ox//morgan/Zn (CALTRATE + D3 PLUS MINERALS PO) Take 1 Tab by mouth daily.        Allergies   Allergen Reactions    Diclofenac Other (comments)     Nausea fever and chills  \"PATIENT CAN TAKE THE TOPICAL CREAM\"    Plaquenil [Hydroxychloroquine] Rash    Sulfa (Sulfonamide Antibiotics) Unknown (comments)     Social History     Tobacco Use    Smoking status: Never Smoker    Smokeless tobacco: Never Used   Substance Use Topics    Alcohol use: Not Currently     Comment: rarely      Lab Results   Component Value Date/Time    WBC 5.3 03/09/2020 10:51 AM    WBC (POC) 7.3 11/20/2018 04:33 PM    HGB 12.2 03/09/2020 10:51 AM    HGB (POC) 12.2 11/20/2018 04:33 PM    HCT 36.7 03/09/2020 10:51 AM    HCT (POC) 37.0 11/20/2018 04:33 PM    PLATELET 685 81/28/6875 10:51 AM    PLATELET (POC) 774.5 11/20/2018 04:33 PM    MCV 86 03/09/2020 10:51 AM    MCV (POC) 89.0 11/20/2018 04:33 PM     Lab Results   Component Value Date/Time    Glucose 115 (H) 12/20/2019 01:48 AM    LDL, calculated 72 03/09/2020 10:51 AM    Creatinine 0.65 12/20/2019 01:48 AM      Lab Results   Component Value Date/Time    Cholesterol, total 175 03/09/2020 10:51 AM    HDL Cholesterol 73 03/09/2020 10:51 AM    LDL, calculated 72 03/09/2020 10:51 AM    Triglyceride 152 (H) 03/09/2020 10:51 AM     Lab Results   Component Value Date/Time    GFR est non-AA >60 12/20/2019 01:48 AM    GFR est AA >60 12/20/2019 01:48 AM    Creatinine 0.65 12/20/2019 01:48 AM    BUN 24 (H) 12/20/2019 01:48 AM    Sodium 142 12/20/2019 01:48 AM    Potassium 4.2 12/20/2019 01:48 AM    Chloride 108 12/20/2019 01:48 AM    CO2 30 12/20/2019 01:48 AM    Magnesium 1.8 12/17/2019 05:57 AM    Phosphorus 3.6 12/17/2019 05:57 AM     Lab Results   Component Value Date/Time    TSH 1.270 11/20/2018 04:00 PM    T4, Free 1.54 11/20/2018 04:00 PM         ROS    Physical Exam  Vitals signs and nursing note reviewed. Constitutional:       Appearance: She is well-developed. Comments: Appears stated age   Cardiovascular:      Rate and Rhythm: Normal rate and regular rhythm. Heart sounds: Normal heart sounds. No murmur. No friction rub. No gallop. Pulmonary:      Effort: Pulmonary effort is normal. No respiratory distress. Breath sounds: Normal breath sounds. No wheezing. Abdominal:      General: Bowel sounds are normal.      Palpations: Abdomen is soft. Neurological:      Mental Status: She is alert. ASSESSMENT and PLAN  Diagnoses and all orders for this visit:    1. Pure hypercholesterolemia  -     LIPID PANEL  -     METABOLIC PANEL, COMPREHENSIVE  -     TSH 3RD GENERATION  -     METABOLIC PANEL, COMPREHENSIVE  -     CBC W/O DIFF    2. Insomnia, unspecified type    3. Lung nodules  -     METABOLIC PANEL, COMPREHENSIVE    4. Rheumatoid arthritis, involving unspecified site, unspecified whether rheumatoid factor present ([facility]  -     METABOLIC PANEL, COMPREHENSIVE    5. Breast screening  -     VALENTÍN MAMMO BI SCREENING INCL CAD; Future    6.  RUQ pain  -     US ABD COMP; Future  -     METABOLIC PANEL, COMPREHENSIVE  -     CBC W/O DIFF           This is the Subsequent Medicare Annual Wellness Exam, performed 12 months or more after the Initial AWV or the last Subsequent AWV    I have reviewed the patient's medical history in detail and updated the computerized patient record. Depression Risk Factor Screening:     3 most recent PHQ Screens 3/8/2021   Little interest or pleasure in doing things Not at all   Feeling down, depressed, irritable, or hopeless Not at all   Total Score PHQ 2 0       Alcohol Risk Screen    Do you average more than 1 drink per night or more than 7 drinks a week:  No    On any one occasion in the past three months have you have had more than 3 drinks containing alcohol:  No        Functional Ability and Level of Safety:    Hearing: Hearing is good. Activities of Daily Living: The home contains: no safety equipment. Patient does total self care      Ambulation: with no difficulty     Fall Risk:  Fall Risk Assessment, last 12 mths 3/8/2021   Able to walk? Yes   Fall in past 12 months? 0   Do you feel unsteady? 0   Are you worried about falling 0      Abuse Screen:  Patient is not abused       Cognitive Screening    Has your family/caregiver stated any concerns about your memory: no     Cognitive Screening: Normal - serial 3    Assessment/Plan   Education and counseling provided:  Are appropriate based on today's review and evaluation  tdap recommended    Diagnoses and all orders for this visit:    1. Pure hypercholesterolemia  -     LIPID PANEL  -     METABOLIC PANEL, COMPREHENSIVE  -     TSH 3RD GENERATION  -     METABOLIC PANEL, COMPREHENSIVE  -     CBC W/O DIFF   On colestipol and tricor  2. Insomnia, unspecified type    Refill ambien prn  3. Lung nodules  -     METABOLIC PANEL, COMPREHENSIVE   Fu PUlm MD  4. Rheumatoid arthritis, involving unspecified site, unspecified whether rheumatoid factor present ([facility] [address]  -     METABOLIC PANEL, COMPREHENSIVE   Controlled on nsaids-fu Rheum MD  5. Breast screening  -     VALENTÍN MAMMO BI SCREENING INCL CAD; Future    6.  RUQ pain  -     US ABD COMP; Future  -     METABOLIC PANEL, COMPREHENSIVE  -     CBC W/O DIFF   Refer gen sx at [facility]      Health Maintenance Due     Health Maintenance Due   Topic Date Due    DTaP/Tdap/Td series (1 - Tdap) Never done    GLAUCOMA SCREENING Q2Y  Never done    Medicare Yearly Exam  03/10/2021       Patient Care Team   Patient Care Team:  Severo Cartwright MD as PCP - General (Internal Medicine)  Severo Cartwright MD as PCP - [facility] Empaneled Provider  Eve Spence MD (General Surgery)  Dirk Meckel, MD (Rheumatology)  Lisa Pierre MD (Pulmonary Disease)    History     Patient Active Problem List   Diagnosis Code    Right lower quadrant abdominal pain R10.31    Pain in both lower extremities M79.604, M79.605    Ulcer of mouth K12.1    Bacterial pneumonia J15.9    Sjogren's syndrome ([address] M35.00    Hypercholesterolemia E78.00    Primary osteoarthritis involving multiple joints M89.49    Chronic bilateral low back pain without sciatica M54.5, G89.29    Weight loss, unintentional R63.4    Rheumatoid arthritis with positive rheumatoid factor ([address] M05.9    Sepsis ([address] A41.9    Raynaud's disease without gangrene I73.00    Other dysphagia R13.19    DDD (degenerative disc disease), cervical M50.30     Past Medical History:   Diagnosis Date    Arthritis     Bacterial pneumonia 6/26/2018    Chronic bilateral low back pain without sciatica 11/20/2018    Hypercholesterolemia     Long term (current) use of anticoagulants     Primary osteoarthritis involving multiple joints 11/20/2018    Sjogren's syndrome ([address] 11/20/2018    Weight loss, unintentional 11/20/2018      Past Surgical History:   Procedure Laterality Date    HX TONSILLECTOMY       Current Outpatient Medications   Medication Sig Dispense Refill    amLODIPine (NORVASC) 2.5 mg tablet TAKE 1 TABLET BY MOUTH EVERY DAY      fenofibrate (LOFIBRA) 160 mg tablet TAKE 1 TABLET BY MOUTH EVERY DAY 90 Tab 3    gabapentin (NEURONTIN) 100 mg capsule TAKE 1 CAPSULE BY MOUTH THREE TIMES DAILY 90 Cap 5    Biotin 2,500 mcg cap Take  by mouth.  zolpidem (AMBIEN) 10 mg tablet TAKE 1 TABLET BY MOUTH AT BEDTIME AS NEEDED 30 Tab 1    omeprazole (PRILOSEC) 20 mg capsule TAKE 1 CAPSULE BY MOUTH EVERY DAY 30 Cap 5    colestipoL (COLESTID) 1 gram tablet TAKE 1 TABLET BY MOUTH TWICE DAILY 180 Tab 3    propylene glycol/peg 400/PF (SYSTANE, PF, OP) Administer  to both eyes daily as needed (dry eyes).  nabumetone (RELAFEN) 500 mg tablet Take 500 mg by mouth two (2) times a day.  calcium/D3/mag ox//morgan/Zn (CALTRATE + D3 PLUS MINERALS PO) Take 1 Tab by mouth daily.  acetaminophen (TYLENOL EXTRA STRENGTH) 500 mg tablet Take 1,000 mg by mouth daily as needed for Pain.        Allergies   Allergen Reactions    Diclofenac Other (comments)     Nausea fever and chills  \"PATIENT CAN TAKE THE TOPICAL CREAM\"    Plaquenil [Hydroxychloroquine] Rash    Sulfa (Sulfonamide Antibiotics) Unknown (comments)       Family History   Problem Relation Age of Onset    Heart Disease Maternal Grandfather     Prostate Cancer Father     Prostate Cancer Brother      Social History     Tobacco Use    Smoking status: Never Smoker    Smokeless tobacco: Never Used   Substance Use Topics    Alcohol use: Not Currently     Comment: rarely

## 2021-03-08 ENCOUNTER — OFFICE VISIT (OUTPATIENT)
Dept: INTERNAL MEDICINE CLINIC | Age: 71
End: 2021-03-08
Payer: MEDICARE

## 2021-03-08 VITALS
SYSTOLIC BLOOD PRESSURE: 132 MMHG | WEIGHT: 127 LBS | OXYGEN SATURATION: 99 % | HEIGHT: 65 IN | DIASTOLIC BLOOD PRESSURE: 73 MMHG | TEMPERATURE: 95 F | RESPIRATION RATE: 16 BRPM | BODY MASS INDEX: 21.16 KG/M2 | HEART RATE: 73 BPM

## 2021-03-08 DIAGNOSIS — R91.8 LUNG NODULES: ICD-10-CM

## 2021-03-08 DIAGNOSIS — Z00.00 MEDICARE ANNUAL WELLNESS VISIT, SUBSEQUENT: ICD-10-CM

## 2021-03-08 DIAGNOSIS — G47.00 INSOMNIA, UNSPECIFIED TYPE: ICD-10-CM

## 2021-03-08 DIAGNOSIS — Z12.39 BREAST SCREENING: ICD-10-CM

## 2021-03-08 DIAGNOSIS — R10.11 RUQ PAIN: ICD-10-CM

## 2021-03-08 DIAGNOSIS — E78.00 PURE HYPERCHOLESTEROLEMIA: Primary | ICD-10-CM

## 2021-03-08 DIAGNOSIS — M06.9 RHEUMATOID ARTHRITIS, INVOLVING UNSPECIFIED SITE, UNSPECIFIED WHETHER RHEUMATOID FACTOR PRESENT (HCC): ICD-10-CM

## 2021-03-08 PROCEDURE — G8510 SCR DEP NEG, NO PLAN REQD: HCPCS | Performed by: INTERNAL MEDICINE

## 2021-03-08 PROCEDURE — G0463 HOSPITAL OUTPT CLINIC VISIT: HCPCS | Performed by: INTERNAL MEDICINE

## 2021-03-08 PROCEDURE — 1101F PT FALLS ASSESS-DOCD LE1/YR: CPT | Performed by: INTERNAL MEDICINE

## 2021-03-08 PROCEDURE — G8420 CALC BMI NORM PARAMETERS: HCPCS | Performed by: INTERNAL MEDICINE

## 2021-03-08 PROCEDURE — G8536 NO DOC ELDER MAL SCRN: HCPCS | Performed by: INTERNAL MEDICINE

## 2021-03-08 PROCEDURE — G8399 PT W/DXA RESULTS DOCUMENT: HCPCS | Performed by: INTERNAL MEDICINE

## 2021-03-08 PROCEDURE — G8427 DOCREV CUR MEDS BY ELIG CLIN: HCPCS | Performed by: INTERNAL MEDICINE

## 2021-03-08 PROCEDURE — 3017F COLORECTAL CA SCREEN DOC REV: CPT | Performed by: INTERNAL MEDICINE

## 2021-03-08 PROCEDURE — G0439 PPPS, SUBSEQ VISIT: HCPCS | Performed by: INTERNAL MEDICINE

## 2021-03-08 PROCEDURE — 1090F PRES/ABSN URINE INCON ASSESS: CPT | Performed by: INTERNAL MEDICINE

## 2021-03-08 PROCEDURE — G9899 SCRN MAM PERF RSLTS DOC: HCPCS | Performed by: INTERNAL MEDICINE

## 2021-03-08 PROCEDURE — 99214 OFFICE O/P EST MOD 30 MIN: CPT | Performed by: INTERNAL MEDICINE

## 2021-03-08 RX ORDER — AMLODIPINE BESYLATE 2.5 MG/1
2.5 TABLET ORAL
COMMUNITY
Start: 2021-02-23 | End: 2022-06-09

## 2021-03-08 NOTE — PATIENT INSTRUCTIONS
Office Policies Phone calls/patient messages: Please allow up to 24 hours for someone in the office to contact you about your call or message. Be mindful your provider may be out of the office or your message may require further review. We encourage you to use [employer] for your messages as this is a faster, more efficient way to communicate with our office Medication Refills: 
         
Prescription medications require 48-72 business hours to process. We encourage you to use [employer] for your refills. For controlled medications: Please allow 72 business hours to process. Certain medications may require you to  a written prescription at our office. NO narcotic/controlled medications will be prescribed after 4pm Monday through Friday or on weekends Form/Paperwork Completion: 
         
Please note a $25 fee may incur for all paperwork for completed by our providers. We ask that you allow 7-10 business days. Pre-payment is due prior to picking up/faxing the completed form. You may also download your forms to [employer] to have your doctor print off. 
 
1. Have you been to the ER, urgent care clinic since your last visit? Hospitalized since your last visit? No 
 
2. Have you seen or consulted any other health care providers outside of the [address] [place] [address] since your last visit? Include any pap smears or colon screening. No 
Medicare Wellness Visit, Female The best way to live healthy is to have a lifestyle where you eat a well-balanced diet, exercise regularly, limit alcohol use, and quit all forms of tobacco/nicotine, if applicable. Regular preventive services are another way to keep healthy. Preventive services (vaccines, screening tests, monitoring & exams) can help personalize your care plan, which helps you manage your own care. Screening tests can find health problems at the earliest stages, when they are easiest to treat. [facility] follows the current, evidence-based guidelines published by the [facility] ([facility]) when recommending preventive services for our patients. Because we follow these guidelines, sometimes recommendations change over time as research supports it. (For example, mammograms used to be recommended annually. Even though Medicare will still pay for an annual mammogram, the newer guidelines recommend a mammogram every two years for women of average risk). Of course, you and your doctor may decide to screen more often for some diseases, based on your risk and your co-morbidities (chronic disease you are already diagnosed with). Preventive services for you include: - Medicare offers their members a free annual wellness visit, which is time for you and your primary care provider to discuss and plan for your preventive service needs. Take advantage of this benefit every year! 
-All adults over the age of 72 should receive the recommended pneumonia vaccines. Current USPSTF guidelines recommend a series of two vaccines for the best pneumonia protection.  
-All adults should have a flu vaccine yearly and a tetanus vaccine every 10 years.  
-All adults age 48 and older should receive the shingles vaccines (series of two vaccines). -All adults age 38-68 who are overweight should have a diabetes screening test once every three years.  
-All adults born between [birth year] and 1965 should be screened once for Hepatitis C. 
-Other screening tests and preventive services for persons with diabetes include: an eye exam to screen for diabetic retinopathy, a kidney function test, a foot exam, and stricter control over your cholesterol.  
-Cardiovascular screening for adults with routine risk involves an electrocardiogram (ECG) at intervals determined by your doctor. -Colorectal cancer screenings should be done for adults age 54-65 with no increased risk factors for colorectal cancer. There are a number of acceptable methods of screening for this type of cancer. Each test has its own benefits and drawbacks. Discuss with your doctor what is most appropriate for you during your annual wellness visit. The different tests include: colonoscopy (considered the best screening method), a fecal occult blood test, a fecal DNA test, and sigmoidoscopy. 
 
-A bone mass density test is recommended when a woman turns 65 to screen for osteoporosis. This test is only recommended one time, as a screening. Some providers will use this same test as a disease monitoring tool if you already have osteoporosis. -Breast cancer screenings are recommended every other year for women of normal risk, age 54-69. 
-Cervical cancer screenings for women over age 72 are only recommended with certain risk factors. Here is a list of your current Health Maintenance items (your personalized list of preventive services) with a due date: 
Health Maintenance Due Topic Date Due  
 DTaP/Tdap/Td  (1 - Tdap) Never done  Glaucoma Screening   Never done [facility] Annual Well Visit  03/10/2021

## 2021-03-09 ENCOUNTER — TRANSCRIBE ORDER (OUTPATIENT)
Dept: SCHEDULING | Age: 71
End: 2021-03-09

## 2021-03-09 DIAGNOSIS — Z12.31 SCREENING MAMMOGRAM FOR HIGH-RISK PATIENT: Primary | ICD-10-CM

## 2021-03-09 LAB
ALBUMIN SERPL-MCNC: 4.5 G/DL (ref 3.7–4.7)
ALBUMIN/GLOB SERPL: 2.1 {RATIO} (ref 1.2–2.2)
ALP SERPL-CCNC: 42 IU/L (ref 39–117)
ALT SERPL-CCNC: 19 IU/L (ref 0–32)
AST SERPL-CCNC: 40 IU/L (ref 0–40)
BILIRUB SERPL-MCNC: 0.2 MG/DL (ref 0–1.2)
BUN SERPL-MCNC: 16 MG/DL (ref 8–27)
BUN/CREAT SERPL: 18 (ref 12–28)
CALCIUM SERPL-MCNC: 9.9 MG/DL (ref 8.7–10.3)
CHLORIDE SERPL-SCNC: 101 MMOL/L (ref 96–106)
CHOLEST SERPL-MCNC: 167 MG/DL (ref 100–199)
CO2 SERPL-SCNC: 26 MMOL/L (ref 20–29)
CREAT SERPL-MCNC: 0.89 MG/DL (ref 0.57–1)
ERYTHROCYTE [DISTWIDTH] IN BLOOD BY AUTOMATED COUNT: 12.5 % (ref 11.7–15.4)
GLOBULIN SER CALC-MCNC: 2.1 G/DL (ref 1.5–4.5)
GLUCOSE SERPL-MCNC: 79 MG/DL (ref 65–99)
HCT VFR BLD AUTO: 38.1 % (ref 34–46.6)
HDLC SERPL-MCNC: 76 MG/DL
HGB BLD-MCNC: 12.8 G/DL (ref 11.1–15.9)
LDLC SERPL CALC-MCNC: 75 MG/DL (ref 0–99)
MCH RBC QN AUTO: 30.4 PG (ref 26.6–33)
MCHC RBC AUTO-ENTMCNC: 33.6 G/DL (ref 31.5–35.7)
MCV RBC AUTO: 91 FL (ref 79–97)
PLATELET # BLD AUTO: 273 X10E3/UL (ref 150–450)
POTASSIUM SERPL-SCNC: 4.4 MMOL/L (ref 3.5–5.2)
PROT SERPL-MCNC: 6.6 G/DL (ref 6–8.5)
RBC # BLD AUTO: 4.21 X10E6/UL (ref 3.77–5.28)
SODIUM SERPL-SCNC: 141 MMOL/L (ref 134–144)
TRIGL SERPL-MCNC: 90 MG/DL (ref 0–149)
TSH SERPL DL<=0.005 MIU/L-ACNC: 1.91 UIU/ML (ref 0.45–4.5)
VLDLC SERPL CALC-MCNC: 16 MG/DL (ref 5–40)
WBC # BLD AUTO: 6.4 X10E3/UL (ref 3.4–10.8)

## 2021-03-16 ENCOUNTER — HOSPITAL ENCOUNTER (OUTPATIENT)
Dept: ULTRASOUND IMAGING | Age: 71
Discharge: HOME | End: 2021-03-16
Attending: INTERNAL MEDICINE
Payer: MEDICARE

## 2021-03-16 DIAGNOSIS — R10.11 RUQ PAIN: ICD-10-CM

## 2021-03-16 PROCEDURE — 76700 US EXAM ABDOM COMPLETE: CPT

## 2021-03-16 NOTE — PROGRESS NOTES
Spoke with patient using 2 identifiers. Patient was informed of recent Ultrasound. Patient was advised per Dr. Nemesio Garcia to call Dr. Judd Taylor for surgery. Patient agreed to follow   Dr. Nemesio Garcia.

## 2021-03-16 NOTE — PROGRESS NOTES
Tell pt has gallstones but not inflammation or obstruction but I think she should still see Gen Surgery Dr Juan Carlos Rocha or associate for RUQ pain and gallstones-possibly symptomatic from the stones

## 2021-03-22 ENCOUNTER — OFFICE VISIT (OUTPATIENT)
Dept: SURGERY | Age: 71
End: 2021-03-22
Payer: MEDICARE

## 2021-03-22 VITALS
HEART RATE: 74 BPM | OXYGEN SATURATION: 98 % | BODY MASS INDEX: 21.16 KG/M2 | DIASTOLIC BLOOD PRESSURE: 72 MMHG | RESPIRATION RATE: 16 BRPM | WEIGHT: 127 LBS | SYSTOLIC BLOOD PRESSURE: 138 MMHG | TEMPERATURE: 97.3 F | HEIGHT: 65 IN

## 2021-03-22 DIAGNOSIS — R10.9 RIGHT SIDED ABDOMINAL PAIN: Primary | ICD-10-CM

## 2021-03-22 PROCEDURE — 99214 OFFICE O/P EST MOD 30 MIN: CPT | Performed by: SURGERY

## 2021-03-22 PROCEDURE — 1090F PRES/ABSN URINE INCON ASSESS: CPT | Performed by: SURGERY

## 2021-03-22 PROCEDURE — G8420 CALC BMI NORM PARAMETERS: HCPCS | Performed by: SURGERY

## 2021-03-22 PROCEDURE — 1101F PT FALLS ASSESS-DOCD LE1/YR: CPT | Performed by: SURGERY

## 2021-03-22 PROCEDURE — G8427 DOCREV CUR MEDS BY ELIG CLIN: HCPCS | Performed by: SURGERY

## 2021-03-22 PROCEDURE — 3017F COLORECTAL CA SCREEN DOC REV: CPT | Performed by: SURGERY

## 2021-03-22 PROCEDURE — G8399 PT W/DXA RESULTS DOCUMENT: HCPCS | Performed by: SURGERY

## 2021-03-22 PROCEDURE — G8432 DEP SCR NOT DOC, RNG: HCPCS | Performed by: SURGERY

## 2021-03-22 PROCEDURE — G8536 NO DOC ELDER MAL SCRN: HCPCS | Performed by: SURGERY

## 2021-03-22 PROCEDURE — G9899 SCRN MAM PERF RSLTS DOC: HCPCS | Performed by: SURGERY

## 2021-03-22 NOTE — PROGRESS NOTES
Chief Complaint   Patient presents with    Abdominal Pain     Seen at the request of Dr. Boni Hummel, eval gallstones.

## 2021-04-08 NOTE — PROGRESS NOTES
To:  Sun Burnett MD      From: Conchita Grant MD    Thank you for sending Carmelo Contreras to see us. Please note that this dictation was completed with [employer], the computer voice recognition software. Quite often unanticipated grammatical, syntax, homophones, and other interpretive errors are inadvertently transcribed by the software. Please disregard these errors. Please excuse any errors that have escaped final proofreading. Encounter Date: 3/22/2021  History and Physical    Assessment:   Chief complaint: Right upper quadrant abdominal pain. Gallstones on ultrasound. Vague symptomatology. Body mass index is 21.13 kg/m². Comorbid chronic back pain, rheumatoid arthritis, GERD. S/p no prior abdominal operations. No aspirin or anticoagulation. .      Plan/Recommendations/Medical Decision Making: We will send her for a HIDA scan since her symptoms are fairly atypical and she would like to avoid surgery if possible. If she has reproduction of her symptoms with CCK I will recommend cholecystectomy with intraoperative cholangiogram.    Discussed alternatives, risks and benefits of surgery. Risks include infection, hematoma, bleeding, bile duct or bowel injury, recurrence or persistence of symptoms, conversion to an open operation, retained CBD stones, possible ALETHA drain, and the risks of general anesthetic. All questions were answered to the patient's satisfaction. HPI:   Patient is a 69 y.o. female who is seen in consultation at the request of Sun Burnett MD.   Notes that she feels something bulging in her right upper quadrant. She says rarely she gets discomfort there that feels like a knot. She does not have any postprandial symptoms. There is no nausea. There is no bloating. She says it lasts just a few seconds. It is intense but it is brief. She has no history of prior abdominal operations. She says she feels that at the navel and to the right.     There is no history of kidney stones. She has no history of jaundice, marilyn urine, or acholic stools. There is no history of peptic ulcer disease but she does take omeprazole for GERD. She does have issues with fatigue. She also notes some dysphagia. Past Medical History:   Diagnosis Date    Arthritis     Bacterial pneumonia 6/26/2018    Chronic bilateral low back pain without sciatica 11/20/2018    Chronic pain     Hypercholesterolemia     Long term (current) use of anticoagulants     Primary osteoarthritis involving multiple joints 11/20/2018    Sjogren's syndrome ([address] 11/20/2018    Weight loss, unintentional 11/20/2018      Past Surgical History:   Procedure Laterality Date    HX TONSILLECTOMY       Social History     Tobacco Use    Smoking status: Never Smoker    Smokeless tobacco: Never Used   Substance Use Topics    Alcohol use: Not Currently     Comment: rarely      Family History   Problem Relation Age of Onset    Heart Disease Maternal Grandfather     Prostate Cancer Father     Prostate Cancer Brother        Current Outpatient Medications   Medication Sig    CALCIUM PO Take 600 mg by mouth daily.  amLODIPine (NORVASC) 2.5 mg tablet Take 2.5 mg by mouth as needed for Other.  fenofibrate (LOFIBRA) 160 mg tablet TAKE 1 TABLET BY MOUTH EVERY DAY    gabapentin (NEURONTIN) 100 mg capsule TAKE 1 CAPSULE BY MOUTH THREE TIMES DAILY    Biotin 2,500 mcg cap Take  by mouth.  zolpidem (AMBIEN) 10 mg tablet TAKE 1 TABLET BY MOUTH AT BEDTIME AS NEEDED    omeprazole (PRILOSEC) 20 mg capsule TAKE 1 CAPSULE BY MOUTH EVERY DAY    colestipoL (COLESTID) 1 gram tablet TAKE 1 TABLET BY MOUTH TWICE DAILY    acetaminophen (TYLENOL EXTRA STRENGTH) 500 mg tablet Take 1,000 mg by mouth daily as needed for Pain.  propylene glycol/peg 400/PF (SYSTANE, PF, OP) Administer  to both eyes daily as needed (dry eyes).  nabumetone (RELAFEN) 500 mg tablet Take 500 mg by mouth two (2) times a day.     calcium/D3/mag ox//morgan/Zn (CALTRATE + D3 PLUS MINERALS PO) Take 1 Tab by mouth daily. No current facility-administered medications for this visit. Allergies: Allergies   Allergen Reactions    Diclofenac Other (comments) and Nausea and Vomiting     Nausea fever and chills  \"PATIENT CAN TAKE THE TOPICAL CREAM\"  Nausea fever and chills    Plaquenil [Hydroxychloroquine] Rash    Sulfa (Sulfonamide Antibiotics) Unknown (comments)        Review of Systems:  10 systems reviewed. See scanned sheet in \"Media\" section. See HPI for pertinent positives and negatives. Objective:     Visit Vitals  /72 (BP 1 Location: Left upper arm, BP Patient Position: Sitting, BP Cuff Size: Adult)   Pulse 74   Temp 97.3 °F (36.3 °C) (Temporal)   Resp 16   Ht 5' 5\" (1.651 m)   Wt 57.6 kg (127 lb)   SpO2 98%   BMI 21.13 kg/m²     General appearance  Alert, cooperative, no distress, appears stated age   HEENT  Anicteric   Neck Supple   Back   No CVA tenderness   Lungs   CTAB   Heart  Regular rate and rhythm. Abdomen   Soft. Bowel sounds normal. No palpable masses. Presently nontender. Extremities No CCE.    Pulses 2+ right radial   Skin Skin color, texture, turgor normal.        Neurologic Without overt sensory or motor deficit     Imaging and Lab Review:   images and reports reviewed    Signed By: Irene Dominguez MD     April 8, 2021

## 2021-04-09 RX ORDER — MONTELUKAST SODIUM 4 MG/1
TABLET, CHEWABLE ORAL
Qty: 180 TAB | Refills: 3 | Status: SHIPPED | OUTPATIENT
Start: 2021-04-09 | End: 2022-03-14 | Stop reason: SDUPTHER

## 2021-04-20 ENCOUNTER — HOSPITAL ENCOUNTER (OUTPATIENT)
Dept: NUCLEAR MEDICINE | Age: 71
Discharge: HOME | End: 2021-04-20
Attending: SURGERY
Payer: MEDICARE

## 2021-04-20 VITALS — BODY MASS INDEX: 21.17 KG/M2 | WEIGHT: 127.21 LBS

## 2021-04-20 DIAGNOSIS — R10.9 RIGHT SIDED ABDOMINAL PAIN: ICD-10-CM

## 2021-04-20 PROCEDURE — 74011250636 HC RX REV CODE- 250/636: Performed by: SURGERY

## 2021-04-20 PROCEDURE — A9537 TC99M MEBROFENIN: HCPCS

## 2021-04-20 RX ADMIN — SINCALIDE 1.15 MCG: 5 INJECTION, POWDER, LYOPHILIZED, FOR SOLUTION INTRAVENOUS at 09:40

## 2021-04-26 ENCOUNTER — TELEPHONE (OUTPATIENT)
Dept: SURGERY | Age: 71
End: 2021-04-26

## 2021-04-30 NOTE — TELEPHONE ENCOUNTER
Informed patient DOS 5/12/21 @ [phone] with arrival time to [place] [id number]. PAT will call with date & time of COVID test, labs & EKG. Instructions will be mailed to home address. Express understanding.

## 2021-05-04 NOTE — PERIOP NOTES
[facility]  Preoperative Instructions        Surgery Date 5/12/2021          Time of Arrival 7:15am  Covid Testing 5/8/2021 between 7am-10am    1. On the day of your surgery, please report to the Surgical Services Registration Desk and sign in at your designated time. The Surgery Center is located to the right of the Emergency Room. 2. You must have someone with you to drive you home. You should not drive a car for 24 hours following surgery. Please make arrangements for a friend or family member to stay with you for the first 24 hours after your surgery. 3. Do not have anything to eat or drink (including water, gum, mints, coffee, juice) after midnight. ?This may not apply to medications prescribed by your physician. ?(Please note below the special instructions with medications to take the morning of your procedure.)    4. We recommend you do not drink any alcoholic beverages for 24 hours before and after your surgery. 5. Contact your surgeons office for instructions on the following medications: non-steroidal anti-inflammatory drugs (i.e. Advil, Aleve), vitamins, and supplements. (Some surgeons will want you to stop these medications prior to surgery and others may allow you to take them)  **If you are currently taking Plavix, Coumadin, Aspirin and/or other blood-thinning agents, contact your surgeon for instructions. ** Your surgeon will partner with the physician prescribing these medications to determine if it is safe to stop or if you need to continue taking. Please do not stop taking these medications without instructions from your surgeon    6. Wear comfortable clothes. Wear glasses instead of contacts. Do not bring any money or jewelry. Please bring picture ID, insurance card, and any prearranged co-payment or hospital payment. Do not wear make-up, particularly mascara the morning of your surgery.   Do not wear nail polish, particularly if you are having foot /hand surgery. Wear your hair loose or down, no ponytails, buns, cody pins or clips. All body piercings must be removed. Please shower with antibacterial soap for three consecutive days before and on the morning of surgery, but do not apply any lotions, powders or deodorants after the shower on the day of surgery. Please use a fresh towels after each shower. Please sleep in clean clothes and change bed linens the night before surgery. Please do not shave for 48 hours prior to surgery. Shaving of the face is acceptable. 7. You should understand that if you do not follow these instructions your surgery may be cancelled. If your physical condition changes (I.e. fever, cold or flu) please contact your surgeon as soon as possible. 8. It is important that you be on time. If a situation occurs where you may be late, please call [phone] (OR Holding Area). 9. If you have any questions and or problems, please call [phone] (Pre-admission Testing). 10. Your surgery time may be subject to change. You will receive a phone call the evening prior if your time changes. 11.  If having outpatient surgery, you must have someone to drive you here, stay with you during the duration of your stay, and to drive you home at time of discharge. Special Instructions:     TAKE ALL MEDICATIONS DAY OF SURGERY EXCEPT: No exceptions      I understand a pre-operative phone call will be made to verify my surgery time. In the event that I am not available, I give permission for a message to be left on my answering service and/or with another person?   Yes [phone]         ___________________      __________   _________    (Signature of Patient)             (Witness)                (Date and Time)

## 2021-05-07 ENCOUNTER — HOSPITAL ENCOUNTER (OUTPATIENT)
Dept: MAMMOGRAPHY | Age: 71
Discharge: HOME | End: 2021-05-07
Attending: INTERNAL MEDICINE
Payer: MEDICARE

## 2021-05-07 ENCOUNTER — HOSPITAL ENCOUNTER (OUTPATIENT)
Dept: PREADMISSION TESTING | Age: 71
Discharge: HOME | End: 2021-05-07
Attending: SURGERY
Payer: MEDICARE

## 2021-05-07 DIAGNOSIS — Z12.31 SCREENING MAMMOGRAM FOR HIGH-RISK PATIENT: ICD-10-CM

## 2021-05-07 PROCEDURE — 77067 SCR MAMMO BI INCL CAD: CPT

## 2021-05-07 PROCEDURE — 93005 ELECTROCARDIOGRAM TRACING: CPT

## 2021-05-08 ENCOUNTER — HOSPITAL ENCOUNTER (OUTPATIENT)
Dept: PREADMISSION TESTING | Age: 71
Discharge: HOME | End: 2021-05-08
Payer: MEDICARE

## 2021-05-08 LAB
ATRIAL RATE: 70 BPM
CALCULATED P AXIS, ECG09: 82 DEGREES
CALCULATED R AXIS, ECG10: 80 DEGREES
CALCULATED T AXIS, ECG11: 64 DEGREES
DIAGNOSIS, 93000: NORMAL
P-R INTERVAL, ECG05: 166 MS
Q-T INTERVAL, ECG07: 400 MS
QRS DURATION, ECG06: 82 MS
QTC CALCULATION (BEZET), ECG08: 432 MS
SARS-COV-2, COV2: NORMAL
VENTRICULAR RATE, ECG03: 70 BPM

## 2021-05-08 PROCEDURE — U0003 INFECTIOUS AGENT DETECTION BY NUCLEIC ACID (DNA OR RNA); SEVERE ACUTE RESPIRATORY SYNDROME CORONAVIRUS 2 (SARS-COV-2) (CORONAVIRUS DISEASE [COVID-19]), AMPLIFIED PROBE TECHNIQUE, MAKING USE OF HIGH THROUGHPUT TECHNOLOGIES AS DESCRIBED BY CMS-2020-01-R: HCPCS

## 2021-05-09 LAB — SARS-COV-2, COV2NT: NOT DETECTED

## 2021-05-12 ENCOUNTER — HOSPITAL ENCOUNTER (OUTPATIENT)
Age: 71
Setting detail: OUTPATIENT SURGERY
Discharge: HOME | End: 2021-05-12
Attending: SURGERY | Admitting: SURGERY
Payer: MEDICARE

## 2021-05-12 ENCOUNTER — APPOINTMENT (OUTPATIENT)
Dept: GENERAL RADIOLOGY | Age: 71
End: 2021-05-12
Attending: SURGERY
Payer: MEDICARE

## 2021-05-12 ENCOUNTER — ANESTHESIA EVENT (OUTPATIENT)
Dept: SURGERY | Age: 71
End: 2021-05-12
Payer: MEDICARE

## 2021-05-12 ENCOUNTER — ANESTHESIA (OUTPATIENT)
Dept: SURGERY | Age: 71
End: 2021-05-12
Payer: MEDICARE

## 2021-05-12 VITALS
WEIGHT: 127 LBS | OXYGEN SATURATION: 94 % | DIASTOLIC BLOOD PRESSURE: 61 MMHG | RESPIRATION RATE: 20 BRPM | SYSTOLIC BLOOD PRESSURE: 144 MMHG | TEMPERATURE: 97.7 F | HEART RATE: 72 BPM | HEIGHT: 66 IN | BODY MASS INDEX: 20.41 KG/M2

## 2021-05-12 DIAGNOSIS — K80.20 GALLSTONES: Primary | ICD-10-CM

## 2021-05-12 PROCEDURE — 76060000032 HC ANESTHESIA 0.5 TO 1 HR: Performed by: SURGERY

## 2021-05-12 PROCEDURE — 74011000250 HC RX REV CODE- 250: Performed by: SURGERY

## 2021-05-12 PROCEDURE — 77030020053 HC ELECTRD LAPSCP COVD -B: Performed by: SURGERY

## 2021-05-12 PROCEDURE — 76210000026 HC REC RM PH II 1 TO 1.5 HR: Performed by: SURGERY

## 2021-05-12 PROCEDURE — 74011000636 HC RX REV CODE- 636: Performed by: SURGERY

## 2021-05-12 PROCEDURE — 47563 LAPARO CHOLECYSTECTOMY/GRAPH: CPT | Performed by: SURGERY

## 2021-05-12 PROCEDURE — 74011250636 HC RX REV CODE- 250/636: Performed by: SURGERY

## 2021-05-12 PROCEDURE — 77030008684 HC TU ET CUF COVD -B: Performed by: ANESTHESIOLOGY

## 2021-05-12 PROCEDURE — 77030037032 HC INSRT SCIS CLICKLLINE DISP STOR -B: Performed by: SURGERY

## 2021-05-12 PROCEDURE — 74011250636 HC RX REV CODE- 250/636: Performed by: ANESTHESIOLOGY

## 2021-05-12 PROCEDURE — 77030013079 HC BLNKT BAIR HGGR 3M -A: Performed by: ANESTHESIOLOGY

## 2021-05-12 PROCEDURE — 77030018875 HC APPL CLP LIG4 J&J -B: Performed by: SURGERY

## 2021-05-12 PROCEDURE — 77030002933 HC SUT MCRYL J&J -A: Performed by: SURGERY

## 2021-05-12 PROCEDURE — 77030010837 HC CATH CHOL INTRO TELE -B: Performed by: SURGERY

## 2021-05-12 PROCEDURE — 77030008606 HC TRCR ENDOSC KII AMR -B: Performed by: SURGERY

## 2021-05-12 PROCEDURE — 88304 TISSUE EXAM BY PATHOLOGIST: CPT

## 2021-05-12 PROCEDURE — 77030031139 HC SUT VCRL2 J&J -A: Performed by: SURGERY

## 2021-05-12 PROCEDURE — 77030026438 HC STYL ET INTUB CARD -A: Performed by: ANESTHESIOLOGY

## 2021-05-12 PROCEDURE — 74011250636 HC RX REV CODE- 250/636: Performed by: NURSE ANESTHETIST, CERTIFIED REGISTERED

## 2021-05-12 PROCEDURE — 77030020829: Performed by: SURGERY

## 2021-05-12 PROCEDURE — 77030010507 HC ADH SKN DERMBND J&J -B: Performed by: SURGERY

## 2021-05-12 PROCEDURE — 74300 X-RAY BILE DUCTS/PANCREAS: CPT | Performed by: SURGERY

## 2021-05-12 PROCEDURE — 2709999900 HC NON-CHARGEABLE SUPPLY: Performed by: SURGERY

## 2021-05-12 PROCEDURE — 76010000138 HC OR TIME 0.5 TO 1 HR: Performed by: SURGERY

## 2021-05-12 PROCEDURE — 77030008771 HC TU NG SALEM SUMP -A: Performed by: ANESTHESIOLOGY

## 2021-05-12 PROCEDURE — 74011250636 HC RX REV CODE- 250/636

## 2021-05-12 PROCEDURE — 76210000016 HC OR PH I REC 1 TO 1.5 HR: Performed by: SURGERY

## 2021-05-12 PROCEDURE — 77030012770 HC TRCR OPT FX AMR -B: Performed by: SURGERY

## 2021-05-12 PROCEDURE — 74011000250 HC RX REV CODE- 250: Performed by: NURSE ANESTHETIST, CERTIFIED REGISTERED

## 2021-05-12 PROCEDURE — 74300 X-RAY BILE DUCTS/PANCREAS: CPT

## 2021-05-12 PROCEDURE — 74011250637 HC RX REV CODE- 250/637: Performed by: SURGERY

## 2021-05-12 PROCEDURE — 77030009851 HC PCH RTVR ENDOSC AMR -B: Performed by: SURGERY

## 2021-05-12 PROCEDURE — 77030040361 HC SLV COMPR DVT MDII -B: Performed by: SURGERY

## 2021-05-12 PROCEDURE — 77030008756 HC TU IRR SUC STRY -B: Performed by: SURGERY

## 2021-05-12 RX ORDER — MIDAZOLAM HYDROCHLORIDE 1 MG/ML
1 INJECTION, SOLUTION INTRAMUSCULAR; INTRAVENOUS AS NEEDED
Status: DISCONTINUED | OUTPATIENT
Start: 2021-05-12 | End: 2021-05-12 | Stop reason: HOSPADM

## 2021-05-12 RX ORDER — FENTANYL CITRATE 50 UG/ML
25 INJECTION, SOLUTION INTRAMUSCULAR; INTRAVENOUS
Status: DISCONTINUED | OUTPATIENT
Start: 2021-05-12 | End: 2021-05-12 | Stop reason: HOSPADM

## 2021-05-12 RX ORDER — FENTANYL CITRATE 50 UG/ML
50 INJECTION, SOLUTION INTRAMUSCULAR; INTRAVENOUS AS NEEDED
Status: DISCONTINUED | OUTPATIENT
Start: 2021-05-12 | End: 2021-05-12 | Stop reason: HOSPADM

## 2021-05-12 RX ORDER — LIDOCAINE HYDROCHLORIDE 10 MG/ML
0.1 INJECTION, SOLUTION EPIDURAL; INFILTRATION; INTRACAUDAL; PERINEURAL AS NEEDED
Status: DISCONTINUED | OUTPATIENT
Start: 2021-05-12 | End: 2021-05-12 | Stop reason: HOSPADM

## 2021-05-12 RX ORDER — MIDAZOLAM HYDROCHLORIDE 1 MG/ML
INJECTION, SOLUTION INTRAMUSCULAR; INTRAVENOUS AS NEEDED
Status: DISCONTINUED | OUTPATIENT
Start: 2021-05-12 | End: 2021-05-12 | Stop reason: HOSPADM

## 2021-05-12 RX ORDER — HYDROMORPHONE HYDROCHLORIDE 2 MG/ML
INJECTION, SOLUTION INTRAMUSCULAR; INTRAVENOUS; SUBCUTANEOUS AS NEEDED
Status: DISCONTINUED | OUTPATIENT
Start: 2021-05-12 | End: 2021-05-12 | Stop reason: HOSPADM

## 2021-05-12 RX ORDER — POLYETHYLENE GLYCOL 3350 17 G/17G
17 POWDER, FOR SOLUTION ORAL DAILY
Qty: 510 G | Refills: 0 | Status: SHIPPED | OUTPATIENT
Start: 2021-05-12 | End: 2022-05-01 | Stop reason: ALTCHOICE

## 2021-05-12 RX ORDER — BUPIVACAINE HYDROCHLORIDE 5 MG/ML
INJECTION, SOLUTION EPIDURAL; INTRACAUDAL AS NEEDED
Status: DISCONTINUED | OUTPATIENT
Start: 2021-05-12 | End: 2021-05-12 | Stop reason: HOSPADM

## 2021-05-12 RX ORDER — HYDROMORPHONE HYDROCHLORIDE 1 MG/ML
.2-.5 INJECTION, SOLUTION INTRAMUSCULAR; INTRAVENOUS; SUBCUTANEOUS
Status: DISCONTINUED | OUTPATIENT
Start: 2021-05-12 | End: 2021-05-12 | Stop reason: HOSPADM

## 2021-05-12 RX ORDER — LIDOCAINE HYDROCHLORIDE 20 MG/ML
INJECTION, SOLUTION EPIDURAL; INFILTRATION; INTRACAUDAL; PERINEURAL AS NEEDED
Status: DISCONTINUED | OUTPATIENT
Start: 2021-05-12 | End: 2021-05-12 | Stop reason: HOSPADM

## 2021-05-12 RX ORDER — SODIUM CHLORIDE, SODIUM LACTATE, POTASSIUM CHLORIDE, CALCIUM CHLORIDE 600; 310; 30; 20 MG/100ML; MG/100ML; MG/100ML; MG/100ML
25 INJECTION, SOLUTION INTRAVENOUS CONTINUOUS
Status: DISCONTINUED | OUTPATIENT
Start: 2021-05-12 | End: 2021-05-12 | Stop reason: HOSPADM

## 2021-05-12 RX ORDER — HYDROCODONE BITARTRATE AND ACETAMINOPHEN 5; 325 MG/1; MG/1
1-2 TABLET ORAL
Qty: 30 TAB | Refills: 0 | Status: SHIPPED | OUTPATIENT
Start: 2021-05-12 | End: 2021-05-17

## 2021-05-12 RX ORDER — ONDANSETRON 2 MG/ML
INJECTION INTRAMUSCULAR; INTRAVENOUS AS NEEDED
Status: DISCONTINUED | OUTPATIENT
Start: 2021-05-12 | End: 2021-05-12 | Stop reason: HOSPADM

## 2021-05-12 RX ORDER — NEOSTIGMINE METHYLSULFATE 1 MG/ML
INJECTION, SOLUTION INTRAVENOUS AS NEEDED
Status: DISCONTINUED | OUTPATIENT
Start: 2021-05-12 | End: 2021-05-12 | Stop reason: HOSPADM

## 2021-05-12 RX ORDER — ONDANSETRON 2 MG/ML
4 INJECTION INTRAMUSCULAR; INTRAVENOUS AS NEEDED
Status: DISCONTINUED | OUTPATIENT
Start: 2021-05-12 | End: 2021-05-12 | Stop reason: HOSPADM

## 2021-05-12 RX ORDER — DEXAMETHASONE SODIUM PHOSPHATE 4 MG/ML
INJECTION, SOLUTION INTRA-ARTICULAR; INTRALESIONAL; INTRAMUSCULAR; INTRAVENOUS; SOFT TISSUE AS NEEDED
Status: DISCONTINUED | OUTPATIENT
Start: 2021-05-12 | End: 2021-05-12 | Stop reason: HOSPADM

## 2021-05-12 RX ORDER — GLYCOPYRROLATE 0.2 MG/ML
INJECTION INTRAMUSCULAR; INTRAVENOUS AS NEEDED
Status: DISCONTINUED | OUTPATIENT
Start: 2021-05-12 | End: 2021-05-12 | Stop reason: HOSPADM

## 2021-05-12 RX ORDER — FENTANYL CITRATE 50 UG/ML
INJECTION, SOLUTION INTRAMUSCULAR; INTRAVENOUS
Status: COMPLETED
Start: 2021-05-12 | End: 2021-05-12

## 2021-05-12 RX ORDER — SUCCINYLCHOLINE CHLORIDE 20 MG/ML
INJECTION INTRAMUSCULAR; INTRAVENOUS AS NEEDED
Status: DISCONTINUED | OUTPATIENT
Start: 2021-05-12 | End: 2021-05-12 | Stop reason: HOSPADM

## 2021-05-12 RX ORDER — PHENYLEPHRINE HCL IN 0.9% NACL 0.4MG/10ML
SYRINGE (ML) INTRAVENOUS AS NEEDED
Status: DISCONTINUED | OUTPATIENT
Start: 2021-05-12 | End: 2021-05-12 | Stop reason: HOSPADM

## 2021-05-12 RX ORDER — ROCURONIUM BROMIDE 10 MG/ML
INJECTION, SOLUTION INTRAVENOUS AS NEEDED
Status: DISCONTINUED | OUTPATIENT
Start: 2021-05-12 | End: 2021-05-12 | Stop reason: HOSPADM

## 2021-05-12 RX ORDER — PROPOFOL 10 MG/ML
INJECTION, EMULSION INTRAVENOUS AS NEEDED
Status: DISCONTINUED | OUTPATIENT
Start: 2021-05-12 | End: 2021-05-12 | Stop reason: HOSPADM

## 2021-05-12 RX ADMIN — FENTANYL CITRATE 25 MCG: 50 INJECTION, SOLUTION INTRAMUSCULAR; INTRAVENOUS at 09:47

## 2021-05-12 RX ADMIN — FENTANYL CITRATE 25 MCG: 50 INJECTION, SOLUTION INTRAMUSCULAR; INTRAVENOUS at 09:57

## 2021-05-12 RX ADMIN — SUCCINYLCHOLINE CHLORIDE 120 MG: 20 INJECTION, SOLUTION INTRAMUSCULAR; INTRAVENOUS at 08:18

## 2021-05-12 RX ADMIN — MIDAZOLAM HYDROCHLORIDE 2 MG: 1 INJECTION, SOLUTION INTRAMUSCULAR; INTRAVENOUS at 08:15

## 2021-05-12 RX ADMIN — Medication 200 MCG: at 08:36

## 2021-05-12 RX ADMIN — GLYCOPYRROLATE 0.4 MG: 0.2 INJECTION, SOLUTION INTRAMUSCULAR; INTRAVENOUS at 09:03

## 2021-05-12 RX ADMIN — PROPOFOL 120 MG: 10 INJECTION, EMULSION INTRAVENOUS at 08:18

## 2021-05-12 RX ADMIN — FENTANYL CITRATE 25 MCG: 50 INJECTION, SOLUTION INTRAMUSCULAR; INTRAVENOUS at 09:52

## 2021-05-12 RX ADMIN — SODIUM CHLORIDE, POTASSIUM CHLORIDE, SODIUM LACTATE AND CALCIUM CHLORIDE 25 ML/HR: 600; 310; 30; 20 INJECTION, SOLUTION INTRAVENOUS at 07:45

## 2021-05-12 RX ADMIN — LIDOCAINE HYDROCHLORIDE 80 MG: 20 INJECTION, SOLUTION EPIDURAL; INFILTRATION; INTRACAUDAL; PERINEURAL at 08:18

## 2021-05-12 RX ADMIN — ROCURONIUM BROMIDE 10 MG: 10 INJECTION INTRAVENOUS at 08:18

## 2021-05-12 RX ADMIN — HYDROMORPHONE HYDROCHLORIDE 0.5 MG: 2 INJECTION, SOLUTION INTRAMUSCULAR; INTRAVENOUS; SUBCUTANEOUS at 08:34

## 2021-05-12 RX ADMIN — HYDROMORPHONE HYDROCHLORIDE 0.5 MG: 2 INJECTION, SOLUTION INTRAMUSCULAR; INTRAVENOUS; SUBCUTANEOUS at 08:16

## 2021-05-12 RX ADMIN — ONDANSETRON HYDROCHLORIDE 4 MG: 2 INJECTION, SOLUTION INTRAMUSCULAR; INTRAVENOUS at 08:40

## 2021-05-12 RX ADMIN — NEOSTIGMINE METHYLSULFATE 3 MG: 1 INJECTION, SOLUTION INTRAVENOUS at 09:03

## 2021-05-12 RX ADMIN — Medication 3 AMPULE: at 07:45

## 2021-05-12 RX ADMIN — DEXAMETHASONE SODIUM PHOSPHATE 8 MG: 4 INJECTION, SOLUTION INTRAMUSCULAR; INTRAVENOUS at 08:23

## 2021-05-12 RX ADMIN — WATER 2 G: 1 INJECTION INTRAMUSCULAR; INTRAVENOUS; SUBCUTANEOUS at 08:25

## 2021-05-12 RX ADMIN — Medication 80 MCG: at 08:27

## 2021-05-12 NOTE — PERIOP NOTES
Handoff Report from Operating Room to PACU    Report received from Meka Goldsmith RN and Chilo Dickens CRNA regarding Noemi Salcido. Surgeon(s):  Ani Bryant MD  And Procedure(s) (LRB):  LAPAROSCOPIC CHOLECYSTECTOMY  WITH CHOLANGIOGRAMS (N/A)  confirmed   with allergies, dressings and local anesthetic discussed. Anesthesia type, drugs, patient history, complications, estimated blood loss, vital signs, intake and output, and last pain medication, lines, reversal medications and temperature were reviewed.

## 2021-05-12 NOTE — OP NOTES
DATE OF PROCEDURE:  5/12/2021     PREOPERATIVE DIAGNOSIS:   Symptomatic gallstones    POSTOPERATIVE DIAGNOSIS:   Gallstones [K80.20]     OPERATIVE PROCEDURE:  Laparoscopic cholecystectomy with cholangiogram, interpretation of cholangiogram (CPT 17979 [id number])    SURGEON:  Vianey Bro MD    ANESTHESIA:  General endotracheal.    EBL: minimal    SPECIMENS:   ID Type Source Tests Collected by Time Destination   1 : gallbladder Preservative Gallbladder  Nina MD Michele 5/12/2021 9870 Pathology        FINDINGS:  Chronic inflammation of the gallbladder, gallstones. No CBD filling defect on cholangiogram.    INDICATIONS:  RUQ and epigastric pain. Gallstones on ultrasound, and abnormal gallbladder EF on HIDA with exact reproduction of post-prandial pain with CCK administration. DESCRIPTION OF PROCEDURE:  After obtaining informed consent, the patient was taken to the operating room and placed supine on the operating table. An operative time-out was performed, and general endotracheal anesthesia was induced. Preoperative antibiotics were administered, and the abdomen was prepped and draped in the usual sterile fashion. The abdomen was then entered just above the umbilicus using a 5-mm optical trocar. The abdomen was the insufflated without incident and was visually explored. There was no other visible pathology noted. Two right upper quadrant 5-mm ports were placed under direct vision, followed by a 12-mm port in the subxiphoid position. Local anesthetic was infiltrated at the port sites prior to placement. The gallbladder fundus was then grasped and retracted cephalad over the liver. The triangle of Calot was exposed, and the cystic duct and artery were skeletonized using a combination of blunt dissection with a Maryland dissector and hook electrocautery. The cystic artery was then divided between clips with 2 clips left on the patient side.   The cystic duct was then traced from the gallbladder infundibulum into its insertion into the portal triad. The cystic duct was swept with a grasper up into the gallbladder. A clip was placed on the gallbladder infundibulum and an incision in the cystic duct adjacent to the gallbladder infundibulum was made with abe and the cholangiogram catheter was inserted and the cholangiogram was performed with the above noted findings. The cystic duct was then divided between clips directly adjacent to the gallbladder infundibulum, with 3 clips left on the patient's side. The gallbladder was then removed from the liver bed using hook electrocautery. It was removed from the abdominal cavity using a bag through the subxiphoid incision. The liver bed was inspected for hemostasis, and excellent hemostasis was achieved with minimal electrocautery. The clips on the cystic duct and artery were again visualized and were intact. The area below and lateral to the liver was suction irrigated until clear. The right upper quadrant ports were removed under direct vision and both were hemostatic. The abdomen was then desufflated through the subxiphoid port under direct vision via the umbilical port. These ports were subsequently removed and the fascial defect at the 12-mm incision was closed with an interrupted 0 Vicryl suture. The incisions were irrigated with sterile saline and made hemostatic with minimal electrocautery. They were closed with buried interrupted 4-0 Monocryl sutures, and dressed with sterile dressing. The patient was recovered from general anesthesia and taken to the recovery area in satisfactory condition. All instrument, sponge, and needle counts were reported as correct.     Nolberto Morales MD

## 2021-05-12 NOTE — H&P
Day of Surgery H&P    Assessment:   Gallstones [K80.20]    Body mass index is 20.5 kg/m². Plan:   LAPAROSCOPIC CHOLECYSTECTOMY  WITH CHOLANGIOGRAMS (N/A ) Discussed the risk of surgery including bleeding, infection, injury to adjacent structures, and the risks of general anesthetic. The patient understands the risks; any and all questions were answered to the patient's satisfaction. The patient was counseled at length about the risks of janis Covid-19 during their perioperative period and any recovery window from their procedure. The patient was made aware that janis Covid-19  may worsen their prognosis for recovering from their procedure and lend to a higher morbidity and/or mortality risk. All material risks, benefits, and reasonable alternatives including postponing the procedure were discussed. The patient wishes to proceed with the procedure at this time. Subjective:      Deya Earl is a 69 y.o. female who presents for above procedure. Objective:   Blood pressure 124/74, pulse 72, temperature 97.7 °F (36.5 °C), resp. rate 13, height 5' 6\" (1.676 m), weight 57.6 kg (127 lb), SpO2 100 %.   Temp (24hrs), Av.7 °F (36.5 °C), Min:97.7 °F (36.5 °C), Max:97.7 °F (36.5 °C)      Physical Exam:  PHYSICAL EXAM:    Chest:   [x]   CTA bialterally, no wheezing/rhonchi/rales/crackles    []   wheezing     []   rhonchi     []   crackles     []   use of accessory muscles    Heart:  [x]   regular rate and rhythm     [x]   No murmurs/rubs/gallops    []   irregular rhythm     []   Murmur     []   Rubs     []   Gallops         Past Medical History:   Diagnosis Date    Arthritis     Bacterial pneumonia 2018    Chronic bilateral low back pain without sciatica 2018    Chronic pain     GERD (gastroesophageal reflux disease)     Hypercholesterolemia     Long term (current) use of anticoagulants     patient denies    Primary osteoarthritis involving multiple joints 2018    Raynaud's syndrome     Sjogren's syndrome ([facility] [address] 11/20/2018    extreme dry mouth and eyes, burning tongue    Weight loss, unintentional 11/20/2018     Past Surgical History:   Procedure Laterality Date    HX TONSILLECTOMY        Family History   Problem Relation Age of Onset    Heart Disease Maternal Grandfather     Prostate Cancer Father     Prostate Cancer Brother      Social History     Socioeconomic History    Marital status:      Spouse name: Not on file    Number of children: Not on file    Years of education: Not on file    Highest education level: Not on file   Tobacco Use    Smoking status: Never Smoker    Smokeless tobacco: Never Used   Substance and Sexual Activity    Alcohol use: Not Currently     Comment: rarely    Drug use: Never    Sexual activity: Not Currently      Prior to Admission medications    Medication Sig Start Date End Date Taking? Authorizing Provider   vit A/vit C/vit E/zinc/copper (PRESERVISION AREDS PO) Take 1 Tab by mouth daily. Yes Provider, Historical   omeprazole (PRILOSEC) 20 mg capsule Take 1 Cap by mouth daily. 4/9/21  Yes Nick Olvera MD   colestipoL (COLESTID) 1 gram tablet One bid 4/9/21  Yes Nick Olvera MD   amLODIPine (NORVASC) 2.5 mg tablet Take 2.5 mg by mouth nightly. 2/23/21  Yes Provider, Historical   fenofibrate (LOFIBRA) 160 mg tablet TAKE 1 TABLET BY MOUTH EVERY DAY 1/28/21  Yes Nick Olvera MD   gabapentin (NEURONTIN) 100 mg capsule TAKE 1 CAPSULE BY MOUTH THREE TIMES DAILY 12/28/20  Yes Nick Olvera MD   Biotin 2,500 mcg cap Take 1 Tab by mouth daily. Yes Provider, Historical   zolpidem (AMBIEN) 10 mg tablet TAKE 1 TABLET BY MOUTH AT BEDTIME AS NEEDED 9/8/20  Yes Nick Olvera MD   acetaminophen (TYLENOL EXTRA STRENGTH) 500 mg tablet Take 1,000 mg by mouth daily as needed for Pain. Yes Provider, Historical   nabumetone (RELAFEN) 500 mg tablet Take 500 mg by mouth two (2) times a day.  9/30/18  Yes Provider, Historical   calcium/D3/mag ox//morgan/Zn (CALTRATE + D3 PLUS MINERALS PO) Take 1 Tab by mouth two (2) times a day. Yes Provider, Historical   propylene glycol/peg 400/PF (SYSTANE, PF, OP) Administer  to both eyes daily as needed (dry eyes). Provider, Historical     ALLERGIES:    Allergies   Allergen Reactions    Diclofenac Other (comments) and Nausea and Vomiting     Nausea fever and chills  \"PATIENT CAN TAKE THE TOPICAL CREAM\"  Nausea fever and chills    Plaquenil [Hydroxychloroquine] Rash    Sulfa (Sulfonamide Antibiotics) Unknown (comments)       Review of Systems:    See prior consult, office note or scanned list in media section. 10 systems negative except as specified.                 Signed By: Princess Rivas MD     May 12, 2021

## 2021-05-12 NOTE — ANESTHESIA PREPROCEDURE EVALUATION
Relevant Problems   RESPIRATORY SYSTEM   (+) Bacterial pneumonia      ENDOCRINE   (+) 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)       Anesthetic History   No history of anesthetic complications            Review of Systems / Medical History  Patient summary reviewed, nursing notes reviewed and pertinent labs reviewed    Pulmonary  Within defined limits                 Neuro/Psych   Within defined limits           Cardiovascular              Hyperlipidemia    Exercise tolerance: >4 METS     GI/Hepatic/Renal     GERD           Endo/Other        Arthritis     Other Findings   Comments: RA  Raynaud's disease  Sjogren's syndrome  Dysphagia  Chronic pain           Physical Exam    Airway  Mallampati: II  TM Distance: 4 - 6 cm  Neck ROM: normal range of motion   Mouth opening: Normal     Cardiovascular  Regular rate and rhythm,  S1 and S2 normal,  no murmur, click, rub, or gallop             Dental  No notable dental hx       Pulmonary  Breath sounds clear to auscultation               Abdominal  GI exam deferred       Other Findings            Anesthetic Plan    ASA: 2  Anesthesia type: general    Monitoring Plan: BIS      Induction: Intravenous  Anesthetic plan and risks discussed with: Patient

## 2021-05-12 NOTE — PERIOP NOTES
06: 35= pt had not arrived for surgery; called home phone ([phone]); pt answered and stated she was told she was not supposed to be here until 7:15AM; I verified that PreOperative instructions DO STATE 7:15AM; I will inform PAT that pt was not called to notify of time of 6:15AM arrival; pt is close by and can be here in 5 minutes; informed waiting Desk and Adriana, OR Charge. 07:10= still has not arrived for surgery.

## 2021-05-12 NOTE — ANESTHESIA POSTPROCEDURE EVALUATION
Procedure(s):  LAPAROSCOPIC CHOLECYSTECTOMY  WITH CHOLANGIOGRAMS. general    Anesthesia Post Evaluation        Patient location during evaluation: PACU  Note status: Adequate. Level of consciousness: responsive to verbal stimuli and sleepy but conscious  Pain management: satisfactory to patient  Airway patency: patent  Anesthetic complications: no  Cardiovascular status: acceptable  Respiratory status: acceptable  Hydration status: acceptable  Comments: +Post-Anesthesia Evaluation and Assessment    Patient: Lane Dietrich MRN: [id number]  SSN: [id number]   YOB: 1950  Age: 69 y.o. Sex: female      Cardiovascular Function/Vital Signs    /63 (BP 1 Location: Right upper arm)   Pulse 68   Temp 36.6 °C (97.9 °F)   Resp 11   Ht 5' 6\" (1.676 m)   Wt 57.6 kg (127 lb)   SpO2 95%   BMI 20.50 kg/m²     Patient is status post Procedure(s):  LAPAROSCOPIC CHOLECYSTECTOMY  WITH CHOLANGIOGRAMS. Nausea/Vomiting: Controlled. Postoperative hydration reviewed and adequate. Pain:  Pain Scale 1: Numeric (0 - 10) (05/12/21 1000)  Pain Intensity 1: 3 (05/12/21 1000)   Managed. Neurological Status:   Neuro (WDL): Exceptions to WDL (05/12/21 0915)   At baseline. Mental Status and Level of Consciousness: Arousable. Pulmonary Status:   O2 Device: None (Room air) (05/12/21 1000)   Adequate oxygenation and airway patent. Complications related to anesthesia: None    Post-anesthesia assessment completed. No concerns. Signed By: Agapito Brown MD    5/12/2021  Post anesthesia nausea and vomiting:  controlled      INITIAL Post-op Vital signs:   Vitals Value Taken Time   /63 05/12/21 1000   Temp 36.6 °C (97.9 °F) 05/12/21 1000   Pulse 70 05/12/21 1013   Resp 14 05/12/21 1013   SpO2 80 % 05/12/21 1010   Vitals shown include unvalidated device data.

## 2021-05-12 NOTE — DISCHARGE INSTRUCTIONS
Discharge Instructions:  Laparoscopic Cholecystectomy (Gallbladder Removal)  Dr. Vicenta Huerta    Call on next business day to arrange appointment for follow up in 3 weeks -- [phone]. Activity:  Walk regularly - can walk today as tolerated, but make yourself walk at least 50 yards at least 6 times per day starting tomorrow. No lifting more than 10 -15 pounds for 4 weeks. You may resume driving in 5-7 days unless still requiring narcotics for pain. Work:  You may return to work in 1 or 2 weeks to light activity. No lifting more than 10 pounds (=\"light duty\") for four weeks. If your employer can not accommodate \"light duty,\" your employer will need to provide any necessary paperwork to our office. This document with serve as the initial \"note\" to your employer. Diet:  You may resume normal diet after 24 hours. Fatty foods may still cause some stomach upset. Avoid fatty/greasy foods for 1 month, then add back slowly. Wound Care:  Dermabond glue dressing will fall off over the next couple of weeks. It is waterproof. You may shower, but no swimming or baths for 2 weeks. Your incisions may ooze for a few days. Do not worry about this. If you experience a lot of drainage, develop redness around the wound, or a fever over 101 F occurs please call the office. Medications:  See attached \"Medication Reconciliation. \"    Resume home medications as indicated on the Medical Reconciliation form. Do not use blood thinners (such as Aspirin, Coumadin, or Plavix) until 3 days after surgery. Pain medications:  Non steroidal antiinflammatories (ibuprofen -- Advil or Motrin) seem to work best for post surgical pain. Try these first.  A narcotic prescription will also be given for breakthrough pain. Do not use Tylenol while taking the narcotic because there is Tylenol in the narcotic pill, and too much Tylenol can injure your liver.   Tylenol can be used in place of the narcotic, but do not take both at the same time. PUT ICE ON YOUR INCISION(S) 20 MINUTES EVERY HOUR WHILE AWAKE FOR THE NEXT 3 DAYS AT LEAST. PLACE A THIN CLOTH BETWEEN YOUR SKIN AND THE ICE BAG. Colace or Miralax should be used twice daily to prevent constipation while on narcotics. If you are still having trouble having a BM after 1-2 days, try milk of magnesia. If this does not work within 24 hours, try a bottle of magnesium citrate. If this does not work, call us. Narcotics and anesthesia sometimes cause nausea and vomiting. If persistent please call the office. Do not hesitate to call with questions or concerns. Josefina Lake MD  [facility]. Tel. [phone]  Fax [phone] from Nurse    PATIENT INSTRUCTIONS:    After general anesthesia or intravenous sedation, for 24 hours or while taking prescription Narcotics:  · Limit your activities  · Do not drive and operate hazardous machinery  · Do not make important personal or business decisions  · Do  not drink alcoholic beverages  · If you have not urinated within 8 hours after discharge, please contact your surgeon on call. Report the following to your surgeon:  · Excessive pain, swelling, redness or odor of or around the surgical area  · Temperature over 100.5  · Nausea and vomiting lasting longer than 4 hours or if unable to take medications  · Any signs of decreased circulation or nerve impairment to extremity: change in color, persistent  numbness, tingling, coldness or increase pain  · Any questions    What to do at Home:  A common side effect of anesthesia following surgery is nausea and/or vomiting. In order to decrease symptoms, it is wise to avoid foods that are high in fat, greasy foods, milk products, and spicy foods for the first 24 hours.     Acceptable foods for the first 24 hours following surgery include but are not limited to:     soup   broth    toast    crackers    applesauce   bananas    mashed potatoes,   soft or scrambled eggs   oatmeal    jello    It is important to eat when taking your pain medication. This will help to prevent nausea. If possible, please try to time your meals with your medications. It is very important to stay hydrated following surgery. Sip fluids frequently while awake. Avoid acidic drinks such as citrus juices and soda for 24 hours. Carbonated beverages may cause bloating and gas. Acceptable fluids include:    - water (flavor packets may add variety)  - coffee or tea (in moderation)  - Gatorade  - Pedrito-aid  - apple juice  - cranberry juice    You are encouraged to cough and deep breathe every hour when awake. This will help to prevent respiratory complications following anesthesia. You may want to hug a pillow when coughing and sneezing to add additional support to the surgical area and to decrease discomfort if you had abdominal or chest surgery. If you are discharged home with support stockings, you may remove them after 24 hours. Support stockings are used to help prevent blood clots in the legs following surgery. Please take time to review all of your Home Care Instructions and Medication Information sheets provided in your discharge packet. If you have any questions, please contact your surgeons office. Thank you. How to Care for Your Wound After Its Treated With  DERMABOND* Topical Skin Adhesive  DERMABOND* Topical Skin Adhesive (2-octyl cyanoacrylate) is a sterile, liquid skin adhesive  that holds wound edges together. The film will usually remain in place for 5 to 10 days, then  naturally fall off your skin. The following will answer some of your questions and provide instructions for proper care for your  wound while it is healing:    CHECK WOUND APPEARANCE   Some swelling, redness, and pain are common with all wounds and normally will go away as the  wound heals.  If swelling, redness, or pain increases or if the wound feels warm to the touch,  contact a doctor. Also contact a doctor if the wound edges reopen or separate. REPLACE BANDAGES   If your wound is bandaged, keep the bandage dry.  Replace the dressing daily until the adhesive film has fallen off or if the  bandage should become wet, unless otherwise instructed by your  physician.  When changing the dressing, do not place tape directly over the  DERMABOND adhesive film, because removing the tape later may also  remove the film. AVOID TOPICAL MEDICATIONS   Do not apply liquid or ointment medications or any other product to your wound while the  DERMABOND adhesive film is in place. These may loosen the film before your wound is healed. KEEP WOUND DRY AND PROTECTED   You may occasionally and briefly wet your wound in the shower or bath. Do not soak or scrub  your wound, do not swim, and avoid periods of heavy perspiration until the DERMABOND  adhesive has naturally fallen off. After showering or bathing, gently blot your wound dry with a  soft towel. If a protective dressing is being used, apply a fresh, dry bandage, being sure to keep  the tape off the DERMABOND adhesive film.  Apply a clean, dry bandage over the wound if necessary to protect it.  Protect your wound from injury until the skin has had sufficient time to heal.   Do not scratch, rub, or pick at the DERMABOND adhesive film. This may loosen the film before  your wound is healed.  Protect the wound from prolonged exposure to sunlight or tanning lamps while the film is in  place. If you have any questions or concerns about this product, please consult your doctor. *Trademark ©[employer] 6018QC PREVENT AN INFECTION      1. [address] YOUR HANDS     To prevent infection, good handwashing is the most important thing you or your caregiver can do.  Wash your hands with soap and water or use the hand  we gave you before you touch any wounds.     2. SHOWER     Use the antibacterial soap we gave you when you take a shower.  Shower with this soap until your wounds are healed.  To reach all areas of your body, you may need someone to help you.  Dont forget to clean your belly button with every shower. 3.  USE CLEAN SHEETS     Use freshly cleaned sheets on your bed after surgery.  To keep the surgery site clean, do not allow pets to sleep with you while your wound is still healing. 4. STOP SMOKING     Stop smoking, or at least cut back on smoking     Smoking slows your healing. 5.  CONTROL YOUR BLOOD SUGAR     High blood sugars slow wound healing. If you are diabetic, control your blood sugar levels before and after your surgery. No smoking/ No tobacco products/ Avoid exposure to second hand smoke  Surgeon General's Warning:  Quitting smoking now greatly reduces serious risk to your health. Obesity, smoking, and sedentary lifestyle greatly increases your risk for illness    A healthy diet, regular physical exercise & weight monitoring are important for maintaining a healthy lifestyle    You may be retaining fluid if you have a history of heart failure or if you experience any of the following symptoms:  Weight gain of 3 pounds or more overnight or 5 pounds in a week, increased swelling in our hands or feet or shortness of breath while lying flat in bed. Please call your doctor as soon as you notice any of these symptoms; do not wait until your next office visit. Patient Education      Narcotic-Analgesic/Acetaminophen (Percocet, [address], [facility], Lortab 10/325) - (By mouth)   Why this medicine is used:   Relieves pain.   Contact a nurse or doctor right away if you have:  · Extreme weakness, shallow breathing, slow heartbeat  · Severe confusion, lightheadedness, dizziness, fainting  · Yellow skin or eyes, dark urine or pale stools  · Severe constipation, severe stomach pain, nausea, vomiting, loss of appetite  · Sweating or cold, clammy skin     Common side effects:  · Mild constipation, nausea, vomiting  · Sleepiness, tiredness  · Itching, rash  [address] Information is for End User's use only and may not be sold, redistributed or otherwise used for commercial purposes. The discharge information has been reviewed with the {PATIENT PARENT GUARDIAN:98428}. The {PATIENT PARENT GUARDIAN:23011} verbalized understanding. Discharge medications reviewed with the {Dishcarge meds reviewed PZ} and appropriate educational materials and side effects teaching were provided.   ___________________________________________________________________________________________________________________________________

## 2021-06-04 ENCOUNTER — OFFICE VISIT (OUTPATIENT)
Dept: SURGERY | Age: 71
End: 2021-06-04
Payer: MEDICARE

## 2021-06-04 VITALS
SYSTOLIC BLOOD PRESSURE: 123 MMHG | BODY MASS INDEX: 20.06 KG/M2 | OXYGEN SATURATION: 97 % | TEMPERATURE: 97.4 F | HEART RATE: 70 BPM | HEIGHT: 66 IN | WEIGHT: 124.8 LBS | DIASTOLIC BLOOD PRESSURE: 68 MMHG

## 2021-06-04 DIAGNOSIS — Z09 POSTOPERATIVE EXAMINATION: Primary | ICD-10-CM

## 2021-06-04 PROCEDURE — 99024 POSTOP FOLLOW-UP VISIT: CPT | Performed by: SURGERY

## 2021-06-04 NOTE — PROGRESS NOTES
Identified pt with two pt identifiers(name and ). Reviewed record in preparation for visit and have obtained necessary documentation. All patient medications has been reviewed. Chief Complaint   Patient presents with    Surgical Follow-up     3 week s/p cholecystectomy on 21       Health Maintenance Due   Topic    DTaP/Tdap/Td series (1 - Tdap)       Vitals:    21 1138   BP: 123/68   Pulse: 70   Temp: 97.4 °F (36.3 °C)   TempSrc: Oral   SpO2: 97%   Weight: 56.6 kg (124 lb 12.8 oz)   Height: 5' 6\" (1.676 m)   PainSc:   0 - No pain       4. Have you been to the ER, urgent care clinic since your last visit? Hospitalized since your last visit? No    5. Have you seen or consulted any other health care providers outside of the [address] [place] [address] since your last visit? Include any pap smears or colon screening. No      Patient is accompanied by self I have received verbal consent from Juan Manuelsteven Reaves to discuss any/all medical information while they are present in the room.

## 2021-06-04 NOTE — Clinical Note
6/22/2021 Patient: Marilee Tai YOB: 1950 Date of Visit: 6/4/2021 Rosa Leal MD 
[address] [address] [address] [employer] Dear Rosa Leal MD, Thank you for referring Ms. Darrius Gottlieb to [address] for evaluation. My notes for this consultation are attached. If you have questions, please do not hesitate to call me. I look forward to following your patient along with you.  
 
 
Sincerely, 
 
Ronnie Bonilla MD

## 2021-06-22 NOTE — PROGRESS NOTES
To: Adolm Romberg, MD , Rich Ch MD  From: Simon Birmingham MD    Thank you for referring Ernestine Murguia. Encounter Date: 2021    Subjective:      Carissa Light is a 69 y.o. female presents for postop care. Not much pain at the incisions. She says she is still belching a lot and feeling tightness in the epigastrium. Eating a regular diet without difficulty. Bowel movements are regular. Objective:     General:  alert, cooperative, no distress, appears stated age   Abdomen: soft, bowel sounds active, non-tender   Incisions:  healing well, no drainage, no erythema, no hernia, no seroma, no swelling, no dehiscence, incision well approximated. Assessment:     S/p laparoscopic cholecystectomy. Pathology revealed chronic cholecystitis with cholelithiasis. No CBD filling defects on intraoperative cholangiogram.   Doing well postoperatively. Plan:     I explained to her that given the pathology results we can certainly hope that her symptoms will resolve with time. There is often some degree of symptoms following surgery given the inflammation in the area of the gastric antrum and duodenum. Typically this resolves within a month or 2. She would like to go back to see Dr. Julianne Najjar. She has not had upper endoscopy at this point and perhaps that will be indicated if her symptoms do not resolve. Further follow-up required here but she knows to give me a call if she has any concerns.     Simon Birmingham MD

## 2021-07-07 ENCOUNTER — HOSPITAL ENCOUNTER (OUTPATIENT)
Dept: CT IMAGING | Age: 71
Discharge: HOME | End: 2021-07-07
Attending: INTERNAL MEDICINE
Payer: MEDICARE

## 2021-07-07 DIAGNOSIS — R91.8 PULMONARY NODULES: ICD-10-CM

## 2021-07-07 PROCEDURE — 71250 CT THORAX DX C-: CPT

## 2021-09-22 ENCOUNTER — CLINICAL SUPPORT (OUTPATIENT)
Dept: INTERNAL MEDICINE CLINIC | Age: 71
End: 2021-09-22
Payer: MEDICARE

## 2021-09-22 VITALS — HEIGHT: 66 IN | WEIGHT: 117.6 LBS | TEMPERATURE: 97.4 F | BODY MASS INDEX: 18.9 KG/M2

## 2021-09-22 DIAGNOSIS — N39.0 URINARY TRACT INFECTION WITHOUT HEMATURIA, SITE UNSPECIFIED: Primary | ICD-10-CM

## 2021-09-22 LAB
BILIRUB UR QL STRIP: NEGATIVE
GLUCOSE UR-MCNC: NEGATIVE MG/DL
KETONES P FAST UR STRIP-MCNC: NEGATIVE MG/DL
PH UR STRIP: 6.5 [PH] (ref 4.6–8)
PROT UR QL STRIP: ABNORMAL
SP GR UR STRIP: 1.03 (ref 1–1.03)
UA UROBILINOGEN AMB POC: ABNORMAL (ref 0.2–1)
URINALYSIS CLARITY POC: ABNORMAL
URINALYSIS COLOR POC: YELLOW
URINE BLOOD POC: NEGATIVE
URINE LEUKOCYTES POC: ABNORMAL
URINE NITRITES POC: POSITIVE

## 2021-09-22 PROCEDURE — 81003 URINALYSIS AUTO W/O SCOPE: CPT | Performed by: INTERNAL MEDICINE

## 2021-09-22 RX ORDER — NITROFURANTOIN 25; 75 MG/1; MG/1
100 CAPSULE ORAL 2 TIMES DAILY
Qty: 14 CAPSULE | Refills: 0 | Status: SHIPPED | OUTPATIENT
Start: 2021-09-22 | End: 2021-09-29

## 2021-09-24 LAB
BACTERIA SPEC CULT: ABNORMAL
CC UR VC: ABNORMAL
SERVICE CMNT-IMP: ABNORMAL

## 2021-09-27 ENCOUNTER — TELEPHONE (OUTPATIENT)
Dept: INTERNAL MEDICINE CLINIC | Age: 71
End: 2021-09-27

## 2021-09-27 NOTE — TELEPHONE ENCOUNTER
----- Message from [facility] sent at 9/27/2021  2:08 PM EDT -----  Regarding: /Telephone  Contact: [phone]  Patient return call    Caller's first and last name and relationship (if not the patient): Kathy Biswas contact number(s):[phone]      Whose call is being returned: [facility] from [facility]

## 2021-09-27 NOTE — TELEPHONE ENCOUNTER
Called, spoke with Pt. Two pt identifiers confirmed. Pt informed Dr. Vira Morrissey wanted to know if the medicine for UTI was working. Pt stated she is doing much better. Pt informed I will let Dr. Vira Morrissey know. Pt verbalized understanding of information discussed w/ no further questions at this time.

## 2021-11-01 NOTE — PROGRESS NOTES
HISTORY OF PRESENT ILLNESS  Mohit Flores is a 69 y.o. female. HPI     Hx RA, Sjogrens syndrome-Dr Anamaria Sosa on enbrel recently for RA and rash( rash to plaquenil PTA). Was not on long term steroids, hx CAP hospitalized last year, insomnia HLD lung nodules (Dr Elfida Mcardle)   Had lap denise for symptomatic stones this year-dr Nguyen Jaimie  Some slighltyl losse stools in mornings since Denise  RA symptoms-off enbrel. Has pain in hands and fingers--on nsaids fo OA and RA    Lung nodules-recent CT-stable LADI nodules and mucous plugging-Dr Elfida Mcardle  Last LDL 75 this tyear    Saw ENT this year for burning tongue--[facility]  Eating nire but somewhat concerned unable to gain weight  Last OV     RA sxs -off enbrel. Takes relafen now  Had right lower back rib /back injection-months ago. Has had pain in right lower rib area x years intermittently  stable left lung nodules but new 5 mm right lung density on CT 3 mos ago-Dr Mariusz Rosenberg     Last LDL 72 on colestipol and tricor     Had colonoscopy recently-no polyps, repeat in 10 years        Has RUQ swelling and pain intermittently occurrred last week --had prior US -\"dangling rib\" per pt.  Has gallstones and sludge  Has had diffiuculty swallowing solids and even liquids-had egd 2014-wnl-- [facility]       Patient Active Problem List    Diagnosis Date Noted    Rheumatoid arthritis with positive rheumatoid factor ([address] 10/01/2019    Sjogren's syndrome ([address] 11/20/2018    Hypercholesterolemia 11/20/2018    Primary osteoarthritis involving multiple joints 11/20/2018    Chronic bilateral low back pain without sciatica 11/20/2018    Weight loss, unintentional 11/20/2018    Bacterial pneumonia 06/26/2018    Raynaud's disease without gangrene 12/30/2019    Other dysphagia 12/30/2019    DDD (degenerative disc disease), cervical 12/30/2019    Sepsis ([address] 12/13/2019    Right lower quadrant abdominal pain 04/26/2018    Pain in both lower extremities 04/26/2018    Ulcer of mouth 04/26/2018 Current Outpatient Medications   Medication Sig Dispense Refill    vit A/vit C/vit E/zinc/copper (PRESERVISION AREDS PO) Take 1 Tab by mouth daily.  omeprazole (PRILOSEC) 20 mg capsule Take 1 Cap by mouth daily. 30 Cap 11    colestipoL (COLESTID) 1 gram tablet One bid 180 Tab 3    amLODIPine (NORVASC) 2.5 mg tablet Take 2.5 mg by mouth nightly.  fenofibrate (LOFIBRA) 160 mg tablet TAKE 1 TABLET BY MOUTH EVERY DAY 90 Tab 3    Biotin 2,500 mcg cap Take 1 Tab by mouth daily.  zolpidem (AMBIEN) 10 mg tablet TAKE 1 TABLET BY MOUTH AT BEDTIME AS NEEDED 30 Tab 1    acetaminophen (TYLENOL EXTRA STRENGTH) 500 mg tablet Take 1,000 mg by mouth daily as needed for Pain.  nabumetone (RELAFEN) 500 mg tablet Take 500 mg by mouth two (2) times a day.  calcium/D3/mag ox//morgan/Zn (CALTRATE + D3 PLUS MINERALS PO) Take 1 Tab by mouth two (2) times a day.  polyethylene glycol (MIRALAX) 17 gram/dose powder Take 17 g by mouth daily. (Patient not taking: Reported on 6/4/2021) 510 g 0    gabapentin (NEURONTIN) 100 mg capsule TAKE 1 CAPSULE BY MOUTH THREE TIMES DAILY (Patient not taking: Reported on 11/2/2021) 90 Cap 5    propylene glycol/peg 400/PF (SYSTANE, PF, OP) Administer  to both eyes daily as needed (dry eyes).  (Patient not taking: Reported on 6/4/2021)       Allergies   Allergen Reactions    Diclofenac Other (comments) and Nausea and Vomiting     Nausea fever and chills  \"PATIENT CAN TAKE THE TOPICAL CREAM\"  Nausea fever and chills    Plaquenil [Hydroxychloroquine] Rash    Sulfa (Sulfonamide Antibiotics) Unknown (comments)      Lab Results   Component Value Date/Time    WBC 6.4 03/08/2021 11:15 AM    WBC (POC) 7.3 11/20/2018 04:33 PM    HGB 12.8 03/08/2021 11:15 AM    HGB (POC) 12.2 11/20/2018 04:33 PM    HCT 38.1 03/08/2021 11:15 AM    HCT (POC) 37.0 11/20/2018 04:33 PM    PLATELET 133 32/90/1314 11:15 AM    PLATELET (POC) 397.3 11/20/2018 04:33 PM    MCV 91 03/08/2021 11:15 AM    MCV (POC) 89.0 11/20/2018 04:33 PM     Lab Results   Component Value Date/Time    Glucose 79 03/08/2021 11:15 AM    LDL, calculated 75 03/08/2021 11:15 AM    LDL, calculated 72 03/09/2020 10:51 AM    Creatinine 0.89 03/08/2021 11:15 AM      Lab Results   Component Value Date/Time    GFR est non-AA 65 03/08/2021 11:15 AM    GFR est AA 75 03/08/2021 11:15 AM    Creatinine 0.89 03/08/2021 11:15 AM    BUN 16 03/08/2021 11:15 AM    Sodium 141 03/08/2021 11:15 AM    Potassium 4.4 03/08/2021 11:15 AM    Chloride 101 03/08/2021 11:15 AM    CO2 26 03/08/2021 11:15 AM    Magnesium 1.8 12/17/2019 05:57 AM    Phosphorus 3.6 12/17/2019 05:57 AM     Lab Results   Component Value Date/Time    TSH 1.910 03/08/2021 11:15 AM    T4, Free 1.54 11/20/2018 04:00 PM         ROS    Physical Exam  Vitals and nursing note reviewed. Constitutional:       Appearance: Normal appearance. She is well-developed. She is obese. Comments: Appears stated age, thin, slightly underweight   Cardiovascular:      Rate and Rhythm: Normal rate and regular rhythm. Heart sounds: Normal heart sounds. No murmur heard. No friction rub. No gallop. Pulmonary:      Effort: Pulmonary effort is normal. No respiratory distress. Breath sounds: Normal breath sounds. No wheezing. Abdominal:      General: Bowel sounds are normal.      Palpations: Abdomen is soft. Neurological:      Mental Status: She is alert. ASSESSMENT and PLAN  Diagnoses and all orders for this visit:    1. Weight loss  -     CBC W/O DIFF; Future  -     METABOLIC PANEL, COMPREHENSIVE; Future  -     TSH 3RD GENERATION; Future   Stable since last OV   Food intake has changed due to dry mouth and burning tongue   Offered referral to dietician   Pt will try to increase calories and monitor her weight  2. Pure hypercholesterolemia   Controlled on colestid and lofibra    3. RA   Delio perez MD    4.  Lung nodules   Stable     Follow-up and Dispositions    · Return in about 6 months (around 5/2/2022) for medicare wellness.

## 2021-11-02 ENCOUNTER — OFFICE VISIT (OUTPATIENT)
Dept: INTERNAL MEDICINE CLINIC | Age: 71
End: 2021-11-02
Payer: MEDICARE

## 2021-11-02 VITALS
RESPIRATION RATE: 16 BRPM | BODY MASS INDEX: 18.64 KG/M2 | HEART RATE: 64 BPM | WEIGHT: 116 LBS | SYSTOLIC BLOOD PRESSURE: 120 MMHG | DIASTOLIC BLOOD PRESSURE: 60 MMHG | HEIGHT: 66 IN | TEMPERATURE: 98.1 F

## 2021-11-02 DIAGNOSIS — R63.4 WEIGHT LOSS: Primary | ICD-10-CM

## 2021-11-02 DIAGNOSIS — E78.00 PURE HYPERCHOLESTEROLEMIA: ICD-10-CM

## 2021-11-02 LAB
ALBUMIN SERPL-MCNC: 4.4 G/DL (ref 3.5–5)
ALBUMIN/GLOB SERPL: 1.3 {RATIO} (ref 1.1–2.2)
ALP SERPL-CCNC: 58 U/L (ref 45–117)
ALT SERPL-CCNC: 33 U/L (ref 12–78)
ANION GAP SERPL CALC-SCNC: 3 MMOL/L (ref 5–15)
AST SERPL-CCNC: 46 U/L (ref 15–37)
BILIRUB SERPL-MCNC: 0.7 MG/DL (ref 0.2–1)
BUN SERPL-MCNC: 19 MG/DL (ref 6–20)
BUN/CREAT SERPL: 21 (ref 12–20)
CALCIUM SERPL-MCNC: 10.2 MG/DL (ref 8.5–10.1)
CHLORIDE SERPL-SCNC: 107 MMOL/L (ref 97–108)
CO2 SERPL-SCNC: 29 MMOL/L (ref 21–32)
CREAT SERPL-MCNC: 0.89 MG/DL (ref 0.55–1.02)
ERYTHROCYTE [DISTWIDTH] IN BLOOD BY AUTOMATED COUNT: 13.8 % (ref 11.5–14.5)
GLOBULIN SER CALC-MCNC: 3.3 G/DL (ref 2–4)
GLUCOSE SERPL-MCNC: 82 MG/DL (ref 65–100)
HCT VFR BLD AUTO: 41.7 % (ref 35–47)
HGB BLD-MCNC: 12.9 G/DL (ref 11.5–16)
MCH RBC QN AUTO: 29.3 PG (ref 26–34)
MCHC RBC AUTO-ENTMCNC: 30.9 G/DL (ref 30–36.5)
MCV RBC AUTO: 94.8 FL (ref 80–99)
NRBC # BLD: 0 K/UL (ref 0–0.01)
NRBC BLD-RTO: 0 PER 100 WBC
PLATELET # BLD AUTO: 282 K/UL (ref 150–400)
PMV BLD AUTO: 11.6 FL (ref 8.9–12.9)
POTASSIUM SERPL-SCNC: 4.4 MMOL/L (ref 3.5–5.1)
PROT SERPL-MCNC: 7.7 G/DL (ref 6.4–8.2)
RBC # BLD AUTO: 4.4 M/UL (ref 3.8–5.2)
SODIUM SERPL-SCNC: 139 MMOL/L (ref 136–145)
TSH SERPL DL<=0.05 MIU/L-ACNC: 1.68 UIU/ML (ref 0.36–3.74)
WBC # BLD AUTO: 4.9 K/UL (ref 3.6–11)

## 2021-11-02 PROCEDURE — 1101F PT FALLS ASSESS-DOCD LE1/YR: CPT | Performed by: INTERNAL MEDICINE

## 2021-11-02 PROCEDURE — G8536 NO DOC ELDER MAL SCRN: HCPCS | Performed by: INTERNAL MEDICINE

## 2021-11-02 PROCEDURE — G9899 SCRN MAM PERF RSLTS DOC: HCPCS | Performed by: INTERNAL MEDICINE

## 2021-11-02 PROCEDURE — G8399 PT W/DXA RESULTS DOCUMENT: HCPCS | Performed by: INTERNAL MEDICINE

## 2021-11-02 PROCEDURE — 3017F COLORECTAL CA SCREEN DOC REV: CPT | Performed by: INTERNAL MEDICINE

## 2021-11-02 PROCEDURE — 1090F PRES/ABSN URINE INCON ASSESS: CPT | Performed by: INTERNAL MEDICINE

## 2021-11-02 PROCEDURE — 99213 OFFICE O/P EST LOW 20 MIN: CPT | Performed by: INTERNAL MEDICINE

## 2021-11-02 PROCEDURE — G0463 HOSPITAL OUTPT CLINIC VISIT: HCPCS | Performed by: INTERNAL MEDICINE

## 2021-11-02 PROCEDURE — G8427 DOCREV CUR MEDS BY ELIG CLIN: HCPCS | Performed by: INTERNAL MEDICINE

## 2021-11-02 PROCEDURE — G8420 CALC BMI NORM PARAMETERS: HCPCS | Performed by: INTERNAL MEDICINE

## 2021-11-02 PROCEDURE — G8432 DEP SCR NOT DOC, RNG: HCPCS | Performed by: INTERNAL MEDICINE

## 2021-11-02 NOTE — PATIENT INSTRUCTIONS
Office Policies Phone calls/patient messages: Please allow up to 24 hours for someone in the office to contact you about your call or message. Be mindful your provider may be out of the office or your message may require further review. We encourage you to use [employer] for your messages as this is a faster, more efficient way to communicate with our office Medication Refills: 
         
Prescription medications require 48-72 business hours to process. We encourage you to use [employer] for your refills. For controlled medications: Please allow 72 business hours to process. Certain medications may require you to  a written prescription at our office. NO narcotic/controlled medications will be prescribed after 4pm Monday through Friday or on weekends Form/Paperwork Completion: 
         
Please note a $25 fee may incur for all paperwork for completed by our providers. We ask that you allow 7-10 business days. Pre-payment is due prior to picking up/faxing the completed form. You may also download your forms to [employer] to have your doctor print off.

## 2021-11-21 ENCOUNTER — APPOINTMENT (OUTPATIENT)
Dept: CT IMAGING | Age: 71
End: 2021-11-21
Attending: EMERGENCY MEDICINE
Payer: MEDICARE

## 2021-11-21 ENCOUNTER — OFFICE VISIT (OUTPATIENT)
Dept: URGENT CARE | Age: 71
End: 2021-11-21

## 2021-11-21 ENCOUNTER — HOSPITAL ENCOUNTER (EMERGENCY)
Age: 71
Discharge: HOME | End: 2021-11-21
Attending: EMERGENCY MEDICINE
Payer: MEDICARE

## 2021-11-21 VITALS
BODY MASS INDEX: 18.35 KG/M2 | WEIGHT: 114.2 LBS | RESPIRATION RATE: 16 BRPM | SYSTOLIC BLOOD PRESSURE: 133 MMHG | HEART RATE: 108 BPM | TEMPERATURE: 98.3 F | HEIGHT: 66 IN | OXYGEN SATURATION: 91 % | DIASTOLIC BLOOD PRESSURE: 60 MMHG

## 2021-11-21 DIAGNOSIS — K52.9 COLITIS: Primary | ICD-10-CM

## 2021-11-21 LAB
ALBUMIN SERPL-MCNC: 4.1 G/DL (ref 3.5–5)
ALBUMIN/GLOB SERPL: 1.1 {RATIO} (ref 1.1–2.2)
ALP SERPL-CCNC: 42 U/L (ref 45–117)
ALT SERPL-CCNC: 37 U/L (ref 12–78)
ANION GAP SERPL CALC-SCNC: 7 MMOL/L (ref 5–15)
APPEARANCE UR: CLEAR
AST SERPL-CCNC: 45 U/L (ref 15–37)
BACTERIA URNS QL MICRO: NEGATIVE /HPF
BASOPHILS # BLD: 0.1 K/UL (ref 0–0.1)
BASOPHILS NFR BLD: 0 % (ref 0–1)
BILIRUB SERPL-MCNC: 0.9 MG/DL (ref 0.2–1)
BILIRUB UR QL: NEGATIVE
BUN SERPL-MCNC: 18 MG/DL (ref 6–20)
BUN/CREAT SERPL: 18 (ref 12–20)
CALCIUM SERPL-MCNC: 9.9 MG/DL (ref 8.5–10.1)
CHLORIDE SERPL-SCNC: 102 MMOL/L (ref 97–108)
CO2 SERPL-SCNC: 27 MMOL/L (ref 21–32)
COLOR UR: ABNORMAL
CREAT SERPL-MCNC: 0.99 MG/DL (ref 0.55–1.02)
DIFFERENTIAL METHOD BLD: ABNORMAL
EOSINOPHIL # BLD: 0 K/UL (ref 0–0.4)
EOSINOPHIL NFR BLD: 0 % (ref 0–7)
EPITH CASTS URNS QL MICRO: ABNORMAL /LPF
ERYTHROCYTE [DISTWIDTH] IN BLOOD BY AUTOMATED COUNT: 13.8 % (ref 11.5–14.5)
GLOBULIN SER CALC-MCNC: 3.9 G/DL (ref 2–4)
GLUCOSE SERPL-MCNC: 99 MG/DL (ref 65–100)
GLUCOSE UR STRIP.AUTO-MCNC: NEGATIVE MG/DL
HCT VFR BLD AUTO: 42.3 % (ref 35–47)
HGB BLD-MCNC: 13.6 G/DL (ref 11.5–16)
HGB UR QL STRIP: NEGATIVE
IMM GRANULOCYTES # BLD AUTO: 0.1 K/UL (ref 0–0.04)
IMM GRANULOCYTES NFR BLD AUTO: 0 % (ref 0–0.5)
KETONES UR QL STRIP.AUTO: 15 MG/DL
LACTATE BLD-SCNC: 1.14 MMOL/L (ref 0.4–2)
LEUKOCYTE ESTERASE UR QL STRIP.AUTO: ABNORMAL
LIPASE SERPL-CCNC: 132 U/L (ref 73–393)
LYMPHOCYTES # BLD: 2.8 K/UL (ref 0.8–3.5)
LYMPHOCYTES NFR BLD: 15 % (ref 12–49)
MCH RBC QN AUTO: 29.6 PG (ref 26–34)
MCHC RBC AUTO-ENTMCNC: 32.2 G/DL (ref 30–36.5)
MCV RBC AUTO: 92.2 FL (ref 80–99)
MONOCYTES # BLD: 1.3 K/UL (ref 0–1)
MONOCYTES NFR BLD: 7 % (ref 5–13)
NEUTS SEG # BLD: 15.1 K/UL (ref 1.8–8)
NEUTS SEG NFR BLD: 78 % (ref 32–75)
NITRITE UR QL STRIP.AUTO: NEGATIVE
NRBC # BLD: 0 K/UL (ref 0–0.01)
NRBC BLD-RTO: 0 PER 100 WBC
PH UR STRIP: 5.5 [PH] (ref 5–8)
PLATELET # BLD AUTO: 360 K/UL (ref 150–400)
PMV BLD AUTO: 9.5 FL (ref 8.9–12.9)
POTASSIUM SERPL-SCNC: 3.3 MMOL/L (ref 3.5–5.1)
PROT SERPL-MCNC: 8 G/DL (ref 6.4–8.2)
PROT UR STRIP-MCNC: 30 MG/DL
RBC # BLD AUTO: 4.59 M/UL (ref 3.8–5.2)
RBC #/AREA URNS HPF: ABNORMAL /HPF (ref 0–5)
SODIUM SERPL-SCNC: 136 MMOL/L (ref 136–145)
SP GR UR REFRACTOMETRY: 1.03 (ref 1–1.03)
UROBILINOGEN UR QL STRIP.AUTO: 0.2 EU/DL (ref 0.2–1)
WBC # BLD AUTO: 19.3 K/UL (ref 3.6–11)
WBC URNS QL MICRO: ABNORMAL /HPF (ref 0–4)

## 2021-11-21 PROCEDURE — 83690 ASSAY OF LIPASE: CPT

## 2021-11-21 PROCEDURE — 80053 COMPREHEN METABOLIC PANEL: CPT

## 2021-11-21 PROCEDURE — 74011000636 HC RX REV CODE- 636: Performed by: EMERGENCY MEDICINE

## 2021-11-21 PROCEDURE — 74011250636 HC RX REV CODE- 250/636: Performed by: EMERGENCY MEDICINE

## 2021-11-21 PROCEDURE — 74177 CT ABD & PELVIS W/CONTRAST: CPT

## 2021-11-21 PROCEDURE — 85025 COMPLETE CBC W/AUTO DIFF WBC: CPT

## 2021-11-21 PROCEDURE — 36415 COLL VENOUS BLD VENIPUNCTURE: CPT

## 2021-11-21 PROCEDURE — 99285 EMERGENCY DEPT VISIT HI MDM: CPT

## 2021-11-21 PROCEDURE — 96360 HYDRATION IV INFUSION INIT: CPT

## 2021-11-21 PROCEDURE — 81001 URINALYSIS AUTO W/SCOPE: CPT

## 2021-11-21 PROCEDURE — 74011250637 HC RX REV CODE- 250/637: Performed by: EMERGENCY MEDICINE

## 2021-11-21 PROCEDURE — 83605 ASSAY OF LACTIC ACID: CPT

## 2021-11-21 RX ORDER — METRONIDAZOLE 500 MG/1
500 TABLET ORAL 2 TIMES DAILY
Qty: 20 TABLET | Refills: 0 | Status: SHIPPED | OUTPATIENT
Start: 2021-11-21 | End: 2021-11-21 | Stop reason: SDUPTHER

## 2021-11-21 RX ORDER — METRONIDAZOLE 250 MG/1
500 TABLET ORAL
Status: COMPLETED | OUTPATIENT
Start: 2021-11-21 | End: 2021-11-21

## 2021-11-21 RX ORDER — DICYCLOMINE HYDROCHLORIDE 20 MG/1
20 TABLET ORAL
Qty: 20 TABLET | Refills: 0 | Status: SHIPPED | OUTPATIENT
Start: 2021-11-21 | End: 2022-06-09

## 2021-11-21 RX ORDER — CIPROFLOXACIN 500 MG/1
500 TABLET ORAL
Status: COMPLETED | OUTPATIENT
Start: 2021-11-21 | End: 2021-11-21

## 2021-11-21 RX ORDER — CIPROFLOXACIN 500 MG/1
500 TABLET ORAL 2 TIMES DAILY
Qty: 20 TABLET | Refills: 0 | Status: SHIPPED | OUTPATIENT
Start: 2021-11-21 | End: 2021-12-01

## 2021-11-21 RX ORDER — METRONIDAZOLE 500 MG/1
500 TABLET ORAL 3 TIMES DAILY
Qty: 30 TABLET | Refills: 0 | Status: SHIPPED | OUTPATIENT
Start: 2021-11-21 | End: 2021-12-01

## 2021-11-21 RX ADMIN — METRONIDAZOLE 500 MG: 250 TABLET ORAL at 20:21

## 2021-11-21 RX ADMIN — SODIUM CHLORIDE 1000 ML: 9 INJECTION, SOLUTION INTRAVENOUS at 18:52

## 2021-11-21 RX ADMIN — IOPAMIDOL 100 ML: 755 INJECTION, SOLUTION INTRAVENOUS at 19:30

## 2021-11-21 RX ADMIN — CIPROFLOXACIN 500 MG: 500 TABLET, FILM COATED ORAL at 20:21

## 2021-11-22 NOTE — ED PROVIDER NOTES
EMERGENCY DEPARTMENT HISTORY AND PHYSICAL EXAM      Date: 11/21/2021  Patient Name: Dameon Cline    History of Presenting Illness     Chief Complaint   Patient presents with    Diarrhea     c/o abdominal pain yesterday then had N/V once following with diarrhea through to today; also states that she has been having bloody diarrhea today. Denies any hx of Chron's or UC. History Provided By: Patient    HPI: Dameon Cline, 69 y.o. female presents to the ED with cc of abdominal pain and bloody diarrhea. Patient's abdominal pain started yesterday. Pain was intermittent and moderate severity. Pain is resolved, but she has had several episodes of vomiting and multiple episodes of diarrhea. She noticed blood in her stool today. Last time she went to the restroom, she does have blood, with no stool. She denies fever, chills, cough, chest pain or shortness of breath. She denies dizziness, dysuria. She is not on a blood thinner. She denies any recent antibiotic use. Her nausea has resolved. There are no other complaints, changes, or physical findings at this time. PCP: Dru Ojeda MD    No current facility-administered medications on file prior to encounter. Current Outpatient Medications on File Prior to Encounter   Medication Sig Dispense Refill    polyethylene glycol (MIRALAX) 17 gram/dose powder Take 17 g by mouth daily. (Patient not taking: Reported on 6/4/2021) 510 g 0    vit A/vit C/vit E/zinc/copper (PRESERVISION AREDS PO) Take 1 Tab by mouth daily.  omeprazole (PRILOSEC) 20 mg capsule Take 1 Cap by mouth daily. 30 Cap 11    colestipoL (COLESTID) 1 gram tablet One bid 180 Tab 3    amLODIPine (NORVASC) 2.5 mg tablet Take 2.5 mg by mouth nightly.       fenofibrate (LOFIBRA) 160 mg tablet TAKE 1 TABLET BY MOUTH EVERY DAY 90 Tab 3    gabapentin (NEURONTIN) 100 mg capsule TAKE 1 CAPSULE BY MOUTH THREE TIMES DAILY (Patient not taking: Reported on 11/2/2021) 90 Cap 5    Biotin 2,500 mcg cap Take 1 Tab by mouth daily.  zolpidem (AMBIEN) 10 mg tablet TAKE 1 TABLET BY MOUTH AT BEDTIME AS NEEDED 30 Tab 1    acetaminophen (TYLENOL EXTRA STRENGTH) 500 mg tablet Take 1,000 mg by mouth daily as needed for Pain.  propylene glycol/peg 400/PF (SYSTANE, PF, OP) Administer  to both eyes daily as needed (dry eyes). (Patient not taking: Reported on 6/4/2021)      nabumetone (RELAFEN) 500 mg tablet Take 500 mg by mouth two (2) times a day.  calcium/D3/mag ox//morgan/Zn (CALTRATE + D3 PLUS MINERALS PO) Take 1 Tab by mouth two (2) times a day. Past History     Past Medical History:  Past Medical History:   Diagnosis Date    Arthritis     Bacterial pneumonia 6/26/2018    Chronic bilateral low back pain without sciatica 11/20/2018    Chronic pain     GERD (gastroesophageal reflux disease)     Hypercholesterolemia     Long term (current) use of anticoagulants     patient denies    Primary osteoarthritis involving multiple joints 11/20/2018    Raynaud's syndrome     Sjogren's syndrome ([facility] [address] 11/20/2018    extreme dry mouth and eyes, burning tongue    Weight loss, unintentional 11/20/2018       Past Surgical History:  Past Surgical History:   Procedure Laterality Date    HX CHOLECYSTECTOMY  05/12/2021    HX TONSILLECTOMY         Family History:  Family History   Problem Relation Age of Onset    Heart Disease Maternal Grandfather     Prostate Cancer Father     Prostate Cancer Brother        Social History:  Social History     Tobacco Use    Smoking status: Never Smoker    Smokeless tobacco: Never Used   Substance Use Topics    Alcohol use: Not Currently     Comment: rarely    Drug use: Never       Allergies:   Allergies   Allergen Reactions    Diclofenac Other (comments) and Nausea and Vomiting     Nausea fever and chills  \"PATIENT CAN TAKE THE TOPICAL CREAM\"  Nausea fever and chills    Plaquenil [Hydroxychloroquine] Rash    Sulfa (Sulfonamide Antibiotics) Unknown (comments)         Review of Systems   Review of Systems   Constitutional: Negative for fever. HENT: Negative for congestion. Eyes: Negative. Respiratory: Negative for shortness of breath. Cardiovascular: Negative for chest pain. Gastrointestinal: Positive for abdominal pain, blood in stool, diarrhea, nausea and vomiting. Endocrine: Negative for heat intolerance. Genitourinary: Negative. Musculoskeletal: Negative for back pain. Skin: Negative for rash. Allergic/Immunologic: Negative for immunocompromised state. Neurological: Negative for dizziness. Hematological: Does not bruise/bleed easily. Psychiatric/Behavioral: Negative. All other systems reviewed and are negative. Physical Exam   Physical Exam  Vitals and nursing note reviewed. Constitutional:       General: She is not in acute distress. Appearance: She is well-developed. HENT:      Head: Normocephalic. Cardiovascular:      Rate and Rhythm: Normal rate and regular rhythm. Pulses: Normal pulses. Heart sounds: Normal heart sounds. Pulmonary:      Effort: Pulmonary effort is normal.      Breath sounds: Normal breath sounds. Abdominal:      General: Bowel sounds are normal.      Palpations: Abdomen is soft. Tenderness: There is no abdominal tenderness. Musculoskeletal:         General: Normal range of motion. Cervical back: Normal range of motion and neck supple. Skin:     General: Skin is warm and dry. Neurological:      General: No focal deficit present. Mental Status: She is alert and oriented to person, place, and time.    Psychiatric:         Mood and Affect: Mood normal.         Behavior: Behavior normal.         Diagnostic Study Results     Labs -     Recent Results (from the past 12 hour(s))   METABOLIC PANEL, COMPREHENSIVE    Collection Time: 11/21/21  3:46 PM   Result Value Ref Range    Sodium 136 136 - 145 mmol/L    Potassium 3.3 (L) 3.5 - 5.1 mmol/L    Chloride 102 97 - 108 mmol/L    CO2 27 21 - 32 mmol/L    Anion gap 7 5 - 15 mmol/L    Glucose 99 65 - 100 mg/dL    BUN 18 6 - 20 MG/DL    Creatinine 0.99 0.55 - 1.02 MG/DL    BUN/Creatinine ratio 18 12 - 20      GFR est AA >60 >60 ml/min/1.73m2    GFR est non-AA 55 (L) >60 ml/min/1.73m2    Calcium 9.9 8.5 - 10.1 MG/DL    Bilirubin, total 0.9 0.2 - 1.0 MG/DL    ALT (SGPT) 37 12 - 78 U/L    AST (SGOT) 45 (H) 15 - 37 U/L    Alk. phosphatase 42 (L) 45 - 117 U/L    Protein, total 8.0 6.4 - 8.2 g/dL    Albumin 4.1 3.5 - 5.0 g/dL    Globulin 3.9 2.0 - 4.0 g/dL    A-G Ratio 1.1 1.1 - 2.2     URINALYSIS W/ RFLX MICROSCOPIC    Collection Time: 11/21/21  3:46 PM   Result Value Ref Range    Color DARK YELLOW      Appearance CLEAR CLEAR      Specific gravity 1.026 1.003 - 1.030      pH (UA) 5.5 5.0 - 8.0      Protein 30 (A) NEG mg/dL    Glucose Negative NEG mg/dL    Ketone 15 (A) NEG mg/dL    Bilirubin Negative NEG      Blood Negative NEG      Urobilinogen 0.2 0.2 - 1.0 EU/dL    Nitrites Negative NEG      Leukocyte Esterase TRACE (A) NEG      WBC 5-10 0 - 4 /hpf    RBC 0-5 0 - 5 /hpf    Epithelial cells FEW FEW /lpf    Bacteria Negative NEG /hpf   LIPASE    Collection Time: 11/21/21  3:46 PM   Result Value Ref Range    Lipase 132 73 - 393 U/L   CBC WITH AUTOMATED DIFF    Collection Time: 11/21/21  3:46 PM   Result Value Ref Range    WBC 19.3 (H) 3.6 - 11.0 K/uL    RBC 4.59 3.80 - 5.20 M/uL    HGB 13.6 11.5 - 16.0 g/dL    HCT 42.3 35.0 - 47.0 %    MCV 92.2 80.0 - 99.0 FL    MCH 29.6 26.0 - 34.0 PG    MCHC 32.2 30.0 - 36.5 g/dL    RDW 13.8 11.5 - 14.5 %    PLATELET 948 181 - 637 K/uL    MPV 9.5 8.9 - 12.9 FL    NRBC 0.0 0  WBC    ABSOLUTE NRBC 0.00 0.00 - 0.01 K/uL    NEUTROPHILS 78 (H) 32 - 75 %    LYMPHOCYTES 15 12 - 49 %    MONOCYTES 7 5 - 13 %    EOSINOPHILS 0 0 - 7 %    BASOPHILS 0 0 - 1 %    IMMATURE GRANULOCYTES 0 0.0 - 0.5 %    ABS. NEUTROPHILS 15.1 (H) 1.8 - 8.0 K/UL    ABS. LYMPHOCYTES 2.8 0.8 - 3.5 K/UL    ABS.  MONOCYTES 1.3 (H) 0.0 - 1.0 K/UL    ABS. EOSINOPHILS 0.0 0.0 - 0.4 K/UL    ABS. BASOPHILS 0.1 0.0 - 0.1 K/UL    ABS. IMM. GRANS. 0.1 (H) 0.00 - 0.04 K/UL    DF AUTOMATED     POC LACTIC ACID    Collection Time: 11/21/21  8:04 PM   Result Value Ref Range    Lactic Acid (POC) 1.14 0.40 - 2.00 mmol/L       Radiologic Studies -   CT ABD PELV W CONT   Final Result   Significant left colonic wall thickening compatible with colitis of   indeterminate etiology. CT Results  (Last 48 hours)               11/21/21 1930  CT ABD PELV W CONT Final result    Impression:  Significant left colonic wall thickening compatible with colitis of   indeterminate etiology. Narrative:  EXAM: CT ABD PELV W CONT       INDICATION: abdominal pain and bloody diarrhea       COMPARISON: None        CONTRAST: 100 mL of Isovue-370. TECHNIQUE:    Following the uneventful intravenous administration of contrast, thin axial   images were obtained through the abdomen and pelvis. Coronal and sagittal   reconstructions were generated. Oral contrast was not administered. CT dose   reduction was achieved through use of a standardized protocol tailored for this   examination and automatic exposure control for dose modulation. FINDINGS:    LOWER THORAX: No significant abnormality in the incidentally imaged lower chest.   LIVER: No mass. BILIARY TREE: Gallbladder is surgically absent. CBD is not dilated. SPLEEN: within normal limits. PANCREAS: No mass or ductal dilatation. ADRENALS: Unremarkable. KIDNEYS: Small right renal cysts, no follow-up required. No renal mass or   hydronephrosis. STOMACH: Unremarkable. SMALL BOWEL: No dilatation or wall thickening. COLON: Significant wall thickening is noted from the splenic flexure into the   sigmoid colon compatible with colitis. APPENDIX: Not visualized. PERITONEUM: No ascites or pneumoperitoneum. RETROPERITONEUM: No lymphadenopathy or aortic aneurysm.    REPRODUCTIVE ORGANS: There is no pelvic mass or lymphadenopathy. URINARY BLADDER: No mass or calculus. BONES: Degenerative changes are seen in the lumbar spine and hip joints   bilaterally. ABDOMINAL WALL: No mass or hernia. ADDITIONAL COMMENTS: N/A               CXR Results  (Last 48 hours)    None          Medical Decision Making   I am the first provider for this patient. I reviewed the vital signs, available nursing notes, past medical history, past surgical history, family history and social history. Vital Signs-Reviewed the patient's vital signs. Patient Vitals for the past 12 hrs:   Temp Pulse Resp BP SpO2   11/21/21 1915    133/60 91 %   11/21/21 1845    (!) 159/66 91 %   11/21/21 1830    124/65 94 %   11/21/21 1815    121/61 97 %   11/21/21 1800    133/69 94 %   11/21/21 1745    121/60 98 %   11/21/21 1730    (!) 116/55 95 %   11/21/21 1715    (!) 112/54 95 %   11/21/21 1530 98.3 °F (36.8 °C) (!) 108 16 112/63 98 %       Records Reviewed: Nursing Notes and Old Medical Records    Provider Notes (Medical Decision Making):   Colitis, anemia, GI bleed, electrolyte abnormality, dehydration, diverticulitis    ED Course:   Initial assessment performed. The patients presenting problems have been discussed, and they are in agreement with the care plan formulated and outlined with them. I have encouraged them to ask questions as they arise throughout their visit. Progress note: The patient's results of been reviewed. Patient is feeling better. She is advised to follow-up and return to ER if worse         Critical Care Time:   0    Disposition:  Home    DISCHARGE PLAN:  1. Discharge Medication List as of 11/21/2021  8:12 PM      START taking these medications    Details   ciprofloxacin HCl (Cipro) 500 mg tablet Take 1 Tablet by mouth two (2) times a day for 10 days. , Normal, Disp-20 Tablet, R-0      dicyclomine (BENTYL) 20 mg tablet Take 1 Tablet by mouth every six (6) hours as needed for Abdominal Cramps., Normal, Disp-20 Tablet, R-0      metroNIDAZOLE (FlagyL) 500 mg tablet Take 1 Tablet by mouth two (2) times a day for 10 days. , Normal, Disp-20 Tablet, R-0         CONTINUE these medications which have NOT CHANGED    Details   polyethylene glycol (MIRALAX) 17 gram/dose powder Take 17 g by mouth daily. , Normal, Disp-510 g, R-0      vit A/vit C/vit E/zinc/copper (PRESERVISION AREDS PO) Take 1 Tab by mouth daily. , Historical Med      omeprazole (PRILOSEC) 20 mg capsule Take 1 Cap by mouth daily. , Normal, Disp-30 Cap, R-11      colestipoL (COLESTID) 1 gram tablet One bid, Normal, Disp-180 Tab, R-3      amLODIPine (NORVASC) 2.5 mg tablet Take 2.5 mg by mouth nightly., Historical Med      fenofibrate (LOFIBRA) 160 mg tablet TAKE 1 TABLET BY MOUTH EVERY DAY, Normal, Disp-90 Tab, R-3      gabapentin (NEURONTIN) 100 mg capsule TAKE 1 CAPSULE BY MOUTH THREE TIMES DAILY, Normal, Disp-90 Cap, R-5      Biotin 2,500 mcg cap Take 1 Tab by mouth daily. , Historical Med      zolpidem (AMBIEN) 10 mg tablet TAKE 1 TABLET BY MOUTH AT BEDTIME AS NEEDED, Normal, Disp-30 Tab, R-1This request is for a new prescription for a controlled substance as required by Federal/State law.      acetaminophen (TYLENOL EXTRA STRENGTH) 500 mg tablet Take 1,000 mg by mouth daily as needed for Pain., Historical Med      propylene glycol/peg 400/PF (SYSTANE, PF, OP) Administer  to both eyes daily as needed (dry eyes). , Historical Med      nabumetone (RELAFEN) 500 mg tablet Take 500 mg by mouth two (2) times a day., Historical Med      calcium/D3/mag ox//morgan/Zn (CALTRATE + D3 PLUS MINERALS PO) Take 1 Tab by mouth two (2) times a day., Historical Med           2.    Follow-up Information     Follow up With Specialties Details Why Contact Info    Geetha Piedra MD Internal Medicine  As needed [address] [facility]  [address] [facility]      Freddy He MD Gastroenterology  As needed [facility] [address] [address]  [phone]      \[facility]\"" EMERGENCY DEPT Emergency Medicine  If symptoms worsen [address] [place] [address]  [phone]        3. Return to ED if worse     Diagnosis     Clinical Impression:   1. Colitis        Attestations:    Heriberto Rivera MD    Please note that this dictation was completed with [employer], the computer voice recognition software. Quite often unanticipated grammatical, syntax, homophones, and other interpretive errors are inadvertently transcribed by the computer software. Please disregard these errors. Please excuse any errors that have escaped final proofreading. Thank you.

## 2021-11-22 NOTE — ED NOTES
19:49 Assumed care of pt. White board updated. Plan of care discussed. Call bell in reach. Will continue to monitor.

## 2022-03-14 DIAGNOSIS — E78.00 PURE HYPERCHOLESTEROLEMIA: Primary | ICD-10-CM

## 2022-03-14 RX ORDER — MONTELUKAST SODIUM 4 MG/1
TABLET, CHEWABLE ORAL
Qty: 180 TABLET | Refills: 3 | Status: SHIPPED | OUTPATIENT
Start: 2022-03-14 | End: 2022-04-07

## 2022-03-14 RX ORDER — FENOFIBRATE 160 MG/1
160 TABLET ORAL DAILY
Qty: 90 TABLET | Refills: 3 | Status: SHIPPED | OUTPATIENT
Start: 2022-03-14

## 2022-03-14 NOTE — TELEPHONE ENCOUNTER
PCP: Jignesh Us MD    Last appt: 11/2/2021  Future Appointments   Date Time Provider Homa Martin   5/2/2022 10:30 AM Jignesh Us MD [facility] BS AMB       Requested Prescriptions     Pending Prescriptions Disp Refills    fenofibrate (LOFIBRA) 160 mg tablet 90 Tablet 3     Sig: Take 1 Tablet by mouth daily.     colestipoL (COLESTID) 1 gram tablet 180 Tablet 3     Sig: One bid       Prior labs and Blood pressures:  BP Readings from Last 3 Encounters:   11/21/21 133/60   11/02/21 120/60   06/04/21 123/68     Lab Results   Component Value Date/Time    Sodium 136 11/21/2021 03:46 PM    Potassium 3.3 (L) 11/21/2021 03:46 PM    Chloride 102 11/21/2021 03:46 PM    CO2 27 11/21/2021 03:46 PM    Anion gap 7 11/21/2021 03:46 PM    Glucose 99 11/21/2021 03:46 PM    BUN 18 11/21/2021 03:46 PM    Creatinine 0.99 11/21/2021 03:46 PM    BUN/Creatinine ratio 18 11/21/2021 03:46 PM    GFR est AA >60 11/21/2021 03:46 PM    GFR est non-AA 55 (L) 11/21/2021 03:46 PM    Calcium 9.9 11/21/2021 03:46 PM     No results found for: HBA1C, [id number], OQC6HMVK, [id number]  Lab Results   Component Value Date/Time    Cholesterol, total 167 03/08/2021 11:15 AM    HDL Cholesterol 76 03/08/2021 11:15 AM    LDL, calculated 75 03/08/2021 11:15 AM    LDL, calculated 72 03/09/2020 10:51 AM    VLDL, calculated 16 03/08/2021 11:15 AM    VLDL, calculated 30 03/09/2020 10:51 AM    Triglyceride 90 03/08/2021 11:15 AM     No results found for: NATALIE Cortés    Lab Results   Component Value Date/Time    TSH 1.68 11/02/2021 09:57 AM

## 2022-03-18 PROBLEM — J15.9 BACTERIAL PNEUMONIA: Status: ACTIVE | Noted: 2018-06-26

## 2022-03-18 PROBLEM — I73.00 RAYNAUD'S DISEASE WITHOUT GANGRENE: Status: ACTIVE | Noted: 2019-12-30

## 2022-03-19 PROBLEM — E78.00 HYPERCHOLESTEROLEMIA: Status: ACTIVE | Noted: 2018-11-20

## 2022-03-19 PROBLEM — M15.0 PRIMARY OSTEOARTHRITIS INVOLVING MULTIPLE JOINTS: Status: ACTIVE | Noted: 2018-11-20

## 2022-03-19 PROBLEM — M79.604 PAIN IN BOTH LOWER EXTREMITIES: Status: ACTIVE | Noted: 2018-04-26

## 2022-03-19 PROBLEM — M54.50 CHRONIC BILATERAL LOW BACK PAIN WITHOUT SCIATICA: Status: ACTIVE | Noted: 2018-11-20

## 2022-03-19 PROBLEM — M15.9 PRIMARY OSTEOARTHRITIS INVOLVING MULTIPLE JOINTS: Status: ACTIVE | Noted: 2018-11-20

## 2022-03-19 PROBLEM — M79.605 PAIN IN BOTH LOWER EXTREMITIES: Status: ACTIVE | Noted: 2018-04-26

## 2022-03-19 PROBLEM — R13.19 OTHER DYSPHAGIA: Status: ACTIVE | Noted: 2019-12-30

## 2022-03-19 PROBLEM — M50.30 DDD (DEGENERATIVE DISC DISEASE), CERVICAL: Status: ACTIVE | Noted: 2019-12-30

## 2022-03-19 PROBLEM — G89.29 CHRONIC BILATERAL LOW BACK PAIN WITHOUT SCIATICA: Status: ACTIVE | Noted: 2018-11-20

## 2022-03-19 PROBLEM — M05.9 RHEUMATOID ARTHRITIS WITH POSITIVE RHEUMATOID FACTOR (HCC): Status: ACTIVE | Noted: 2019-10-01

## 2022-03-19 PROBLEM — A41.9 SEPSIS (HCC): Status: ACTIVE | Noted: 2019-12-13

## 2022-03-19 PROBLEM — M35.00 SJOGREN'S SYNDROME (HCC): Status: ACTIVE | Noted: 2018-11-20

## 2022-03-20 PROBLEM — K12.1 ULCER OF MOUTH: Status: ACTIVE | Noted: 2018-04-26

## 2022-03-20 PROBLEM — R10.31 RIGHT LOWER QUADRANT ABDOMINAL PAIN: Status: ACTIVE | Noted: 2018-04-26

## 2022-03-20 PROBLEM — R63.4 WEIGHT LOSS, UNINTENTIONAL: Status: ACTIVE | Noted: 2018-11-20

## 2022-04-06 ENCOUNTER — TELEPHONE (OUTPATIENT)
Dept: INTERNAL MEDICINE CLINIC | Age: 72
End: 2022-04-06

## 2022-04-06 NOTE — TELEPHONE ENCOUNTER
----- Message from Tash Lloyd sent at 4/6/2022 10:57 AM EDT -----  Subject: Medication Problem    QUESTIONS  Name of Medication? colestipoL (COLESTID) 1 gram tablet  Patient-reported dosage and instructions? 1 gram tab  What question or problem do you have with the medication? Pt phnd states   that pharmacy advsd patient that there is a manufacturing issue with   getting this RX refilled - pt wants to know if there is another medication   that can be ordered for her; please call pt to advise  Preferred Pharmacy? [id number] [address] [place] phone number (if available)? [phone]  Additional Information for Provider?   ---------------------------------------------------------------------------  --------------  CALL BACK INFO  What is the best way for the office to contact you? OK to leave message on   voicemail  Preferred Call Back Phone Number? [phone]  ---------------------------------------------------------------------------  --------------  SCRIPT ANSWERS  Relationship to Patient?  Self

## 2022-04-06 NOTE — TELEPHONE ENCOUNTER
Called, spoke with Pt. Two pt identifiers confirmed. Pt informed Dr. Jason Kennedy was out of the office today but I will send a message for him to take a look and he will respond when he returns. Pt stated that is no problem she is thankful for the update. Pt verbalized understanding of information discussed w/ no further questions at this time.

## 2022-04-07 ENCOUNTER — DOCUMENTATION ONLY (OUTPATIENT)
Dept: INTERNAL MEDICINE CLINIC | Age: 72
End: 2022-04-07

## 2022-04-07 RX ORDER — EZETIMIBE 10 MG/1
10 TABLET ORAL DAILY
Qty: 90 TABLET | Refills: 3 | Status: SHIPPED | OUTPATIENT
Start: 2022-04-07

## 2022-04-07 NOTE — PROGRESS NOTES
Called pt--supply manufacturing problem with colestid per pt.  I sent in zetia to replace the colestid

## 2022-04-12 RX ORDER — OMEPRAZOLE 20 MG/1
CAPSULE, DELAYED RELEASE ORAL
Qty: 30 CAPSULE | Refills: 11 | Status: SHIPPED | OUTPATIENT
Start: 2022-04-12

## 2022-04-26 ENCOUNTER — TRANSCRIBE ORDER (OUTPATIENT)
Dept: SCHEDULING | Age: 72
End: 2022-04-26

## 2022-04-26 DIAGNOSIS — Z12.31 SCREENING MAMMOGRAM FOR HIGH-RISK PATIENT: Primary | ICD-10-CM

## 2022-05-01 NOTE — PROGRESS NOTES
HISTORY OF PRESENT ILLNESS  Jennifer Goins is a 66 y.o. female. HPI   FU RA, Sjogrens syndrome-Dr Salvador Anya on enbrel recently for RA and rash( rash to plaquenil PTA). Was not on long term steroids, hx CAP hospitalized last year, insomnia HLD lung nodules (Dr Bret Granger) and medicare wellness--    colestid changed to zetia since last OV due to supply problem with colestid  Weight loss due to burning tongue and dry mouth symptoms----  RA symptoms--relafen only-mild symptoms  Weight down 1 lb  Uses biotene for xerostomia now  Saw Wes Cuadra this year-stable nodules  EGD and Colonoscopy scheduled 6/10 for abnormal CT scan--Dr Palmer . Left colonic wall thickening c/w colitis with bloody diarrhea 11/2021 ER visit  proper colonoscopy 1/2021 -tics, no polyps--performed by CRS  Labs neg for gluten allergy and crohns  Not able to eat well because of dry mouth per pt    Will eeed mohs surgery in the future    C/o strong urine odor=no dysuria  C/o red lower eye lid-no pain, had style earlier this year same area  Last OV  Had lap denise for symptomatic stones this year-dr Suha Louis  Some slighltyl losse stools in mornings since Denise  RA symptoms-off enbrel.  Has pain in hands and fingers--on nsaids fo OA and RA     Lung nodules-recent CT-stable LADI nodules and mucous plugging-Dr Bret Granger  Last LDL 75 this tyear     Saw ENT this year for burning tongue--[facility]  Eating nire but somewhat concerned unable to gain weight    Patient Active Problem List    Diagnosis Date Noted    Rheumatoid arthritis with positive rheumatoid factor ([address] 10/01/2019    Sjogren's syndrome ([address] 11/20/2018    Hypercholesterolemia 11/20/2018    Primary osteoarthritis involving multiple joints 11/20/2018    Chronic bilateral low back pain without sciatica 11/20/2018    Weight loss, unintentional 11/20/2018    Bacterial pneumonia 06/26/2018    Raynaud's disease without gangrene 12/30/2019    Other dysphagia 12/30/2019    DDD (degenerative disc disease), cervical 12/30/2019    Sepsis ([facility] 12/13/2019    Right lower quadrant abdominal pain 04/26/2018    Pain in both lower extremities 04/26/2018    Ulcer of mouth 04/26/2018     Current Outpatient Medications   Medication Sig Dispense Refill    omeprazole (PRILOSEC) 20 mg capsule TAKE 1 CAPSULE BY MOUTH DAILY 30 Capsule 11    ezetimibe (ZETIA) 10 mg tablet Take 1 Tablet by mouth daily. 90 Tablet 3    fenofibrate (LOFIBRA) 160 mg tablet Take 1 Tablet by mouth daily. 90 Tablet 3    dicyclomine (BENTYL) 20 mg tablet Take 1 Tablet by mouth every six (6) hours as needed for Abdominal Cramps. 20 Tablet 0    vit A/vit C/vit E/zinc/copper (PRESERVISION AREDS PO) Take 1 Tab by mouth daily.  amLODIPine (NORVASC) 2.5 mg tablet Take 2.5 mg by mouth nightly.  Biotin 2,500 mcg cap Take 1 Tab by mouth daily.  zolpidem (AMBIEN) 10 mg tablet TAKE 1 TABLET BY MOUTH AT BEDTIME AS NEEDED 30 Tab 1    acetaminophen (TYLENOL EXTRA STRENGTH) 500 mg tablet Take 1,000 mg by mouth daily as needed for Pain.  nabumetone (RELAFEN) 500 mg tablet Take 500 mg by mouth two (2) times a day.  calcium/D3/mag ox//morgan/Zn (CALTRATE + D3 PLUS MINERALS PO) Take 1 Tab by mouth two (2) times a day.        Allergies   Allergen Reactions    Diclofenac Other (comments) and Nausea and Vomiting     Nausea fever and chills  \"PATIENT CAN TAKE THE TOPICAL CREAM\"  Nausea fever and chills    Plaquenil [Hydroxychloroquine] Rash    Sulfa (Sulfonamide Antibiotics) Unknown (comments)      Lab Results   Component Value Date/Time    WBC 19.3 (H) 11/21/2021 03:46 PM    WBC (POC) 7.3 11/20/2018 04:33 PM    HGB 13.6 11/21/2021 03:46 PM    HGB (POC) 12.2 11/20/2018 04:33 PM    HCT 42.3 11/21/2021 03:46 PM    HCT (POC) 37.0 11/20/2018 04:33 PM    PLATELET 775 89/42/8618 03:46 PM    PLATELET (POC) 863.0 11/20/2018 04:33 PM    MCV 92.2 11/21/2021 03:46 PM    MCV (POC) 89.0 11/20/2018 04:33 PM     Lab Results   Component Value Date/Time    Glucose 99 11/21/2021 03:46 PM    LDL, calculated 75 03/08/2021 11:15 AM    LDL, calculated 72 03/09/2020 10:51 AM    Creatinine 0.99 11/21/2021 03:46 PM      Lab Results   Component Value Date/Time    Cholesterol, total 167 03/08/2021 11:15 AM    HDL Cholesterol 76 03/08/2021 11:15 AM    LDL, calculated 75 03/08/2021 11:15 AM    LDL, calculated 72 03/09/2020 10:51 AM    Triglyceride 90 03/08/2021 11:15 AM     Lab Results   Component Value Date/Time    ALT (SGPT) 37 11/21/2021 03:46 PM    Alk. phosphatase 42 (L) 11/21/2021 03:46 PM    Bilirubin, total 0.9 11/21/2021 03:46 PM    Albumin 4.1 11/21/2021 03:46 PM    Protein, total 8.0 11/21/2021 03:46 PM    PLATELET 601 42/74/1841 03:46 PM    PLATELET (POC) 647.2 11/20/2018 04:33 PM     Lab Results   Component Value Date/Time    GFR est non-AA 55 (L) 11/21/2021 03:46 PM    GFR est AA >60 11/21/2021 03:46 PM    Creatinine 0.99 11/21/2021 03:46 PM    BUN 18 11/21/2021 03:46 PM    Sodium 136 11/21/2021 03:46 PM    Potassium 3.3 (L) 11/21/2021 03:46 PM    Chloride 102 11/21/2021 03:46 PM    CO2 27 11/21/2021 03:46 PM    Magnesium 1.8 12/17/2019 05:57 AM    Phosphorus 3.6 12/17/2019 05:57 AM     Lab Results   Component Value Date/Time    TSH 1.68 11/02/2021 09:57 AM    T4, Free 1.54 11/20/2018 04:00 PM      Lab Results   Component Value Date/Time    Glucose 99 11/21/2021 03:46 PM         ROS    Physical Exam  Vitals and nursing note reviewed. Constitutional:       Appearance: Normal appearance. She is well-developed. Comments: Appears stated age, thin underweight   Eyes:      Comments: Red left lower eyelid   Cardiovascular:      Rate and Rhythm: Normal rate and regular rhythm. Heart sounds: Normal heart sounds. No murmur heard. No friction rub. No gallop. Pulmonary:      Effort: Pulmonary effort is normal. No respiratory distress. Breath sounds: Normal breath sounds. No wheezing.    Abdominal:      General: Bowel sounds are normal. Palpations: Abdomen is soft. Neurological:      Mental Status: She is alert. ASSESSMENT and PLAN  Diagnoses and all orders for this visit:    1. Pure hypercholesterolemia  -     LIPID PANEL; Future  -     METABOLIC PANEL, COMPREHENSIVE; Future    2. Immunosuppressed status ([facility] [address]    3. Rheumatoid arthritis, involving unspecified site, unspecified whether rheumatoid factor present ([facility] [address]  -     METABOLIC PANEL, COMPREHENSIVE; Future    4. Lung nodules     5. Weight loss  -     METABOLIC PANEL, COMPREHENSIVE; Future    6. Colitis    7. Osteopenia, unspecified location  -     DEXA BONE DENSITY STUDY AXIAL; Future    8. Menopause  -     DEXA BONE DENSITY STUDY AXIAL; Future           This is the Subsequent Medicare Annual Wellness Exam, performed 12 months or more after the Initial AWV or the last Subsequent AWV    I have reviewed the patient's medical history in detail and updated the computerized patient record. Assessment/Plan   Education and counseling provided:  Are appropriate based on today's review and evaluation  covid booster and tdap recomemnded  dexa     1. Pure hypercholesterolemia  -     LIPID PANEL; Future  -     METABOLIC PANEL, COMPREHENSIVE; Future  2. Immunosuppressed status ([facility] [address]  3. Rheumatoid arthritis, involving unspecified site, unspecified whether rheumatoid factor present ([facility] [address]  -     METABOLIC PANEL, COMPREHENSIVE; Future  4. Lung nodules-fu Dr Josee Turner  5. Weight loss  -     METABOLIC PANEL, COMPREHENSIVE; Future  6. Colitis  7. Osteopenia, unspecified location  -     DEXA BONE DENSITY STUDY AXIAL; Future  8. Menopause  -     DEXA BONE DENSITY STUDY AXIAL; Future  9. Abnormal urine odor--ua   10.  Left conjunctivitis-emycin oint    Depression Risk Factor Screening     3 most recent PHQ Screens 5/2/2022   Little interest or pleasure in doing things Not at all   Feeling down, depressed, irritable, or hopeless Several days   Total Score PHQ 2 1       Alcohol & Drug Abuse Risk Screen Do you average more than 1 drink per night or more than 7 drinks a week:  No    On any one occasion in the past three months have you have had more than 3 drinks containing alcohol:  No          Functional Ability and Level of Safety    Hearing: Hearing is good. Activities of Daily Living: The home contains: handrails  Patient does total self care      Ambulation: with no difficulty     Fall Risk:  Fall Risk Assessment, last 12 mths 5/2/2022   Able to walk? Yes   Fall in past 12 months? 0   Do you feel unsteady?  0   Are you worried about falling 0      Abuse Screen:  Patient is not abused       Cognitive Screening    Has your family/caregiver stated any concerns about your memory: no     Cognitive Screening: Normal - Verbal Fluency Test    Health Maintenance Due     Health Maintenance Due   Topic Date Due    DTaP/Tdap/Td series (1 - Tdap) Never done    COVID-19 Vaccine (3 - Moderna risk 4-dose series) 12/30/2021    Depression Screen  03/08/2022    Medicare Yearly Exam  03/09/2022       Patient Care Team   Patient Care Team:  Nancy Johnson MD as PCP - General (Internal Medicine)  Nancy Johnson MD [facility] P[facility]  Radha Mueller MD (General Surgery)  Isac Mcarthur MD (Rheumatology)  Manuela Elias MD (Pulmonary Disease)    History     Patient Active Problem List   Diagnosis Code    Right lower quadrant abdominal pain R10.31    Pain in both lower extremities M79.604, M79.605    Ulcer of mouth K12.1    Bacterial pneumonia J15.9    Sjogren's syndrome ([address] M35.00    Hypercholesterolemia E78.00    Primary osteoarthritis involving multiple joints M15.9    Chronic bilateral low back pain without sciatica M54.50, G89.29    Weight loss, unintentional R63.4    Rheumatoid arthritis with positive rheumatoid factor ([address] M05.9    Sepsis ([address] A41.9    Raynaud's disease without gangrene I73.00    Other dysphagia R13.19    DDD (degenerative disc disease), cervical M50.30 Past Medical History:   Diagnosis Date    Arthritis     Bacterial pneumonia 6/26/2018    Chronic bilateral low back pain without sciatica 11/20/2018    Chronic pain     GERD (gastroesophageal reflux disease)     Hypercholesterolemia     Long term (current) use of anticoagulants     patient denies    Primary osteoarthritis involving multiple joints 11/20/2018    Raynaud's syndrome     Sjogren's syndrome ([facility] 11/20/2018    extreme dry mouth and eyes, burning tongue    Weight loss, unintentional 11/20/2018      Past Surgical History:   Procedure Laterality Date    HX CHOLECYSTECTOMY  05/12/2021    HX TONSILLECTOMY       Current Outpatient Medications   Medication Sig Dispense Refill    omeprazole (PRILOSEC) 20 mg capsule TAKE 1 CAPSULE BY MOUTH DAILY 30 Capsule 11    ezetimibe (ZETIA) 10 mg tablet Take 1 Tablet by mouth daily. 90 Tablet 3    fenofibrate (LOFIBRA) 160 mg tablet Take 1 Tablet by mouth daily. 90 Tablet 3    vit A/vit C/vit E/zinc/copper (PRESERVISION AREDS PO) Take 1 Tab by mouth daily.  zolpidem (AMBIEN) 10 mg tablet TAKE 1 TABLET BY MOUTH AT BEDTIME AS NEEDED 30 Tab 1    acetaminophen (TYLENOL EXTRA STRENGTH) 500 mg tablet Take 1,000 mg by mouth daily as needed for Pain.  nabumetone (RELAFEN) 500 mg tablet Take 500 mg by mouth two (2) times a day.  calcium/D3/mag ox//morgan/Zn (CALTRATE + D3 PLUS MINERALS PO) Take 1 Tab by mouth two (2) times a day.  dicyclomine (BENTYL) 20 mg tablet Take 1 Tablet by mouth every six (6) hours as needed for Abdominal Cramps. 20 Tablet 0    amLODIPine (NORVASC) 2.5 mg tablet Take 2.5 mg by mouth nightly. (Patient not taking: Reported on 5/2/2022)      Biotin 2,500 mcg cap Take 1 Tab by mouth daily.  (Patient not taking: Reported on 5/2/2022)       Allergies   Allergen Reactions    Diclofenac Other (comments) and Nausea and Vomiting     Nausea fever and chills  \"PATIENT CAN TAKE THE TOPICAL CREAM\"  Nausea fever and chills    Plaquenil [Hydroxychloroquine] Rash    Sulfa (Sulfonamide Antibiotics) Unknown (comments)       Family History   Problem Relation Age of Onset    Heart Disease Maternal Grandfather     Prostate Cancer Father     Prostate Cancer Brother      Social History     Tobacco Use    Smoking status: Never Smoker    Smokeless tobacco: Never Used   Substance Use Topics    Alcohol use: Not Currently     Comment: rarely         Chuyita Flores MD

## 2022-05-02 ENCOUNTER — OFFICE VISIT (OUTPATIENT)
Dept: INTERNAL MEDICINE CLINIC | Age: 72
End: 2022-05-02
Payer: MEDICARE

## 2022-05-02 VITALS
TEMPERATURE: 98.2 F | SYSTOLIC BLOOD PRESSURE: 120 MMHG | WEIGHT: 113 LBS | OXYGEN SATURATION: 98 % | HEART RATE: 75 BPM | HEIGHT: 65 IN | DIASTOLIC BLOOD PRESSURE: 60 MMHG | BODY MASS INDEX: 18.83 KG/M2 | RESPIRATION RATE: 16 BRPM

## 2022-05-02 DIAGNOSIS — Z00.00 MEDICARE ANNUAL WELLNESS VISIT, SUBSEQUENT: ICD-10-CM

## 2022-05-02 DIAGNOSIS — K52.9 COLITIS: ICD-10-CM

## 2022-05-02 DIAGNOSIS — M85.80 OSTEOPENIA, UNSPECIFIED LOCATION: ICD-10-CM

## 2022-05-02 DIAGNOSIS — R82.90 ABNORMAL URINE ODOR: ICD-10-CM

## 2022-05-02 DIAGNOSIS — R63.4 WEIGHT LOSS: ICD-10-CM

## 2022-05-02 DIAGNOSIS — Z78.0 MENOPAUSE: ICD-10-CM

## 2022-05-02 DIAGNOSIS — M06.9 RHEUMATOID ARTHRITIS, INVOLVING UNSPECIFIED SITE, UNSPECIFIED WHETHER RHEUMATOID FACTOR PRESENT (HCC): ICD-10-CM

## 2022-05-02 DIAGNOSIS — E78.00 PURE HYPERCHOLESTEROLEMIA: Primary | ICD-10-CM

## 2022-05-02 DIAGNOSIS — H10.32 ACUTE CONJUNCTIVITIS OF LEFT EYE, UNSPECIFIED ACUTE CONJUNCTIVITIS TYPE: ICD-10-CM

## 2022-05-02 DIAGNOSIS — D84.9 IMMUNOSUPPRESSED STATUS (HCC): ICD-10-CM

## 2022-05-02 DIAGNOSIS — R91.8 LUNG NODULES: ICD-10-CM

## 2022-05-02 PROCEDURE — G8399 PT W/DXA RESULTS DOCUMENT: HCPCS | Performed by: INTERNAL MEDICINE

## 2022-05-02 PROCEDURE — G8536 NO DOC ELDER MAL SCRN: HCPCS | Performed by: INTERNAL MEDICINE

## 2022-05-02 PROCEDURE — G0463 HOSPITAL OUTPT CLINIC VISIT: HCPCS | Performed by: INTERNAL MEDICINE

## 2022-05-02 PROCEDURE — G8420 CALC BMI NORM PARAMETERS: HCPCS | Performed by: INTERNAL MEDICINE

## 2022-05-02 PROCEDURE — G0439 PPPS, SUBSEQ VISIT: HCPCS | Performed by: INTERNAL MEDICINE

## 2022-05-02 PROCEDURE — 1101F PT FALLS ASSESS-DOCD LE1/YR: CPT | Performed by: INTERNAL MEDICINE

## 2022-05-02 PROCEDURE — G9899 SCRN MAM PERF RSLTS DOC: HCPCS | Performed by: INTERNAL MEDICINE

## 2022-05-02 PROCEDURE — G8427 DOCREV CUR MEDS BY ELIG CLIN: HCPCS | Performed by: INTERNAL MEDICINE

## 2022-05-02 PROCEDURE — 3017F COLORECTAL CA SCREEN DOC REV: CPT | Performed by: INTERNAL MEDICINE

## 2022-05-02 PROCEDURE — 1090F PRES/ABSN URINE INCON ASSESS: CPT | Performed by: INTERNAL MEDICINE

## 2022-05-02 PROCEDURE — G8510 SCR DEP NEG, NO PLAN REQD: HCPCS | Performed by: INTERNAL MEDICINE

## 2022-05-02 PROCEDURE — 99213 OFFICE O/P EST LOW 20 MIN: CPT | Performed by: INTERNAL MEDICINE

## 2022-05-02 RX ORDER — ERYTHROMYCIN 5 MG/G
0.5 OINTMENT OPHTHALMIC
Qty: 3.5 G | Refills: 0 | Status: SHIPPED | OUTPATIENT
Start: 2022-05-02 | End: 2022-06-09

## 2022-05-02 RX ORDER — POLYMYXIN B SULFATE AND TRIMETHOPRIM 1; 10000 MG/ML; [USP'U]/ML
1 SOLUTION OPHTHALMIC EVERY 6 HOURS
Qty: 10 ML | Refills: 0 | Status: SHIPPED | OUTPATIENT
Start: 2022-05-02 | End: 2022-05-02

## 2022-05-02 NOTE — PATIENT INSTRUCTIONS
Office Policies    Phone calls/patient messages:            Please allow up to 24 hours for someone in the office to contact you about your call or message. Be mindful your provider may be out of the office or your message may require further review. We encourage you to use [employer] for your messages as this is a faster, more efficient way to communicate with our office                         Medication Refills:            Prescription medications require 48-72 business hours to process. We encourage you to use [employer] for your refills. For controlled medications: Please allow 72 business hours to process. Certain medications may require you to  a written prescription at our office. NO narcotic/controlled medications will be prescribed after 4pm Monday through Friday or on weekends              Form/Paperwork Completion:            Please note a $25 fee may incur for all paperwork for completed by our providers. We ask that you allow 7-10 business days. Pre-payment is due prior to picking up/faxing the completed form. You may also download your forms to [employer] to have your doctor print off. Medicare Wellness Visit, Female     The best way to live healthy is to have a lifestyle where you eat a well-balanced diet, exercise regularly, limit alcohol use, and quit all forms of tobacco/nicotine, if applicable. Regular preventive services are another way to keep healthy. Preventive services (vaccines, screening tests, monitoring & exams) can help personalize your care plan, which helps you manage your own care. Screening tests can find health problems at the earliest stages, when they are easiest to treat. [facility] follows the current, evidence-based guidelines published by the [facility] [place] (USPSTF) when recommending preventive services for our patients.  Because we follow these guidelines, sometimes recommendations change over time as research supports it. (For example, mammograms used to be recommended annually. Even though Medicare will still pay for an annual mammogram, the newer guidelines recommend a mammogram every two years for women of average risk). Of course, you and your doctor may decide to screen more often for some diseases, based on your risk and your co-morbidities (chronic disease you are already diagnosed with). Preventive services for you include:  - Medicare offers their members a free annual wellness visit, which is time for you and your primary care provider to discuss and plan for your preventive service needs. Take advantage of this benefit every year!  -All adults over the age of 72 should receive the recommended pneumonia vaccines. Current USPSTF guidelines recommend a series of two vaccines for the best pneumonia protection.   -All adults should have a flu vaccine yearly and a tetanus vaccine every 10 years.   -All adults age 48 and older should receive the shingles vaccines (series of two vaccines). -All adults age 38-68 who are overweight should have a diabetes screening test once every three years.   -All adults born between [birth year] and 1966 should be screened once for Hepatitis C.  -Other screening tests and preventive services for persons with diabetes include: an eye exam to screen for diabetic retinopathy, a kidney function test, a foot exam, and stricter control over your cholesterol.   -Cardiovascular screening for adults with routine risk involves an electrocardiogram (ECG) at intervals determined by your doctor.   -Colorectal cancer screenings should be done for adults age 54-65 with no increased risk factors for colorectal cancer. There are a number of acceptable methods of screening for this type of cancer. Each test has its own benefits and drawbacks. Discuss with your doctor what is most appropriate for you during your annual wellness visit.  The different tests include: colonoscopy (considered the best screening method), a fecal occult blood test, a fecal DNA test, and sigmoidoscopy.    -A bone mass density test is recommended when a woman turns 65 to screen for osteoporosis. This test is only recommended one time, as a screening. Some providers will use this same test as a disease monitoring tool if you already have osteoporosis. -Breast cancer screenings are recommended every other year for women of normal risk, age 54-69.  -Cervical cancer screenings for women over age 72 are only recommended with certain risk factors.      Here is a list of your current Health Maintenance items (your personalized list of preventive services) with a due date:  Health Maintenance Due   Topic Date Due    DTaP/Tdap/Td  (1 - Tdap) Never done    COVID-19 Vaccine (3 - Huang Alin risk 4-dose series) 12/30/2021    Depresssion Screening  03/08/2022    Annual Well Visit  03/09/2022

## 2022-05-02 NOTE — PROGRESS NOTES
1. \"Have you been to the ER, urgent care clinic since your last visit? Hospitalized since your last visit? \" No    2. \"Have you seen or consulted any other health care providers outside of the [address] [place] [address] since your last visit? \" Yes, [facility]  3. For patients aged 39-70: Has the patient had a colonoscopy / FIT/ Cologuard? Yes, Noted in chart      If the patient is female:    4. For patients aged 41-77: Has the patient had a mammogram within the past 2 years?  Yes, Noted in chart

## 2022-05-03 LAB
ALBUMIN SERPL-MCNC: 3.9 G/DL (ref 3.5–5)
ALBUMIN/GLOB SERPL: 1.3 {RATIO} (ref 1.1–2.2)
ALP SERPL-CCNC: 57 U/L (ref 45–117)
ALT SERPL-CCNC: 39 U/L (ref 12–78)
ANION GAP SERPL CALC-SCNC: 10 MMOL/L (ref 5–15)
APPEARANCE UR: CLEAR
AST SERPL-CCNC: 59 U/L (ref 15–37)
BACTERIA URNS QL MICRO: NEGATIVE /HPF
BILIRUB SERPL-MCNC: 0.6 MG/DL (ref 0.2–1)
BILIRUB UR QL: NEGATIVE
BUN SERPL-MCNC: 23 MG/DL (ref 6–20)
BUN/CREAT SERPL: 24 (ref 12–20)
CALCIUM SERPL-MCNC: 9.4 MG/DL (ref 8.5–10.1)
CHLORIDE SERPL-SCNC: 105 MMOL/L (ref 97–108)
CHOLEST SERPL-MCNC: 148 MG/DL
CO2 SERPL-SCNC: 27 MMOL/L (ref 21–32)
COLOR UR: ABNORMAL
CREAT SERPL-MCNC: 0.96 MG/DL (ref 0.55–1.02)
EPITH CASTS URNS QL MICRO: ABNORMAL /LPF
GLOBULIN SER CALC-MCNC: 2.9 G/DL (ref 2–4)
GLUCOSE SERPL-MCNC: 82 MG/DL (ref 65–100)
GLUCOSE UR STRIP.AUTO-MCNC: NEGATIVE MG/DL
HDLC SERPL-MCNC: 67 MG/DL
HDLC SERPL: 2.2 {RATIO} (ref 0–5)
HGB UR QL STRIP: NEGATIVE
HYALINE CASTS URNS QL MICRO: ABNORMAL /LPF (ref 0–5)
KETONES UR QL STRIP.AUTO: ABNORMAL MG/DL
LDLC SERPL CALC-MCNC: 62 MG/DL (ref 0–100)
LEUKOCYTE ESTERASE UR QL STRIP.AUTO: ABNORMAL
NITRITE UR QL STRIP.AUTO: NEGATIVE
PH UR STRIP: 6 [PH] (ref 5–8)
POTASSIUM SERPL-SCNC: 4.1 MMOL/L (ref 3.5–5.1)
PROT SERPL-MCNC: 6.8 G/DL (ref 6.4–8.2)
PROT UR STRIP-MCNC: NEGATIVE MG/DL
RBC #/AREA URNS HPF: ABNORMAL /HPF (ref 0–5)
SODIUM SERPL-SCNC: 142 MMOL/L (ref 136–145)
SP GR UR REFRACTOMETRY: 1.02 (ref 1–1.03)
TRIGL SERPL-MCNC: 95 MG/DL (ref ?–150)
UA: UC IF INDICATED,UAUC: ABNORMAL
UROBILINOGEN UR QL STRIP.AUTO: 0.2 EU/DL (ref 0.2–1)
VLDLC SERPL CALC-MCNC: 19 MG/DL
WBC URNS QL MICRO: ABNORMAL /HPF (ref 0–4)

## 2022-06-10 ENCOUNTER — HOSPITAL ENCOUNTER (OUTPATIENT)
Age: 72
Setting detail: OUTPATIENT SURGERY
Discharge: HOME | End: 2022-06-10
Attending: SPECIALIST | Admitting: SPECIALIST
Payer: MEDICARE

## 2022-06-10 ENCOUNTER — ANESTHESIA (OUTPATIENT)
Dept: ENDOSCOPY | Age: 72
End: 2022-06-10
Payer: MEDICARE

## 2022-06-10 ENCOUNTER — ANESTHESIA EVENT (OUTPATIENT)
Dept: ENDOSCOPY | Age: 72
End: 2022-06-10
Payer: MEDICARE

## 2022-06-10 VITALS
TEMPERATURE: 96.9 F | DIASTOLIC BLOOD PRESSURE: 55 MMHG | OXYGEN SATURATION: 96 % | RESPIRATION RATE: 18 BRPM | SYSTOLIC BLOOD PRESSURE: 101 MMHG | HEIGHT: 66 IN | HEART RATE: 86 BPM | WEIGHT: 109 LBS | BODY MASS INDEX: 17.52 KG/M2

## 2022-06-10 PROCEDURE — 77030021593 HC FCPS BIOP ENDOSC BSC -A: Performed by: INTERNAL MEDICINE

## 2022-06-10 PROCEDURE — 2709999900 HC NON-CHARGEABLE SUPPLY: Performed by: INTERNAL MEDICINE

## 2022-06-10 PROCEDURE — 88305 TISSUE EXAM BY PATHOLOGIST: CPT

## 2022-06-10 PROCEDURE — 76060000032 HC ANESTHESIA 0.5 TO 1 HR: Performed by: INTERNAL MEDICINE

## 2022-06-10 PROCEDURE — 74011250636 HC RX REV CODE- 250/636: Performed by: NURSE ANESTHETIST, CERTIFIED REGISTERED

## 2022-06-10 PROCEDURE — 74011000250 HC RX REV CODE- 250: Performed by: NURSE ANESTHETIST, CERTIFIED REGISTERED

## 2022-06-10 PROCEDURE — 77030019988 HC FCPS ENDOSC DISP BSC -B: Performed by: INTERNAL MEDICINE

## 2022-06-10 PROCEDURE — 74011250636 HC RX REV CODE- 250/636: Performed by: SPECIALIST

## 2022-06-10 PROCEDURE — 76040000007: Performed by: INTERNAL MEDICINE

## 2022-06-10 RX ORDER — SODIUM CHLORIDE 0.9 % (FLUSH) 0.9 %
5-40 SYRINGE (ML) INJECTION AS NEEDED
Status: DISCONTINUED | OUTPATIENT
Start: 2022-06-10 | End: 2022-06-10 | Stop reason: HOSPADM

## 2022-06-10 RX ORDER — DEXMEDETOMIDINE HYDROCHLORIDE 100 UG/ML
INJECTION, SOLUTION INTRAVENOUS AS NEEDED
Status: DISCONTINUED | OUTPATIENT
Start: 2022-06-10 | End: 2022-06-10 | Stop reason: HOSPADM

## 2022-06-10 RX ORDER — PROPOFOL 10 MG/ML
INJECTION, EMULSION INTRAVENOUS AS NEEDED
Status: DISCONTINUED | OUTPATIENT
Start: 2022-06-10 | End: 2022-06-10 | Stop reason: HOSPADM

## 2022-06-10 RX ORDER — SODIUM CHLORIDE 0.9 % (FLUSH) 0.9 %
5-40 SYRINGE (ML) INJECTION EVERY 8 HOURS
Status: DISCONTINUED | OUTPATIENT
Start: 2022-06-10 | End: 2022-06-10 | Stop reason: HOSPADM

## 2022-06-10 RX ORDER — SODIUM CHLORIDE 9 MG/ML
50 INJECTION, SOLUTION INTRAVENOUS CONTINUOUS
Status: DISCONTINUED | OUTPATIENT
Start: 2022-06-10 | End: 2022-06-10 | Stop reason: HOSPADM

## 2022-06-10 RX ORDER — ONDANSETRON 2 MG/ML
INJECTION INTRAMUSCULAR; INTRAVENOUS AS NEEDED
Status: DISCONTINUED | OUTPATIENT
Start: 2022-06-10 | End: 2022-06-10 | Stop reason: HOSPADM

## 2022-06-10 RX ORDER — GLYCOPYRROLATE 0.2 MG/ML
INJECTION INTRAMUSCULAR; INTRAVENOUS AS NEEDED
Status: DISCONTINUED | OUTPATIENT
Start: 2022-06-10 | End: 2022-06-10 | Stop reason: HOSPADM

## 2022-06-10 RX ORDER — DEXTROMETHORPHAN/PSEUDOEPHED 2.5-7.5/.8
1.2 DROPS ORAL
Status: DISCONTINUED | OUTPATIENT
Start: 2022-06-10 | End: 2022-06-10 | Stop reason: HOSPADM

## 2022-06-10 RX ORDER — LIDOCAINE HYDROCHLORIDE 20 MG/ML
INJECTION, SOLUTION EPIDURAL; INFILTRATION; INTRACAUDAL; PERINEURAL AS NEEDED
Status: DISCONTINUED | OUTPATIENT
Start: 2022-06-10 | End: 2022-06-10 | Stop reason: HOSPADM

## 2022-06-10 RX ADMIN — SODIUM CHLORIDE 80 MCG: 900 INJECTION, SOLUTION INTRAVENOUS at 14:03

## 2022-06-10 RX ADMIN — SODIUM CHLORIDE 100 MCG: 900 INJECTION, SOLUTION INTRAVENOUS at 14:13

## 2022-06-10 RX ADMIN — PROPOFOL 100 MG: 10 INJECTION, EMULSION INTRAVENOUS at 13:58

## 2022-06-10 RX ADMIN — PROPOFOL 100 MG: 10 INJECTION, EMULSION INTRAVENOUS at 13:49

## 2022-06-10 RX ADMIN — LIDOCAINE HYDROCHLORIDE 40 MG: 20 INJECTION, SOLUTION EPIDURAL; INFILTRATION; INTRACAUDAL; PERINEURAL at 13:44

## 2022-06-10 RX ADMIN — SODIUM CHLORIDE 50 ML/HR: 9 INJECTION, SOLUTION INTRAVENOUS at 13:31

## 2022-06-10 RX ADMIN — ONDANSETRON HYDROCHLORIDE 4 MG: 2 INJECTION, SOLUTION INTRAMUSCULAR; INTRAVENOUS at 13:44

## 2022-06-10 RX ADMIN — GLYCOPYRROLATE 0.2 MG: 0.2 INJECTION, SOLUTION INTRAMUSCULAR; INTRAVENOUS at 13:58

## 2022-06-10 RX ADMIN — DEXMEDETOMIDINE HYDROCHLORIDE 6 MCG: 100 INJECTION, SOLUTION, CONCENTRATE INTRAVENOUS at 13:44

## 2022-06-10 RX ADMIN — SODIUM CHLORIDE 80 MCG: 900 INJECTION, SOLUTION INTRAVENOUS at 14:09

## 2022-06-10 RX ADMIN — SODIUM CHLORIDE 80 MCG: 900 INJECTION, SOLUTION INTRAVENOUS at 13:59

## 2022-06-10 RX ADMIN — PROPOFOL 100 MG: 10 INJECTION, EMULSION INTRAVENOUS at 13:43

## 2022-06-10 NOTE — ROUTINE PROCESS
Christofer Cox Branson  1950  [id number]    Situation:  Verbal report received from: Freddy Fung  Procedure: Procedure(s):  ESOPHAGOGASTRODUODENOSCOPY (EGD)  COLONOSCOPY  ESOPHAGOGASTRODUODENAL (EGD) BIOPSY  COLON BIOPSY    Background:    Preoperative diagnosis: Abnormal CT, change in bowel habits  Postoperative diagnosis: Egd:Hiatal hernia, gastritis, duodenitis  Colon: Diverticulosis, AVM    :  Dr. Kamran Stovall  Assistant(s): Endoscopy Technician-1: Judy Zhang  Endoscopy RN-1: Kianna Kellogg    Specimens:   ID Type Source Tests Collected by Time Destination   1 : BX Preservative Duodenum  Edmund Quick MD 6/10/2022 1348 Pathology   2 : BX Preservative Gastric  Edmund Quick MD 6/10/2022 1354 Pathology   3 : BX Preservative GE Junction  Edmund Quick MD 6/10/2022 1355 Pathology   4 : BX Preservative Esophagus  Edmund Quick MD 6/10/2022 1355 Pathology   5 : BX Preservative Random colon  Edmund Quick MD 6/10/2022 1406 Pathology     H. Pylori  no    Assessment:  Intra-procedure medications       Anesthesia gave intra-procedure sedation and medications, see anesthesia flow sheet yes    Intravenous fluids: NS@ KVO     Vital signs stable yes    Abdominal assessment: round and soft yes    Recommendation:

## 2022-06-10 NOTE — ANESTHESIA POSTPROCEDURE EVALUATION
Procedure(s):  ESOPHAGOGASTRODUODENOSCOPY (EGD)  COLONOSCOPY  ESOPHAGOGASTRODUODENAL (EGD) BIOPSY  COLON BIOPSY. MAC    Anesthesia Post Evaluation      Multimodal analgesia: multimodal analgesia used between 6 hours prior to anesthesia start to PACU discharge  Patient location during evaluation: PACU  Patient participation: complete - patient participated  Level of consciousness: awake and alert  Pain management: adequate  Airway patency: patent  Anesthetic complications: no  Cardiovascular status: acceptable, hemodynamically stable and blood pressure returned to baseline  Respiratory status: acceptable and room air  Hydration status: euvolemic  Post anesthesia nausea and vomiting:  none  Final Post Anesthesia Temperature Assessment:  Normothermia (36.0-37.5 degrees C)      INITIAL Post-op Vital signs:   Vitals Value Taken Time   /54 06/10/22 1445   Temp 36.1 °C (96.9 °F) 06/10/22 1430   Pulse 84 06/10/22 1447   Resp 23 06/10/22 1447   SpO2 91 % 06/10/22 1446   Vitals shown include unvalidated device data.

## 2022-06-10 NOTE — H&P
Pre-endoscopy H and P    The patient was seen and examined in the room/pre-op holding area. The airway was assessed and documented. The problem list, past medical history, and medications were reviewed. Patient Active Problem List   Diagnosis Code    Right lower quadrant abdominal pain R10.31    Pain in both lower extremities M79.604, M79.605    Ulcer of mouth K12.1    Bacterial pneumonia J15.9    Sjogren's syndrome ([facility] [address] M35.00    Hypercholesterolemia E78.00    Primary osteoarthritis involving multiple joints M15.9    Chronic bilateral low back pain without sciatica M54.50, G89.29    Weight loss, unintentional R63.4    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) M05.9    Sepsis (HCC) A41.9    Raynaud's disease without gangrene I73.00    Other dysphagia R13.19    DDD (degenerative disc disease), cervical M50.30     Social History     Socioeconomic History    Marital status:      Spouse name: Not on file    Number of children: Not on file    Years of education: Not on file    Highest education level: Not on file   Occupational History    Not on file   Tobacco Use    Smoking status: Never Smoker    Smokeless tobacco: Never Used   Vaping Use    Vaping Use: Never used   Substance and Sexual Activity    Alcohol use: Not Currently     Comment: rarely    Drug use: Never    Sexual activity: Not Currently   Other Topics Concern    Not on file   Social History Narrative    Not on file     Social Determinants of Health     Financial Resource Strain:     Difficulty of Paying Living Expenses: Not on file   Food Insecurity:     Worried About Running Out of Food in the Last Year: Not on file    [place] of Food in the Last Year: Not on file   Transportation Needs:     Lack of Transportation (Medical): Not on file    Lack of Transportation (Non-Medical):  Not on file   Physical Activity:     Days of Exercise per Week: Not on file    Minutes of Exercise per Session: Not on file   Stress:     Feeling of Stress : Not on file   Social Connections:     Frequency of Communication with Friends and Family: Not on file    Frequency of Social Gatherings with Friends and Family: Not on file    Attends Holiness Services: Not on file    Active Member of Clubs or Organizations: Not on file    Attends Club or Organization Meetings: Not on file    Marital Status: Not on file   Intimate Partner Violence:     Fear of Current or Ex-Partner: Not on file    Emotionally Abused: Not on file    Physically Abused: Not on file    Sexually Abused: Not on file   Housing Stability:     Unable to Pay for Housing in the Last Year: Not on file    Number of [place] in the Last Year: Not on file    Unstable Housing in the Last Year: Not on file     Past Medical History:   Diagnosis Date    Arthritis     Bacterial pneumonia 6/26/2018    Chronic bilateral low back pain without sciatica 11/20/2018    Chronic pain     GERD (gastroesophageal reflux disease)     Hypercholesterolemia     Long term (current) use of anticoagulants     patient denies    Primary osteoarthritis involving multiple joints 11/20/2018    Raynaud's syndrome     Sjogren's syndrome ([facility] [address] 11/20/2018    extreme dry mouth and eyes, burning tongue    Weight loss, unintentional 11/20/2018         Prior to Admission Medications   Prescriptions Last Dose Informant Patient Reported? Taking?   acetaminophen (TYLENOL EXTRA STRENGTH) 500 mg tablet 6/9/2022 at Unknown time Self Yes Yes   Sig: Take 1,000 mg by mouth daily as needed for Pain. calcium/D3/mag ox//morgan/Zn (CALTRATE + D3 PLUS MINERALS PO) 6/8/2022 Self Yes Yes   Sig: Take 1 Tab by mouth two (2) times a day.   ezetimibe (ZETIA) 10 mg tablet 6/8/2022  No Yes   Sig: Take 1 Tablet by mouth daily. fenofibrate (LOFIBRA) 160 mg tablet 6/8/2022  No Yes   Sig: Take 1 Tablet by mouth daily.    nabumetone (RELAFEN) 500 mg tablet 6/9/2022 at Unknown time Self Yes Yes   Sig: Take 500 mg by mouth two (2) times a day. omeprazole (PRILOSEC) 20 mg capsule 6/8/2022  No Yes   Sig: TAKE 1 CAPSULE BY MOUTH DAILY   Patient taking differently: Take 20 mg by mouth daily. vit A/vit C/vit E/zinc/copper (PRESERVISION AREDS PO) 6/8/2022  Yes Yes   Sig: Take 1 Tab by mouth daily. zolpidem (AMBIEN) 10 mg tablet 3/10/2022  No Yes   Sig: TAKE 1 TABLET BY MOUTH AT BEDTIME AS NEEDED   Patient taking differently: Take 10 mg by mouth nightly as needed. TAKE 1 TABLET BY MOUTH AT BEDTIME AS NEEDED      Facility-Administered Medications: None           The review of systems is:  Negative  for shortness of breath or chest pain      The heart, lungs, and mental status were satisfactory for the administration of deep sedation and for the procedure. I discussed with the patient the objectives, risks, consequences and alternatives to the procedure.       Kay Quiñonez MD  6/10/2022  1:37 PM

## 2022-06-10 NOTE — ANESTHESIA PREPROCEDURE EVALUATION
Relevant Problems   RESPIRATORY SYSTEM   (+) Bacterial pneumonia      ENDOCRINE   (+) 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)       Anesthetic History   No history of anesthetic complications            Review of Systems / Medical History  Patient summary reviewed, nursing notes reviewed and pertinent labs reviewed    Pulmonary              Pertinent negatives: No COPD, asthma and shortness of breath     Neuro/Psych   Within defined limits           Cardiovascular              Hyperlipidemia    Exercise tolerance: >4 METS     GI/Hepatic/Renal     GERD           Endo/Other        Arthritis  Pertinent negatives: No diabetes and obesity   Other Findings   Comments: RA  Raynaud's disease  Sjogren's syndrome  Dysphagia  Chronic pain           Physical Exam    Airway  Mallampati: II  TM Distance: 4 - 6 cm  Neck ROM: normal range of motion   Mouth opening: Normal     Cardiovascular  Regular rate and rhythm,  S1 and S2 normal,  no murmur, click, rub, or gallop             Dental  No notable dental hx       Pulmonary  Breath sounds clear to auscultation               Abdominal  GI exam deferred       Other Findings            Anesthetic Plan    ASA: 2  Anesthesia type: MAC          Induction: Intravenous  Anesthetic plan and risks discussed with: Patient

## 2022-06-10 NOTE — PROGRESS NOTES
1352- Endoscope was pre-cleaned at bedside immediately following procedure by KADEEM Monahan    1411-Endoscope was pre-cleaned at bedside immediately following procedure by MAGGI Monahan.

## 2022-06-10 NOTE — PROCEDURES
Colonoscopy Procedure Note    [facility]  1950  [id number]    Indications:  Please see below. Pre-operative Diagnosis: Abnormal CT, change in bowel habits,Weight loss    Post-operative Diagnosis: Colonic Diverticulosis, AVM      : Jamil Hi MD    Referring Provider: Venancio Lee MD    Sedation:  MAC anesthesia Propofol        Procedure Details:    After detailed informed consent was obtained with all risks and benefits of procedure explained and preoperative exam completed, the patient was taken to the endoscopy suite and placed in the left lateral decubitus position. Upon sequential sedation as per above, a digital rectal exam was performed  and was normal.  The Olympus videocolonoscope  was inserted in the rectum and carefully advanced to the cecum, which was identified by the ileocecal valve and appendiceal orifice, terminal ileum. The quality of preparation was good. The colonoscope was slowly withdrawn with careful evaluation between folds. Retroflexion in the rectum was performed. Findings:   · The Olympus video high-definition colonoscope was advanced to the cecum, identified by its typical land marks, with ease. · TI was normal to 3 cm. · A medium sized AVM is noted in the transverse colon. · Moderate sigmoid diverticulosis. · The mucosa of the colon is normal appearing to the cecum with no obvious mucosal pathology (polyp,mass lesion, colitis etc) noted on this colonoscopy. Mucosal biopsies taken to r/o microscopic colitis([facility] Protocol). Therapies:  biopsy of colon : random colon    Specimen/s: Specimens were collected as described above and sent to pathology. Complications: None were encountered during the procedure. EBL:  None. Recommendations:     -Await pathology. -Repeat colonoscopy pending biopsies.  -Follow up with PCP as per plan. Vaibhav Hi MD  6/10/2022  2:14 PM

## 2022-06-10 NOTE — DISCHARGE INSTRUCTIONS
Home Office: [phone]    Shonda Odell  [id number]  1950    EGD/COLONOSCOPY DISCHARGE INSTRUCTIONS  Discomfort:  Sore throat- throat lozenges or warm salt water gargle  redness at IV site- apply warm compress to area; if redness or soreness persist- contact your physician  Gaseous discomfort- walking, belching will help relieve any discomfort  You may not operate a vehicle for 12 hours  You may not engage in an occupation involving machinery or appliances for rest of today. You may not drink alcoholic beverages for at least 12 hours  Avoid making any critical decisions for at least 24 hour  DIET  You may resume your regular diet - however -  remember your colon is empty and a heavy meal will produce gas. Avoid these foods:  fried / greasy foods, excessive carbonated drinks or too much caffeine  MEDICATIONS   Regarding Aspirin or Nonsteroidal medications specifically, please see below. ACTIVITY  You may resume your normal daily activities. Spend the remainder of the day resting -  avoid any strenuous activity. CALL M.D. ANY SIGN OF   Increasing pain, nausea, vomiting  Abdominal distension (swelling)  New increased bleeding (oral or rectal)  Fever (chills)  Pain in chest area  Bloody discharge from nose or mouth  Shortness of breath    You may not take any Advil, Aspirin, Ibuprofen, Motrin, Aleve, or Goodys for 7 days, ONLY  Tylenol as needed for pain. Follow-up Instructions:   Call  Jamil Hi MD for any questions or concerns  Results of procedure / biopsy in 7 days   Telephone # [phone]      Follow-up Information    None

## 2022-06-10 NOTE — PROCEDURES
[place] Office: [phone]      Esophagogastroduodenoscopy Procedure Note      [facility]  1950  [id number]    Indication: Weight loss, Sjogren's syndrome ,change in bowel function    : Baxter Ganser, MD    Referring Provider:  Fallon Carias MD    Sedation:  MAC anesthesia Propofol    Procedure Details:  After detailed informed consent was obtained for the procedure, with all risks and benefits of procedure explained the patient was taken to the endoscopy suite and placed in the left lateral decubitus position. Following sequential administration of sedation as per above, the endoscope was inserted into the mouth and advanced under direct vision to second portion of the duodenum. A careful inspection was made as the gastroscope was withdrawn, including a retroflexed view of the proximal stomach; findings and interventions are described below. Findings:     Esophagus: The esophageal mucosa in the proximal, mid and distal esophagus is normal. Biopsies taken (r/o EoE)  The squamo-columnar junction is at 38 cm where the Z-line was noted. Biopsies were obtained. A small (1 cm), sliding  hiatal hernia is noted     Stomach: The gastric mucosa has shiny erythema in the body and antrum. Biopsies taken  The fundus was found to be normal with no lesions noted on retroflexion. The angularis is normal as well. Duodenum:   The bulb and post bulbar mucosa has mild erythema. I took mucosal biopsies. The duodenal folds are normal.     Therapies:  biopsy of esophagus  biopsy of stomach body, antrum  biopsy of duodenal distal bulb, second portion    Specimen:  Specimens were collected as described and send to the laboratory. Complications:   None were encountered during the procedure. EBL:  None. Recommendations:     -Await pathology. ,   -Follow symptoms. ,   -Follow up with primary care physician,   -Follow up with .       Thank you for entrusting me with this patient's care. Please do not hesitate to contact me with any questions or if I can be of assistance with any of your other patients' GI needs. Nomi Mars MD  6/10/2022  1:53 PM

## 2022-06-10 NOTE — PROGRESS NOTES
Patient \"sipped water\" all morning. Last sip was 1030. Dr. Yung Min informed can do the procedure in 2 hours for and anesthesia point. Charge Nurse Eveline Vance informed who will coordinate when/ if the patient can be done today based on physician availability.

## 2022-06-14 ENCOUNTER — TELEPHONE (OUTPATIENT)
Dept: INTERNAL MEDICINE CLINIC | Age: 72
End: 2022-06-14

## 2022-06-14 DIAGNOSIS — R63.4 WEIGHT LOSS, UNINTENTIONAL: Primary | ICD-10-CM

## 2022-06-14 RX ORDER — CHOLESTYRAMINE 4 G/5G
2 POWDER, FOR SUSPENSION ORAL 2 TIMES DAILY WITH MEALS
Qty: 30 EACH | Refills: 5 | Status: SHIPPED | OUTPATIENT
Start: 2022-06-14

## 2022-06-14 NOTE — TELEPHONE ENCOUNTER
States just had colonoscopy, states now Stomach cramps, last night (felt like did with procedure prep)    Needs to talk to nurse as she has questions about what to eat and not to eat etc, states gastrology did not answer anything and referred her to pcp.     Please call to advise

## 2022-06-14 NOTE — TELEPHONE ENCOUNTER
Called patient. ID verified with Name and . Spoke with patient in regards to message received. Patient states that she recently had colonoscopy and endoscopy performed in regards to her c/o weight loss. Patient states that she was informed by Steven Katie that the information received from procedures did not reflect any cause or concerns at this time and patient was referred back to PCP in regards to next steps. Patient also states that, between the hours of 10p and 4a, she began to experience stomach cramping with diarrhea, states that it felt like she had taken the prep for the colonoscopy again.

## 2022-06-15 NOTE — TELEPHONE ENCOUNTER
Called patient. ID verified with Name and . Spoke with patient in regards to information received. Writer informed patient of information from provider at this time. Patient still expressing concerns of the weight loss she has been experiencing and wanted to know what provider recommends at this time. Patient states that she had colonoscopy and endoscopy hoping that it would provide some answers as to why she continues to lose weight. Patient states that she doesn't feel like she is getting the answers that she needs at this time.

## 2022-06-15 NOTE — TELEPHONE ENCOUNTER
Called patient. ID verified with Name and . Spoke with patient in regards to information received. Writer informed patient, per provider, \"Advised her to get fu with Dr Teodoro Krueger. [facility] [id number] [facility], maybe her caloric intake can be increased. She has had a lot of testing already. Ask if having any particular symptoms and or if has decreased appetite\"    Patient did accept offer for referral to dietician at this time. Order placed and faxed. Patient states that she understands the information received and has no further questions or concerns at this time.

## 2022-07-07 ENCOUNTER — TELEPHONE (OUTPATIENT)
Dept: INTERNAL MEDICINE CLINIC | Age: 72
End: 2022-07-07

## 2022-07-07 NOTE — TELEPHONE ENCOUNTER
#[phone]  Pt states that she was to have gotten a referral as to who to see as a dietician. She has not heard from anyone yet. Pt also would like to know if you received her results from her gastro appt on 7-6-22?     Please call pt

## 2022-07-12 ENCOUNTER — HOSPITAL ENCOUNTER (OUTPATIENT)
Dept: MAMMOGRAPHY | Age: 72
Discharge: HOME | End: 2022-07-12
Attending: INTERNAL MEDICINE
Payer: MEDICARE

## 2022-07-12 DIAGNOSIS — Z78.0 MENOPAUSE: ICD-10-CM

## 2022-07-12 DIAGNOSIS — Z12.31 SCREENING MAMMOGRAM FOR HIGH-RISK PATIENT: ICD-10-CM

## 2022-07-12 DIAGNOSIS — M85.80 OSTEOPENIA, UNSPECIFIED LOCATION: ICD-10-CM

## 2022-07-12 PROCEDURE — 77067 SCR MAMMO BI INCL CAD: CPT

## 2022-07-12 PROCEDURE — 77080 DXA BONE DENSITY AXIAL: CPT

## 2022-11-02 ENCOUNTER — OFFICE VISIT (OUTPATIENT)
Dept: INTERNAL MEDICINE CLINIC | Age: 72
End: 2022-11-02
Payer: MEDICARE

## 2022-11-02 VITALS
RESPIRATION RATE: 16 BRPM | BODY MASS INDEX: 17.64 KG/M2 | SYSTOLIC BLOOD PRESSURE: 120 MMHG | HEART RATE: 63 BPM | HEIGHT: 66 IN | WEIGHT: 109.8 LBS | TEMPERATURE: 97 F | DIASTOLIC BLOOD PRESSURE: 70 MMHG | OXYGEN SATURATION: 100 %

## 2022-11-02 DIAGNOSIS — M06.9 RHEUMATOID ARTHRITIS, INVOLVING UNSPECIFIED SITE, UNSPECIFIED WHETHER RHEUMATOID FACTOR PRESENT (HCC): Primary | ICD-10-CM

## 2022-11-02 DIAGNOSIS — E78.5 HYPERLIPIDEMIA, UNSPECIFIED HYPERLIPIDEMIA TYPE: ICD-10-CM

## 2022-11-02 DIAGNOSIS — R63.4 WEIGHT LOSS: ICD-10-CM

## 2022-11-02 PROCEDURE — 1090F PRES/ABSN URINE INCON ASSESS: CPT | Performed by: INTERNAL MEDICINE

## 2022-11-02 PROCEDURE — G8536 NO DOC ELDER MAL SCRN: HCPCS | Performed by: INTERNAL MEDICINE

## 2022-11-02 PROCEDURE — G0463 HOSPITAL OUTPT CLINIC VISIT: HCPCS | Performed by: INTERNAL MEDICINE

## 2022-11-02 PROCEDURE — G8418 CALC BMI BLW LOW PARAM F/U: HCPCS | Performed by: INTERNAL MEDICINE

## 2022-11-02 PROCEDURE — G9899 SCRN MAM PERF RSLTS DOC: HCPCS | Performed by: INTERNAL MEDICINE

## 2022-11-02 PROCEDURE — G8427 DOCREV CUR MEDS BY ELIG CLIN: HCPCS | Performed by: INTERNAL MEDICINE

## 2022-11-02 PROCEDURE — 99213 OFFICE O/P EST LOW 20 MIN: CPT | Performed by: INTERNAL MEDICINE

## 2022-11-02 PROCEDURE — G8399 PT W/DXA RESULTS DOCUMENT: HCPCS | Performed by: INTERNAL MEDICINE

## 2022-11-02 PROCEDURE — 1101F PT FALLS ASSESS-DOCD LE1/YR: CPT | Performed by: INTERNAL MEDICINE

## 2022-11-02 PROCEDURE — 3017F COLORECTAL CA SCREEN DOC REV: CPT | Performed by: INTERNAL MEDICINE

## 2022-11-02 PROCEDURE — G8432 DEP SCR NOT DOC, RNG: HCPCS | Performed by: INTERNAL MEDICINE

## 2022-11-02 NOTE — PROGRESS NOTES
1. \"Have you been to the ER, urgent care clinic since your last visit? Hospitalized since your last visit? \" No    2. \"Have you seen or consulted any other health care providers outside of the [address] [place] [address] since your last visit? \" No     3. For patients aged 39-70: Has the patient had a colonoscopy / FIT/ Cologuard? Yes - Care Gap present. Most recent result on file      If the patient is female:    4. For patients aged 41-77: Has the patient had a mammogram within the past 2 years? Yes - Care Gap present. Most recent result on file      5. For patients aged 21-65: Has the patient had a pap smear?  NA - based on age or sex

## 2022-11-02 NOTE — PROGRESS NOTES
HISTORY OF PRESENT ILLNESS  David Boyer is a 66 y.o. female. HPI  fu RA, Sjogrens syndrome-Dr Ector Chu on enbrel recently for RA and rash( rash to plaquenil PTA)s Was not on long term steroids, hx CAP hospitalized last year, insomnia HLD lung nodules (Dr Maxine Ricardo)    fu weight loss  Had egd/CLN  Dr Jose Lugo this year for weight loss-hh/gastritis, tics/ AVM of CLN  Weight stable this year per pt around 112 at home  Last LDL 62  Has dry mouth and burinng tongue - Dr Ector Chu asked her to retry a lower dose of the pilocarpine. Caused excess perspiration in the past  Only taking relafen for RA now  Last OV  colestid changed to zetia since last OV due to supply problem with colestid  Weight loss due to burning tongue and dry mouth symptoms----  RA symptoms--relafen only-mild symptoms  Weight down 1 lb  Uses biotene for xerostomia now  Saw Wes Cuadra this year-stable nodules  EGD and Colonoscopy scheduled 6/10 for abnormal CT scan--Dr Palmer .  Left colonic wall thickening c/w colitis with bloody diarrhea 11/2021 ER visit  proper colonoscopy 1/2021 -tics, no polyps--performed by [facility]  Labs neg for gluten allergy and crohns  Not able to eat well because of dry mouth per pt     Will need mohs surgery in the future     C/o strong urine odor=no dysuria  C/o red lower eye lid-no pain, had style earlier this year same area    Patient Active Problem List    Diagnosis Date Noted    Rheumatoid arthritis with positive rheumatoid factor ([address] 10/01/2019    Sjogren's syndrome ([address] 11/20/2018    Hypercholesterolemia 11/20/2018    Primary osteoarthritis involving multiple joints 11/20/2018    Chronic bilateral low back pain without sciatica 11/20/2018    Weight loss, unintentional 11/20/2018    Bacterial pneumonia 06/26/2018    Raynaud's disease without gangrene 12/30/2019    Other dysphagia 12/30/2019    DDD (degenerative disc disease), cervical 12/30/2019    Sepsis ([address] 12/13/2019    Right lower quadrant abdominal pain 04/26/2018 Pain in both lower extremities 04/26/2018    Ulcer of mouth 04/26/2018     Current Outpatient Medications   Medication Sig Dispense Refill    omeprazole (PRILOSEC) 20 mg capsule TAKE 1 CAPSULE BY MOUTH DAILY (Patient taking differently: Take 20 mg by mouth daily.) 30 Capsule 11    ezetimibe (ZETIA) 10 mg tablet Take 1 Tablet by mouth daily. 90 Tablet 3    fenofibrate (LOFIBRA) 160 mg tablet Take 1 Tablet by mouth daily. 90 Tablet 3    vit A/vit C/vit E/zinc/copper (PRESERVISION AREDS PO) Take 1 Tab by mouth daily. zolpidem (AMBIEN) 10 mg tablet TAKE 1 TABLET BY MOUTH AT BEDTIME AS NEEDED (Patient taking differently: Take 10 mg by mouth nightly as needed. TAKE 1 TABLET BY MOUTH AT BEDTIME AS NEEDED) 30 Tab 1    acetaminophen (TYLENOL) 500 mg tablet Take 1,000 mg by mouth daily as needed for Pain.      nabumetone (RELAFEN) 500 mg tablet Take 500 mg by mouth two (2) times a day. calcium/D3/mag ox//morgan/Zn (CALTRATE + D3 PLUS MINERALS PO) Take 1 Tab by mouth two (2) times a day. Cholestyramine-Aspartame (Questran Light) 4 gram powder Take 2 g by mouth two (2) times daily (with meals).  (Patient not taking: Reported on 11/2/2022) 30 Each 5     Allergies   Allergen Reactions    Diclofenac Other (comments) and Nausea and Vomiting     Nausea fever and chills  \"PATIENT CAN TAKE THE TOPICAL CREAM\"  Nausea fever and chills    Plaquenil [Hydroxychloroquine] Rash    Sulfa (Sulfonamide Antibiotics) Unknown (comments)      Lab Results   Component Value Date/Time    WBC 19.3 (H) 11/21/2021 03:46 PM    WBC (POC) 7.3 11/20/2018 04:33 PM    HGB 13.6 11/21/2021 03:46 PM    HGB (POC) 12.2 11/20/2018 04:33 PM    HCT 42.3 11/21/2021 03:46 PM    HCT (POC) 37.0 11/20/2018 04:33 PM    PLATELET 233 57/41/9204 03:46 PM    PLATELET (POC) 597.4 11/20/2018 04:33 PM    MCV 92.2 11/21/2021 03:46 PM    MCV (POC) 89.0 11/20/2018 04:33 PM     Lab Results   Component Value Date/Time    Cholesterol, total 148 05/02/2022 11:29 AM HDL Cholesterol 67 05/02/2022 11:29 AM    LDL, calculated 62 05/02/2022 11:29 AM    Triglyceride 95 05/02/2022 11:29 AM    CHOL/HDL Ratio 2.2 05/02/2022 11:29 AM     Lab Results   Component Value Date/Time    GFR est non-AA 57 (L) 05/02/2022 11:29 AM    GFR est AA >60 05/02/2022 11:29 AM    Creatinine 0.96 05/02/2022 11:29 AM    BUN 23 (H) 05/02/2022 11:29 AM    Sodium 142 05/02/2022 11:29 AM    Potassium 4.1 05/02/2022 11:29 AM    Chloride 105 05/02/2022 11:29 AM    CO2 27 05/02/2022 11:29 AM    Magnesium 1.8 12/17/2019 05:57 AM    Phosphorus 3.6 12/17/2019 05:57 AM     Lab Results   Component Value Date/Time    TSH 1.68 11/02/2021 09:57 AM    T4, Free 1.54 11/20/2018 04:00 PM      Lab Results   Component Value Date/Time    Glucose 82 05/02/2022 11:29 AM         ROS    Physical Exam  Vitals and nursing note reviewed. Constitutional:       Appearance: Normal appearance. She is well-developed. Comments: Appears stated age   Cardiovascular:      Rate and Rhythm: Normal rate and regular rhythm. Heart sounds: Normal heart sounds. No murmur heard. No friction rub. No gallop. Pulmonary:      Effort: Pulmonary effort is normal. No respiratory distress. Breath sounds: Normal breath sounds. No wheezing. Abdominal:      General: Bowel sounds are normal.      Palpations: Abdomen is soft. Neurological:      Mental Status: She is alert. ASSESSMENT and PLAN    ICD-10-CM ICD-9-CM    1. Rheumatoid arthritis, involving unspecified site, unspecified whether rheumatoid factor present ([facility]  M06.9 714.0 REFERRAL TO RHEUMATOLOGY  Pt want to see another rheum MD in [place] close to her home      CBC W/O DIFF      METABOLIC PANEL, COMPREHENSIVE      SED RATE (ESR)  Fax labs to Dr Joaquin Villegas      2. Weight loss  R63.4 783.21 Stable. Encourage caloric intake  Dry mouth causing some difificulty with food-she may retry the pilocarpine      3.  Hyperlipidemia, unspecified hyperlipidemia type  E78.5 272.4 Controlled on zetia and fenofibrate   Rtc 6 months wellness

## 2022-11-02 NOTE — PATIENT INSTRUCTIONS
Office Policies    Phone calls/patient messages:            Please allow up to 24 hours for someone in the office to contact you about your call or message. Be mindful your provider may be out of the office or your message may require further review. We encourage you to use [employer] for your messages as this is a faster, more efficient way to communicate with our office                         Medication Refills:            Prescription medications require 48-72 business hours to process. We encourage you to use [employer] for your refills. For controlled medications: Please allow 72 business hours to process. Certain medications may require you to  a written prescription at our office. NO narcotic/controlled medications will be prescribed after 4pm Monday through Friday or on weekends              Form/Paperwork Completion:            Please note a $25 fee may incur for all paperwork for completed by our providers. We ask that you allow 7-10 business days. Pre-payment is due prior to picking up/faxing the completed form. You may also download your forms to [employer] to have your doctor print off.

## 2022-11-03 LAB
ALBUMIN SERPL-MCNC: 4 G/DL (ref 3.5–5)
ALBUMIN/GLOB SERPL: 1.4 {RATIO} (ref 1.1–2.2)
ALP SERPL-CCNC: 52 U/L (ref 45–117)
ALT SERPL-CCNC: 40 U/L (ref 12–78)
ANION GAP SERPL CALC-SCNC: 3 MMOL/L (ref 5–15)
AST SERPL-CCNC: 55 U/L (ref 15–37)
BILIRUB SERPL-MCNC: 0.6 MG/DL (ref 0.2–1)
BUN SERPL-MCNC: 26 MG/DL (ref 6–20)
BUN/CREAT SERPL: 30 (ref 12–20)
CALCIUM SERPL-MCNC: 9.8 MG/DL (ref 8.5–10.1)
CHLORIDE SERPL-SCNC: 106 MMOL/L (ref 97–108)
CO2 SERPL-SCNC: 30 MMOL/L (ref 21–32)
COMMENT, HOLDF: NORMAL
CREAT SERPL-MCNC: 0.87 MG/DL (ref 0.55–1.02)
ERYTHROCYTE [DISTWIDTH] IN BLOOD BY AUTOMATED COUNT: 14.4 % (ref 11.5–14.5)
ERYTHROCYTE [SEDIMENTATION RATE] IN BLOOD: 7 MM/HR (ref 0–30)
GLOBULIN SER CALC-MCNC: 2.9 G/DL (ref 2–4)
GLUCOSE SERPL-MCNC: 84 MG/DL (ref 65–100)
HCT VFR BLD AUTO: 39.5 % (ref 35–47)
HGB BLD-MCNC: 12.3 G/DL (ref 11.5–16)
MCH RBC QN AUTO: 30 PG (ref 26–34)
MCHC RBC AUTO-ENTMCNC: 31.1 G/DL (ref 30–36.5)
MCV RBC AUTO: 96.3 FL (ref 80–99)
NRBC # BLD: 0 K/UL (ref 0–0.01)
NRBC BLD-RTO: 0 PER 100 WBC
PLATELET # BLD AUTO: 286 K/UL (ref 150–400)
PMV BLD AUTO: 11.9 FL (ref 8.9–12.9)
POTASSIUM SERPL-SCNC: 4.2 MMOL/L (ref 3.5–5.1)
PROT SERPL-MCNC: 6.9 G/DL (ref 6.4–8.2)
RBC # BLD AUTO: 4.1 M/UL (ref 3.8–5.2)
SAMPLES BEING HELD,HOLD: NORMAL
SODIUM SERPL-SCNC: 139 MMOL/L (ref 136–145)
WBC # BLD AUTO: 5.5 K/UL (ref 3.6–11)

## 2022-12-18 ENCOUNTER — TELEPHONE (OUTPATIENT)
Dept: INTERNAL MEDICINE CLINIC | Age: 72
End: 2022-12-18

## 2022-12-18 DIAGNOSIS — U07.1 COVID-19: Primary | ICD-10-CM

## 2022-12-18 RX ORDER — NIRMATRELVIR AND RITONAVIR 300-100 MG
KIT ORAL
Qty: 1 BOX | Refills: 0 | Status: SHIPPED | OUTPATIENT
Start: 2022-12-18

## 2022-12-18 NOTE — TELEPHONE ENCOUNTER
Patient called she tested positive for COVID, onset of symptoms last night . Has low grade fever, nose, body aches, fatigue and runny nose. Orders Placed This Encounter    Paxlovid, EUA, 300 mg (150 mg x 2)-100 mg     Sig: Follow package directions  Indications: COVID-19 (emergency use authorization)     Dispense:  1 Box     Refill:  0     GFR is above 60     Order Specific Question:   Does this patient qualify for COVID-19 antiviral treatment based on criteria for treatment? Answer:    Yes

## 2022-12-22 ENCOUNTER — TRANSCRIBE ORDER (OUTPATIENT)
Dept: SCHEDULING | Age: 72
End: 2022-12-22

## 2022-12-22 DIAGNOSIS — R89.9 ABNORMAL LABORATORY TEST: ICD-10-CM

## 2022-12-22 DIAGNOSIS — K58.9 IRRITABLE BOWEL SYNDROME: ICD-10-CM

## 2022-12-22 DIAGNOSIS — R63.4 ABNORMAL WEIGHT LOSS: Primary | ICD-10-CM

## 2022-12-22 DIAGNOSIS — U07.1 COVID-19: ICD-10-CM

## 2022-12-22 DIAGNOSIS — Z86.19 H/O CLOSTRIDIUM DIFFICILE INFECTION: ICD-10-CM

## 2023-01-05 ENCOUNTER — HOSPITAL ENCOUNTER (OUTPATIENT)
Dept: CT IMAGING | Age: 73
Discharge: HOME | End: 2023-01-05
Attending: SPECIALIST
Payer: MEDICARE

## 2023-01-05 DIAGNOSIS — R63.4 ABNORMAL WEIGHT LOSS: ICD-10-CM

## 2023-01-05 DIAGNOSIS — R89.9 ABNORMAL LABORATORY TEST: ICD-10-CM

## 2023-01-05 DIAGNOSIS — Z86.19 H/O CLOSTRIDIUM DIFFICILE INFECTION: ICD-10-CM

## 2023-01-05 DIAGNOSIS — K58.9 IRRITABLE BOWEL SYNDROME: ICD-10-CM

## 2023-01-05 DIAGNOSIS — U07.1 COVID-19: ICD-10-CM

## 2023-01-05 PROCEDURE — 74177 CT ABD & PELVIS W/CONTRAST: CPT

## 2023-01-05 PROCEDURE — 74011000636 HC RX REV CODE- 636: Performed by: SPECIALIST

## 2023-01-05 RX ADMIN — IOPAMIDOL 80 ML: 755 INJECTION, SOLUTION INTRAVENOUS at 07:27

## 2023-01-12 ENCOUNTER — TRANSCRIBE ORDER (OUTPATIENT)
Dept: SCHEDULING | Age: 73
End: 2023-01-12

## 2023-01-12 DIAGNOSIS — R63.4 ABNORMAL WEIGHT LOSS: Primary | ICD-10-CM

## 2023-01-12 DIAGNOSIS — R89.9 ABNORMAL LABORATORY TEST: ICD-10-CM

## 2023-01-12 DIAGNOSIS — Z86.19 HISTORY OF CLOSTRIDIUM DIFFICILE INFECTION: ICD-10-CM

## 2023-01-12 DIAGNOSIS — U07.1 COVID-19: ICD-10-CM

## 2023-01-12 DIAGNOSIS — K58.9 IRRITABLE BOWEL SYNDROME: ICD-10-CM

## 2023-01-14 RX ORDER — EZETIMIBE 10 MG/1
10 TABLET ORAL DAILY
Qty: 90 TABLET | Refills: 3 | Status: SHIPPED | OUTPATIENT
Start: 2023-01-14

## 2023-02-13 ENCOUNTER — TELEPHONE (OUTPATIENT)
Dept: INTERNAL MEDICINE CLINIC | Age: 73
End: 2023-02-13

## 2023-02-13 DIAGNOSIS — N17.9 AKI (ACUTE KIDNEY INJURY) (HCC): Primary | ICD-10-CM

## 2023-02-13 NOTE — TELEPHONE ENCOUNTER
PT called at this time. Per Dr. Cb Mcnally:  Tel pt I ordered labs-she can stopy by .  fully hydrate prior to the lab   No further concerns voiced.

## 2023-02-13 NOTE — TELEPHONE ENCOUNTER
----- Message from Fide Villalobos sent at 2/10/2023  1:20 PM EST -----  Subject: Referral Request    Reason for referral request? Pt wants to redo some lab work tests for   creatine, calcium, and EGFr as they were out of range when her [address]   gastroenterologist, Dr. Maria Luisa Calderon, tested the levels last on 2/1/23. Pt   wants PCP Kristi Duvall to order them again for her to see as her gastroenterologist   said they may have been out of range due to lack of proper hydration. Please advise Pt. Provider patient wants to be referred to(if known): Brandon Koch    Provider Phone Number(if known):     Additional Information for Provider?   ---------------------------------------------------------------------------  --------------  [facility]    [phone]; OK to leave message on voicemail  ---------------------------------------------------------------------------  --------------

## 2023-02-15 ENCOUNTER — LAB ONLY (OUTPATIENT)
Dept: INTERNAL MEDICINE CLINIC | Age: 73
End: 2023-02-15

## 2023-02-15 DIAGNOSIS — N17.9 AKI (ACUTE KIDNEY INJURY) (HCC): ICD-10-CM

## 2023-02-16 LAB
ANION GAP SERPL CALC-SCNC: 9 MMOL/L (ref 5–15)
BUN SERPL-MCNC: 26 MG/DL (ref 6–20)
BUN/CREAT SERPL: 25 (ref 12–20)
CALCIUM SERPL-MCNC: 9.5 MG/DL (ref 8.5–10.1)
CHLORIDE SERPL-SCNC: 107 MMOL/L (ref 97–108)
CO2 SERPL-SCNC: 27 MMOL/L (ref 21–32)
CREAT SERPL-MCNC: 1.05 MG/DL (ref 0.55–1.02)
GLUCOSE SERPL-MCNC: 118 MG/DL (ref 65–100)
POTASSIUM SERPL-SCNC: 4.2 MMOL/L (ref 3.5–5.1)
SODIUM SERPL-SCNC: 143 MMOL/L (ref 136–145)

## 2023-02-16 NOTE — PROGRESS NOTES
PT called at this time. 2 identifiers confirmed. Per Dr. Spenser Bennett:  mild kidney function impairment--avoid all nsaids. Hydrate more. Can repeat labs at appt next month   Verbalized understanding. No concerns voiced.

## 2023-02-16 NOTE — PROGRESS NOTES
Tell pt mild kidney function impairment--avoid all nsaids. Hydrate more.  Can repeat labs at appt next month

## 2023-03-09 DIAGNOSIS — E78.00 PURE HYPERCHOLESTEROLEMIA: ICD-10-CM

## 2023-03-09 RX ORDER — FENOFIBRATE 160 MG/1
160 TABLET ORAL DAILY
Qty: 90 TABLET | Refills: 3 | Status: SHIPPED | OUTPATIENT
Start: 2023-03-09

## 2023-03-09 NOTE — TELEPHONE ENCOUNTER
PCP: Leatha Silva MD    Last appt: 11/2/2022  Future Appointments   Date Time Provider Homa Martin   3/21/2023  3:15 PM Leatha Silva MD [facility] AMB   6/7/2023 10:45 AM Leatha Silva MD [facility] AMB       Requested Prescriptions     Pending Prescriptions Disp Refills    fenofibrate (LOFIBRA) 160 mg tablet 90 Tablet 3     Sig: Take 1 Tablet by mouth daily.

## 2023-03-21 ENCOUNTER — OFFICE VISIT (OUTPATIENT)
Dept: INTERNAL MEDICINE CLINIC | Age: 73
End: 2023-03-21
Payer: MEDICARE

## 2023-03-21 VITALS
HEART RATE: 80 BPM | RESPIRATION RATE: 16 BRPM | TEMPERATURE: 97.1 F | DIASTOLIC BLOOD PRESSURE: 80 MMHG | HEIGHT: 66 IN | OXYGEN SATURATION: 98 % | WEIGHT: 109.8 LBS | BODY MASS INDEX: 17.64 KG/M2 | SYSTOLIC BLOOD PRESSURE: 130 MMHG

## 2023-03-21 DIAGNOSIS — F33.1 MAJOR DEPRESSIVE DISORDER, RECURRENT, MODERATE (HCC): ICD-10-CM

## 2023-03-21 DIAGNOSIS — F33.0 MAJOR DEPRESSIVE DISORDER, RECURRENT, MILD (HCC): ICD-10-CM

## 2023-03-21 DIAGNOSIS — D84.9 IMMUNOSUPPRESSED STATUS (HCC): ICD-10-CM

## 2023-03-21 DIAGNOSIS — M06.9 RHEUMATOID ARTHRITIS, INVOLVING UNSPECIFIED SITE, UNSPECIFIED WHETHER RHEUMATOID FACTOR PRESENT (HCC): ICD-10-CM

## 2023-03-21 DIAGNOSIS — N17.9 AKI (ACUTE KIDNEY INJURY) (HCC): ICD-10-CM

## 2023-03-21 DIAGNOSIS — Z91.018 ALLERGY TO ALPHA-GAL: Primary | ICD-10-CM

## 2023-03-21 DIAGNOSIS — F33.9 EPISODE OF RECURRENT MAJOR DEPRESSIVE DISORDER, UNSPECIFIED DEPRESSION EPISODE SEVERITY (HCC): ICD-10-CM

## 2023-03-21 PROCEDURE — 3017F COLORECTAL CA SCREEN DOC REV: CPT | Performed by: INTERNAL MEDICINE

## 2023-03-21 PROCEDURE — 1090F PRES/ABSN URINE INCON ASSESS: CPT | Performed by: INTERNAL MEDICINE

## 2023-03-21 PROCEDURE — 99214 OFFICE O/P EST MOD 30 MIN: CPT | Performed by: INTERNAL MEDICINE

## 2023-03-21 PROCEDURE — G8536 NO DOC ELDER MAL SCRN: HCPCS | Performed by: INTERNAL MEDICINE

## 2023-03-21 PROCEDURE — 1101F PT FALLS ASSESS-DOCD LE1/YR: CPT | Performed by: INTERNAL MEDICINE

## 2023-03-21 PROCEDURE — G8399 PT W/DXA RESULTS DOCUMENT: HCPCS | Performed by: INTERNAL MEDICINE

## 2023-03-21 PROCEDURE — G0463 HOSPITAL OUTPT CLINIC VISIT: HCPCS | Performed by: INTERNAL MEDICINE

## 2023-03-21 PROCEDURE — G8418 CALC BMI BLW LOW PARAM F/U: HCPCS | Performed by: INTERNAL MEDICINE

## 2023-03-21 PROCEDURE — G9899 SCRN MAM PERF RSLTS DOC: HCPCS | Performed by: INTERNAL MEDICINE

## 2023-03-21 PROCEDURE — G8511 SCR DEP POS, NO PLAN DOC RNG: HCPCS | Performed by: INTERNAL MEDICINE

## 2023-03-21 PROCEDURE — G8427 DOCREV CUR MEDS BY ELIG CLIN: HCPCS | Performed by: INTERNAL MEDICINE

## 2023-03-21 RX ORDER — NEOMYCIN SULFATE, POLYMYXIN B SULFATE, AND DEXAMETHASONE 3.5; 10000; 1 MG/G; [USP'U]/G; MG/G
OINTMENT OPHTHALMIC
COMMUNITY
Start: 2023-03-15

## 2023-03-21 RX ORDER — SERTRALINE HYDROCHLORIDE 25 MG/1
25 TABLET, FILM COATED ORAL DAILY
Qty: 90 TABLET | Refills: 1 | Status: SHIPPED | OUTPATIENT
Start: 2023-03-21

## 2023-03-21 RX ORDER — EZETIMIBE 10 MG/1
10 TABLET ORAL DAILY
Qty: 90 TABLET | Refills: 3 | Status: SHIPPED | OUTPATIENT
Start: 2023-03-21

## 2023-03-21 RX ORDER — PREDNISOLONE 15 MG/5ML
SOLUTION ORAL
COMMUNITY
Start: 2022-03-10

## 2023-03-21 NOTE — PROGRESS NOTES
1. Have you been to the ER, urgent care clinic since your last visit? Hospitalized since your last visit? No    2. Have you seen or consulted any other health care providers outside of the [address] [place] [address] since your last visit? Include any pap smears or colon screening.  No

## 2023-03-21 NOTE — PROGRESS NOTES
HISTORY OF PRESENT ILLNESS  Dorothea Galdamez is a 67 y.o. female. HPI  fu RA, Sjogrens syndrome-Dr Jatinder Vale on enbrel recently for RA and rash( rash to plaquenil PTA)s Was not on long term steroids, hx CAP hospitalized last year, insomnia HLD lung nodules (Dr Clark Pel renal insufficiency by labs this year--bun/cr  26/1.05  On relafen bid for RA joint pain for at least 6-12 months-Dr Jatinder Vale  C/o feeling down sometimes not able to eat out with friends due to burning tongue and not able to wear clothes for her age due to her weight loss  Feels somehwat anxious and irritable  Cannot eat mammalliam meat due to alpha gal allergy  Sees GI Manluc but going to see a [facility] GI MD soon for her weight loss--appetite is ok pr pt        Last OV   fu weight loss  Had egd/CLN  Dr Yesenia Joyner this year for weight loss-hh/gastritis, tics/ AVM of CLN  Weight stable this year per pt around 112 at home  Last LDL 62  Has dry mouth and burinng tongue - Dr Jatinder Vale asked her to retry a lower dose of the pilocarpine.  Caused excess perspiration in the past    Patient Active Problem List    Diagnosis Date Noted    Rheumatoid arthritis with positive rheumatoid factor ([address] 10/01/2019    Sjogren's syndrome ([address] 11/20/2018    Hypercholesterolemia 11/20/2018    Primary osteoarthritis involving multiple joints 11/20/2018    Chronic bilateral low back pain without sciatica 11/20/2018    Weight loss, unintentional 11/20/2018    Bacterial pneumonia 06/26/2018    Major depressive disorder, recurrent, mild 03/21/2023    Major depressive disorder, recurrent, moderate 03/21/2023    Major depressive disorder, recurrent, unspecified 03/21/2023    Raynaud's disease without gangrene 12/30/2019    Other dysphagia 12/30/2019    DDD (degenerative disc disease), cervical 12/30/2019    Sepsis ([address] 12/13/2019    Right lower quadrant abdominal pain 04/26/2018    Pain in both lower extremities 04/26/2018    Ulcer of mouth 04/26/2018     Current Outpatient Medications   Medication Sig Dispense Refill    prednisoLONE (PRELONE) 15 mg/5 mL syrup       neomycin-polymyxin-dexamethasone (DEXACINE) 3.5 mg/g-10,000 unit/g-0.1 % ophthalmic ointment APPLY TO THE AFFECTED AREA THREE TIMES DAILY FOR 2 WEEKS ONLY      multivit with minerals/lutein (MULTIVITAMIN 50 PLUS PO)       sertraline (ZOLOFT) 25 mg tablet Take 1 Tablet by mouth daily. 90 Tablet 1    ezetimibe (ZETIA) 10 mg tablet Take 1 Tablet by mouth daily. 90 Tablet 3    fenofibrate (LOFIBRA) 160 mg tablet Take 1 Tablet by mouth daily. 90 Tablet 3    omeprazole (PRILOSEC) 20 mg capsule TAKE 1 CAPSULE BY MOUTH DAILY (Patient taking differently: Take 20 mg by mouth daily.) 30 Capsule 11    vit A/vit C/vit E/zinc/copper (PRESERVISION AREDS PO) Take 1 Tab by mouth daily. zolpidem (AMBIEN) 10 mg tablet TAKE 1 TABLET BY MOUTH AT BEDTIME AS NEEDED (Patient taking differently: Take 10 mg by mouth nightly as needed. TAKE 1 TABLET BY MOUTH AT BEDTIME AS NEEDED) 30 Tab 1    acetaminophen (TYLENOL) 500 mg tablet Take 1,000 mg by mouth daily as needed for Pain.      nabumetone (RELAFEN) 500 mg tablet Take 500 mg by mouth two (2) times a day. calcium/D3/mag ox//morgan/Zn (CALTRATE + D3 PLUS MINERALS PO) Take 1 Tab by mouth two (2) times a day.  (Patient not taking: Reported on 3/21/2023)       Allergies   Allergen Reactions    Diclofenac Other (comments) and Nausea and Vomiting     Nausea fever and chills  \"PATIENT CAN TAKE THE TOPICAL CREAM\"  Nausea fever and chills    Plaquenil [Hydroxychloroquine] Rash    Sulfa (Sulfonamide Antibiotics) Unknown (comments)      Lab Results   Component Value Date/Time    WBC 5.5 11/02/2022 11:44 AM    WBC (POC) 7.3 11/20/2018 04:33 PM    HGB 12.3 11/02/2022 11:44 AM    HGB (POC) 12.2 11/20/2018 04:33 PM    HCT 39.5 11/02/2022 11:44 AM    HCT (POC) 37.0 11/20/2018 04:33 PM    PLATELET 901 86/46/9628 11:44 AM    PLATELET (POC) 652.0 11/20/2018 04:33 PM    MCV 96.3 11/02/2022 11:44 AM    MCV (POC) 89.0 11/20/2018 04:33 PM     Lab Results   Component Value Date/Time    GFR est non-AA 57 (L) 05/02/2022 11:29 AM    GFR est AA >60 05/02/2022 11:29 AM    Creatinine 1.05 (H) 02/15/2023 02:39 PM    BUN 26 (H) 02/15/2023 02:39 PM    Sodium 143 02/15/2023 02:39 PM    Potassium 4.2 02/15/2023 02:39 PM    Chloride 107 02/15/2023 02:39 PM    CO2 27 02/15/2023 02:39 PM    Magnesium 1.8 12/17/2019 05:57 AM    Phosphorus 3.6 12/17/2019 05:57 AM        ROS    Physical Exam  Vitals and nursing note reviewed. Constitutional:       Appearance: Normal appearance. She is well-developed. Comments: Thin, underweight   HENT:      Head: Normocephalic and atraumatic. Right Ear: Tympanic membrane normal.      Left Ear: Tympanic membrane normal.   Eyes:      Pupils: Pupils are equal, round, and reactive to light. Neck:      Vascular: No carotid bruit. Cardiovascular:      Rate and Rhythm: Normal rate and regular rhythm. Pulses: Normal pulses. Heart sounds: Normal heart sounds. No murmur heard. No friction rub. No gallop. Pulmonary:      Effort: Pulmonary effort is normal. No respiratory distress. Breath sounds: Normal breath sounds. No wheezing or rales. Abdominal:      Palpations: Abdomen is soft. Musculoskeletal:      Cervical back: Normal range of motion and neck supple. Right lower leg: No edema. Left lower leg: No edema. Lymphadenopathy:      Cervical: No cervical adenopathy. Skin:     General: Skin is warm. Neurological:      General: No focal deficit present. Mental Status: She is alert. Psychiatric:         Mood and Affect: Mood normal.         Behavior: Behavior normal.         Thought Content: Thought content normal.         Judgment: Judgment normal.       ASSESSMENT and PLAN    ICD-10-CM ICD-9-CM    1. Allergy to alpha-gal  Z91.018 V15.05 REFERRAL TO ALLERGY      2. Immunosuppressed status ([facility]  D84.9 279.9       3.  Rheumatoid arthritis, involving unspecified site, unspecified whether rheumatoid factor present ([facility]  M06.9 714.0 Fu rheum MD but plan on stopping relafen due to elevated bun/cr on recent labs--d/w pt      4. Major depressive disorder, recurrent, mild  F33.0 296.31 Start zoloft 25 mg qd      5. F33.1 296.32       6. F33.9 296.30       7.  MARKELL (acute kidney injury) ([facility]  A03.0 652.6 METABOLIC PANEL, BASIC  D/c relafen   8        weight loss--will see gi MD. She will try to liberalize calories in diet    Fu 3 months

## 2023-03-22 ENCOUNTER — APPOINTMENT (OUTPATIENT)
Dept: INTERNAL MEDICINE CLINIC | Age: 73
End: 2023-03-22

## 2023-03-23 LAB
ANION GAP SERPL CALC-SCNC: 7 MMOL/L (ref 5–15)
BUN SERPL-MCNC: 20 MG/DL (ref 6–20)
BUN/CREAT SERPL: 24 (ref 12–20)
CALCIUM SERPL-MCNC: 10.1 MG/DL (ref 8.5–10.1)
CHLORIDE SERPL-SCNC: 105 MMOL/L (ref 97–108)
CO2 SERPL-SCNC: 28 MMOL/L (ref 21–32)
CREAT SERPL-MCNC: 0.82 MG/DL (ref 0.55–1.02)
GLUCOSE SERPL-MCNC: 122 MG/DL (ref 65–100)
POTASSIUM SERPL-SCNC: 4 MMOL/L (ref 3.5–5.1)
SODIUM SERPL-SCNC: 140 MMOL/L (ref 136–145)

## 2023-03-23 NOTE — PROGRESS NOTES
Tell pt her repeat kidney function is normal. Stay hydrated. Can take relafen for pain but maybe lower to every day instead of bid .

## 2023-03-24 NOTE — PROGRESS NOTES
PT called at this time. 2 identifiers confirmed. Per Dr. Ria Finnegan:  repeat kidney function is normal. Stay hydrated. Can take relafen for pain but maybe lower to every day instead of bid . Verbalized understanding. No further concerns voiced.

## 2023-05-11 ENCOUNTER — TELEPHONE (OUTPATIENT)
Age: 73
End: 2023-05-11

## 2023-05-11 NOTE — TELEPHONE ENCOUNTER
Theresa//Maddie states she needs a call back in reference to getting Clarification on Magic Mouthwash received. Please call.  Thank you

## 2023-05-23 NOTE — TELEPHONE ENCOUNTER
Pt would like the mouthwash script to go to a different pharmacy. [employer] on file. Please call pt when that has been done. Thanks.

## 2023-05-26 ENCOUNTER — TELEPHONE (OUTPATIENT)
Age: 73
End: 2023-05-26

## 2023-05-26 NOTE — TELEPHONE ENCOUNTER
Abebe Bacon called back at this time. Verbal ok given to use lidocaine mixture instead of standard medication. Per Dr. Aliya Son. No further concerns voiced.

## 2023-05-26 NOTE — TELEPHONE ENCOUNTER
Rasta Collins//[facility] needs a call back to make an Adjustment to Crystal received.        Please call back at [phone]    Option 2 for GUILLERMO MENDOZA [facility]

## 2023-05-30 ENCOUNTER — TELEPHONE (OUTPATIENT)
Age: 73
End: 2023-05-30

## 2023-05-30 NOTE — TELEPHONE ENCOUNTER
----- Message from Carina Hill sent at 5/30/2023 12:51 PM EDT -----  Subject: Appointment Request    Reason for Call: Established Patient Appointment needed: Routine Medicare   AWV    QUESTIONS    Reason for appointment request? No appointments available during search     Additional Information for Provider?  AWV r/s patient still will be out of   town on 6/8/23  ---------------------------------------------------------------------------  --------------  [email or web address]  [phone]; OK to leave message on voicemail  ---------------------------------------------------------------------------  --------------  SCRIPT ANSWERS  COVID Screen: Luan Jones

## 2023-05-30 NOTE — TELEPHONE ENCOUNTER
----- Message from Sukhwindernirali Corado sent at 5/30/2023 11:44 AM EDT -----  Subject: Appointment Request    Reason for Call: Established Patient Appointment needed: Routine Medicare   AWV    QUESTIONS    Reason for appointment request? No appointments available during search     Additional Information for Provider?  awv  ---------------------------------------------------------------------------  --------------  Surendra Vernon [facility]  [phone]; OK to leave message on voicemail  ---------------------------------------------------------------------------  --------------  SCRIPT ANSWERS  COVID Screen: Benja Trevino

## 2023-06-28 ENCOUNTER — TRANSCRIBE ORDERS (OUTPATIENT)
Facility: HOSPITAL | Age: 73
End: 2023-06-28

## 2023-06-28 DIAGNOSIS — Z12.31 SCREENING MAMMOGRAM FOR BREAST CANCER: Primary | ICD-10-CM

## 2023-07-06 ENCOUNTER — OFFICE VISIT (OUTPATIENT)
Age: 73
End: 2023-07-06
Payer: MEDICARE

## 2023-07-06 VITALS
TEMPERATURE: 97.3 F | RESPIRATION RATE: 16 BRPM | WEIGHT: 111.6 LBS | BODY MASS INDEX: 17.94 KG/M2 | HEIGHT: 66 IN | OXYGEN SATURATION: 97 % | HEART RATE: 70 BPM | SYSTOLIC BLOOD PRESSURE: 110 MMHG | DIASTOLIC BLOOD PRESSURE: 62 MMHG

## 2023-07-06 DIAGNOSIS — R63.4 WEIGHT LOSS: ICD-10-CM

## 2023-07-06 DIAGNOSIS — E78.00 PURE HYPERCHOLESTEROLEMIA: Primary | ICD-10-CM

## 2023-07-06 DIAGNOSIS — F32.A DEPRESSION, UNSPECIFIED DEPRESSION TYPE: ICD-10-CM

## 2023-07-06 DIAGNOSIS — Z00.00 MEDICARE ANNUAL WELLNESS VISIT, SUBSEQUENT: ICD-10-CM

## 2023-07-06 DIAGNOSIS — M06.9 RHEUMATOID ARTHRITIS, INVOLVING UNSPECIFIED SITE, UNSPECIFIED WHETHER RHEUMATOID FACTOR PRESENT (HCC): ICD-10-CM

## 2023-07-06 PROCEDURE — 1090F PRES/ABSN URINE INCON ASSESS: CPT | Performed by: INTERNAL MEDICINE

## 2023-07-06 PROCEDURE — 99213 OFFICE O/P EST LOW 20 MIN: CPT | Performed by: INTERNAL MEDICINE

## 2023-07-06 PROCEDURE — 1123F ACP DISCUSS/DSCN MKR DOCD: CPT | Performed by: INTERNAL MEDICINE

## 2023-07-06 PROCEDURE — 3017F COLORECTAL CA SCREEN DOC REV: CPT | Performed by: INTERNAL MEDICINE

## 2023-07-06 PROCEDURE — G0439 PPPS, SUBSEQ VISIT: HCPCS | Performed by: INTERNAL MEDICINE

## 2023-07-06 PROCEDURE — G8419 CALC BMI OUT NRM PARAM NOF/U: HCPCS | Performed by: INTERNAL MEDICINE

## 2023-07-06 PROCEDURE — G8427 DOCREV CUR MEDS BY ELIG CLIN: HCPCS | Performed by: INTERNAL MEDICINE

## 2023-07-06 PROCEDURE — G8399 PT W/DXA RESULTS DOCUMENT: HCPCS | Performed by: INTERNAL MEDICINE

## 2023-07-06 PROCEDURE — 4004F PT TOBACCO SCREEN RCVD TLK: CPT | Performed by: INTERNAL MEDICINE

## 2023-07-06 RX ORDER — SERTRALINE HYDROCHLORIDE 25 MG/1
25 TABLET, FILM COATED ORAL DAILY
COMMUNITY
Start: 2023-06-14 | End: 2023-07-06 | Stop reason: DRUGHIGH

## 2023-07-06 RX ORDER — SODIUM FLUORIDE 5 MG/G
GEL, DENTIFRICE DENTAL
COMMUNITY
Start: 2023-05-09

## 2023-07-06 SDOH — ECONOMIC STABILITY: INCOME INSECURITY: HOW HARD IS IT FOR YOU TO PAY FOR THE VERY BASICS LIKE FOOD, HOUSING, MEDICAL CARE, AND HEATING?: NOT HARD AT ALL

## 2023-07-06 SDOH — ECONOMIC STABILITY: HOUSING INSECURITY
IN THE LAST 12 MONTHS, WAS THERE A TIME WHEN YOU DID NOT HAVE A STEADY PLACE TO SLEEP OR SLEPT IN A SHELTER (INCLUDING NOW)?: NO

## 2023-07-06 SDOH — ECONOMIC STABILITY: FOOD INSECURITY: WITHIN THE PAST 12 MONTHS, THE FOOD YOU BOUGHT JUST DIDN'T LAST AND YOU DIDN'T HAVE MONEY TO GET MORE.: NEVER TRUE

## 2023-07-06 SDOH — ECONOMIC STABILITY: FOOD INSECURITY: WITHIN THE PAST 12 MONTHS, YOU WORRIED THAT YOUR FOOD WOULD RUN OUT BEFORE YOU GOT MONEY TO BUY MORE.: NEVER TRUE

## 2023-07-06 ASSESSMENT — PATIENT HEALTH QUESTIONNAIRE - PHQ9
SUM OF ALL RESPONSES TO PHQ QUESTIONS 1-9: 0
SUM OF ALL RESPONSES TO PHQ QUESTIONS 1-9: 0
1. LITTLE INTEREST OR PLEASURE IN DOING THINGS: 0
2. FEELING DOWN, DEPRESSED OR HOPELESS: 0
SUM OF ALL RESPONSES TO PHQ9 QUESTIONS 1 & 2: 0
SUM OF ALL RESPONSES TO PHQ QUESTIONS 1-9: 0
2. FEELING DOWN, DEPRESSED OR HOPELESS: 0
SUM OF ALL RESPONSES TO PHQ QUESTIONS 1-9: 0
1. LITTLE INTEREST OR PLEASURE IN DOING THINGS: 0
SUM OF ALL RESPONSES TO PHQ QUESTIONS 1-9: 0
SUM OF ALL RESPONSES TO PHQ9 QUESTIONS 1 & 2: 0
SUM OF ALL RESPONSES TO PHQ QUESTIONS 1-9: 0

## 2023-07-06 ASSESSMENT — LIFESTYLE VARIABLES
HOW OFTEN DO YOU HAVE A DRINK CONTAINING ALCOHOL: NEVER
HOW MANY STANDARD DRINKS CONTAINING ALCOHOL DO YOU HAVE ON A TYPICAL DAY: PATIENT DOES NOT DRINK

## 2023-07-06 NOTE — PROGRESS NOTES
1. \"Have you been to the ER, urgent care clinic since your last visit? Hospitalized since your last visit? \" No    2. \"Have you seen or consulted any other health care providers outside of the [address] [place] [address] since your last visit? \"         Allergist    3. For patients aged 43-73: Has the patient had a colonoscopy / FIT/ Cologuard? 2022 [facility]      If the patient is female:    4. For patients aged 43-66: Has the patient had a mammogram within the past 2 years? Scheduled 7/2023      5. For patients aged 21-65: Has the patient had a pap smear?   No DISPLAY PLAN FREE TEXT

## 2023-07-06 NOTE — PROGRESS NOTES
HISTORY OF PRESENT ILLNESS   Bob Chen   is a 67 y.o.  female. fu RA, Sjogrens syndrome-Dr Analilia Gonzalez on enbrel recently for RA and rash( rash to plaquenil PTA) Was not on long term steroids, hx CAP hospitalized last year, insomnia HLD lung nodules (Dr Luis Alberto Rose)  MDD and medicare wellness-    RA symptoms controlled on relafen but stopped due to LISA last ov-repeat cr down to 0.82--resated one relafen daily  Hx weight loss due burning tongue syndrome--some improvement oin tongue sxs  Saw GI at [facility] this year for weight loss--?  Due to long haul covid---diet information provided  Weight up 2 lbs  Zoloft started last OV for depression helped some    Referred to allergy MD for alpha gal allergy-was told the lvel was low and could start eating meat again--tolerating meat now  Still sees PULM Dr Jamey Neff renal insufficiency by labs this year--bun/cr  26/1.05  On relafen bid for RA joint pain for at least 6-12 months-Dr Analilia Gonzalez  C/o feeling down sometimes not able to eat out with friends due to burning tongue and not able to wear clothes for her age due to her weight loss  Feels somehwat anxious and irritable  Cannot eat mammalliam meat due to alpha gal allergy  Sees LOUIS Mireles but going to see a [facility] GI MD soon for her weight loss--appetite is ok pr pt           Patient Active Problem List    Diagnosis Date Noted    Rheumatoid arthritis with positive rheumatoid factor ([address]) 10/01/2019    Sjogren's syndrome ([address]) 11/20/2018    Hypercholesterolemia 11/20/2018    Primary osteoarthritis involving multiple joints 11/20/2018    Chronic bilateral low back pain without sciatica 11/20/2018    Weight loss, unintentional 11/20/2018    Bacterial pneumonia 06/26/2018    Major depressive disorder, recurrent, mild ([address]) 03/21/2023    Major depressive disorder, recurrent, moderate ([address]) 03/21/2023    Major depressive disorder, recurrent, unspecified ([address]) 03/21/2023    Raynaud's disease without gangrene 12/30/2019

## 2023-07-07 LAB
ANION GAP SERPL CALC-SCNC: 4 MMOL/L (ref 5–15)
BUN SERPL-MCNC: 25 MG/DL (ref 6–20)
BUN/CREAT SERPL: 28 (ref 12–20)
CALCIUM SERPL-MCNC: 9.8 MG/DL (ref 8.5–10.1)
CHLORIDE SERPL-SCNC: 108 MMOL/L (ref 97–108)
CHOLEST SERPL-MCNC: 110 MG/DL
CO2 SERPL-SCNC: 30 MMOL/L (ref 21–32)
CREAT SERPL-MCNC: 0.88 MG/DL (ref 0.55–1.02)
GLUCOSE SERPL-MCNC: 111 MG/DL (ref 65–100)
HDLC SERPL-MCNC: 46 MG/DL
HDLC SERPL: 2.4 (ref 0–5)
LDLC SERPL CALC-MCNC: 45.8 MG/DL (ref 0–100)
POTASSIUM SERPL-SCNC: 4 MMOL/L (ref 3.5–5.1)
SODIUM SERPL-SCNC: 142 MMOL/L (ref 136–145)
TRIGL SERPL-MCNC: 91 MG/DL
VLDLC SERPL CALC-MCNC: 18.2 MG/DL

## 2023-07-10 ENCOUNTER — TELEPHONE (OUTPATIENT)
Age: 73
End: 2023-07-10

## 2023-07-10 NOTE — TELEPHONE ENCOUNTER
----- Message from Alexus Jain sent at 7/10/2023  2:44 PM EDT -----  Subject: Appointment Request    Reason for Call: Established Patient Appointment needed: Routine Pre-Op    QUESTIONS    Reason for appointment request? Available appointments did not meet   patient need     Additional Information for Provider? patient is needing a pre op for   cataract surgery on 9/5/23 with Dr Allen Jimenez at [facility]. Patient   states the pre op has to be between 8/23/23 - 8/30/23  ---------------------------------------------------------------------------  --------------  Jose Luis MAHAJAN  [phone]; OK to leave message on voicemail  ---------------------------------------------------------------------------  --------------  SCRIPT ANSWERS

## 2023-07-19 ENCOUNTER — HOSPITAL ENCOUNTER (OUTPATIENT)
Facility: HOSPITAL | Age: 73
Discharge: HOME | End: 2023-07-22
Attending: INTERNAL MEDICINE
Payer: MEDICARE

## 2023-07-19 DIAGNOSIS — Z12.31 SCREENING MAMMOGRAM FOR BREAST CANCER: ICD-10-CM

## 2023-07-19 PROCEDURE — 77067 SCR MAMMO BI INCL CAD: CPT

## 2023-08-29 ENCOUNTER — OFFICE VISIT (OUTPATIENT)
Age: 73
End: 2023-08-29
Payer: MEDICARE

## 2023-08-29 VITALS
SYSTOLIC BLOOD PRESSURE: 130 MMHG | BODY MASS INDEX: 17.71 KG/M2 | RESPIRATION RATE: 16 BRPM | TEMPERATURE: 96.9 F | WEIGHT: 110.2 LBS | DIASTOLIC BLOOD PRESSURE: 80 MMHG | HEIGHT: 66 IN

## 2023-08-29 DIAGNOSIS — Z01.818 PREOP EXAMINATION: Primary | ICD-10-CM

## 2023-08-29 PROCEDURE — 99213 OFFICE O/P EST LOW 20 MIN: CPT | Performed by: INTERNAL MEDICINE

## 2023-08-29 PROCEDURE — 1036F TOBACCO NON-USER: CPT | Performed by: INTERNAL MEDICINE

## 2023-08-29 PROCEDURE — G8399 PT W/DXA RESULTS DOCUMENT: HCPCS | Performed by: INTERNAL MEDICINE

## 2023-08-29 PROCEDURE — G8427 DOCREV CUR MEDS BY ELIG CLIN: HCPCS | Performed by: INTERNAL MEDICINE

## 2023-08-29 PROCEDURE — 1123F ACP DISCUSS/DSCN MKR DOCD: CPT | Performed by: INTERNAL MEDICINE

## 2023-08-29 PROCEDURE — G8419 CALC BMI OUT NRM PARAM NOF/U: HCPCS | Performed by: INTERNAL MEDICINE

## 2023-08-29 PROCEDURE — 1090F PRES/ABSN URINE INCON ASSESS: CPT | Performed by: INTERNAL MEDICINE

## 2023-08-29 PROCEDURE — 3017F COLORECTAL CA SCREEN DOC REV: CPT | Performed by: INTERNAL MEDICINE

## 2023-08-29 NOTE — PROGRESS NOTES
1. \"Have you been to the ER, urgent care clinic since your last visit? Hospitalized since your last visit? \" No    2. \"Have you seen or consulted any other health care providers outside of the [address] [place] [address] since your last visit? \"         Dr. Marisa Shane // dermatology // skin biopsy     3. For patients aged 43-73: Has the patient had a colonoscopy / FIT/ Cologuard? 2022 Mad[facility] Rear       If the patient is female:    4. For patients aged 43-66: Has the patient had a mammogram within the past 2 years? 2023 [facility]      5. For patients aged 21-65: Has the patient had a pap smear?   No
Vitamins-Minerals (PRESERVISION AREDS 2+MULTI VIT PO) Take 1 tablet by mouth daily      MULTIPLE VITAMIN PO Take by mouth      CALCIUM CARBONATE-VIT D-MIN PO Take by mouth      Biotin 2.5 MG CAPS Take by mouth      SODIUM FLUORIDE, DENTAL GEL, 1.1 % GEL       sertraline (ZOLOFT) 50 MG tablet Take 1 tablet by mouth daily 90 tablet 1    acetaminophen (TYLENOL) 500 MG tablet Take 2 tablets by mouth daily as needed      ezetimibe (ZETIA) 10 MG tablet Take 1 tablet by mouth daily      fenofibrate (TRIGLIDE) 160 MG tablet Take 1 tablet by mouth daily      nabumetone (RELAFEN) 500 MG tablet Take 1 tablet by mouth 2 times daily      omeprazole (PRILOSEC) 20 MG delayed release capsule TAKE 1 CAPSULE BY MOUTH DAILY      zolpidem (AMBIEN) 10 MG tablet TAKE 1 TABLET BY MOUTH AT BEDTIME AS NEEDED       No current facility-administered medications for this visit.      Allergies   Allergen Reactions    Sulfa Antibiotics      Other reaction(s): Unknown (comments)    Diclofenac Nausea And Vomiting and Other (See Comments)     Nausea fever and chills  \"PATIENT CAN TAKE THE TOPICAL CREAM\"  Nausea fever and chills    Hydroxychloroquine Rash        BMP:   Lab Results   Component Value Date/Time     07/06/2023 02:55 PM    K 4.0 07/06/2023 02:55 PM     07/06/2023 02:55 PM    CO2 30 07/06/2023 02:55 PM    BUN 25 07/06/2023 02:55 PM    CREATININE 0.88 07/06/2023 02:55 PM    GLUCOSE 111 07/06/2023 02:55 PM    CALCIUM 9.8 07/06/2023 02:55 PM      CBC:   Lab Results   Component Value Date/Time    WBC 5.5 11/02/2022 11:44 AM    RBC 4.10 11/02/2022 11:44 AM    HGB 12.3 11/02/2022 11:44 AM    HCT 39.5 11/02/2022 11:44 AM    MCV 96.3 11/02/2022 11:44 AM    MCH 30.0 11/02/2022 11:44 AM    MCHC 31.1 11/02/2022 11:44 AM    RDW 14.4 11/02/2022 11:44 AM     11/02/2022 11:44 AM    MPV 11.9 11/02/2022 11:44 AM      Lipids   Lab Results   Component Value Date/Time    CHOL 110 07/06/2023 02:55 PM    TRIG 91 07/06/2023 02:55 PM    LDLCALC

## 2023-09-01 ENCOUNTER — ANESTHESIA EVENT (OUTPATIENT)
Facility: HOSPITAL | Age: 73
End: 2023-09-01
Payer: MEDICARE

## 2023-09-05 ENCOUNTER — HOSPITAL ENCOUNTER (OUTPATIENT)
Facility: HOSPITAL | Age: 73
Setting detail: OUTPATIENT SURGERY
Discharge: HOME | End: 2023-09-05
Attending: OPHTHALMOLOGY | Admitting: OPHTHALMOLOGY
Payer: MEDICARE

## 2023-09-05 ENCOUNTER — ANESTHESIA (OUTPATIENT)
Facility: HOSPITAL | Age: 73
End: 2023-09-05
Payer: MEDICARE

## 2023-09-05 VITALS
WEIGHT: 109 LBS | HEART RATE: 60 BPM | BODY MASS INDEX: 17.52 KG/M2 | RESPIRATION RATE: 17 BRPM | OXYGEN SATURATION: 96 % | HEIGHT: 66 IN | SYSTOLIC BLOOD PRESSURE: 158 MMHG | DIASTOLIC BLOOD PRESSURE: 69 MMHG | TEMPERATURE: 97.4 F

## 2023-09-05 PROCEDURE — 2500000003 HC RX 250 WO HCPCS: Performed by: NURSE ANESTHETIST, CERTIFIED REGISTERED

## 2023-09-05 PROCEDURE — 2709999900 HC NON-CHARGEABLE SUPPLY: Performed by: OPHTHALMOLOGY

## 2023-09-05 PROCEDURE — 6360000002 HC RX W HCPCS: Performed by: NURSE ANESTHETIST, CERTIFIED REGISTERED

## 2023-09-05 PROCEDURE — 3600000003 HC SURGERY LEVEL 3 BASE: Performed by: OPHTHALMOLOGY

## 2023-09-05 PROCEDURE — 2500000003 HC RX 250 WO HCPCS: Performed by: OPHTHALMOLOGY

## 2023-09-05 PROCEDURE — 2580000003 HC RX 258: Performed by: ANESTHESIOLOGY

## 2023-09-05 PROCEDURE — 7100000000 HC PACU RECOVERY - FIRST 15 MIN: Performed by: OPHTHALMOLOGY

## 2023-09-05 PROCEDURE — 3700000000 HC ANESTHESIA ATTENDED CARE: Performed by: OPHTHALMOLOGY

## 2023-09-05 PROCEDURE — 3600000013 HC SURGERY LEVEL 3 ADDTL 15MIN: Performed by: OPHTHALMOLOGY

## 2023-09-05 PROCEDURE — 3700000001 HC ADD 15 MINUTES (ANESTHESIA): Performed by: OPHTHALMOLOGY

## 2023-09-05 PROCEDURE — 7100000010 HC PHASE II RECOVERY - FIRST 15 MIN: Performed by: OPHTHALMOLOGY

## 2023-09-05 PROCEDURE — 6370000000 HC RX 637 (ALT 250 FOR IP): Performed by: OPHTHALMOLOGY

## 2023-09-05 PROCEDURE — 2500000003 HC RX 250 WO HCPCS

## 2023-09-05 PROCEDURE — V2787 ASTIGMATISM-CORRECT FUNCTION: HCPCS | Performed by: OPHTHALMOLOGY

## 2023-09-05 PROCEDURE — 6360000002 HC RX W HCPCS: Performed by: OPHTHALMOLOGY

## 2023-09-05 PROCEDURE — 6370000000 HC RX 637 (ALT 250 FOR IP)

## 2023-09-05 DEVICE — ACRYSOF(R) IQ TORIC ASTIGMATISM IOL, SINGLE-PIECE ACRYLIC FOLDABLE LENS, UV WITH BLUE LIGHTFILTER, 13.0MM LENGTH, 6.0MM BICONVEX TORICASPHERIC OPTIC, 2.25 D CYLINDER.
Type: IMPLANTABLE DEVICE | Site: EYE | Status: FUNCTIONAL
Brand: ACRYSOF®

## 2023-09-05 RX ORDER — SODIUM CHLORIDE 0.9 % (FLUSH) 0.9 %
5-40 SYRINGE (ML) INJECTION EVERY 12 HOURS SCHEDULED
Status: DISCONTINUED | OUTPATIENT
Start: 2023-09-05 | End: 2023-09-05 | Stop reason: HOSPADM

## 2023-09-05 RX ORDER — TROPICAMIDE 10 MG/ML
SOLUTION/ DROPS OPHTHALMIC
Status: COMPLETED
Start: 2023-09-05 | End: 2023-09-05

## 2023-09-05 RX ORDER — SODIUM CHLORIDE 0.9 % (FLUSH) 0.9 %
5-40 SYRINGE (ML) INJECTION PRN
Status: DISCONTINUED | OUTPATIENT
Start: 2023-09-05 | End: 2023-09-05 | Stop reason: HOSPADM

## 2023-09-05 RX ORDER — FENTANYL CITRATE 50 UG/ML
25 INJECTION, SOLUTION INTRAMUSCULAR; INTRAVENOUS EVERY 5 MIN PRN
Status: DISCONTINUED | OUTPATIENT
Start: 2023-09-05 | End: 2023-09-05 | Stop reason: HOSPADM

## 2023-09-05 RX ORDER — OXYCODONE HYDROCHLORIDE 5 MG/1
10 TABLET ORAL PRN
Status: DISCONTINUED | OUTPATIENT
Start: 2023-09-05 | End: 2023-09-05 | Stop reason: HOSPADM

## 2023-09-05 RX ORDER — PROPARACAINE HYDROCHLORIDE 5 MG/ML
SOLUTION/ DROPS OPHTHALMIC
Status: COMPLETED
Start: 2023-09-05 | End: 2023-09-05

## 2023-09-05 RX ORDER — SODIUM CHLORIDE 9 MG/ML
INJECTION, SOLUTION INTRAVENOUS PRN
Status: DISCONTINUED | OUTPATIENT
Start: 2023-09-05 | End: 2023-09-05 | Stop reason: HOSPADM

## 2023-09-05 RX ORDER — DICLOFENAC SODIUM 1 MG/ML
SOLUTION/ DROPS OPHTHALMIC
Status: COMPLETED
Start: 2023-09-05 | End: 2023-09-05

## 2023-09-05 RX ORDER — TROPICAMIDE 10 MG/ML
1 SOLUTION/ DROPS OPHTHALMIC SEE ADMIN INSTRUCTIONS
Status: DISCONTINUED | OUTPATIENT
Start: 2023-09-05 | End: 2023-09-05 | Stop reason: HOSPADM

## 2023-09-05 RX ORDER — LIDOCAINE HYDROCHLORIDE 10 MG/ML
1 INJECTION, SOLUTION EPIDURAL; INFILTRATION; INTRACAUDAL; PERINEURAL
Status: DISCONTINUED | OUTPATIENT
Start: 2023-09-05 | End: 2023-09-05 | Stop reason: HOSPADM

## 2023-09-05 RX ORDER — DROPERIDOL 2.5 MG/ML
0.62 INJECTION, SOLUTION INTRAMUSCULAR; INTRAVENOUS
Status: DISCONTINUED | OUTPATIENT
Start: 2023-09-05 | End: 2023-09-05 | Stop reason: HOSPADM

## 2023-09-05 RX ORDER — PROPARACAINE HYDROCHLORIDE 5 MG/ML
1 SOLUTION/ DROPS OPHTHALMIC ONCE
Status: COMPLETED | OUTPATIENT
Start: 2023-09-05 | End: 2023-09-05

## 2023-09-05 RX ORDER — TETRACAINE HYDROCHLORIDE 5 MG/ML
1 SOLUTION OPHTHALMIC ONCE
Status: DISCONTINUED | OUTPATIENT
Start: 2023-09-05 | End: 2023-09-05 | Stop reason: HOSPADM

## 2023-09-05 RX ORDER — DICLOFENAC SODIUM 1 MG/ML
1 SOLUTION/ DROPS OPHTHALMIC SEE ADMIN INSTRUCTIONS
Status: COMPLETED | OUTPATIENT
Start: 2023-09-05 | End: 2023-09-05

## 2023-09-05 RX ORDER — MEPERIDINE HYDROCHLORIDE 25 MG/ML
12.5 INJECTION INTRAMUSCULAR; INTRAVENOUS; SUBCUTANEOUS EVERY 5 MIN PRN
Status: DISCONTINUED | OUTPATIENT
Start: 2023-09-05 | End: 2023-09-05 | Stop reason: HOSPADM

## 2023-09-05 RX ORDER — CYCLOPENTOLATE HYDROCHLORIDE 20 MG/ML
SOLUTION/ DROPS OPHTHALMIC
Status: COMPLETED
Start: 2023-09-05 | End: 2023-09-05

## 2023-09-05 RX ORDER — NEOMYCIN SULFATE, POLYMYXIN B SULFATE, AND DEXAMETHASONE 3.5; 10000; 1 MG/G; [USP'U]/G; MG/G
OINTMENT OPHTHALMIC ONCE
Status: COMPLETED | OUTPATIENT
Start: 2023-09-05 | End: 2023-09-05

## 2023-09-05 RX ORDER — ACETAMINOPHEN 650 MG
TABLET, EXTENDED RELEASE ORAL ONCE
Status: COMPLETED | OUTPATIENT
Start: 2023-09-05 | End: 2023-09-05

## 2023-09-05 RX ORDER — BALANCED SALT SOLUTION 6.4; .75; .48; .3; 3.9; 1.7 MG/ML; MG/ML; MG/ML; MG/ML; MG/ML; MG/ML
SOLUTION OPHTHALMIC
Status: DISCONTINUED
Start: 2023-09-05 | End: 2023-09-05 | Stop reason: HOSPADM

## 2023-09-05 RX ORDER — PHENYLEPHRINE HYDROCHLORIDE 25 MG/ML
1 SOLUTION/ DROPS OPHTHALMIC SEE ADMIN INSTRUCTIONS
Status: COMPLETED | OUTPATIENT
Start: 2023-09-05 | End: 2023-09-05

## 2023-09-05 RX ORDER — CYCLOPENTOLATE HYDROCHLORIDE 20 MG/ML
1 SOLUTION/ DROPS OPHTHALMIC SEE ADMIN INSTRUCTIONS
Status: COMPLETED | OUTPATIENT
Start: 2023-09-05 | End: 2023-09-05

## 2023-09-05 RX ORDER — PHENYLEPHRINE HYDROCHLORIDE 25 MG/ML
SOLUTION/ DROPS OPHTHALMIC
Status: COMPLETED
Start: 2023-09-05 | End: 2023-09-05

## 2023-09-05 RX ORDER — ONDANSETRON 2 MG/ML
4 INJECTION INTRAMUSCULAR; INTRAVENOUS
Status: DISCONTINUED | OUTPATIENT
Start: 2023-09-05 | End: 2023-09-05 | Stop reason: HOSPADM

## 2023-09-05 RX ORDER — MIDAZOLAM HYDROCHLORIDE 1 MG/ML
2 INJECTION, SOLUTION INTRAMUSCULAR; INTRAVENOUS
Status: DISCONTINUED | OUTPATIENT
Start: 2023-09-05 | End: 2023-09-05 | Stop reason: HOSPADM

## 2023-09-05 RX ORDER — SODIUM CHLORIDE, SODIUM LACTATE, POTASSIUM CHLORIDE, CALCIUM CHLORIDE 600; 310; 30; 20 MG/100ML; MG/100ML; MG/100ML; MG/100ML
INJECTION, SOLUTION INTRAVENOUS CONTINUOUS
Status: DISCONTINUED | OUTPATIENT
Start: 2023-09-05 | End: 2023-09-05 | Stop reason: HOSPADM

## 2023-09-05 RX ORDER — ACETAMINOPHEN 500 MG
1000 TABLET ORAL
Status: DISCONTINUED | OUTPATIENT
Start: 2023-09-05 | End: 2023-09-05 | Stop reason: HOSPADM

## 2023-09-05 RX ORDER — OXYCODONE HYDROCHLORIDE 5 MG/1
5 TABLET ORAL PRN
Status: DISCONTINUED | OUTPATIENT
Start: 2023-09-05 | End: 2023-09-05 | Stop reason: HOSPADM

## 2023-09-05 RX ADMIN — CYCLOPENTOLATE HYDROCHLORIDE 1 DROP: 20 SOLUTION/ DROPS OPHTHALMIC at 07:37

## 2023-09-05 RX ADMIN — PHENYLEPHRINE HYDROCHLORIDE 1 DROP: 25 SOLUTION/ DROPS OPHTHALMIC at 07:20

## 2023-09-05 RX ADMIN — PROPARACAINE HYDROCHLORIDE 1 DROP: 5 SOLUTION/ DROPS OPHTHALMIC at 07:19

## 2023-09-05 RX ADMIN — CYCLOPENTOLATE HYDROCHLORIDE: 20 SOLUTION/ DROPS OPHTHALMIC at 07:20

## 2023-09-05 RX ADMIN — PHENYLEPHRINE HYDROCHLORIDE 1 DROP: 25 SOLUTION/ DROPS OPHTHALMIC at 07:37

## 2023-09-05 RX ADMIN — TROPICAMIDE 1 DROP: 10 SOLUTION/ DROPS OPHTHALMIC at 07:20

## 2023-09-05 RX ADMIN — DICLOFENAC SODIUM 1 DROP: 1 SOLUTION/ DROPS OPHTHALMIC at 07:20

## 2023-09-05 RX ADMIN — TROPICAMIDE 1 DROP: 10 SOLUTION/ DROPS OPHTHALMIC at 07:37

## 2023-09-05 RX ADMIN — DICLOFENAC SODIUM 1 DROP: 1 SOLUTION/ DROPS OPHTHALMIC at 07:47

## 2023-09-05 RX ADMIN — PROPOFOL 40 MG: 10 INJECTION, EMULSION INTRAVENOUS at 08:29

## 2023-09-05 RX ADMIN — DICLOFENAC SODIUM 1 DROP: 1 SOLUTION/ DROPS OPHTHALMIC at 07:38

## 2023-09-05 RX ADMIN — TROPICAMIDE 1 DROP: 10 SOLUTION/ DROPS OPHTHALMIC at 07:47

## 2023-09-05 RX ADMIN — PHENYLEPHRINE HYDROCHLORIDE 1 DROP: 25 SOLUTION/ DROPS OPHTHALMIC at 07:47

## 2023-09-05 RX ADMIN — SODIUM CHLORIDE: 9 INJECTION, SOLUTION INTRAVENOUS at 07:49

## 2023-09-05 RX ADMIN — LIDOCAINE HYDROCHLORIDE 100 MG: 20 INJECTION, SOLUTION EPIDURAL; INFILTRATION; INTRACAUDAL; PERINEURAL at 08:29

## 2023-09-05 ASSESSMENT — PAIN - FUNCTIONAL ASSESSMENT: PAIN_FUNCTIONAL_ASSESSMENT: 0-10

## 2023-09-05 ASSESSMENT — PAIN SCALES - GENERAL: PAINLEVEL_OUTOF10: 0

## 2023-09-05 NOTE — ANESTHESIA POSTPROCEDURE EVALUATION
Department of Anesthesiology  Postprocedure Note    Patient: Sophia Deleon  MRN: [id number]  YOB: 1950  Date of evaluation: 9/5/2023      Procedure Summary     Date: 09/05/23 Room / Location: [facility] ASU  / [facility] AMBULATORY OR    Anesthesia Start: 0825 Anesthesia Stop: 0900    Procedure: LEFT EYE PHACOEMULSIFICATION WITH INTRA OCULAR LENS IMPLANT FIRST EYE  (MAC WITH RETROBULBAR) (Left: Eye) Diagnosis:       Combined forms of age-related cataract of left eye      (Combined forms of age-related cataract of left eye [H25.812])    Surgeons: Hank Torres MD Responsible Provider: Deonte Laura MD    Anesthesia Type: MAC ASA Status: 2          Anesthesia Type: MAC    Barbara Phase I: Barbara Score: 10    Barbara Phase II: Barbara Score: 10      Anesthesia Post Evaluation    Patient location during evaluation: PACU  Patient participation: complete - patient participated  Level of consciousness: awake and alert  Pain score: 0  Airway patency: patent  Nausea & Vomiting: no nausea and no vomiting  Complications: no  Cardiovascular status: hemodynamically stable  Respiratory status: acceptable  Hydration status: euvolemic  There was medical reason for not using a multimodal analgesia pain management approach. Pain management: satisfactory to patient

## 2023-09-05 NOTE — DISCHARGE INSTRUCTIONS
Dr. Zheng Betancourt and [facility]  [address]  [address]  [phone]    Cataract Post Operative Instructions    Diet - you may eat a normal diet but avoid spicy or greasy tonight. Activity - Limit heavy lifting - do not lift more than 20 pounds for the next 7 days. Leave your eye patch on tonight. Your eye should remain closed under the patch. Your patch will be removed in Dr. Severo Jaegers office tomorrow. Most people do not have significant pain after cataract surgery. You may take any over-the-counter pain medication that you normally take as needed. You may resume all of your pre-operative medications. You should have your postoperative drops. If you do not[facility] You will start eyedrops TOMORROW. You have an appointment with Dr. Marixa Broussard tomorrow in her office. Date: ________________ Time: _________________    If you need to speak to Dr. Marixa Broussard after business hours, please call [phone]. DO NOT TAKE SLEEPING MEDICATIONS OR ANTIANXIETY MEDICATIONS WHILE TAKING NARCOTIC PAIN MEDICATIONS,  ESPECIALLY THE NIGHT OF ANESTHESIA. CPAP PATIENTS BE SURE TO WEAR MACHINE WHENEVER NAPPING OR SLEEPING. DISCHARGE SUMMARY from Nurse    The following personal items collected during your admission are returned to you:   Dental Appliance:    Vision:    Hearing Aid:    Jewelry:    Clothing:    Other Valuables:    Valuables sent to safe:        PATIENT INSTRUCTIONS:    Anesthesia Discharge Instructions for Procedural Area requiring Sedation (MAC Anesthesia, Cath Lab, Endo and Radiology): You have been given medications during your procedure that may affect your memory and mental judgement for the next 24 hours. During this time frame for your safety, please follow the instructions listed below :   Have a responsible adult to drive you home and be with you for at least 24 hours.    Rest today and

## 2023-09-05 NOTE — PERIOP NOTE
Permission received to review discharge instructions and discuss private health information with  and will have someone with them after discharge   Patient states that family/friend will be with them for at least 24 hours following today's procedure.    Warm blanket given to pt
Rosalind Crease  1950  [id number]    Situation:  Verbal report given from: RODRIGUEZ Bear Sandrita CRNA  Procedure: Procedure(s):  LEFT EYE PHACOEMULSIFICATION WITH INTRA OCULAR LENS IMPLANT FIRST EYE  (MAC WITH RETROBULBAR)    Background:    Preoperative diagnosis: Combined forms of age-related cataract of left eye [H25.812]    Postoperative diagnosis: * No post-op diagnosis entered *    :  Dr. Pam Chan    Assistant(s): Circulator: Torey Noel RN; Michale Lanes, RN  Scrub Person First: Chandler Erazo    Specimens: * No specimens in log *    Assessment:  Intra-procedure medications         Anesthesia gave intra-procedure sedation and medications, see anesthesia flow sheet     Intravenous fluids: LR@ KVO     Vital signs stable       Recommendation:    Permission to share finding with family :
my answering service and/or with another person?       yes         ___________________      ___________________      ________________  (Signature of Patient)          (Witness)                   (Date and Time)

## 2023-09-18 ENCOUNTER — ANESTHESIA EVENT (OUTPATIENT)
Facility: HOSPITAL | Age: 73
End: 2023-09-18
Payer: MEDICARE

## 2023-09-19 ENCOUNTER — ANESTHESIA (OUTPATIENT)
Facility: HOSPITAL | Age: 73
End: 2023-09-19
Payer: MEDICARE

## 2023-09-19 ENCOUNTER — HOSPITAL ENCOUNTER (OUTPATIENT)
Facility: HOSPITAL | Age: 73
Setting detail: OUTPATIENT SURGERY
Discharge: HOME | End: 2023-09-19
Attending: OPHTHALMOLOGY | Admitting: OPHTHALMOLOGY
Payer: MEDICARE

## 2023-09-19 VITALS
TEMPERATURE: 98 F | HEART RATE: 62 BPM | WEIGHT: 111 LBS | BODY MASS INDEX: 17.84 KG/M2 | HEIGHT: 66 IN | SYSTOLIC BLOOD PRESSURE: 128 MMHG | RESPIRATION RATE: 16 BRPM | OXYGEN SATURATION: 97 % | DIASTOLIC BLOOD PRESSURE: 59 MMHG

## 2023-09-19 PROCEDURE — 6360000002 HC RX W HCPCS: Performed by: NURSE ANESTHETIST, CERTIFIED REGISTERED

## 2023-09-19 PROCEDURE — 2500000003 HC RX 250 WO HCPCS

## 2023-09-19 PROCEDURE — V2787 ASTIGMATISM-CORRECT FUNCTION: HCPCS | Performed by: OPHTHALMOLOGY

## 2023-09-19 PROCEDURE — 2500000003 HC RX 250 WO HCPCS: Performed by: OPHTHALMOLOGY

## 2023-09-19 PROCEDURE — 7100000010 HC PHASE II RECOVERY - FIRST 15 MIN: Performed by: OPHTHALMOLOGY

## 2023-09-19 PROCEDURE — 3700000000 HC ANESTHESIA ATTENDED CARE: Performed by: OPHTHALMOLOGY

## 2023-09-19 PROCEDURE — 6370000000 HC RX 637 (ALT 250 FOR IP): Performed by: OPHTHALMOLOGY

## 2023-09-19 PROCEDURE — 2580000003 HC RX 258: Performed by: ANESTHESIOLOGY

## 2023-09-19 PROCEDURE — 6360000002 HC RX W HCPCS: Performed by: OPHTHALMOLOGY

## 2023-09-19 PROCEDURE — 7100000000 HC PACU RECOVERY - FIRST 15 MIN: Performed by: OPHTHALMOLOGY

## 2023-09-19 PROCEDURE — 6370000000 HC RX 637 (ALT 250 FOR IP)

## 2023-09-19 PROCEDURE — 2500000003 HC RX 250 WO HCPCS: Performed by: NURSE ANESTHETIST, CERTIFIED REGISTERED

## 2023-09-19 PROCEDURE — 2709999900 HC NON-CHARGEABLE SUPPLY: Performed by: OPHTHALMOLOGY

## 2023-09-19 PROCEDURE — 3600000003 HC SURGERY LEVEL 3 BASE: Performed by: OPHTHALMOLOGY

## 2023-09-19 PROCEDURE — 3600000013 HC SURGERY LEVEL 3 ADDTL 15MIN: Performed by: OPHTHALMOLOGY

## 2023-09-19 PROCEDURE — 3700000001 HC ADD 15 MINUTES (ANESTHESIA): Performed by: OPHTHALMOLOGY

## 2023-09-19 DEVICE — ACRYSOF(R) IQ TORIC ASTIGMATISM IOL, SINGLE-PIECE ACRYLIC FOLDABLE LENS, UV WITH BLUE LIGHTFILTER, 13.0MM LENGTH, 6.0MM BICONVEX TORICASPHERIC OPTIC, 2.25 D CYLINDER.
Type: IMPLANTABLE DEVICE | Site: LENS | Status: FUNCTIONAL
Brand: ACRYSOF®

## 2023-09-19 RX ORDER — CYCLOPENTOLATE HYDROCHLORIDE 20 MG/ML
1 SOLUTION/ DROPS OPHTHALMIC SEE ADMIN INSTRUCTIONS
Status: COMPLETED | OUTPATIENT
Start: 2023-09-19 | End: 2023-09-19

## 2023-09-19 RX ORDER — ACETAMINOPHEN 650 MG
TABLET, EXTENDED RELEASE ORAL ONCE
Status: COMPLETED | OUTPATIENT
Start: 2023-09-19 | End: 2023-09-19

## 2023-09-19 RX ORDER — MIDAZOLAM HYDROCHLORIDE 1 MG/ML
2 INJECTION, SOLUTION INTRAMUSCULAR; INTRAVENOUS
Status: DISCONTINUED | OUTPATIENT
Start: 2023-09-19 | End: 2023-09-19 | Stop reason: HOSPADM

## 2023-09-19 RX ORDER — NEOMYCIN SULFATE, POLYMYXIN B SULFATE, AND DEXAMETHASONE 3.5; 10000; 1 MG/G; [USP'U]/G; MG/G
OINTMENT OPHTHALMIC ONCE
Status: COMPLETED | OUTPATIENT
Start: 2023-09-19 | End: 2023-09-19

## 2023-09-19 RX ORDER — ACETAMINOPHEN 500 MG
1000 TABLET ORAL
Status: DISCONTINUED | OUTPATIENT
Start: 2023-09-19 | End: 2023-09-19 | Stop reason: HOSPADM

## 2023-09-19 RX ORDER — DICLOFENAC SODIUM 1 MG/ML
SOLUTION/ DROPS OPHTHALMIC
Status: COMPLETED
Start: 2023-09-19 | End: 2023-09-19

## 2023-09-19 RX ORDER — SODIUM CHLORIDE 0.9 % (FLUSH) 0.9 %
5-40 SYRINGE (ML) INJECTION EVERY 12 HOURS SCHEDULED
Status: DISCONTINUED | OUTPATIENT
Start: 2023-09-19 | End: 2023-09-19 | Stop reason: HOSPADM

## 2023-09-19 RX ORDER — PROPARACAINE HYDROCHLORIDE 5 MG/ML
1 SOLUTION/ DROPS OPHTHALMIC ONCE
Status: COMPLETED | OUTPATIENT
Start: 2023-09-19 | End: 2023-09-19

## 2023-09-19 RX ORDER — PHENYLEPHRINE HYDROCHLORIDE 25 MG/ML
SOLUTION/ DROPS OPHTHALMIC
Status: COMPLETED
Start: 2023-09-19 | End: 2023-09-19

## 2023-09-19 RX ORDER — TROPICAMIDE 10 MG/ML
SOLUTION/ DROPS OPHTHALMIC
Status: COMPLETED
Start: 2023-09-19 | End: 2023-09-19

## 2023-09-19 RX ORDER — TROPICAMIDE 10 MG/ML
1 SOLUTION/ DROPS OPHTHALMIC SEE ADMIN INSTRUCTIONS
Status: COMPLETED | OUTPATIENT
Start: 2023-09-19 | End: 2023-09-19

## 2023-09-19 RX ORDER — SODIUM CHLORIDE 9 MG/ML
INJECTION, SOLUTION INTRAVENOUS PRN
Status: DISCONTINUED | OUTPATIENT
Start: 2023-09-19 | End: 2023-09-19 | Stop reason: HOSPADM

## 2023-09-19 RX ORDER — MEPERIDINE HYDROCHLORIDE 25 MG/ML
12.5 INJECTION INTRAMUSCULAR; INTRAVENOUS; SUBCUTANEOUS EVERY 5 MIN PRN
Status: DISCONTINUED | OUTPATIENT
Start: 2023-09-19 | End: 2023-09-19 | Stop reason: HOSPADM

## 2023-09-19 RX ORDER — DICLOFENAC SODIUM 1 MG/ML
1 SOLUTION/ DROPS OPHTHALMIC SEE ADMIN INSTRUCTIONS
Status: COMPLETED | OUTPATIENT
Start: 2023-09-19 | End: 2023-09-19

## 2023-09-19 RX ORDER — CYCLOPENTOLATE HYDROCHLORIDE 20 MG/ML
SOLUTION/ DROPS OPHTHALMIC
Status: COMPLETED
Start: 2023-09-19 | End: 2023-09-19

## 2023-09-19 RX ORDER — SODIUM CHLORIDE 0.9 % (FLUSH) 0.9 %
5-40 SYRINGE (ML) INJECTION PRN
Status: DISCONTINUED | OUTPATIENT
Start: 2023-09-19 | End: 2023-09-19 | Stop reason: HOSPADM

## 2023-09-19 RX ORDER — FENTANYL CITRATE 50 UG/ML
25 INJECTION, SOLUTION INTRAMUSCULAR; INTRAVENOUS EVERY 5 MIN PRN
Status: DISCONTINUED | OUTPATIENT
Start: 2023-09-19 | End: 2023-09-19 | Stop reason: HOSPADM

## 2023-09-19 RX ORDER — SODIUM CHLORIDE, SODIUM LACTATE, POTASSIUM CHLORIDE, CALCIUM CHLORIDE 600; 310; 30; 20 MG/100ML; MG/100ML; MG/100ML; MG/100ML
INJECTION, SOLUTION INTRAVENOUS CONTINUOUS
Status: DISCONTINUED | OUTPATIENT
Start: 2023-09-19 | End: 2023-09-19 | Stop reason: HOSPADM

## 2023-09-19 RX ORDER — TETRACAINE HYDROCHLORIDE 5 MG/ML
1 SOLUTION OPHTHALMIC ONCE
Status: DISCONTINUED | OUTPATIENT
Start: 2023-09-19 | End: 2023-09-19 | Stop reason: HOSPADM

## 2023-09-19 RX ORDER — LIDOCAINE HYDROCHLORIDE 10 MG/ML
1 INJECTION, SOLUTION EPIDURAL; INFILTRATION; INTRACAUDAL; PERINEURAL
Status: DISCONTINUED | OUTPATIENT
Start: 2023-09-19 | End: 2023-09-19 | Stop reason: HOSPADM

## 2023-09-19 RX ORDER — DROPERIDOL 2.5 MG/ML
0.62 INJECTION, SOLUTION INTRAMUSCULAR; INTRAVENOUS
Status: DISCONTINUED | OUTPATIENT
Start: 2023-09-19 | End: 2023-09-19 | Stop reason: HOSPADM

## 2023-09-19 RX ORDER — PHENYLEPHRINE HYDROCHLORIDE 25 MG/ML
1 SOLUTION/ DROPS OPHTHALMIC SEE ADMIN INSTRUCTIONS
Status: COMPLETED | OUTPATIENT
Start: 2023-09-19 | End: 2023-09-19

## 2023-09-19 RX ORDER — OXYCODONE HYDROCHLORIDE 5 MG/1
10 TABLET ORAL PRN
Status: DISCONTINUED | OUTPATIENT
Start: 2023-09-19 | End: 2023-09-19 | Stop reason: HOSPADM

## 2023-09-19 RX ORDER — PROPARACAINE HYDROCHLORIDE 5 MG/ML
SOLUTION/ DROPS OPHTHALMIC
Status: COMPLETED
Start: 2023-09-19 | End: 2023-09-19

## 2023-09-19 RX ORDER — OXYCODONE HYDROCHLORIDE 5 MG/1
5 TABLET ORAL PRN
Status: DISCONTINUED | OUTPATIENT
Start: 2023-09-19 | End: 2023-09-19 | Stop reason: HOSPADM

## 2023-09-19 RX ORDER — ONDANSETRON 2 MG/ML
4 INJECTION INTRAMUSCULAR; INTRAVENOUS
Status: DISCONTINUED | OUTPATIENT
Start: 2023-09-19 | End: 2023-09-19 | Stop reason: HOSPADM

## 2023-09-19 RX ADMIN — PHENYLEPHRINE HYDROCHLORIDE 1 DROP: 25 SOLUTION/ DROPS OPHTHALMIC at 08:00

## 2023-09-19 RX ADMIN — PHENYLEPHRINE HYDROCHLORIDE 1 DROP: 25 SOLUTION/ DROPS OPHTHALMIC at 08:15

## 2023-09-19 RX ADMIN — DICLOFENAC SODIUM 1 DROP: 1 SOLUTION/ DROPS OPHTHALMIC at 08:01

## 2023-09-19 RX ADMIN — CYCLOPENTOLATE HYDROCHLORIDE 1 DROP: 20 SOLUTION/ DROPS OPHTHALMIC at 08:10

## 2023-09-19 RX ADMIN — PROPARACAINE HYDROCHLORIDE 1 DROP: 5 SOLUTION/ DROPS OPHTHALMIC at 08:01

## 2023-09-19 RX ADMIN — PHENYLEPHRINE HYDROCHLORIDE 1 DROP: 25 SOLUTION/ DROPS OPHTHALMIC at 08:10

## 2023-09-19 RX ADMIN — TROPICAMIDE 1 DROP: 10 SOLUTION/ DROPS OPHTHALMIC at 08:15

## 2023-09-19 RX ADMIN — CYCLOPENTOLATE HYDROCHLORIDE 1 DROP: 20 SOLUTION/ DROPS OPHTHALMIC at 08:00

## 2023-09-19 RX ADMIN — SODIUM CHLORIDE: 9 INJECTION, SOLUTION INTRAVENOUS at 08:03

## 2023-09-19 RX ADMIN — PROPOFOL 40 MG: 10 INJECTION, EMULSION INTRAVENOUS at 08:52

## 2023-09-19 RX ADMIN — TROPICAMIDE 1 DROP: 10 SOLUTION/ DROPS OPHTHALMIC at 08:00

## 2023-09-19 RX ADMIN — CYCLOPENTOLATE HYDROCHLORIDE 1 DROP: 20 SOLUTION/ DROPS OPHTHALMIC at 08:15

## 2023-09-19 RX ADMIN — DICLOFENAC SODIUM 1 DROP: 1 SOLUTION/ DROPS OPHTHALMIC at 08:10

## 2023-09-19 RX ADMIN — LIDOCAINE HYDROCHLORIDE 100 MG: 20 INJECTION, SOLUTION EPIDURAL; INFILTRATION; INTRACAUDAL; PERINEURAL at 08:52

## 2023-09-19 RX ADMIN — TROPICAMIDE 1 DROP: 10 SOLUTION/ DROPS OPHTHALMIC at 08:10

## 2023-09-19 RX ADMIN — DICLOFENAC SODIUM 1 DROP: 1 SOLUTION/ DROPS OPHTHALMIC at 08:15

## 2023-09-19 ASSESSMENT — PAIN - FUNCTIONAL ASSESSMENT: PAIN_FUNCTIONAL_ASSESSMENT: 0-10

## 2023-09-19 NOTE — ANESTHESIA PRE PROCEDURE
Department of Anesthesiology  Preprocedure Note       Name:  Adrienne Rogers   Age:  67 y.o.  :  1950                                          MRN:  [id number]         Date:  2023      Surgeon: Shailesh Linn):  Tanvir Valdovinos MD    Procedure: Procedure(s):  RIGHT 2ND EYE PHACOEMULSIFICATION WITH INTRAOCULAR LENS IMPLANT (MAC/RETROBULBAR)    Medications prior to admission:   Prior to Admission medications    Medication Sig Start Date End Date Taking? Authorizing Provider   albuterol sulfate (PROAIR RESPICLICK) 437 (90 Base) MCG/ACT aerosol powder inhalation Inhale 2 puffs into the lungs every 4 hours as needed for Wheezing or Shortness of Breath    Historical Provider, MD   Multiple Vitamins-Minerals (PRESERVISION AREDS 2+MULTI VIT PO) Take 1 tablet by mouth daily    Historical Provider, MD   CALCIUM CARBONATE-VIT D-MIN PO Take 1 tablet by mouth daily    Historical Provider, MD   SODIUM FLUORIDE, DENTAL GEL, 1.1 % GEL 1 application  as needed    Historical Provider, MD   sertraline (ZOLOFT) 50 MG tablet Take 1 tablet by mouth daily 23   Christian Rojas MD   acetaminophen (TYLENOL) 500 MG tablet Take 2 tablets by mouth daily as needed    Ar Automatic Reconciliation   ezetimibe (ZETIA) 10 MG tablet Take 1 tablet by mouth daily 23   Ar Automatic Reconciliation   fenofibrate (TRIGLIDE) 160 MG tablet Take 1 tablet by mouth daily 3/9/23   Ar Automatic Reconciliation   nabumetone (RELAFEN) 500 MG tablet Take 1 tablet by mouth daily 18   Ar Automatic Reconciliation   omeprazole (PRILOSEC) 20 MG delayed release capsule TAKE 1 CAPSULE BY MOUTH DAILY 22   Ar Automatic Reconciliation   zolpidem (AMBIEN) 10 MG tablet TAKE 1 TABLET BY MOUTH AT BEDTIME AS NEEDED 20   Ar Automatic Reconciliation       Current medications:    No current facility-administered medications for this visit. No current outpatient medications on file.      Facility-Administered Medications Ordered in Other Visits

## 2023-09-19 NOTE — ANESTHESIA POSTPROCEDURE EVALUATION
Department of Anesthesiology  Postprocedure Note    Patient: Janae Ayala  MRN: [id number]  YOB: 1950  Date of evaluation: 9/19/2023      Procedure Summary     Date: 09/19/23 Room / Location: [facility] ASU B3 / [facility] AMBULATORY OR    Anesthesia Start: 0848 Anesthesia Stop: 8353    Procedure: RIGHT 2ND EYE PHACOEMULSIFICATION WITH INTRAOCULAR LENS IMPLANT (MAC/RETROBULBAR) (Right: Eye) Diagnosis:       Combined forms of age-related cataract of right eye      (Combined forms of age-related cataract of right eye [H25.811])    Surgeons: Chichi Alcantara MD Responsible Provider: Greg High MD    Anesthesia Type: TIVA, MAC ASA Status: 2          Anesthesia Type: TIVA, MAC    Barbara Phase I: Barbara Score: 10    Barbara Phase II: Barbara Score: 10      Anesthesia Post Evaluation    Patient location during evaluation: PACU  Patient participation: complete - patient participated  Level of consciousness: awake and alert  Pain score: 0  Airway patency: patent  Nausea & Vomiting: no nausea and no vomiting  Complications: no  Cardiovascular status: hemodynamically stable  Respiratory status: acceptable  Hydration status: euvolemic  There was medical reason for not using a multimodal analgesia pain management approach. Pain management: satisfactory to patient

## 2023-09-19 NOTE — PERIOP NOTE
Permission received to review discharge instructions and discuss private health information with  and will have someone with them after discharge . Patient states that family/friend will be with them for at least 24 hours following today's procedure.      Air Warming blanket placed on pt; turned on for comfort

## 2023-09-19 NOTE — PERIOP NOTE
Afsaneh Fall  1950  [id number]    Situation:  Verbal report given from:  Bridgett Romo CRNA, Costa Castillo, RN  Procedure: Procedure(s):  RIGHT 2ND EYE PHACOEMULSIFICATION WITH INTRAOCULAR LENS IMPLANT (MAC/RETROBULBAR)    Background:    Preoperative diagnosis: Combined forms of age-related cataract of right eye [H25.811]    Postoperative diagnosis: * No post-op diagnosis entered *    :  Dr. Gabby Del Angel    Assistant(s): Circulator: Lianna Jerez RN  Scrub Person First: Aliya Hatch  Circulator Assist: Nicklas Fleischer, RN; Art Carlton RN    Specimens: * No specimens in log *    Assessment:  Intra-procedure medications         Anesthesia gave intra-procedure sedation and medications, see anesthesia flow sheet     Intravenous fluids: LR@ KVO     Vital signs stable       Recommendation:    Permission to share finding with

## 2023-09-19 NOTE — OP NOTE
Operative Note      Patient: Lupe Franks  YOB: 1950  MRN: [id number]    Date of Procedure: 9/19/2023    Pre-Op Diagnosis Codes:     * Combined forms of age-related cataract of right eye [H25.811]    Post-Op Diagnosis: Same       Procedure(s):  RIGHT 2ND EYE PHACOEMULSIFICATION WITH INTRAOCULAR LENS IMPLANT (MAC/RETROBULBAR)  Toric implant    Surgeon(s):  Tanisha Miranda MD    Assistant:   * No surgical staff found *    Anesthesia: Monitor Anesthesia Care    Estimated Blood Loss (mL): Minimal    Complications: None    Specimens:   * No specimens in log *    Implants:  Implant Name Type Inv. Item Serial No.  Lot No. LRB No. Used Action   LENS IO +185 DIOPT CYL PWR +225 DIOPT L13MM DIA6MM 0DEG - [id number]  LENS IO +185 DIOPT CYL PWR +225 DIOPT L13MM DIA6MM 0DEG [id number] [employer]- N/A Right 1 Implanted         Drains: * No LDAs found *    Findings: none    The surgeon marked the toric reference axis with the patient sitting upright. After informed consent was obtained, the patient was brought into the operating suite. MAC with sedation and a retrobulbar block using a 50/50 mixture of lidocaine 2% and Marcaine 0.75% was administered without complication. The patient was prepped and draped in the usual sterile ophthalmic fashion. A wire lid speculum was placed. The steep toric axis was marked. A paracentesis was made using a 75 blade. The eye was filled with Provisc. A 2.8mm keratome was used to make a temporal clear corneal incision. A cystotome and Utrada forcep was used to make a continuous curvilinear capsulorrhexis without complication. Hydrodisection was performed until the nuclear rotated freely in the capsular bag. The cataract was removed using a two handed divide and conquer technique using a Rosman as a second instrument. Irrigation/aspiration was used to remove the remaining cortex. Capsular polish was used as needed.   The bag was inflated

## 2023-09-19 NOTE — PERIOP NOTE
Pt. States ready for discharge. Vss. Denies pain. Eye shield to right eye intact. Discharge instructions reviewed w/pts. . He verbalized understanding. Pt discharged via wheelchair to car, accompanied by RN. Pt discharged awake and alert, respirations equal and unlabored, skin warm, dry, and intact. Pt and family members' questions and concerns addressed prior to discharge.

## 2023-10-23 ENCOUNTER — TELEPHONE (OUTPATIENT)
Age: 73
End: 2023-10-23

## 2023-10-23 NOTE — TELEPHONE ENCOUNTER
Spoke with patient and advised. Verbalized understanding.      Per Dr. Haro Apt m not sure what to make of her symptoms/ maybe needs to see cardiologist. I would advise her to go to  today or tomorrow for exam, ekg etc. My shedule is full next few days\"

## 2023-10-23 NOTE — TELEPHONE ENCOUNTER
Patient states she needs a call back in reference to Every Morning after Morning medications she gets very weak, Headache, Uncomfortable in her Chest that all last Approx. 1 hour & then Back to herself. Patient states she needs a call back to discuss Plan of care.  Thank you -2

## 2023-10-23 NOTE — TELEPHONE ENCOUNTER
Spoke with patient. States she is experiencing a headache on the top of head \"dull ache\", chest tightness, and weakness after taking her morning medications. Denies fever or chills. States this has been going on for over the past two weeks. Patient states she eats before taking her medication. Morning meds:  Fenofibrate  Zeta  Zoloft  Omeprazole  Nabumetone  Preservision  Multivitamins  Calcium + vit d    Please advise.

## 2023-10-24 ENCOUNTER — OFFICE VISIT (OUTPATIENT)
Age: 73
End: 2023-10-24

## 2023-10-24 DIAGNOSIS — R20.0 NUMBNESS AND TINGLING IN LEFT ARM: ICD-10-CM

## 2023-10-24 DIAGNOSIS — R20.2 NUMBNESS AND TINGLING IN LEFT ARM: ICD-10-CM

## 2023-10-24 DIAGNOSIS — R07.9 CHEST PAIN, UNSPECIFIED TYPE: Primary | ICD-10-CM

## 2023-10-24 DIAGNOSIS — R10.13 EPIGASTRIC PAIN: ICD-10-CM

## 2023-10-24 NOTE — PROGRESS NOTES
of anticoagulants     patient denies    Primary osteoarthritis involving multiple joints 11/20/2018    Raynaud's syndrome     Sjogren's syndrome ([address]) 11/20/2018    extreme dry mouth and eyes, burning tongue    Weight loss, unintentional 11/20/2018    probabaly due to [place] tic bite per PCP     Past Surgical History:   Procedure Laterality Date    CHOLECYSTECTOMY  05/12/2021    COLONOSCOPY N/A 06/10/2022    COLONOSCOPY performed by Little Martines MD at [facility] ENDOSCOPY    ENDOSCOPY, COLON, DIAGNOSTIC      EYE SURGERY Left 9/5/2023    LEFT EYE PHACOEMULSIFICATION WITH INTRA OCULAR LENS IMPLANT FIRST EYE  (MAC WITH RETROBULBAR) performed by Maco Saldivar MD at [facility] AMBULATORY OR    EYE SURGERY Right 9/19/2023    RIGHT 2ND EYE PHACOEMULSIFICATION WITH INTRAOCULAR LENS IMPLANT (MAC/RETROBULBAR) performed by Maco Saldivar MD at [facility] AMBULATORY OR    OTHER SURGICAL HISTORY Left     eye procedure outer eye bump removed - [facility]    TONSILLECTOMY       Prior to Admission medications    Medication Sig Start Date End Date Taking?  Authorizing Provider   albuterol sulfate (PROAIR RESPICLICK) 257 (90 Base) MCG/ACT aerosol powder inhalation Inhale 2 puffs into the lungs every 4 hours as needed for Wheezing or Shortness of Breath    Miguel Miller MD   Multiple Vitamins-Minerals (PRESERVISION AREDS 2+MULTI VIT PO) Take 1 tablet by mouth daily    Miguel Miller MD   CALCIUM CARBONATE-VIT D-MIN PO Take 1 tablet by mouth daily    Miguel Miller MD   SODIUM FLUORIDE, DENTAL GEL, 1.1 % GEL 1 application  as needed 2/6/31   Miguel Miller MD   sertraline (ZOLOFT) 50 MG tablet Take 1 tablet by mouth daily 7/6/23   Genie Pak MD   acetaminophen (TYLENOL) 500 MG tablet Take 2 tablets by mouth daily as needed    Automatic Reconciliation, Ar   ezetimibe (ZETIA) 10 MG tablet Take 1 tablet by mouth daily 1/14/23   Automatic Reconciliation, Ar   fenofibrate (TRIGLIDE) 160 MG tablet Take 1 tablet by

## 2023-10-25 ENCOUNTER — TELEPHONE (OUTPATIENT)
Age: 73
End: 2023-10-25

## 2023-10-25 NOTE — TELEPHONE ENCOUNTER
Patient went to   and got EKG  - recommendation to go to a stand alone ER or Emergency room  to do a blood test - see if any issues with her heart- patient would like to know what Dr Cherie Vo thinks in regard to that . Please call to discuss.

## 2023-10-27 ENCOUNTER — APPOINTMENT (OUTPATIENT)
Facility: HOSPITAL | Age: 73
End: 2023-10-27
Payer: MEDICARE

## 2023-10-27 ENCOUNTER — TELEPHONE (OUTPATIENT)
Age: 73
End: 2023-10-27

## 2023-10-27 ENCOUNTER — HOSPITAL ENCOUNTER (EMERGENCY)
Facility: HOSPITAL | Age: 73
Discharge: HOME | End: 2023-10-27
Attending: EMERGENCY MEDICINE
Payer: MEDICARE

## 2023-10-27 VITALS
TEMPERATURE: 98.2 F | WEIGHT: 108.69 LBS | OXYGEN SATURATION: 98 % | HEART RATE: 72 BPM | SYSTOLIC BLOOD PRESSURE: 133 MMHG | RESPIRATION RATE: 14 BRPM | BODY MASS INDEX: 17.54 KG/M2 | DIASTOLIC BLOOD PRESSURE: 62 MMHG

## 2023-10-27 DIAGNOSIS — R07.9 CHEST PAIN, UNSPECIFIED TYPE: Primary | ICD-10-CM

## 2023-10-27 LAB
ALBUMIN SERPL-MCNC: 3.8 G/DL (ref 3.5–5)
ALBUMIN/GLOB SERPL: 1.3 (ref 1.1–2.2)
ALP SERPL-CCNC: 52 U/L (ref 45–117)
ALT SERPL-CCNC: 27 U/L (ref 12–78)
ANION GAP SERPL CALC-SCNC: 3 MMOL/L (ref 5–15)
AST SERPL-CCNC: 48 U/L (ref 15–37)
BASOPHILS # BLD: 0.1 K/UL (ref 0–0.1)
BASOPHILS NFR BLD: 1 % (ref 0–1)
BILIRUB SERPL-MCNC: 0.6 MG/DL (ref 0.2–1)
BUN SERPL-MCNC: 20 MG/DL (ref 6–20)
BUN/CREAT SERPL: 25 (ref 12–20)
CALCIUM SERPL-MCNC: 9.2 MG/DL (ref 8.5–10.1)
CHLORIDE SERPL-SCNC: 107 MMOL/L (ref 97–108)
CO2 SERPL-SCNC: 30 MMOL/L (ref 21–32)
COMMENT:: NORMAL
CREAT SERPL-MCNC: 0.81 MG/DL (ref 0.55–1.02)
DIFFERENTIAL METHOD BLD: ABNORMAL
EOSINOPHIL # BLD: 0.2 K/UL (ref 0–0.4)
EOSINOPHIL NFR BLD: 3 % (ref 0–7)
ERYTHROCYTE [DISTWIDTH] IN BLOOD BY AUTOMATED COUNT: 14.8 % (ref 11.5–14.5)
GLOBULIN SER CALC-MCNC: 3 G/DL (ref 2–4)
GLUCOSE SERPL-MCNC: 73 MG/DL (ref 65–100)
HCT VFR BLD AUTO: 39.3 % (ref 35–47)
HGB BLD-MCNC: 12.9 G/DL (ref 11.5–16)
IMM GRANULOCYTES # BLD AUTO: 0 K/UL (ref 0–0.04)
IMM GRANULOCYTES NFR BLD AUTO: 0 % (ref 0–0.5)
LYMPHOCYTES # BLD: 1.9 K/UL (ref 0.8–3.5)
LYMPHOCYTES NFR BLD: 33 % (ref 12–49)
MCH RBC QN AUTO: 29.7 PG (ref 26–34)
MCHC RBC AUTO-ENTMCNC: 32.8 G/DL (ref 30–36.5)
MCV RBC AUTO: 90.3 FL (ref 80–99)
MONOCYTES # BLD: 0.4 K/UL (ref 0–1)
MONOCYTES NFR BLD: 6 % (ref 5–13)
NEUTS SEG # BLD: 3.3 K/UL (ref 1.8–8)
NEUTS SEG NFR BLD: 57 % (ref 32–75)
NRBC # BLD: 0 K/UL (ref 0–0.01)
NRBC BLD-RTO: 0 PER 100 WBC
PLATELET # BLD AUTO: 263 K/UL (ref 150–400)
PMV BLD AUTO: 10.4 FL (ref 8.9–12.9)
POTASSIUM SERPL-SCNC: 3.7 MMOL/L (ref 3.5–5.1)
PROT SERPL-MCNC: 6.8 G/DL (ref 6.4–8.2)
RBC # BLD AUTO: 4.35 M/UL (ref 3.8–5.2)
SODIUM SERPL-SCNC: 140 MMOL/L (ref 136–145)
SPECIMEN HOLD: NORMAL
TROPONIN I SERPL HS-MCNC: 9 NG/L (ref 0–51)
WBC # BLD AUTO: 5.8 K/UL (ref 3.6–11)

## 2023-10-27 PROCEDURE — 99285 EMERGENCY DEPT VISIT HI MDM: CPT

## 2023-10-27 PROCEDURE — 71046 X-RAY EXAM CHEST 2 VIEWS: CPT

## 2023-10-27 PROCEDURE — 84484 ASSAY OF TROPONIN QUANT: CPT

## 2023-10-27 PROCEDURE — 80053 COMPREHEN METABOLIC PANEL: CPT

## 2023-10-27 PROCEDURE — 36415 COLL VENOUS BLD VENIPUNCTURE: CPT

## 2023-10-27 PROCEDURE — 93005 ELECTROCARDIOGRAM TRACING: CPT | Performed by: EMERGENCY MEDICINE

## 2023-10-27 PROCEDURE — 85025 COMPLETE CBC W/AUTO DIFF WBC: CPT

## 2023-10-27 ASSESSMENT — PAIN SCALES - GENERAL: PAINLEVEL_OUTOF10: 0

## 2023-10-27 NOTE — TELEPHONE ENCOUNTER
Emergency Room / Urgent Care   Update from pt    Details:  Diagnosis/Symptoms:   Chest pain, unspecified type  Date of Visit:  10/27  Facility:   [facility]    Patient States:  Kandy Del Angel to ed as directed    Recommends pt see cardiologist    Please see notes from ed    Appointments:  Last seen by pcp:  8/29/2023

## 2023-10-27 NOTE — TELEPHONE ENCOUNTER
I called pt earlier today and advised her to go to ER today for intermittent chest tightness concerning for angina.  Pt verbalized understanding and agreement

## 2023-10-27 NOTE — ED PROVIDER NOTES
[facility] EMERGENCY DEPT  EMERGENCY DEPARTMENT ENCOUNTER       Pt Name: Tawanna Tobin  MRN: [id number]  [address] [facility] 1950  Date of evaluation: 10/27/2023  Provider: Kristen Bauer DO   PCP: Annita Rausch MD  Note Started: 3:00 PM EDT 10/27/23     CHIEF COMPLAINT       Chief Complaint   Patient presents with    Chest Pain     Pt has had a \"weird\" discomfort in her chest off and on a couple of weeks, with it radiating down the left arm. Was seen at Urgent Care on [address] and had an EKG, but recommended by her doctor and urgent care to further testing in Ed. HISTORY OF PRESENT ILLNESS: 1 or more elements      History From: Patient, History limited by: none     Tawanna Tobin is a 67 y.o. female presents to the emergency department by private vehicle for evaluation of chest pain. Pt states she has been having intermittent episodes of chest discomfort for the past week. She states pain epigastric substernal region when it occurs. The pain resolves on its own without taking anything. The pain does not radiate. She denies any diaphoresis, n/v. She states she has also noted fullness and epigastric pain following her evening meal. She denies any fever/chills, cough or cold symptoms. She had been seen at Urgent Care yesterday and they recommended ED eval for blood work. Pt presents today for further work-up. Please See MDM for Additional Details of the HPI/PMH  Nursing Notes were all reviewed and agreed with or any disagreements were addressed in the HPI. REVIEW OF SYSTEMS        Positives and Pertinent negatives as per HPI.     PAST HISTORY     Past Medical History:  Past Medical History:   Diagnosis Date    Alpha galactosidase deficiency     Arthritis     Bacterial pneumonia 6/26/2018    Chronic bilateral low back pain without sciatica 11/20/2018    Chronic pain     GERD (gastroesophageal reflux disease)     Hypercholesterolemia     Long term (current) use of anticoagulants     patient denies    Primary

## 2023-10-28 LAB
EKG ATRIAL RATE: 69 BPM
EKG DIAGNOSIS: NORMAL
EKG P AXIS: 77 DEGREES
EKG P-R INTERVAL: 154 MS
EKG Q-T INTERVAL: 400 MS
EKG QRS DURATION: 80 MS
EKG QTC CALCULATION (BAZETT): 428 MS
EKG R AXIS: 78 DEGREES
EKG T AXIS: 44 DEGREES
EKG VENTRICULAR RATE: 69 BPM

## 2024-01-04 ENCOUNTER — OFFICE VISIT (OUTPATIENT)
Age: 74
End: 2024-01-04
Payer: MEDICARE

## 2024-01-04 VITALS
BODY MASS INDEX: 17.36 KG/M2 | WEIGHT: 108 LBS | SYSTOLIC BLOOD PRESSURE: 113 MMHG | HEIGHT: 66 IN | HEART RATE: 65 BPM | RESPIRATION RATE: 16 BRPM | TEMPERATURE: 97 F | DIASTOLIC BLOOD PRESSURE: 52 MMHG

## 2024-01-04 DIAGNOSIS — R00.0 TACHYCARDIA: Primary | ICD-10-CM

## 2024-01-04 DIAGNOSIS — R63.4 WEIGHT LOSS: ICD-10-CM

## 2024-01-04 DIAGNOSIS — D84.9 IMMUNODEFICIENCY, UNSPECIFIED (HCC): ICD-10-CM

## 2024-01-04 DIAGNOSIS — F33.0 MAJOR DEPRESSIVE DISORDER, RECURRENT, MILD (HCC): ICD-10-CM

## 2024-01-04 DIAGNOSIS — M06.9 RHEUMATOID ARTHRITIS, INVOLVING UNSPECIFIED SITE, UNSPECIFIED WHETHER RHEUMATOID FACTOR PRESENT (HCC): ICD-10-CM

## 2024-01-04 DIAGNOSIS — F33.1 MAJOR DEPRESSIVE DISORDER, RECURRENT, MODERATE (HCC): ICD-10-CM

## 2024-01-04 LAB
ALBUMIN SERPL-MCNC: 3.6 G/DL (ref 3.5–5)
ALBUMIN/GLOB SERPL: 1.2 (ref 1.1–2.2)
ALP SERPL-CCNC: 74 U/L (ref 45–117)
ALT SERPL-CCNC: 33 U/L (ref 12–78)
ANION GAP SERPL CALC-SCNC: 3 MMOL/L (ref 5–15)
AST SERPL-CCNC: 66 U/L (ref 15–37)
BILIRUB SERPL-MCNC: 0.6 MG/DL (ref 0.2–1)
BUN SERPL-MCNC: 23 MG/DL (ref 6–20)
BUN/CREAT SERPL: 29 (ref 12–20)
CALCIUM SERPL-MCNC: 9.2 MG/DL (ref 8.5–10.1)
CHLORIDE SERPL-SCNC: 108 MMOL/L (ref 97–108)
CO2 SERPL-SCNC: 32 MMOL/L (ref 21–32)
CREAT SERPL-MCNC: 0.78 MG/DL (ref 0.55–1.02)
GLOBULIN SER CALC-MCNC: 3.1 G/DL (ref 2–4)
GLUCOSE SERPL-MCNC: 84 MG/DL (ref 65–100)
POTASSIUM SERPL-SCNC: 4.1 MMOL/L (ref 3.5–5.1)
PROT SERPL-MCNC: 6.7 G/DL (ref 6.4–8.2)
SODIUM SERPL-SCNC: 143 MMOL/L (ref 136–145)
TSH SERPL DL<=0.05 MIU/L-ACNC: 1.06 UIU/ML (ref 0.36–3.74)

## 2024-01-04 PROCEDURE — 99214 OFFICE O/P EST MOD 30 MIN: CPT | Performed by: INTERNAL MEDICINE

## 2024-01-04 PROCEDURE — 3017F COLORECTAL CA SCREEN DOC REV: CPT | Performed by: INTERNAL MEDICINE

## 2024-01-04 PROCEDURE — G8419 CALC BMI OUT NRM PARAM NOF/U: HCPCS | Performed by: INTERNAL MEDICINE

## 2024-01-04 PROCEDURE — 1123F ACP DISCUSS/DSCN MKR DOCD: CPT | Performed by: INTERNAL MEDICINE

## 2024-01-04 PROCEDURE — 1036F TOBACCO NON-USER: CPT | Performed by: INTERNAL MEDICINE

## 2024-01-04 PROCEDURE — 1090F PRES/ABSN URINE INCON ASSESS: CPT | Performed by: INTERNAL MEDICINE

## 2024-01-04 PROCEDURE — G8399 PT W/DXA RESULTS DOCUMENT: HCPCS | Performed by: INTERNAL MEDICINE

## 2024-01-04 PROCEDURE — G8484 FLU IMMUNIZE NO ADMIN: HCPCS | Performed by: INTERNAL MEDICINE

## 2024-01-04 PROCEDURE — G8427 DOCREV CUR MEDS BY ELIG CLIN: HCPCS | Performed by: INTERNAL MEDICINE

## 2024-01-04 RX ORDER — LEVOCETIRIZINE DIHYDROCHLORIDE 5 MG/1
5 TABLET, FILM COATED ORAL NIGHTLY
COMMUNITY
Start: 2023-12-14

## 2024-01-04 RX ORDER — METOPROLOL SUCCINATE 25 MG/1
25 TABLET, EXTENDED RELEASE ORAL DAILY
COMMUNITY
Start: 2023-12-20

## 2024-01-04 ASSESSMENT — PATIENT HEALTH QUESTIONNAIRE - PHQ9
8. MOVING OR SPEAKING SO SLOWLY THAT OTHER PEOPLE COULD HAVE NOTICED. OR THE OPPOSITE, BEING SO FIGETY OR RESTLESS THAT YOU HAVE BEEN MOVING AROUND A LOT MORE THAN USUAL: 0
9. THOUGHTS THAT YOU WOULD BE BETTER OFF DEAD, OR OF HURTING YOURSELF: 0
7. TROUBLE CONCENTRATING ON THINGS, SUCH AS READING THE NEWSPAPER OR WATCHING TELEVISION: 1
SUM OF ALL RESPONSES TO PHQ QUESTIONS 1-9: 6
SUM OF ALL RESPONSES TO PHQ QUESTIONS 1-9: 6
4. FEELING TIRED OR HAVING LITTLE ENERGY: 3
SUM OF ALL RESPONSES TO PHQ9 QUESTIONS 1 & 2: 0
2. FEELING DOWN, DEPRESSED OR HOPELESS: 0
1. LITTLE INTEREST OR PLEASURE IN DOING THINGS: 0
SUM OF ALL RESPONSES TO PHQ QUESTIONS 1-9: 6
6. FEELING BAD ABOUT YOURSELF - OR THAT YOU ARE A FAILURE OR HAVE LET YOURSELF OR YOUR FAMILY DOWN: 2
10. IF YOU CHECKED OFF ANY PROBLEMS, HOW DIFFICULT HAVE THESE PROBLEMS MADE IT FOR YOU TO DO YOUR WORK, TAKE CARE OF THINGS AT HOME, OR GET ALONG WITH OTHER PEOPLE: 0
3. TROUBLE FALLING OR STAYING ASLEEP: 0
5. POOR APPETITE OR OVEREATING: 0
SUM OF ALL RESPONSES TO PHQ QUESTIONS 1-9: 6

## 2024-01-04 NOTE — PROGRESS NOTES
1. \"Have you been to the ER, urgent care clinic since your last visit?  Hospitalized since your last visit?\"       ER visit 10/2023 - chest pain     2. \"Have you seen or consulted any other health care providers outside of the [facility] since your last visit?\"       Dr. Lange // stress test done    Jonnathan Campbell // pulmonary     3. For patients aged 45-75: Has the patient had a colonoscopy / FIT/ Cologuard? 2022 britton      If the patient is female:    4. For patients aged 40-74: Has the patient had a mammogram within the past 2 years?  2023      5. For patients aged 21-65: Has the patient had a pap smear?  No

## 2024-01-04 NOTE — PROGRESS NOTES
HISTORY OF PRESENT ILLNESS   Vicenta Scott   is a 73 y.o.  female.  fu RA, Sjogrens syndrome-Dr Arvizu on nambutone  for RA and rash( rash to plaquenil PTA) Was not on long term steroids, hx CAP hospitalized last year, insomnia HLD lung nodules (Dr Campbell)  asthma  MDD .     In Er on Oct for chest pain-neg rincon in ER    Saw Dr Lange -had stress test and wearing a heart monitor now for elevated HR- now on BB. Has fu appt .    Weight loss--stable but still has difficulty with digestion -food goes right through her pt pt. Depressed about weight loss and her digestion  GI Dr Mireles-dx IBS s/p c dfif and hx alpha gal. Sometimes normal bowels per pt. Not really having diarrhea. No pain. Asking about seeing GI MD at [facility] for her alpha gal condition    Depression--zoloft increased to 50 mg qd not much change per pt    On nambutone for RA-she says she has more OA then RA joint pain  Her burning tongue symptoms are better per her report    Last oV  Here for preop cataract surgeyr Dr Jones on 9/5/23 os then OD   No difficulty in the past with surgery or anesthesia  No latex alergy  Denies cp sob syncope f/c     Patient Active Problem List    Diagnosis Date Noted    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) 10/01/2019    Sjogren's syndrome (HCC) 11/20/2018    Hypercholesterolemia 11/20/2018    Primary osteoarthritis involving multiple joints 11/20/2018    Chronic bilateral low back pain without sciatica 11/20/2018    Weight loss, unintentional 11/20/2018    Bacterial pneumonia 06/26/2018    Major depressive disorder, recurrent, mild (HCC) 03/21/2023    Major depressive disorder, recurrent, moderate (HCC) 03/21/2023    Major depressive disorder, recurrent, unspecified (HCC) 03/21/2023    Raynaud's disease without gangrene 12/30/2019    DDD (degenerative disc disease), cervical 12/30/2019    Other dysphagia 12/30/2019    Sepsis (HCC) 12/13/2019    Pain in both lower extremities 04/26/2018

## 2024-01-05 LAB
GGT SERPL-CCNC: 18 U/L (ref 5–55)
HBV SURFACE AG SER QL: <0.1 INDEX
HBV SURFACE AG SER QL: NEGATIVE

## 2024-01-10 DIAGNOSIS — F32.A DEPRESSION, UNSPECIFIED DEPRESSION TYPE: ICD-10-CM

## 2024-01-10 NOTE — TELEPHONE ENCOUNTER
PCP: Shravan Rodriguez MD    Last appt:   1/4/2024    Future Appointments   Date Time Provider Department Center   7/10/2024 10:30 AM Shravan Rodriguez MD [facility] AMB       Requested Prescriptions     Pending Prescriptions Disp Refills    sertraline (ZOLOFT) 50 MG tablet 90 tablet 1     Sig: Take 1 tablet by mouth daily

## 2024-01-25 ENCOUNTER — TELEPHONE (OUTPATIENT)
Age: 74
End: 2024-01-25

## 2024-01-25 DIAGNOSIS — Z91.018 ALLERGY TO ALPHA-GAL: Primary | ICD-10-CM

## 2024-01-25 NOTE — TELEPHONE ENCOUNTER
Spoke with patient. Requesting a referral for Dr. Laron Anaya- allergy and immunology clinic at [facility] in [place].   States she has alpha gal in blood stream due to being bit by a tic and having symptoms    Advised to also check with insurance if provider/facility is in network    Please advise.

## 2024-01-25 NOTE — TELEPHONE ENCOUNTER
Patient states she needs a call back in reference to getting referral for Specialist in [place], [place]. Please call to discuss. Thank you

## 2024-01-26 NOTE — TELEPHONE ENCOUNTER
Spoke with patient. Advised will send external referral in mail to home address to proceed scheduling appt with allergist.     Verbalized understanding.

## 2024-01-31 ENCOUNTER — TELEPHONE (OUTPATIENT)
Age: 74
End: 2024-01-31

## 2024-01-31 NOTE — TELEPHONE ENCOUNTER
Pt is calling to see if the referral had been faxed?  This is from previous messages.  Pt states they (?) are looking for a fax of this referral.    This is for allergist in previous note.     It shows that referral was being mailed to home?      Pt is asking for a call to get this straight.

## 2024-01-31 NOTE — TELEPHONE ENCOUNTER
Called and informed patient that referral has been faxed to Dr. Laron Alejandra [facility] office.

## 2024-02-02 ENCOUNTER — TELEPHONE (OUTPATIENT)
Age: 74
End: 2024-02-02

## 2024-02-02 NOTE — TELEPHONE ENCOUNTER
Patient states she needs a call back to discuss if Dr. Rodriguez Can Order & Lab be done at our Lab for Alpha-Gal Test. Patient states she seen an Allergist that needs a New Test with results. Please call to discuss & advise. Patient states a detailed message can be left on voice mail if no answer. Thank you

## 2024-02-04 DIAGNOSIS — T78.1XXA ALLERGIC REACTION TO ALPHA-GAL: Primary | ICD-10-CM

## 2024-02-05 DIAGNOSIS — T78.1XXA ALLERGIC REACTION TO ALPHA-GAL: ICD-10-CM

## 2024-02-09 LAB
ALLERGEN LAMB/MUTTON, IGE, AGAL3: 0.16 KU/L
ALPHA-GAL IGE QN: 0.38 KU/L
BEEF IGE QN: 0.23 KU/L
IGE SERPL-ACNC: 15 IU/ML (ref 6–495)
Lab: ABNORMAL
PORK IGE QN: 0.15 KU/L

## 2024-03-11 RX ORDER — FENOFIBRATE 160 MG/1
160 TABLET ORAL DAILY
Qty: 90 TABLET | Refills: 1 | Status: SHIPPED | OUTPATIENT
Start: 2024-03-11

## 2024-03-11 NOTE — TELEPHONE ENCOUNTER
Received faxed refill request from pharmacy      PCP: Shravan Rodriguez MD    Last appt: 1/4/2024  Future Appointments   Date Time Provider Department Center   7/10/2024 10:30 AM Shravan Rodriguez MD [facility] AMB       Requested Prescriptions     Pending Prescriptions Disp Refills    fenofibrate (TRIGLIDE) 160 MG tablet 90 tablet 1     Sig: Take 1 tablet by mouth daily

## 2024-03-12 ENCOUNTER — TELEPHONE (OUTPATIENT)
Age: 74
End: 2024-03-12

## 2024-03-12 ENCOUNTER — OFFICE VISIT (OUTPATIENT)
Age: 74
End: 2024-03-12
Payer: MEDICARE

## 2024-03-12 VITALS
BODY MASS INDEX: 17 KG/M2 | HEART RATE: 68 BPM | TEMPERATURE: 98 F | WEIGHT: 105.8 LBS | DIASTOLIC BLOOD PRESSURE: 54 MMHG | SYSTOLIC BLOOD PRESSURE: 120 MMHG | HEIGHT: 66 IN | RESPIRATION RATE: 18 BRPM | OXYGEN SATURATION: 97 %

## 2024-03-12 DIAGNOSIS — M05.79 RHEUMATOID ARTHRITIS INVOLVING MULTIPLE SITES WITH POSITIVE RHEUMATOID FACTOR (HCC): ICD-10-CM

## 2024-03-12 DIAGNOSIS — K21.9 GASTROESOPHAGEAL REFLUX DISEASE WITHOUT ESOPHAGITIS: ICD-10-CM

## 2024-03-12 DIAGNOSIS — R19.7 DIARRHEA, UNSPECIFIED TYPE: Primary | ICD-10-CM

## 2024-03-12 PROCEDURE — 1090F PRES/ABSN URINE INCON ASSESS: CPT | Performed by: INTERNAL MEDICINE

## 2024-03-12 PROCEDURE — G8399 PT W/DXA RESULTS DOCUMENT: HCPCS | Performed by: INTERNAL MEDICINE

## 2024-03-12 PROCEDURE — 99213 OFFICE O/P EST LOW 20 MIN: CPT | Performed by: INTERNAL MEDICINE

## 2024-03-12 PROCEDURE — G8419 CALC BMI OUT NRM PARAM NOF/U: HCPCS | Performed by: INTERNAL MEDICINE

## 2024-03-12 PROCEDURE — 1123F ACP DISCUSS/DSCN MKR DOCD: CPT | Performed by: INTERNAL MEDICINE

## 2024-03-12 PROCEDURE — G8484 FLU IMMUNIZE NO ADMIN: HCPCS | Performed by: INTERNAL MEDICINE

## 2024-03-12 PROCEDURE — G8427 DOCREV CUR MEDS BY ELIG CLIN: HCPCS | Performed by: INTERNAL MEDICINE

## 2024-03-12 PROCEDURE — 1036F TOBACCO NON-USER: CPT | Performed by: INTERNAL MEDICINE

## 2024-03-12 PROCEDURE — 3017F COLORECTAL CA SCREEN DOC REV: CPT | Performed by: INTERNAL MEDICINE

## 2024-03-12 ASSESSMENT — PATIENT HEALTH QUESTIONNAIRE - PHQ9
9. THOUGHTS THAT YOU WOULD BE BETTER OFF DEAD, OR OF HURTING YOURSELF: 0
5. POOR APPETITE OR OVEREATING: 0
SUM OF ALL RESPONSES TO PHQ QUESTIONS 1-9: 0
7. TROUBLE CONCENTRATING ON THINGS, SUCH AS READING THE NEWSPAPER OR WATCHING TELEVISION: 0
10. IF YOU CHECKED OFF ANY PROBLEMS, HOW DIFFICULT HAVE THESE PROBLEMS MADE IT FOR YOU TO DO YOUR WORK, TAKE CARE OF THINGS AT HOME, OR GET ALONG WITH OTHER PEOPLE: 0
3. TROUBLE FALLING OR STAYING ASLEEP: 0
SUM OF ALL RESPONSES TO PHQ QUESTIONS 1-9: 0
4. FEELING TIRED OR HAVING LITTLE ENERGY: 0
8. MOVING OR SPEAKING SO SLOWLY THAT OTHER PEOPLE COULD HAVE NOTICED. OR THE OPPOSITE, BEING SO FIGETY OR RESTLESS THAT YOU HAVE BEEN MOVING AROUND A LOT MORE THAN USUAL: 0
2. FEELING DOWN, DEPRESSED OR HOPELESS: 0
1. LITTLE INTEREST OR PLEASURE IN DOING THINGS: 0
SUM OF ALL RESPONSES TO PHQ QUESTIONS 1-9: 0
SUM OF ALL RESPONSES TO PHQ9 QUESTIONS 1 & 2: 0
SUM OF ALL RESPONSES TO PHQ QUESTIONS 1-9: 0
6. FEELING BAD ABOUT YOURSELF - OR THAT YOU ARE A FAILURE OR HAVE LET YOURSELF OR YOUR FAMILY DOWN: 0

## 2024-03-12 ASSESSMENT — ENCOUNTER SYMPTOMS
WHEEZING: 0
NAUSEA: 0
VOMITING: 0
SHORTNESS OF BREATH: 0
BACK PAIN: 0
DIARRHEA: 1

## 2024-03-13 LAB
ALBUMIN SERPL-MCNC: 3.9 G/DL (ref 3.5–5)
ALBUMIN/GLOB SERPL: 1.3 (ref 1.1–2.2)
ALP SERPL-CCNC: 52 U/L (ref 45–117)
ALT SERPL-CCNC: 28 U/L (ref 12–78)
ANION GAP SERPL CALC-SCNC: 7 MMOL/L (ref 5–15)
AST SERPL-CCNC: 54 U/L (ref 15–37)
BILIRUB SERPL-MCNC: 0.8 MG/DL (ref 0.2–1)
BUN SERPL-MCNC: 17 MG/DL (ref 6–20)
BUN/CREAT SERPL: 21 (ref 12–20)
CALCIUM SERPL-MCNC: 9.6 MG/DL (ref 8.5–10.1)
CHLORIDE SERPL-SCNC: 107 MMOL/L (ref 97–108)
CO2 SERPL-SCNC: 29 MMOL/L (ref 21–32)
CREAT SERPL-MCNC: 0.82 MG/DL (ref 0.55–1.02)
ERYTHROCYTE [DISTWIDTH] IN BLOOD BY AUTOMATED COUNT: 15.9 % (ref 11.5–14.5)
GLOBULIN SER CALC-MCNC: 3 G/DL (ref 2–4)
GLUCOSE SERPL-MCNC: 81 MG/DL (ref 65–100)
HCT VFR BLD AUTO: 39.2 % (ref 35–47)
HGB BLD-MCNC: 12.9 G/DL (ref 11.5–16)
MCH RBC QN AUTO: 30 PG (ref 26–34)
MCHC RBC AUTO-ENTMCNC: 32.9 G/DL (ref 30–36.5)
MCV RBC AUTO: 91.2 FL (ref 80–99)
NRBC # BLD: 0 K/UL (ref 0–0.01)
NRBC BLD-RTO: 0 PER 100 WBC
PLATELET # BLD AUTO: 215 K/UL (ref 150–400)
PMV BLD AUTO: 11.5 FL (ref 8.9–12.9)
POTASSIUM SERPL-SCNC: 3.8 MMOL/L (ref 3.5–5.1)
PROT SERPL-MCNC: 6.9 G/DL (ref 6.4–8.2)
RBC # BLD AUTO: 4.3 M/UL (ref 3.8–5.2)
SODIUM SERPL-SCNC: 143 MMOL/L (ref 136–145)
WBC # BLD AUTO: 7.3 K/UL (ref 3.6–11)

## 2024-03-14 NOTE — RESULT ENCOUNTER NOTE
Called, Spoke with Pt  Received two pt identifiers  Pt informed per Dr. Covarrubias Labs good overall, mild stable lft elevation--f/u w her GI dr kaba  Rest labs are okay   Pt verbalizes understanding of the instructions and has no further questions at this time.

## 2024-03-28 ENCOUNTER — TELEPHONE (OUTPATIENT)
Age: 74
End: 2024-03-28

## 2024-03-28 NOTE — TELEPHONE ENCOUNTER
Called pt, verified two pt identifiers.   Informed pt do not have results, order was no released.   Pt states she dropped samples off at [facility] as she wasn't sure if she had to drop them back off at the office.   [facility] and they took the orders and said they would test it.   Pt also states [facility] called stating they did not have enough of the tubes and needed one more.   Pt states lab [facility] gave pt another sample kit to do again.     
Checked lab [facility] system, no record of C Diff or OVA+Parasite+Giardia tests.     Called [facility], spoke with Melina at [facility] location.   Melina checked system and log sheets or sign in sheet from 03/15-03/20 and no record of sign in anywhere or sample.        Called pt, verified two pt identifiers.   Informed pt very sorry, did the best I could to find results, and there is no record of it in their system.   Informed pt will need her to come in to receive another stool kit, and return the samples to our office.     Pt states unsure if will need to as her symptoms have now subsided. Pt states has an appointment with Gastro next week and wanted results back before then and will probably have to do another test.     Informed pt if she would like to the order is in the system if she decides and can  between 8am-4:30pm mon- fri    Pt verifies an understanding, no further questions  
Dr. Rodriguez patient last seen by Dr. Covarrubias states she needs a call back to discuss Lab Results for Stool Sample done from appt seen on 3/12/24. Please call. Patient states a detailed Voice Mail can be left if No answer Thank you  
no

## 2024-04-11 ENCOUNTER — HOSPITAL ENCOUNTER (OUTPATIENT)
Facility: HOSPITAL | Age: 74
Discharge: HOME | End: 2024-04-11
Attending: INTERNAL MEDICINE
Payer: MEDICARE

## 2024-04-11 DIAGNOSIS — R63.4 ABNORMAL WEIGHT LOSS: ICD-10-CM

## 2024-04-11 DIAGNOSIS — K58.9 IRRITABLE BOWEL SYNDROME WITHOUT DIARRHEA: ICD-10-CM

## 2024-04-11 DIAGNOSIS — K14.6 BURNING MOUTH SYNDROME: ICD-10-CM

## 2024-04-11 DIAGNOSIS — A04.72 C. DIFFICILE DIARRHEA: ICD-10-CM

## 2024-04-11 DIAGNOSIS — R13.10 DYSPHAGIA, UNSPECIFIED TYPE: ICD-10-CM

## 2024-04-11 DIAGNOSIS — M35.00 SJOGREN'S SYNDROME, WITH UNSPECIFIED ORGAN INVOLVEMENT (HCC): ICD-10-CM

## 2024-04-11 PROCEDURE — 74220 X-RAY XM ESOPHAGUS 1CNTRST: CPT

## 2024-04-15 ENCOUNTER — HOSPITAL ENCOUNTER (OUTPATIENT)
Facility: HOSPITAL | Age: 74
Discharge: HOME | End: 2024-04-18
Attending: INTERNAL MEDICINE

## 2024-04-15 DIAGNOSIS — A04.72 C. DIFFICILE DIARRHEA: ICD-10-CM

## 2024-04-15 DIAGNOSIS — K58.9 IRRITABLE BOWEL SYNDROME WITHOUT DIARRHEA: ICD-10-CM

## 2024-04-15 DIAGNOSIS — M35.00 SJOGREN'S SYNDROME, WITH UNSPECIFIED ORGAN INVOLVEMENT (HCC): ICD-10-CM

## 2024-04-15 DIAGNOSIS — K14.6 BURNING MOUTH SYNDROME: ICD-10-CM

## 2024-04-15 DIAGNOSIS — R13.10 DYSPHAGIA, UNSPECIFIED TYPE: ICD-10-CM

## 2024-04-15 DIAGNOSIS — R63.4 ABNORMAL WEIGHT LOSS: ICD-10-CM

## 2024-04-16 ENCOUNTER — TELEPHONE (OUTPATIENT)
Age: 74
End: 2024-04-16

## 2024-04-16 RX ORDER — EZETIMIBE 10 MG/1
10 TABLET ORAL DAILY
Qty: 90 TABLET | Refills: 1 | Status: SHIPPED | OUTPATIENT
Start: 2024-04-16

## 2024-04-16 RX ORDER — NABUMETONE 500 MG/1
500 TABLET, FILM COATED ORAL DAILY
Qty: 60 TABLET | Refills: 1 | Status: SHIPPED | OUTPATIENT
Start: 2024-04-16

## 2024-04-16 NOTE — TELEPHONE ENCOUNTER
Patient states she needs a call back to discuss if Dr. Rodriguez can take over her Arthritis medication, Nabumetone (RELAFEN). Patient states she has not seen Rheumatologist in over a year & want to know if Dr. Rodriguez Can Take over medication. Dosage patient states she is taking is not showing in chart/Med List. Please call to discuss. Thank you

## 2024-04-16 NOTE — TELEPHONE ENCOUNTER
Patient states she needs refill done thru [facility] on file for her Ezetimibe (ZETIA) 10 MG tablet. Please call if any questions. Thank you      Patient reminded of [address] turn around on refills

## 2024-04-16 NOTE — TELEPHONE ENCOUNTER
PCP: Shravan Rodriguez MD    Last appt:   3/12/2024    Future Appointments   Date Time Provider Department Center   6[facility]2024 [facility]   6/5/2024  1:00 PM [facility] NM RM 2 [facility]   6/5/2024  2:00 PM SF NM RM 2 [facility]   6/5/2024  3:00 PM SF NM RM 2 [facility]   6/5/2024  4:00 PM SF NM RM 2 [facility]   7/10/2024 10:30 AM Shravan Rodriguez MD [facility] AMB       Requested Prescriptions     Pending Prescriptions Disp Refills    ezetimibe (ZETIA) 10 MG tablet 90 tablet 1     Sig: Take 1 tablet by mouth daily    nabumetone (RELAFEN) 500 MG tablet 60 tablet 1     Sig: Take 1 tablet by mouth daily

## 2024-04-30 RX ORDER — OMEPRAZOLE 20 MG/1
CAPSULE, DELAYED RELEASE ORAL
Qty: 90 CAPSULE | Refills: 3 | Status: SHIPPED | OUTPATIENT
Start: 2024-04-30

## 2024-06-05 ENCOUNTER — HOSPITAL ENCOUNTER (OUTPATIENT)
Facility: HOSPITAL | Age: 74
Discharge: HOME | End: 2024-06-08
Attending: INTERNAL MEDICINE
Payer: MEDICARE

## 2024-06-05 PROCEDURE — 78264 GASTRIC EMPTYING IMG STUDY: CPT

## 2024-06-05 PROCEDURE — A9541 TC99M SULFUR COLLOID: HCPCS | Performed by: INTERNAL MEDICINE

## 2024-06-05 PROCEDURE — 3430000000 HC RX DIAGNOSTIC RADIOPHARMACEUTICAL: Performed by: INTERNAL MEDICINE

## 2024-06-05 RX ADMIN — TECHNETIUM TC 99M SULFUR COLLOID 1 MILLICURIE: KIT at 12:05

## 2024-07-07 SDOH — HEALTH STABILITY: PHYSICAL HEALTH: ON AVERAGE, HOW MANY DAYS PER WEEK DO YOU ENGAGE IN MODERATE TO STRENUOUS EXERCISE (LIKE A BRISK WALK)?: 0 DAYS

## 2024-07-07 SDOH — ECONOMIC STABILITY: FOOD INSECURITY: WITHIN THE PAST 12 MONTHS, YOU WORRIED THAT YOUR FOOD WOULD RUN OUT BEFORE YOU GOT MONEY TO BUY MORE.: NEVER TRUE

## 2024-07-07 SDOH — ECONOMIC STABILITY: INCOME INSECURITY: HOW HARD IS IT FOR YOU TO PAY FOR THE VERY BASICS LIKE FOOD, HOUSING, MEDICAL CARE, AND HEATING?: NOT HARD AT ALL

## 2024-07-07 SDOH — ECONOMIC STABILITY: FOOD INSECURITY: WITHIN THE PAST 12 MONTHS, THE FOOD YOU BOUGHT JUST DIDN'T LAST AND YOU DIDN'T HAVE MONEY TO GET MORE.: NEVER TRUE

## 2024-07-07 SDOH — HEALTH STABILITY: PHYSICAL HEALTH: ON AVERAGE, HOW MANY MINUTES DO YOU ENGAGE IN EXERCISE AT THIS LEVEL?: 0 MIN

## 2024-07-07 SDOH — ECONOMIC STABILITY: TRANSPORTATION INSECURITY
IN THE PAST 12 MONTHS, HAS LACK OF TRANSPORTATION KEPT YOU FROM MEETINGS, WORK, OR FROM GETTING THINGS NEEDED FOR DAILY LIVING?: NO

## 2024-07-07 ASSESSMENT — LIFESTYLE VARIABLES
HOW OFTEN DO YOU HAVE A DRINK CONTAINING ALCOHOL: NEVER
HOW MANY STANDARD DRINKS CONTAINING ALCOHOL DO YOU HAVE ON A TYPICAL DAY: PATIENT DOES NOT DRINK
HOW OFTEN DO YOU HAVE A DRINK CONTAINING ALCOHOL: 1
HOW MANY STANDARD DRINKS CONTAINING ALCOHOL DO YOU HAVE ON A TYPICAL DAY: 0
HOW OFTEN DO YOU HAVE SIX OR MORE DRINKS ON ONE OCCASION: 1

## 2024-07-07 ASSESSMENT — PATIENT HEALTH QUESTIONNAIRE - PHQ9
SUM OF ALL RESPONSES TO PHQ9 QUESTIONS 1 & 2: 0
SUM OF ALL RESPONSES TO PHQ QUESTIONS 1-9: 0
SUM OF ALL RESPONSES TO PHQ QUESTIONS 1-9: 0
1. LITTLE INTEREST OR PLEASURE IN DOING THINGS: NOT AT ALL
SUM OF ALL RESPONSES TO PHQ QUESTIONS 1-9: 0
SUM OF ALL RESPONSES TO PHQ QUESTIONS 1-9: 0
2. FEELING DOWN, DEPRESSED OR HOPELESS: NOT AT ALL

## 2024-07-10 ENCOUNTER — OFFICE VISIT (OUTPATIENT)
Age: 74
End: 2024-07-10
Payer: MEDICARE

## 2024-07-10 VITALS
WEIGHT: 109 LBS | HEIGHT: 66 IN | SYSTOLIC BLOOD PRESSURE: 116 MMHG | DIASTOLIC BLOOD PRESSURE: 66 MMHG | TEMPERATURE: 97.2 F | BODY MASS INDEX: 17.52 KG/M2 | RESPIRATION RATE: 16 BRPM

## 2024-07-10 DIAGNOSIS — E78.5 HYPERLIPIDEMIA, UNSPECIFIED HYPERLIPIDEMIA TYPE: Primary | ICD-10-CM

## 2024-07-10 DIAGNOSIS — M06.9 RHEUMATOID ARTHRITIS, INVOLVING UNSPECIFIED SITE, UNSPECIFIED WHETHER RHEUMATOID FACTOR PRESENT (HCC): ICD-10-CM

## 2024-07-10 DIAGNOSIS — Z91.018 ALLERGY TO ALPHA-GAL: ICD-10-CM

## 2024-07-10 DIAGNOSIS — K58.9 IRRITABLE BOWEL SYNDROME, UNSPECIFIED TYPE: ICD-10-CM

## 2024-07-10 DIAGNOSIS — F32.A DEPRESSION, UNSPECIFIED DEPRESSION TYPE: ICD-10-CM

## 2024-07-10 DIAGNOSIS — Z12.39 BREAST SCREENING: ICD-10-CM

## 2024-07-10 DIAGNOSIS — Z00.00 MEDICARE ANNUAL WELLNESS VISIT, SUBSEQUENT: ICD-10-CM

## 2024-07-10 DIAGNOSIS — R63.4 WEIGHT LOSS: ICD-10-CM

## 2024-07-10 LAB
CHOLEST SERPL-MCNC: 113 MG/DL
HDLC SERPL-MCNC: 35 MG/DL
HDLC SERPL: 3.2 (ref 0–5)
LDLC SERPL CALC-MCNC: 59.4 MG/DL (ref 0–100)
TRIGL SERPL-MCNC: 93 MG/DL
VLDLC SERPL CALC-MCNC: 18.6 MG/DL

## 2024-07-10 PROCEDURE — 3017F COLORECTAL CA SCREEN DOC REV: CPT | Performed by: INTERNAL MEDICINE

## 2024-07-10 PROCEDURE — 99214 OFFICE O/P EST MOD 30 MIN: CPT | Performed by: INTERNAL MEDICINE

## 2024-07-10 PROCEDURE — 1123F ACP DISCUSS/DSCN MKR DOCD: CPT | Performed by: INTERNAL MEDICINE

## 2024-07-10 PROCEDURE — G8419 CALC BMI OUT NRM PARAM NOF/U: HCPCS | Performed by: INTERNAL MEDICINE

## 2024-07-10 PROCEDURE — 1090F PRES/ABSN URINE INCON ASSESS: CPT | Performed by: INTERNAL MEDICINE

## 2024-07-10 PROCEDURE — 1036F TOBACCO NON-USER: CPT | Performed by: INTERNAL MEDICINE

## 2024-07-10 PROCEDURE — G8427 DOCREV CUR MEDS BY ELIG CLIN: HCPCS | Performed by: INTERNAL MEDICINE

## 2024-07-10 PROCEDURE — G8399 PT W/DXA RESULTS DOCUMENT: HCPCS | Performed by: INTERNAL MEDICINE

## 2024-07-10 PROCEDURE — G0439 PPPS, SUBSEQ VISIT: HCPCS | Performed by: INTERNAL MEDICINE

## 2024-07-10 RX ORDER — CROMOLYN SODIUM 100 MG/5ML
SOLUTION, CONCENTRATE ORAL
COMMUNITY

## 2024-07-10 SDOH — ECONOMIC STABILITY: FOOD INSECURITY: WITHIN THE PAST 12 MONTHS, YOU WORRIED THAT YOUR FOOD WOULD RUN OUT BEFORE YOU GOT MONEY TO BUY MORE.: NEVER TRUE

## 2024-07-10 SDOH — ECONOMIC STABILITY: INCOME INSECURITY: HOW HARD IS IT FOR YOU TO PAY FOR THE VERY BASICS LIKE FOOD, HOUSING, MEDICAL CARE, AND HEATING?: NOT HARD AT ALL

## 2024-07-10 SDOH — ECONOMIC STABILITY: FOOD INSECURITY: WITHIN THE PAST 12 MONTHS, THE FOOD YOU BOUGHT JUST DIDN'T LAST AND YOU DIDN'T HAVE MONEY TO GET MORE.: NEVER TRUE

## 2024-07-10 ASSESSMENT — PATIENT HEALTH QUESTIONNAIRE - PHQ9
SUM OF ALL RESPONSES TO PHQ QUESTIONS 1-9: 4
7. TROUBLE CONCENTRATING ON THINGS, SUCH AS READING THE NEWSPAPER OR WATCHING TELEVISION: SEVERAL DAYS
SUM OF ALL RESPONSES TO PHQ QUESTIONS 1-9: 4
10. IF YOU CHECKED OFF ANY PROBLEMS, HOW DIFFICULT HAVE THESE PROBLEMS MADE IT FOR YOU TO DO YOUR WORK, TAKE CARE OF THINGS AT HOME, OR GET ALONG WITH OTHER PEOPLE: SOMEWHAT DIFFICULT
SUM OF ALL RESPONSES TO PHQ QUESTIONS 1-9: 4
8. MOVING OR SPEAKING SO SLOWLY THAT OTHER PEOPLE COULD HAVE NOTICED. OR THE OPPOSITE, BEING SO FIGETY OR RESTLESS THAT YOU HAVE BEEN MOVING AROUND A LOT MORE THAN USUAL: NOT AT ALL
9. THOUGHTS THAT YOU WOULD BE BETTER OFF DEAD, OR OF HURTING YOURSELF: NOT AT ALL
SUM OF ALL RESPONSES TO PHQ9 QUESTIONS 1 & 2: 0
SUM OF ALL RESPONSES TO PHQ QUESTIONS 1-9: 4
5. POOR APPETITE OR OVEREATING: NOT AT ALL
3. TROUBLE FALLING OR STAYING ASLEEP: NOT AT ALL
4. FEELING TIRED OR HAVING LITTLE ENERGY: MORE THAN HALF THE DAYS
1. LITTLE INTEREST OR PLEASURE IN DOING THINGS: NOT AT ALL
2. FEELING DOWN, DEPRESSED OR HOPELESS: NOT AT ALL
6. FEELING BAD ABOUT YOURSELF - OR THAT YOU ARE A FAILURE OR HAVE LET YOURSELF OR YOUR FAMILY DOWN: SEVERAL DAYS

## 2024-07-10 NOTE — PATIENT INSTRUCTIONS
other health problems such as diabetes, high blood pressure, and high cholesterol. If you think you may have a problem with alcohol or drug use, talk to your doctor.   Medicines    Take your medicines exactly as prescribed. Call your doctor if you think you are having a problem with your medicine.     If your doctor recommends aspirin, take the amount directed each day. Make sure you take aspirin and not another kind of pain reliever, such as acetaminophen (Tylenol).   When should you call for help?   Call 911 if you have symptoms of a heart attack. These may include:    Chest pain or pressure, or a strange feeling in the chest.     Sweating.     Shortness of breath.     Pain, pressure, or a strange feeling in the back, neck, jaw, or upper belly or in one or both shoulders or arms.     Lightheadedness or sudden weakness.     A fast or irregular heartbeat.   After you call 911, the  may tell you to chew 1 adult-strength or 2 to 4 low-dose aspirin. Wait for an ambulance. Do not try to drive yourself.  Watch closely for changes in your health, and be sure to contact your doctor if you have any problems.  Where can you learn more?  Go to https://www.benchee.net/patientEd and enter F075 to learn more about \"A Healthy Heart: Care Instructions.\"  Current as of: June 24, 2023  Content Version: 14.1  © 9696-3360 [employer].   Care instructions adapted under license by [employer]. If you have questions about a medical condition or this instruction, always ask your healthcare professional. [employer] disclaims any warranty or liability for your use of this information.      Personalized Preventive Plan for Vicenta Scott - 7/10/2024  Medicare offers a range of preventive health benefits. Some of the tests and screenings are paid in full while other may be subject to a deductible, co-insurance, and/or copay.    Some of these benefits include a comprehensive review of your medical

## 2024-07-10 NOTE — PROGRESS NOTES
\"Have you been to the ER, urgent care clinic since your last visit?  Hospitalized since your last visit?\"    NO    “Have you seen or consulted any other health care providers outside of [facility] since your last visit?”    NO            Click Here for Release of Records Request    
daily     Abnormal BMI (underweight):  Body mass index is 17.59 kg/m². (!) Abnormal  Interventions - Underweight:  Advioseadequate caloric intake                       Objective   Vitals:    07/10/24 1037   BP: 116/66   Site: Left Upper Arm   Position: Sitting   Cuff Size: Medium Adult   Resp: 16   Temp: 97.2 °F (36.2 °C)   TempSrc: Temporal   Weight: 49.4 kg (109 lb)   Height: 1.676 m (5' 6\")      Body mass index is 17.59 kg/m².                    Allergies   Allergen Reactions    Alpha-D-Galactosidase Other (See Comments)    Alpha-Gal     Hydrochlorothiazide Other (See Comments)    Sulfa Antibiotics Swelling     Lip swelling, unsure, as a teenager    Diclofenac Nausea And Vomiting and Other (See Comments)     Nausea fever and chills  \"PATIENT CAN TAKE THE TOPICAL CREAM\"  Nausea fever and chills    Hydroxychloroquine Rash     Prior to Visit Medications    Medication Sig Taking? Authorizing Provider   cromolyn (GASTROCROM) 100 MG/5ML solution TAKE 10 ML BY MOUTH FOUR TIMES DAILY AS NEEDED FOR GASTROINTESNIAL FOOD ALLERGY SYMPTOMS Yes Miguel Miller MD   omeprazole (PRILOSEC) 20 MG delayed release capsule TAKE 1 CAPSULE BY MOUTH DAILY Yes Shravan Rodriguez MD   ezetimibe (ZETIA) 10 MG tablet Take 1 tablet by mouth daily Yes Shravan Rodriguez MD   nabumetone (RELAFEN) 500 MG tablet Take 1 tablet by mouth daily Yes Shravan Rodriguez MD   fenofibrate (TRIGLIDE) 160 MG tablet Take 1 tablet by mouth daily Yes Shravan Rodriguez MD   sertraline (ZOLOFT) 50 MG tablet Take 1 tablet by mouth daily Yes Shravan Rodriguez MD   levocetirizine (XYZAL) 5 MG tablet Take 1 tablet by mouth nightly at bedtime. Yes Miguel Miller MD   metoprolol succinate (TOPROL XL) 25 MG extended release tablet Take 1 tablet by mouth daily Yes Miguel Miller MD   polyethyl glycol-propyl glycol 0.4-0.3 % (SYSTANE) 0.4-0.3 % ophthalmic solution 1 drop as needed for Dry Eyes Yes Miguel Miller MD   albuterol sulfate (PROAIR RESPICLICK) 108 (90

## 2024-07-13 LAB
ALLERGEN LAMB/MUTTON, IGE, AGAL3: 0.3 KU/L
ALPHA-GAL IGE QN: 1.19 KU/L
BEEF IGE QN: 0.36 KU/L
IGE SERPL-ACNC: 145 IU/ML (ref 6–495)
Lab: ABNORMAL
PORK IGE QN: 0.28 KU/L

## 2024-07-15 DIAGNOSIS — F32.A DEPRESSION, UNSPECIFIED DEPRESSION TYPE: ICD-10-CM

## 2024-07-25 ENCOUNTER — TELEPHONE (OUTPATIENT)
Age: 74
End: 2024-07-25

## 2024-07-25 NOTE — TELEPHONE ENCOUNTER
F/U Regarding visit on:   7/10/2024       Caller states:  Concern:   Would like report from tick bloodwork (alpha gal) sent to specialist.  Send to dr anju cabrera at fax [phone]    Request:   Please fax

## 2024-07-26 ENCOUNTER — TRANSCRIBE ORDERS (OUTPATIENT)
Facility: HOSPITAL | Age: 74
End: 2024-07-26

## 2024-07-26 DIAGNOSIS — Z12.31 ENCOUNTER FOR SCREENING MAMMOGRAM FOR MALIGNANT NEOPLASM OF BREAST: Primary | ICD-10-CM

## 2024-08-05 ENCOUNTER — TRANSCRIBE ORDERS (OUTPATIENT)
Facility: HOSPITAL | Age: 74
End: 2024-08-05

## 2024-08-05 DIAGNOSIS — I25.84 CORONARY ATHEROSCLEROSIS DUE TO SEVERELY CALCIFIED CORONARY LESION: Primary | ICD-10-CM

## 2024-08-05 DIAGNOSIS — R93.3 ABNORMAL VIRTUAL COLONOSCOPE: Primary | ICD-10-CM

## 2024-08-16 ENCOUNTER — HOSPITAL ENCOUNTER (OUTPATIENT)
Facility: HOSPITAL | Age: 74
End: 2024-08-16
Attending: INTERNAL MEDICINE
Payer: MEDICARE

## 2024-08-16 DIAGNOSIS — R93.3 ABNORMAL VIRTUAL COLONOSCOPE: ICD-10-CM

## 2024-08-16 PROCEDURE — 74230 X-RAY XM SWLNG FUNCJ C+: CPT

## 2024-08-16 PROCEDURE — 92611 MOTION FLUOROSCOPY/SWALLOW: CPT

## 2024-08-16 NOTE — PLAN OF CARE
[facility]  SPEECH PATHOLOGY MODIFIED BARIUM SWALLOW STUDY  Patient: Vicenta Scott (73 y.o. female)  Date: 8/16/2024  Referring Provider:  Odell Botello MD    SUBJECTIVE:   Per chart review, patient with Esophagram completed 4/2024: \"The esophagus is normal in course and caliber. No gross mucosal abnormality. No hiatal hernia or gastroesophageal reflux. Trace aspiration.\" Patient reports feeling of globus sensation, pointing to sternal notch area during meals occasionally. She states it is usually with dinner. She also endorses frequent belching and ~40lb weight lose. Patient also reports Sjogren's Syndrome and xerostomia.     OBJECTIVE:   Past Medical History:   Past Medical History:   Diagnosis Date    Alpha galactosidase deficiency     Arthritis     Asthma August 16, 2023    Bacterial pneumonia 6/26/2018    Chronic bilateral low back pain without sciatica 11/20/2018    Chronic pain     GERD (gastroesophageal reflux disease)     Hypercholesterolemia     Long term (current) use of anticoagulants     patient denies    Primary osteoarthritis involving multiple joints 11/20/2018    Raynaud's syndrome     Sjogren's syndrome (HCC) 11/20/2018    extreme dry mouth and eyes, burning tongue    Weight loss, unintentional 11/20/2018    probabaly due to [place] tic bite per PCP     Past Surgical History:   Procedure Laterality Date    CHOLECYSTECTOMY  05/12/2021    COLONOSCOPY N/A 06/10/2022    COLONOSCOPY performed by Gigi Botello MD at [facility] ENDOSCOPY    ENDOSCOPY, COLON, DIAGNOSTIC      EYE SURGERY Left 09/05/2023    LEFT EYE PHACOEMULSIFICATION WITH INTRA OCULAR LENS IMPLANT FIRST EYE  (MAC WITH RETROBULBAR) performed by Anupama Jones MD at [facility] AMBULATORY OR    EYE SURGERY Right 09/19/2023    RIGHT 2ND EYE PHACOEMULSIFICATION WITH INTRAOCULAR LENS IMPLANT (MAC/RETROBULBAR) performed by Anupama Jones MD at [facility] AMBULATORY OR    MOHS SURGERY  12/27/2023    Radha

## 2024-08-21 ENCOUNTER — HOSPITAL ENCOUNTER (OUTPATIENT)
Facility: HOSPITAL | Age: 74
Discharge: HOME | End: 2024-08-24
Attending: INTERNAL MEDICINE
Payer: MEDICARE

## 2024-08-21 DIAGNOSIS — I25.84 CORONARY ATHEROSCLEROSIS DUE TO SEVERELY CALCIFIED CORONARY LESION: ICD-10-CM

## 2024-08-21 LAB — CREAT BLD-MCNC: 0.9 MG/DL (ref 0.6–1.3)

## 2024-08-21 PROCEDURE — 74174 CTA ABD&PLVS W/CONTRAST: CPT

## 2024-08-21 PROCEDURE — 6360000004 HC RX CONTRAST MEDICATION: Performed by: INTERNAL MEDICINE

## 2024-08-21 PROCEDURE — 82565 ASSAY OF CREATININE: CPT

## 2024-08-21 RX ADMIN — IOPAMIDOL 100 ML: 755 INJECTION, SOLUTION INTRAVENOUS at 09:37

## 2024-08-26 ENCOUNTER — HOSPITAL ENCOUNTER (OUTPATIENT)
Facility: HOSPITAL | Age: 74
Discharge: HOME | End: 2024-08-29
Attending: INTERNAL MEDICINE
Payer: MEDICARE

## 2024-08-26 VITALS — WEIGHT: 109 LBS | HEIGHT: 66 IN | BODY MASS INDEX: 17.52 KG/M2

## 2024-08-26 DIAGNOSIS — Z12.31 ENCOUNTER FOR SCREENING MAMMOGRAM FOR MALIGNANT NEOPLASM OF BREAST: ICD-10-CM

## 2024-08-26 PROCEDURE — 77063 BREAST TOMOSYNTHESIS BI: CPT

## 2024-08-30 NOTE — PROGRESS NOTES
HISTORY OF PRESENT ILLNESS  Vicenta Scott is a 73 y.o. female.  Diarrhea   Pertinent negatives include no chills, fever, headaches or vomiting.     Pt of Dr Rodriguez. She presents for acute care.    She reports diarrhea x Saturday,improved on Sunday but again worse since yesterday   She states she is having 5 bm per day- soft  watery, yellowish  Denies blood in stools, fever, chills  She is taking imodium   Discussed to take enough binding foods, fibre supplements, probiotics   She wonders if her liver function is good as her stool color keeps on changing, discussed will order LFT today     She reports hiccups more in the evening after dinner  She states she has hard time swallowing food  and feels the food gets stuck epigastric  Discussed she needs to repeat endoscopy last endoscopy was June 2022    She is on Prilosec for reflux      No sick contacts    Ordered labs      Hx of cholecystectomy     Has hx of RA- on immunosuppressants   Has hx of ABM  Colonoscopy 6/22- sigmoid diverticulosis    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)    Sepsis (HCC)    DDD (degenerative disc disease), cervical    Hypercholesterolemia    Other dysphagia    Primary osteoarthritis involving multiple joints    Chronic bilateral low back pain without sciatica    Weight loss, unintentional    Ulcer of mouth    Right lower quadrant abdominal pain    Major depressive disorder, recurrent, mild (HCC)    Major depressive disorder, recurrent, moderate (HCC)    Major depressive disorder, recurrent, unspecified (HCC)     Current Outpatient Medications   Medication Sig Dispense Refill    fenofibrate (TRIGLIDE) 160 MG tablet Take 1 tablet by mouth daily 90 tablet 1    sertraline (ZOLOFT) 50 MG tablet Take 1 tablet by mouth daily 90 tablet 1    levocetirizine (XYZAL) 5 MG  Med Rec - Admission

## 2024-10-11 RX ORDER — FENOFIBRATE 160 MG/1
160 TABLET ORAL DAILY
Qty: 90 TABLET | Refills: 1 | Status: SHIPPED | OUTPATIENT
Start: 2024-10-11

## 2024-10-11 NOTE — TELEPHONE ENCOUNTER
PCP: Shravan Rodriguez MD    Last appt:   7/10/2024    Future Appointments   Date Time Provider Department Center   1/10/2025 10:45 AM Shravan Rodriguez MD [facility] DEP       Requested Prescriptions     Pending Prescriptions Disp Refills    fenofibrate 160 MG tablet 90 tablet 1     Sig: Take 1 tablet by mouth daily

## 2024-10-14 RX ORDER — EZETIMIBE 10 MG/1
10 TABLET ORAL DAILY
Qty: 90 TABLET | Refills: 1 | Status: SHIPPED | OUTPATIENT
Start: 2024-10-14

## 2025-01-02 ENCOUNTER — TELEPHONE (OUTPATIENT)
Age: 75
End: 2025-01-02

## 2025-01-02 RX ORDER — METHYLPREDNISOLONE 4 MG/1
TABLET ORAL
Qty: 1 KIT | Refills: 0 | Status: SHIPPED | OUTPATIENT
Start: 2025-01-02 | End: 2025-01-08

## 2025-01-02 NOTE — TELEPHONE ENCOUNTER
Spoke with patient. C/o sciatica right hip/leg. Intermittent. States things this time pain is lingering.   Has not tried any anti-inflammatory.  No swelling.    Pt requesting prescription to hold over until visit.     Please advise.

## 2025-01-02 NOTE — TELEPHONE ENCOUNTER
Caller states:    Sciatica in right hip/leg  Asks for advice on relief      Date of last appointment:    7/10/2024     Please call patient back to advise.

## 2025-01-08 NOTE — PROGRESS NOTES
HISTORY OF PRESENT ILLNESS   Vicenta Scott   is a 74 y.o.  female.  fu RA, Sjogrens syndrome-Dr Arvizu on nambutone  for RA and rash( rash to plaquenil PTA) Was not on long term steroids, hx CAP hospitalized last year, insomnia HLD lung nodules (Dr Campbell)  asthma  MDD IBS.hx SVT depression/anxiety, allergy to alpha-gal  PULM Dr Campbell-asthma, lung nodules  CARD Dr Lange-hx SVT on BB  Rheum-Dr Desai-no fu scheduled. On nambutone qdfor arthritis pain  Avoid any mammallian meat   Weight loss-stable over past 1 year  Hs good appetite, intake unchanged  Not much exercise  Not going outside due to cold weather and raynauds-took amlodipine  Depression on zoloft  Saw Dr Botello-for IBS and diarrhea===fecal tests abnormal calpropectin 136  . Has small frequent BP 6-7 times per day. 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) 10/01/2019    Sjogren's syndrome (HCC) 11/20/2018    Hypercholesterolemia 11/20/2018    Primary osteoarthritis involving multiple joints 11/20/2018    Chronic bilateral low back pain without sciatica 11/20/2018    Weight loss, unintentional 11/20/2018    Bacterial pneumonia 06/26/2018    Major depressive disorder, recurrent, mild (HCC) 03/21/2023    Major depressive disorder, recurrent, moderate (HCC) 03/21/2023    Major depressive disorder, recurrent, unspecified (HCC) 03/21/2023    Raynaud's disease without gangrene 12/30/2019    DDD (degenerative disc disease), cervical 12/30/2019    Other dysphagia

## 2025-01-10 ENCOUNTER — OFFICE VISIT (OUTPATIENT)
Age: 75
End: 2025-01-10
Payer: MEDICARE

## 2025-01-10 VITALS
WEIGHT: 106.4 LBS | RESPIRATION RATE: 16 BRPM | HEIGHT: 66 IN | SYSTOLIC BLOOD PRESSURE: 120 MMHG | BODY MASS INDEX: 17.1 KG/M2 | DIASTOLIC BLOOD PRESSURE: 60 MMHG | TEMPERATURE: 97.2 F

## 2025-01-10 DIAGNOSIS — K21.9 GASTROESOPHAGEAL REFLUX DISEASE, UNSPECIFIED WHETHER ESOPHAGITIS PRESENT: ICD-10-CM

## 2025-01-10 DIAGNOSIS — M35.00 SJOGREN'S SYNDROME, WITH UNSPECIFIED ORGAN INVOLVEMENT (HCC): ICD-10-CM

## 2025-01-10 DIAGNOSIS — M06.9 RHEUMATOID ARTHRITIS, INVOLVING UNSPECIFIED SITE, UNSPECIFIED WHETHER RHEUMATOID FACTOR PRESENT (HCC): ICD-10-CM

## 2025-01-10 DIAGNOSIS — E78.5 HYPERLIPIDEMIA, UNSPECIFIED HYPERLIPIDEMIA TYPE: Primary | ICD-10-CM

## 2025-01-10 DIAGNOSIS — R63.4 WEIGHT LOSS: ICD-10-CM

## 2025-01-10 DIAGNOSIS — F33.1 MAJOR DEPRESSIVE DISORDER, RECURRENT, MODERATE (HCC): ICD-10-CM

## 2025-01-10 DIAGNOSIS — F32.A DEPRESSION, UNSPECIFIED DEPRESSION TYPE: ICD-10-CM

## 2025-01-10 DIAGNOSIS — I47.10 SVT (SUPRAVENTRICULAR TACHYCARDIA) (HCC): ICD-10-CM

## 2025-01-10 PROCEDURE — 99214 OFFICE O/P EST MOD 30 MIN: CPT | Performed by: INTERNAL MEDICINE

## 2025-01-10 RX ORDER — AMLODIPINE BESYLATE 2.5 MG/1
2.5 TABLET ORAL DAILY
Qty: 90 TABLET | Refills: 3 | Status: SHIPPED | OUTPATIENT
Start: 2025-01-10

## 2025-01-10 RX ORDER — ERYTHROMYCIN 5 MG/G
OINTMENT OPHTHALMIC
COMMUNITY

## 2025-01-10 SDOH — ECONOMIC STABILITY: FOOD INSECURITY: WITHIN THE PAST 12 MONTHS, YOU WORRIED THAT YOUR FOOD WOULD RUN OUT BEFORE YOU GOT MONEY TO BUY MORE.: NEVER TRUE

## 2025-01-10 SDOH — ECONOMIC STABILITY: FOOD INSECURITY: WITHIN THE PAST 12 MONTHS, THE FOOD YOU BOUGHT JUST DIDN'T LAST AND YOU DIDN'T HAVE MONEY TO GET MORE.: NEVER TRUE

## 2025-01-10 ASSESSMENT — PATIENT HEALTH QUESTIONNAIRE - PHQ9
2. FEELING DOWN, DEPRESSED OR HOPELESS: NOT AT ALL
7. TROUBLE CONCENTRATING ON THINGS, SUCH AS READING THE NEWSPAPER OR WATCHING TELEVISION: NEARLY EVERY DAY
9. THOUGHTS THAT YOU WOULD BE BETTER OFF DEAD, OR OF HURTING YOURSELF: NOT AT ALL
5. POOR APPETITE OR OVEREATING: NOT AT ALL
SUM OF ALL RESPONSES TO PHQ QUESTIONS 1-9: 9
6. FEELING BAD ABOUT YOURSELF - OR THAT YOU ARE A FAILURE OR HAVE LET YOURSELF OR YOUR FAMILY DOWN: NEARLY EVERY DAY
SUM OF ALL RESPONSES TO PHQ9 QUESTIONS 1 & 2: 0
1. LITTLE INTEREST OR PLEASURE IN DOING THINGS: NOT AT ALL
8. MOVING OR SPEAKING SO SLOWLY THAT OTHER PEOPLE COULD HAVE NOTICED. OR THE OPPOSITE, BEING SO FIGETY OR RESTLESS THAT YOU HAVE BEEN MOVING AROUND A LOT MORE THAN USUAL: NOT AT ALL
10. IF YOU CHECKED OFF ANY PROBLEMS, HOW DIFFICULT HAVE THESE PROBLEMS MADE IT FOR YOU TO DO YOUR WORK, TAKE CARE OF THINGS AT HOME, OR GET ALONG WITH OTHER PEOPLE: SOMEWHAT DIFFICULT
3. TROUBLE FALLING OR STAYING ASLEEP: NOT AT ALL
SUM OF ALL RESPONSES TO PHQ QUESTIONS 1-9: 9
SUM OF ALL RESPONSES TO PHQ QUESTIONS 1-9: 9
4. FEELING TIRED OR HAVING LITTLE ENERGY: NEARLY EVERY DAY
SUM OF ALL RESPONSES TO PHQ QUESTIONS 1-9: 9

## 2025-01-11 LAB
ALBUMIN SERPL-MCNC: 3.9 G/DL (ref 3.5–5)
ALBUMIN/GLOB SERPL: 1.3 (ref 1.1–2.2)
ALP SERPL-CCNC: 65 U/L (ref 45–117)
ALT SERPL-CCNC: 31 U/L (ref 12–78)
ANION GAP SERPL CALC-SCNC: 6 MMOL/L (ref 2–12)
AST SERPL-CCNC: 63 U/L (ref 15–37)
BILIRUB SERPL-MCNC: 0.6 MG/DL (ref 0.2–1)
BUN SERPL-MCNC: 25 MG/DL (ref 6–20)
BUN/CREAT SERPL: 34 (ref 12–20)
CALCIUM SERPL-MCNC: 10.1 MG/DL (ref 8.5–10.1)
CHLORIDE SERPL-SCNC: 105 MMOL/L (ref 97–108)
CO2 SERPL-SCNC: 30 MMOL/L (ref 21–32)
CREAT SERPL-MCNC: 0.73 MG/DL (ref 0.55–1.02)
ERYTHROCYTE [DISTWIDTH] IN BLOOD BY AUTOMATED COUNT: 15.1 % (ref 11.5–14.5)
GLOBULIN SER CALC-MCNC: 2.9 G/DL (ref 2–4)
GLUCOSE SERPL-MCNC: 76 MG/DL (ref 65–100)
HCT VFR BLD AUTO: 40.2 % (ref 35–47)
HGB BLD-MCNC: 12.8 G/DL (ref 11.5–16)
MCH RBC QN AUTO: 29.8 PG (ref 26–34)
MCHC RBC AUTO-ENTMCNC: 31.8 G/DL (ref 30–36.5)
MCV RBC AUTO: 93.5 FL (ref 80–99)
NRBC # BLD: 0 K/UL (ref 0–0.01)
NRBC BLD-RTO: 0 PER 100 WBC
PLATELET # BLD AUTO: 220 K/UL (ref 150–400)
PMV BLD AUTO: 12.3 FL (ref 8.9–12.9)
POTASSIUM SERPL-SCNC: 4.4 MMOL/L (ref 3.5–5.1)
PROT SERPL-MCNC: 6.8 G/DL (ref 6.4–8.2)
RBC # BLD AUTO: 4.3 M/UL (ref 3.8–5.2)
SODIUM SERPL-SCNC: 141 MMOL/L (ref 136–145)
TSH SERPL DL<=0.05 MIU/L-ACNC: 2.09 UIU/ML (ref 0.36–3.74)
WBC # BLD AUTO: 7.6 K/UL (ref 3.6–11)

## 2025-01-22 ENCOUNTER — HOSPITAL ENCOUNTER (OUTPATIENT)
Facility: HOSPITAL | Age: 75
Setting detail: OUTPATIENT SURGERY
Discharge: HOME | End: 2025-01-22
Attending: INTERNAL MEDICINE | Admitting: INTERNAL MEDICINE
Payer: MEDICARE

## 2025-01-22 ENCOUNTER — ANESTHESIA (OUTPATIENT)
Facility: HOSPITAL | Age: 75
End: 2025-01-22
Payer: MEDICARE

## 2025-01-22 ENCOUNTER — ANESTHESIA EVENT (OUTPATIENT)
Facility: HOSPITAL | Age: 75
End: 2025-01-22
Payer: MEDICARE

## 2025-01-22 VITALS
DIASTOLIC BLOOD PRESSURE: 54 MMHG | RESPIRATION RATE: 15 BRPM | SYSTOLIC BLOOD PRESSURE: 113 MMHG | WEIGHT: 102.4 LBS | HEART RATE: 60 BPM | OXYGEN SATURATION: 92 % | HEIGHT: 66 IN | TEMPERATURE: 97.4 F | BODY MASS INDEX: 16.46 KG/M2

## 2025-01-22 PROCEDURE — 3700000000 HC ANESTHESIA ATTENDED CARE: Performed by: INTERNAL MEDICINE

## 2025-01-22 PROCEDURE — 7100000011 HC PHASE II RECOVERY - ADDTL 15 MIN: Performed by: INTERNAL MEDICINE

## 2025-01-22 PROCEDURE — 2580000003 HC RX 258: Performed by: INTERNAL MEDICINE

## 2025-01-22 PROCEDURE — 2709999900 HC NON-CHARGEABLE SUPPLY: Performed by: INTERNAL MEDICINE

## 2025-01-22 PROCEDURE — 3600007502: Performed by: INTERNAL MEDICINE

## 2025-01-22 PROCEDURE — 88305 TISSUE EXAM BY PATHOLOGIST: CPT

## 2025-01-22 PROCEDURE — 3600007512: Performed by: INTERNAL MEDICINE

## 2025-01-22 PROCEDURE — 6360000002 HC RX W HCPCS: Performed by: NURSE ANESTHETIST, CERTIFIED REGISTERED

## 2025-01-22 PROCEDURE — 3700000001 HC ADD 15 MINUTES (ANESTHESIA): Performed by: INTERNAL MEDICINE

## 2025-01-22 PROCEDURE — 7100000010 HC PHASE II RECOVERY - FIRST 15 MIN: Performed by: INTERNAL MEDICINE

## 2025-01-22 RX ORDER — SODIUM CHLORIDE 0.9 % (FLUSH) 0.9 %
5-40 SYRINGE (ML) INJECTION PRN
Status: DISCONTINUED | OUTPATIENT
Start: 2025-01-22 | End: 2025-01-22 | Stop reason: HOSPADM

## 2025-01-22 RX ORDER — PHENYLEPHRINE HCL IN 0.9% NACL 0.4MG/10ML
SYRINGE (ML) INTRAVENOUS
Status: DISCONTINUED | OUTPATIENT
Start: 2025-01-22 | End: 2025-01-22 | Stop reason: SDUPTHER

## 2025-01-22 RX ORDER — SODIUM CHLORIDE 0.9 % (FLUSH) 0.9 %
5-40 SYRINGE (ML) INJECTION EVERY 12 HOURS SCHEDULED
Status: DISCONTINUED | OUTPATIENT
Start: 2025-01-22 | End: 2025-01-22 | Stop reason: HOSPADM

## 2025-01-22 RX ORDER — SODIUM CHLORIDE 9 MG/ML
INJECTION, SOLUTION INTRAVENOUS PRN
Status: DISCONTINUED | OUTPATIENT
Start: 2025-01-22 | End: 2025-01-22 | Stop reason: HOSPADM

## 2025-01-22 RX ADMIN — PROPOFOL 40 MG: 10 INJECTION, EMULSION INTRAVENOUS at 09:18

## 2025-01-22 RX ADMIN — LIDOCAINE HYDROCHLORIDE 60 MG: 20 INJECTION, SOLUTION EPIDURAL; INFILTRATION; INTRACAUDAL; PERINEURAL at 09:12

## 2025-01-22 RX ADMIN — PROPOFOL 80 MG: 10 INJECTION, EMULSION INTRAVENOUS at 09:12

## 2025-01-22 RX ADMIN — SODIUM CHLORIDE: 9 INJECTION, SOLUTION INTRAVENOUS at 09:07

## 2025-01-22 RX ADMIN — PROPOFOL 20 MG: 10 INJECTION, EMULSION INTRAVENOUS at 09:23

## 2025-01-22 RX ADMIN — Medication 120 MCG: at 09:19

## 2025-01-22 ASSESSMENT — PAIN - FUNCTIONAL ASSESSMENT: PAIN_FUNCTIONAL_ASSESSMENT: NONE - DENIES PAIN

## 2025-01-22 NOTE — OP NOTE
Colonoscopy Procedure Note    Vicenta Scott  1950  [id number]    Indications:  Please see below.    Pre-operative Diagnosis: High fecal calprotectin [R19.5]  Colitis [K52.9]  Irritable bowel syndrome, unspecified type [K58.9]  Abnormal weight loss [R63.4]  Abnormal laboratory test [R89.9]    Post-operative Diagnosis:   Diverticulosis,   AVM colon,  Internal hemorrhoids      : Gigi Botello MD    Referring Provider: Shravan Rodriguez MD    Sedation:  MAC anesthesia Propofol        Procedure Details:    After detailed informed consent was obtained with all risks and benefits of procedure explained and preoperative exam completed, the patient was taken to the endoscopy suite and placed in the left lateral decubitus position.  Upon sequential sedation as per above, a digital rectal exam was performed  and was normal.  The Olympus videocolonoscope  was inserted in the rectum and carefully advanced to the cecum, which was identified by the ileocecal valve and appendiceal orifice.   The colonoscope was slowly withdrawn with careful evaluation between folds.   Retroflexion in the rectum was performed.      The quality of preparation was good    [place] Bowel Preparation Score: 2.3.3      Findings:   The Olympus video high-definition colonoscope was advanced to the cecum, identified by its typical land marks, with ease. TI could not be entered.  3 small, non-bleeding cecal  AVMs are noted.  The mucosa of the colon is normal appearing to the cecum with no obvious mucosal pathology noted on this colonoscopy.  Multiple biopsies were obtained throughout colon to rule out microscopic colitis.  There is moderately severe sigmoid diverticulosis noted.  Small internal hemorrhoids with hypertrophic anal papillae noted      Therapies:  biopsy of colon : random colon     Specimen/s:      Specimens were collected as described above and sent to pathology.     Complications: None were encountered during the procedure.

## 2025-01-22 NOTE — PROGRESS NOTES
ARRIVAL INFORMATION:  Verified patient name and date of birth, scheduled procedure, and informed consent.     : Frank frausto contact number: [phone]  Physician and staff can share information with the .     Receive texts: yes    Belongings with patient include:  Clothing,None    GI FOCUSED ASSESSMENT:  Neuro: Awake, alert, oriented x4  Respiratory: even and unlabored   GI: soft and non-distended  EKG Rhythm: normal sinus rhythm    Education:Reviewed general discharge instructions and  information.

## 2025-01-22 NOTE — ANESTHESIA PRE PROCEDURE
Department of Anesthesiology  Preprocedure Note       Name:  Vicenta Scott   Age:  74 y.o.  :  1950                                          MRN:  [id number]         Date:  2025      Surgeon: Surgeon(s):  Odell Botello MD    Procedure: Procedure(s):  COLONOSCOPY    Medications prior to admission:   Prior to Admission medications    Medication Sig Start Date End Date Taking? Authorizing Provider   amLODIPine (NORVASC) 2.5 MG tablet Take 1 tablet by mouth daily 1/10/25  Yes Shravan Rodriguez MD   sertraline (ZOLOFT) 50 MG tablet Take 1 tablet by mouth daily 1/10/25  Yes Shravan Rodriguez MD   ezetimibe (ZETIA) 10 MG tablet TAKE 1 TABLET BY MOUTH DAILY 10/14/24  Yes Shravan Rodriguez MD   fenofibrate 160 MG tablet Take 1 tablet by mouth daily 10/11/24  Yes Shravan Rodriguez MD   cromolyn (GASTROCROM) 100 MG/5ML solution TAKE 10 ML BY MOUTH FOUR TIMES DAILY AS NEEDED FOR GASTROINTESNIAL FOOD ALLERGY SYMPTOMS   Yes Miguel Miller MD   omeprazole (PRILOSEC) 20 MG delayed release capsule TAKE 1 CAPSULE BY MOUTH DAILY 24  Yes Shravan Rodriguez MD   levocetirizine (XYZAL) 5 MG tablet Take 1 tablet by mouth nightly at bedtime. 23  Yes Miguel Miller MD   metoprolol succinate (TOPROL XL) 25 MG extended release tablet Take 1 tablet by mouth daily 23  Yes Miguel Miller MD   polyethyl glycol-propyl glycol 0.4-0.3 % (SYSTANE) 0.4-0.3 % ophthalmic solution 1 drop as needed for Dry Eyes   Yes Miguel Miller MD   albuterol sulfate (PROAIR RESPICLICK) 108 (90 Base) MCG/ACT aerosol powder inhalation Inhale 2 puffs into the lungs every 4 hours as needed for Wheezing or Shortness of Breath   Yes Miguel Miller MD   Multiple Vitamins-Minerals (PRESERVISION AREDS 2+MULTI VIT PO) Take 1 tablet by mouth daily   Yes Miguel Miller MD   CALCIUM CARBONATE-VIT D-MIN PO Take 1 tablet by mouth daily   Yes Miguel Miller MD   acetaminophen (TYLENOL) 500 MG tablet Take 2

## 2025-01-22 NOTE — DISCHARGE INSTRUCTIONS
Pool Office: [phone]    Vicenta Scott  [id number]  1950    EGD/COLONOSCOPY DISCHARGE INSTRUCTIONS  Discomfort:  Sore throat- throat lozenges or warm salt water gargle  redness at IV site- apply warm compress to area; if redness or soreness persist- contact your physician  Gaseous discomfort- walking, belching will help relieve any discomfort  You may not operate a vehicle for 12 hours  You may not engage in an occupation involving machinery or appliances for rest of today.  You may not drink alcoholic beverages for at least 12 hours  Avoid making any critical decisions for at least 24 hour  DIET  You may resume your regular diet - however -  remember your colon is empty and a heavy meal will produce gas.   Avoid these foods:  fried / greasy foods, excessive carbonated drinks or too much caffeine  MEDICATIONS   Regarding Aspirin or Nonsteroidal medications specifically, please see below.  ACTIVITY  You may resume your normal daily activities.   Spend the remainder of the day resting -  avoid any strenuous activity.    CALL M.D.  ANY SIGN OF   Increasing pain, nausea, vomiting  Abdominal distension (swelling)  New increased bleeding (oral or rectal)  Fever (chills)  Pain in chest area  Bloody discharge from nose or mouth  Shortness of breath    You may not take any Advil, Aspirin, Ibuprofen, Motrin or Aleve(NSAIDs) for 7 days, ONLY  Tylenol as needed for pain.    Findings:  The Olympus video high-definition colonoscope was advanced to the cecum, identified by its typical land marks, with ease.  3 small, non-bleeding cecal  AVMs are noted.  The mucosa of the colon is normal appearing to the cecum with no obvious mucosal pathology noted on this colonoscopy.  Multiple biopsies were obtained throughout colon to rule out microscopic colitis.  There is moderately severe sigmoid diverticulosis noted.  Small internal hemorrhoids with hypertrophic anal papillae noted      Follow-up Instructions:   Call

## 2025-01-22 NOTE — ANESTHESIA POSTPROCEDURE EVALUATION
Department of Anesthesiology  Postprocedure Note    Patient: Vicenta Scott  MRN: [id number]  YOB: 1950  Date of evaluation: 1/22/2025    Procedure Summary       Date: 01/22/25 Room / Location: [facility]    Anesthesia Start: 0907 Anesthesia Stop: 0931    Procedure: COLONOSCOPY Diagnosis:       High fecal calprotectin      Colitis      Irritable bowel syndrome, unspecified type      Abnormal weight loss      Abnormal laboratory test      (High fecal calprotectin [R19.5])      (Colitis [K52.9])      (Irritable bowel syndrome, unspecified type [K58.9])      (Abnormal weight loss [R63.4])      (Abnormal laboratory test [R89.9])    Surgeons: Odell Botello MD Responsible Provider: Michael Johnson MD    Anesthesia Type: MAC, TIVA ASA Status: 2            Anesthesia Type: MAC, TIVA    Barbara Phase I: Barbara Score: 10    Barbara Phase II: Barbara Score: 9    Anesthesia Post Evaluation    Patient location during evaluation: PACU  Patient participation: complete - patient participated  Level of consciousness: sleepy but conscious and responsive to verbal stimuli  Airway patency: patent  Nausea & Vomiting: no vomiting and no nausea  Cardiovascular status: blood pressure returned to baseline and hemodynamically stable  Respiratory status: acceptable  Hydration status: stable    No notable events documented.

## 2025-01-22 NOTE — H&P
Pre-endoscopy H and P    The patient was seen and examined in the room/pre-op holding area.  The airway was assessed and documented.  The problem list, past medical history, and medications were reviewed.     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)    Sepsis (HCC)    DDD (degenerative disc disease), cervical    Hypercholesterolemia    Other dysphagia    Primary osteoarthritis involving multiple joints    Chronic bilateral low back pain without sciatica    Weight loss, unintentional    Ulcer of mouth    Right lower quadrant abdominal pain    Major depressive disorder, recurrent, mild (HCC)    Major depressive disorder, recurrent, moderate (HCC)    Major depressive disorder, recurrent, unspecified (HCC)     Social History     Socioeconomic History    Marital status:      Spouse name: Not on file    Number of children: Not on file    Years of education: Not on file    Highest education level: Not on file   Occupational History    Not on file   Tobacco Use    Smoking status: Never     Passive exposure: Never    Smokeless tobacco: Never   Vaping Use    Vaping status: Never Used   Substance and Sexual Activity    Alcohol use: Not Currently    Drug use: Never    Sexual activity: Not Currently   Other Topics Concern    Not on file   Social History Narrative    Not on file     Social Determinants of Health     Financial Resource Strain: Low Risk  (7/10/2024)    Overall Financial Resource Strain (CARDIA)     Difficulty of Paying Living Expenses: Not hard at all   Food Insecurity: No Food Insecurity (1/10/2025)    Hunger Vital Sign     Worried About Running Out of Food in the Last Year: Never true     Ran Out of Food in the Last Year: Never true   Transportation Needs: No Transportation Needs (1/10/2025)    PRAPARE - Transportation

## 2025-01-22 NOTE — PROGRESS NOTES
Ambulatory Visit  Name: Emily Berrios      : 1946      MRN: [id number]  Encounter Provider: Ramsey Gtz MD  Encounter Date: 2024   Encounter department: [facility] CARE [facility]    Assessment & Plan   1. Pre-op examination  Comments:  Pt is Clinically stable at this time for bladder Surgery , if cardiology Cleared her...  Orders:  -     Protime-INR; Future  -     PTH, intact; Future  -     UA (URINE) with reflex to Scope; Future  -     XR chest pa & lateral; Future; Expected date: 2024  2. Encounter for screening mammogram for breast cancer  -     Mammo screening bilateral w 3d & cad; Future; Expected date: 2024  3. Essential hypertension  -     amLODIPine (NORVASC) 5 mg tablet; Take 1 tablet (5 mg total) by mouth 2 (two) times a day  4. Syncope, unspecified syncope type  Comments:  improved nicely  continue same  RTC in 3 mos w Blood work  Orders:  -     bisoprolol (ZEBETA) 10 MG tablet; Take 1 tablet (10 mg total) by mouth daily  5. Chronic coronary artery disease  6. Other hyperlipidemia  7. Anxiety  -     ALPRAZolam (XANAX) 0.5 mg tablet; Take 1 tablet (0.5 mg total) by mouth daily at bedtime as needed for anxiety  8. Acute diffuse otitis externa of right ear  -     ofloxacin (FLOXIN) 0.3 % otic solution; Administer 5 drops to the right ear 2 (two) times a day  9. Gastroesophageal reflux disease with esophagitis without hemorrhage  -     Vonoprazan Fumarate (Voquezna) 20 MG TABS; Take 20 mg by mouth in the morning  RTC in 3 mos w Blood  work       History of Present Illness     78 Y O lady is here for Regular check up, and Pre surgery Clearance, she feels OK, recent blood work and med list reviewed,...        Review of Systems   Constitutional:  Negative for chills, fatigue and fever.   HENT:  Negative for congestion, facial swelling, sore throat, trouble swallowing and voice change.    Eyes:  Negative for pain, discharge and visual disturbance.  Endoscope was pre-cleaned at bedside immediately following procedure by CAESAR Devlin.     "  Respiratory:  Negative for cough, shortness of breath and wheezing.    Cardiovascular:  Negative for chest pain, palpitations and leg swelling.   Gastrointestinal:  Negative for abdominal pain, blood in stool, constipation, diarrhea and nausea.   Endocrine: Negative for polydipsia, polyphagia and polyuria.   Genitourinary:  Negative for difficulty urinating, hematuria and urgency.   Musculoskeletal:  Negative for arthralgias and myalgias.   Skin:  Negative for rash.   Neurological:  Negative for dizziness, tremors, weakness and headaches.   Hematological:  Negative for adenopathy. Does not bruise/bleed easily.   Psychiatric/Behavioral:  Negative for dysphoric mood, sleep disturbance and suicidal ideas.        Objective     /68 (BP Location: Left arm, Patient Position: Sitting, Cuff Size: Standard)   Pulse 68   Temp 97.9 °F (36.6 °C) (Tympanic)   Resp 14   Ht 5' 1\" (1.549 m)   Wt 60.8 kg (134 lb)   LMP  (LMP Unknown)   SpO2 97%   BMI 25.32 kg/m²     Physical Exam  Constitutional:       General: She is not in acute distress.     Appearance: She is well-developed. She is not diaphoretic.   HENT:      Head: Normocephalic.      Right Ear: External ear normal.      Left Ear: External ear normal.      Nose: Nose normal.   Eyes:      General:         Right eye: No discharge.         Left eye: No discharge.      Extraocular Movements: EOM normal.      Conjunctiva/sclera: Conjunctivae normal.      Pupils: Pupils are equal, round, and reactive to light.   Neck:      Thyroid: No thyromegaly.      Trachea: No tracheal deviation.   Cardiovascular:      Rate and Rhythm: Normal rate and regular rhythm.      Heart sounds: Normal heart sounds. No murmur heard.     No friction rub.   Pulmonary:      Effort: Pulmonary effort is normal. No respiratory distress.      Breath sounds: Normal breath sounds. No stridor. No wheezing or rales.   Abdominal:      General: Bowel sounds are normal. There is no distension.      " Palpations: Abdomen is soft.      Tenderness: There is no abdominal tenderness. There is no guarding.   Musculoskeletal:         General: No tenderness, deformity or edema. Normal range of motion.      Cervical back: Normal range of motion and neck supple.   Lymphadenopathy:      Cervical: No cervical adenopathy.   Skin:     General: Skin is warm.      Coloration: Skin is not pale.      Findings: No erythema or rash.   Neurological:      Mental Status: She is alert and oriented to person, place, and time.      Cranial Nerves: No cranial nerve deficit.      Coordination: Coordination normal.      Comments: Pt uses a Walker to support gait   Psychiatric:         Mood and Affect: Mood and affect normal.         Behavior: Behavior normal.       Administrative Statements

## 2025-01-29 ENCOUNTER — NURSE ONLY (OUTPATIENT)
Age: 75
End: 2025-01-29

## 2025-01-29 VITALS
BODY MASS INDEX: 16.39 KG/M2 | RESPIRATION RATE: 16 BRPM | SYSTOLIC BLOOD PRESSURE: 123 MMHG | TEMPERATURE: 96.3 F | WEIGHT: 102 LBS | DIASTOLIC BLOOD PRESSURE: 77 MMHG | HEIGHT: 66 IN | HEART RATE: 60 BPM

## 2025-01-29 DIAGNOSIS — I73.00 RAYNAUD'S DISEASE WITHOUT GANGRENE: Primary | ICD-10-CM

## 2025-01-29 DIAGNOSIS — I95.9 HYPOTENSION, UNSPECIFIED HYPOTENSION TYPE: ICD-10-CM

## 2025-01-29 SDOH — ECONOMIC STABILITY: FOOD INSECURITY: WITHIN THE PAST 12 MONTHS, THE FOOD YOU BOUGHT JUST DIDN'T LAST AND YOU DIDN'T HAVE MONEY TO GET MORE.: NEVER TRUE

## 2025-01-29 SDOH — ECONOMIC STABILITY: FOOD INSECURITY: WITHIN THE PAST 12 MONTHS, YOU WORRIED THAT YOUR FOOD WOULD RUN OUT BEFORE YOU GOT MONEY TO BUY MORE.: NEVER TRUE

## 2025-01-29 ASSESSMENT — PATIENT HEALTH QUESTIONNAIRE - PHQ9
SUM OF ALL RESPONSES TO PHQ QUESTIONS 1-9: 0
SUM OF ALL RESPONSES TO PHQ9 QUESTIONS 1 & 2: 0
1. LITTLE INTEREST OR PLEASURE IN DOING THINGS: NOT AT ALL
SUM OF ALL RESPONSES TO PHQ QUESTIONS 1-9: 0
2. FEELING DOWN, DEPRESSED OR HOPELESS: NOT AT ALL

## 2025-01-29 NOTE — PROGRESS NOTES
Patient presents in clinic for BP check per Dr. Rodriguez.  BP taken--see VS.  Ht 1.676 m (5' 6\")   Wt 46.3 kg (102 lb)   BMI 16.46 kg/m²     Patient informed Shravan Rodriguez MDwill be notified.    Patient informed if Shravan Rodriguez MDmakes any adjustments, pt will be contacted.     Patient verbalized understanding of information discussed w/ no further questions at this time.    Diane Payton LPN

## 2025-02-28 RX ORDER — OMEPRAZOLE 20 MG/1
CAPSULE, DELAYED RELEASE ORAL
Qty: 90 CAPSULE | Refills: 3 | Status: SHIPPED | OUTPATIENT
Start: 2025-02-28

## 2025-04-10 ENCOUNTER — TRANSCRIBE ORDERS (OUTPATIENT)
Facility: HOSPITAL | Age: 75
End: 2025-04-10

## 2025-04-10 ENCOUNTER — HOSPITAL ENCOUNTER (OUTPATIENT)
Facility: HOSPITAL | Age: 75
Discharge: HOME | End: 2025-04-13
Payer: MEDICARE

## 2025-04-10 DIAGNOSIS — R19.8 CHANGE IN BOWEL MOVEMENT: ICD-10-CM

## 2025-04-10 DIAGNOSIS — R63.4 ABNORMAL WEIGHT LOSS: ICD-10-CM

## 2025-04-10 DIAGNOSIS — R15.0 INCOMPLETE DEFECATION: ICD-10-CM

## 2025-04-10 DIAGNOSIS — R63.4 ABNORMAL WEIGHT LOSS: Primary | ICD-10-CM

## 2025-04-10 PROCEDURE — 74018 RADEX ABDOMEN 1 VIEW: CPT

## 2025-04-14 RX ORDER — FENOFIBRATE 160 MG/1
160 TABLET ORAL DAILY
Qty: 90 TABLET | Refills: 3 | Status: SHIPPED | OUTPATIENT
Start: 2025-04-14

## 2025-04-22 ENCOUNTER — TELEPHONE (OUTPATIENT)
Age: 75
End: 2025-04-22

## 2025-04-22 ENCOUNTER — OFFICE VISIT (OUTPATIENT)
Age: 75
End: 2025-04-22
Payer: MEDICARE

## 2025-04-22 VITALS
WEIGHT: 108.6 LBS | RESPIRATION RATE: 16 BRPM | SYSTOLIC BLOOD PRESSURE: 110 MMHG | HEIGHT: 66 IN | TEMPERATURE: 97.8 F | DIASTOLIC BLOOD PRESSURE: 60 MMHG | BODY MASS INDEX: 17.45 KG/M2

## 2025-04-22 DIAGNOSIS — I99.8 VASCULAR CALCIFICATION: ICD-10-CM

## 2025-04-22 DIAGNOSIS — E78.5 HYPERLIPIDEMIA, UNSPECIFIED HYPERLIPIDEMIA TYPE: Primary | ICD-10-CM

## 2025-04-22 DIAGNOSIS — R60.0 BILATERAL LEG EDEMA: ICD-10-CM

## 2025-04-22 PROCEDURE — 1123F ACP DISCUSS/DSCN MKR DOCD: CPT | Performed by: INTERNAL MEDICINE

## 2025-04-22 PROCEDURE — G8399 PT W/DXA RESULTS DOCUMENT: HCPCS | Performed by: INTERNAL MEDICINE

## 2025-04-22 PROCEDURE — G8427 DOCREV CUR MEDS BY ELIG CLIN: HCPCS | Performed by: INTERNAL MEDICINE

## 2025-04-22 PROCEDURE — 1159F MED LIST DOCD IN RCRD: CPT | Performed by: INTERNAL MEDICINE

## 2025-04-22 PROCEDURE — 99214 OFFICE O/P EST MOD 30 MIN: CPT | Performed by: INTERNAL MEDICINE

## 2025-04-22 PROCEDURE — 3017F COLORECTAL CA SCREEN DOC REV: CPT | Performed by: INTERNAL MEDICINE

## 2025-04-22 PROCEDURE — G8419 CALC BMI OUT NRM PARAM NOF/U: HCPCS | Performed by: INTERNAL MEDICINE

## 2025-04-22 PROCEDURE — 1090F PRES/ABSN URINE INCON ASSESS: CPT | Performed by: INTERNAL MEDICINE

## 2025-04-22 PROCEDURE — 1036F TOBACCO NON-USER: CPT | Performed by: INTERNAL MEDICINE

## 2025-04-22 RX ORDER — ALBUTEROL SULFATE 90 UG/1
2 INHALANT RESPIRATORY (INHALATION) EVERY 4 HOURS PRN
COMMUNITY
Start: 2025-03-25

## 2025-04-22 RX ORDER — IPRATROPIUM BROMIDE 21 UG/1
SPRAY, METERED NASAL
COMMUNITY
Start: 2025-03-25

## 2025-04-22 RX ORDER — NIFEDIPINE 30 MG
30 TABLET, EXTENDED RELEASE ORAL DAILY
COMMUNITY

## 2025-04-22 NOTE — PROGRESS NOTES
HISTORY OF PRESENT ILLNESS   Vicenta Scott   is a 74 y.o.  female.  Hx RA, Sjogrens syndrome-Dr Arvizu on nambutone  for RA and rash( rash to plaquenil PTA) Was not on long term steroids, hx CAP hospitalized last year, insomnia HLD lung nodules (Dr Campbell)  asthma  MDD IBS.hx SVT depression/anxiety, allergy to alpha-gal    C/o leg edema x 3 weeks from mid calf to feet m/l  Worse at end of day. Feet tend turn red late evenings  Weight up a few lbs    Amlodipine was changed to nifedipine last month by Rheum since amlodipine was not helping her Raynauds symptoms    She is concerned about vascular calcification on KUB recently from GI MD  Also had vascular calcification on prior CT A/P  Having some intermittent diarrhea but not pain after eating  Mesalamine stopped by GI MD    Last OV  PULM Dr Campbell-asthma, lung nodules  CARD Dr Lange-hx SVT on BB  Rheum-Dr Desai-no fu scheduled. On nambutone qdfor arthritis pain  Avoid any mammallian meat   Weight loss-stable over past 1 year  Hs good appetite, intake unchanged  Not much exercise  Not going outside due to cold weather and raynauds-took amlodipine  Depression on zoloft  Saw Dr Botello-for IBS and diarrhea===fecal tests abnormal calpropectin 136  . Has small frequent BP 6-7 times per day. Will get repeat colonoscopy 1/22  MBS-slight penetreation -went to speech tx  Some mild dysphagia symptoms  Patient Active Problem List    Diagnosis Date Noted    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) 10/01/2019    Sjogren's syndrome 11/20/2018    Hypercholesterolemia 11/20/2018    Primary osteoarthritis involving multiple joints 11/20/2018    Chronic bilateral low back pain without sciatica 11/20/2018    Weight loss, unintentional 11/20/2018    Bacterial pneumonia 06/26/2018    Major depressive disorder, recurrent, mild 03/21/2023    Major depressive disorder, recurrent, moderate (HCC) 03/21/2023    Major depressive disorder, recurrent, unspecified 03/21/2023

## 2025-04-22 NOTE — TELEPHONE ENCOUNTER
Pt states would like a call from clinical staff, states has swelling in ankles and feet for a couple of weeks.     Pt states not sure if swelling is being caused by a medication that pt is taking.

## 2025-04-22 NOTE — TELEPHONE ENCOUNTER
Spoke with patient.   C/o ankle and feet swelling. States feet are red with some spotting mimicking a rash.  Onset couple of weeks ago with no relief.   Elevating feet. No compression socks  Not on fluid  medication.     Rheumatology prescribed nifedipine 30mg daily at last visit on March 11, 2025. Pt did not notice swelling until after started new medication. Rheumatology discontinued amlodipine     Denies sob/chest pain. Denies ankle/feet pain. Slight discomfort. Denies hot to touch.   Does not check BP at home  Pt staying hydrated.    Please advise.

## 2025-05-01 ENCOUNTER — TRANSCRIBE ORDERS (OUTPATIENT)
Facility: HOSPITAL | Age: 75
End: 2025-05-01

## 2025-05-01 DIAGNOSIS — Z00.00 EXAMINATION: Primary | ICD-10-CM

## 2025-05-06 ENCOUNTER — HOSPITAL ENCOUNTER (OUTPATIENT)
Facility: HOSPITAL | Age: 75
Discharge: HOME | End: 2025-05-09
Attending: INTERNAL MEDICINE

## 2025-05-06 DIAGNOSIS — E78.5 HYPERLIPIDEMIA, UNSPECIFIED HYPERLIPIDEMIA TYPE: ICD-10-CM

## 2025-05-06 DIAGNOSIS — I99.8 VASCULAR CALCIFICATION: ICD-10-CM

## 2025-05-06 PROCEDURE — 75571 CT HRT W/O DYE W/CA TEST: CPT

## 2025-05-07 ENCOUNTER — RESULTS FOLLOW-UP (OUTPATIENT)
Age: 75
End: 2025-05-07

## 2025-05-08 RX ORDER — EZETIMIBE 10 MG/1
10 TABLET ORAL DAILY
Qty: 90 TABLET | Refills: 1 | Status: SHIPPED | OUTPATIENT
Start: 2025-05-08

## 2025-05-08 NOTE — TELEPHONE ENCOUNTER
PCP: Shravan Rodriguez MD    Last appt:   4/22/2025    Future Appointments   Date Time Provider Department Center   7/15/2025 10:30 AM Shravan Rodriguez MD [facility] DEP       Requested Prescriptions     Pending Prescriptions Disp Refills    ezetimibe (ZETIA) 10 MG tablet 90 tablet 1     Sig: Take 1 tablet by mouth daily

## 2025-05-09 ENCOUNTER — TELEPHONE (OUTPATIENT)
Age: 75
End: 2025-05-09

## 2025-05-09 NOTE — TELEPHONE ENCOUNTER
Pt called,     [facility] cardiology appt on Thursday at 9:30      Specialist:  [facility]  Phone: [phone]  Fax: not provided    Lists of [facility] [place] specialist needs:  Demographics  Office notes  Recent imaging results/reports        Fax please

## 2025-06-05 RX ORDER — OMEPRAZOLE 20 MG/1
20 CAPSULE, DELAYED RELEASE ORAL DAILY
Qty: 90 CAPSULE | Refills: 3 | Status: SHIPPED | OUTPATIENT
Start: 2025-06-05

## 2025-06-05 NOTE — TELEPHONE ENCOUNTER
PCP: Shravan Rodriguez MD    Last appt: 4/22/2025  Future Appointments   Date Time Provider Department Center   7/15/2025 10:30 AM Shravan Rodriguez MD [facility] DEP       Requested Prescriptions     Pending Prescriptions Disp Refills    omeprazole (PRILOSEC) 20 MG delayed release capsule [Pharmacy Med Name: OMEPRAZOLE 20MG CAPSULES] 90 capsule 3     Sig: TAKE 1 CAPSULE BY MOUTH DAILY       Pharmacy Confirmed-

## 2025-07-02 ENCOUNTER — TELEPHONE (OUTPATIENT)
Age: 75
End: 2025-07-02

## 2025-07-02 NOTE — TELEPHONE ENCOUNTER
Pt wants to know if Dr. Rodriguez can help her.  Pt has had so many appts and she needs to know if Dr. Rodriguez is getting those reports?      Pt wonders if doctor is getting this stuff?  There is a lot going on with her she states.    Please call to discuss this.

## 2025-07-03 NOTE — TELEPHONE ENCOUNTER
6/25/2025 ultrasound ordered by Dr. Botello and completed in office     Concerned for mention for aorta findings    Request for report sent to Dr. Botello office

## 2025-07-08 NOTE — TELEPHONE ENCOUNTER
Dr. Botello has not responded to her request. She wants imaging results from her ultrasound. Please call patient.

## 2025-07-14 SDOH — HEALTH STABILITY: PHYSICAL HEALTH: ON AVERAGE, HOW MANY DAYS PER WEEK DO YOU ENGAGE IN MODERATE TO STRENUOUS EXERCISE (LIKE A BRISK WALK)?: 0 DAYS

## 2025-07-14 SDOH — HEALTH STABILITY: PHYSICAL HEALTH: ON AVERAGE, HOW MANY MINUTES DO YOU ENGAGE IN EXERCISE AT THIS LEVEL?: 0 MIN

## 2025-07-14 ASSESSMENT — PATIENT HEALTH QUESTIONNAIRE - PHQ9
3. TROUBLE FALLING OR STAYING ASLEEP: NOT AT ALL
4. FEELING TIRED OR HAVING LITTLE ENERGY: MORE THAN HALF THE DAYS
SUM OF ALL RESPONSES TO PHQ QUESTIONS 1-9: 9
8. MOVING OR SPEAKING SO SLOWLY THAT OTHER PEOPLE COULD HAVE NOTICED. OR THE OPPOSITE, BEING SO FIGETY OR RESTLESS THAT YOU HAVE BEEN MOVING AROUND A LOT MORE THAN USUAL: SEVERAL DAYS
SUM OF ALL RESPONSES TO PHQ QUESTIONS 1-9: 9
1. LITTLE INTEREST OR PLEASURE IN DOING THINGS: MORE THAN HALF THE DAYS
SUM OF ALL RESPONSES TO PHQ QUESTIONS 1-9: 9
5. POOR APPETITE OR OVEREATING: NOT AT ALL
7. TROUBLE CONCENTRATING ON THINGS, SUCH AS READING THE NEWSPAPER OR WATCHING TELEVISION: SEVERAL DAYS
6. FEELING BAD ABOUT YOURSELF - OR THAT YOU ARE A FAILURE OR HAVE LET YOURSELF OR YOUR FAMILY DOWN: SEVERAL DAYS
2. FEELING DOWN, DEPRESSED OR HOPELESS: MORE THAN HALF THE DAYS
10. IF YOU CHECKED OFF ANY PROBLEMS, HOW DIFFICULT HAVE THESE PROBLEMS MADE IT FOR YOU TO DO YOUR WORK, TAKE CARE OF THINGS AT HOME, OR GET ALONG WITH OTHER PEOPLE: SOMEWHAT DIFFICULT
SUM OF ALL RESPONSES TO PHQ QUESTIONS 1-9: 9
9. THOUGHTS THAT YOU WOULD BE BETTER OFF DEAD, OR OF HURTING YOURSELF: NOT AT ALL

## 2025-07-14 ASSESSMENT — LIFESTYLE VARIABLES
HOW OFTEN DO YOU HAVE A DRINK CONTAINING ALCOHOL: NEVER
HOW MANY STANDARD DRINKS CONTAINING ALCOHOL DO YOU HAVE ON A TYPICAL DAY: 0
HOW OFTEN DO YOU HAVE A DRINK CONTAINING ALCOHOL: 1
HOW OFTEN DO YOU HAVE SIX OR MORE DRINKS ON ONE OCCASION: 1
HOW MANY STANDARD DRINKS CONTAINING ALCOHOL DO YOU HAVE ON A TYPICAL DAY: PATIENT DOES NOT DRINK

## 2025-07-14 NOTE — PROGRESS NOTES
HISTORY OF PRESENT ILLNESS   Vicenta Scott   is a 74 y.o.  female.  FU RA, Sjogrens syndrome-Dr Arvizu on nambutone  for RA and rash( rash to plaquenil PTA) Was not on long term steroids Raynaudshx CAP hospitalized last year, insomnia HLD lung nodules (Dr Campbell)  asthma  MDD IBS with diarrhea .hx SVT depression/anxiety, allergy to alpha-gal and AWV    Had recent elevated Coronary artery calcium score of  819, advised to start aspirin and consider statin to her zetia  Last LDL on zetia was 59  Dr Almanza ordered NST, nmr profile and Lpa. All wnl.  SVT controlled on BB  Rheum Dr Woo-not on med for RA  Alleregy --Gelber-was told last month allergic to dairy  GI Dr Botello--colonoscopy and EGD in August  Home BP    Depresion on zoloft  Just upset about her weight    Lost 7 lbs last 3 months-appeitite about the same. Alpha-gal level went up per ptdiet ha sbeen changed-no cream sauce    Mostly concerned about her weight   Overall less diarrhea since dairy free diet but weight loss has occurrend        Last OV       C/o leg edema x 3 weeks from mid calf to feet m/l  Worse at end of day. 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

## 2025-07-15 ENCOUNTER — OFFICE VISIT (OUTPATIENT)
Age: 75
End: 2025-07-15
Payer: MEDICARE

## 2025-07-15 VITALS
TEMPERATURE: 96.8 F | DIASTOLIC BLOOD PRESSURE: 70 MMHG | HEART RATE: 79 BPM | HEIGHT: 66 IN | WEIGHT: 101 LBS | RESPIRATION RATE: 17 BRPM | SYSTOLIC BLOOD PRESSURE: 115 MMHG | BODY MASS INDEX: 16.23 KG/M2 | OXYGEN SATURATION: 96 %

## 2025-07-15 DIAGNOSIS — R93.1 ELEVATED CORONARY ARTERY CALCIUM SCORE: ICD-10-CM

## 2025-07-15 DIAGNOSIS — J45.909 UNCOMPLICATED ASTHMA, UNSPECIFIED ASTHMA SEVERITY, UNSPECIFIED WHETHER PERSISTENT: ICD-10-CM

## 2025-07-15 DIAGNOSIS — Z12.39 BREAST SCREENING: ICD-10-CM

## 2025-07-15 DIAGNOSIS — Z00.00 MEDICARE ANNUAL WELLNESS VISIT, SUBSEQUENT: ICD-10-CM

## 2025-07-15 DIAGNOSIS — E78.5 HYPERLIPIDEMIA, UNSPECIFIED HYPERLIPIDEMIA TYPE: Primary | ICD-10-CM

## 2025-07-15 DIAGNOSIS — F32.A DEPRESSION, UNSPECIFIED DEPRESSION TYPE: ICD-10-CM

## 2025-07-15 DIAGNOSIS — K58.0 IRRITABLE BOWEL SYNDROME WITH DIARRHEA: ICD-10-CM

## 2025-07-15 DIAGNOSIS — Z12.31 ENCOUNTER FOR SCREENING MAMMOGRAM FOR MALIGNANT NEOPLASM OF BREAST: ICD-10-CM

## 2025-07-15 DIAGNOSIS — M06.9 RHEUMATOID ARTHRITIS, INVOLVING UNSPECIFIED SITE, UNSPECIFIED WHETHER RHEUMATOID FACTOR PRESENT (HCC): ICD-10-CM

## 2025-07-15 PROCEDURE — 1123F ACP DISCUSS/DSCN MKR DOCD: CPT | Performed by: INTERNAL MEDICINE

## 2025-07-15 PROCEDURE — 1090F PRES/ABSN URINE INCON ASSESS: CPT | Performed by: INTERNAL MEDICINE

## 2025-07-15 PROCEDURE — 3017F COLORECTAL CA SCREEN DOC REV: CPT | Performed by: INTERNAL MEDICINE

## 2025-07-15 PROCEDURE — G8399 PT W/DXA RESULTS DOCUMENT: HCPCS | Performed by: INTERNAL MEDICINE

## 2025-07-15 PROCEDURE — G8427 DOCREV CUR MEDS BY ELIG CLIN: HCPCS | Performed by: INTERNAL MEDICINE

## 2025-07-15 PROCEDURE — G0439 PPPS, SUBSEQ VISIT: HCPCS | Performed by: INTERNAL MEDICINE

## 2025-07-15 PROCEDURE — 1036F TOBACCO NON-USER: CPT | Performed by: INTERNAL MEDICINE

## 2025-07-15 PROCEDURE — G8419 CALC BMI OUT NRM PARAM NOF/U: HCPCS | Performed by: INTERNAL MEDICINE

## 2025-07-15 PROCEDURE — 99214 OFFICE O/P EST MOD 30 MIN: CPT | Performed by: INTERNAL MEDICINE

## 2025-07-15 PROCEDURE — 1159F MED LIST DOCD IN RCRD: CPT | Performed by: INTERNAL MEDICINE

## 2025-07-15 RX ORDER — ASPIRIN 81 MG/1
81 TABLET ORAL DAILY
Qty: 90 TABLET | Refills: 3
Start: 2025-07-15

## 2025-08-14 ENCOUNTER — TRANSCRIBE ORDERS (OUTPATIENT)
Facility: HOSPITAL | Age: 75
End: 2025-08-14

## 2025-08-14 DIAGNOSIS — R93.41 BLADDER FILLING DEFECT: ICD-10-CM

## 2025-08-14 DIAGNOSIS — R63.4 ABNORMAL WEIGHT LOSS: Primary | ICD-10-CM

## 2025-08-14 DIAGNOSIS — R15.0 INCOMPLETE DEFECATION: ICD-10-CM

## 2025-08-14 DIAGNOSIS — K52.9 INFLAMMATORY BOWEL DISEASE: ICD-10-CM

## 2025-08-14 DIAGNOSIS — K58.9 IRRITABLE BOWEL SYNDROME, UNSPECIFIED TYPE: ICD-10-CM

## 2025-08-15 ENCOUNTER — PATIENT MESSAGE (OUTPATIENT)
Age: 75
End: 2025-08-15

## 2025-08-18 ENCOUNTER — HOSPITAL ENCOUNTER (OUTPATIENT)
Facility: HOSPITAL | Age: 75
Discharge: HOME | End: 2025-08-21
Attending: INTERNAL MEDICINE
Payer: MEDICARE

## 2025-08-18 DIAGNOSIS — R63.4 ABNORMAL WEIGHT LOSS: ICD-10-CM

## 2025-08-18 PROCEDURE — A9575 INJ GADOTERATE MEGLUMI 0.1ML: HCPCS | Performed by: INTERNAL MEDICINE

## 2025-08-18 PROCEDURE — 74183 MRI ABD W/O CNTR FLWD CNTR: CPT

## 2025-08-18 PROCEDURE — 6360000004 HC RX CONTRAST MEDICATION: Performed by: INTERNAL MEDICINE

## 2025-08-18 RX ORDER — GADOTERATE MEGLUMINE 376.9 MG/ML
10 INJECTION INTRAVENOUS
Status: COMPLETED | OUTPATIENT
Start: 2025-08-18 | End: 2025-08-18

## 2025-08-18 RX ADMIN — GADOTERATE MEGLUMINE 10 ML: 376.9 INJECTION INTRAVENOUS at 17:09

## 2025-08-19 ENCOUNTER — HOSPITAL ENCOUNTER (OUTPATIENT)
Facility: HOSPITAL | Age: 75
Discharge: HOME | End: 2025-08-22
Attending: INTERNAL MEDICINE
Payer: MEDICARE

## 2025-08-19 VITALS — WEIGHT: 99 LBS | BODY MASS INDEX: 15.98 KG/M2

## 2025-08-19 DIAGNOSIS — R93.41 BLADDER FILLING DEFECT: ICD-10-CM

## 2025-08-19 LAB
GLUCOSE BLD STRIP.AUTO-MCNC: 78 MG/DL (ref 65–117)
SERVICE CMNT-IMP: NORMAL

## 2025-08-19 PROCEDURE — 82962 GLUCOSE BLOOD TEST: CPT

## 2025-08-19 PROCEDURE — 3430000000 HC RX DIAGNOSTIC RADIOPHARMACEUTICAL: Performed by: INTERNAL MEDICINE

## 2025-08-19 PROCEDURE — 78815 PET IMAGE W/CT SKULL-THIGH: CPT

## 2025-08-19 PROCEDURE — A9609 HC RX DIAGNOSTIC RADIOPHARMACEUTICAL: HCPCS | Performed by: INTERNAL MEDICINE

## 2025-08-19 RX ORDER — FLUDEOXYGLUCOSE F-18 500 MCI/ML
10 INJECTION INTRAVENOUS
Status: COMPLETED | OUTPATIENT
Start: 2025-08-19 | End: 2025-08-19

## 2025-08-19 RX ADMIN — FLUDEOXYGLUCOSE F-18 10 MILLICURIE: 500 INJECTION INTRAVENOUS at 13:22

## 2025-08-21 ENCOUNTER — TELEPHONE (OUTPATIENT)
Age: 75
End: 2025-08-21

## (undated) DEVICE — INFECTION CONTROL KIT SYS

## (undated) DEVICE — Device

## (undated) DEVICE — SYRINGE MED 30ML STD CLR PLAS LUERLOCK TIP N CTRL DISP

## (undated) DEVICE — CLICKLINE SCISSORS INSERT: Brand: CLICKLINE

## (undated) DEVICE — SYRINGE MED 10ML LUERLOCK TIP W/O SFTY DISP

## (undated) DEVICE — Z DISCONTINUED NO SUB IDED SET EXTN W/ 4 W STPCOCK M SPIN LOK 36IN

## (undated) DEVICE — TISSUE RETRIEVAL SYSTEM: Brand: INZII RETRIEVAL SYSTEM

## (undated) DEVICE — SUTURE SZ 0 27IN 5/8 CIR UR-6  TAPER PT VIOLET ABSRB VICRYL J603H

## (undated) DEVICE — KIT,1200CC CANISTER,3/16"X6' TUBING: Brand: MEDLINE INDUSTRIES, INC.

## (undated) DEVICE — SOLIDIFIER FLD 2OZ 1500CC N DISINF IN BTL DISP SAFESORB

## (undated) DEVICE — SOL IRR SOD CL 0.9% 3000ML --

## (undated) DEVICE — STERILE POLYISOPRENE POWDER-FREE SURGICAL GLOVES: Brand: PROTEXIS

## (undated) DEVICE — NEEDLE HYPO 30GA L0.5IN BGE POLYPR HUB S STL REG BVL STR

## (undated) DEVICE — LAPAROSCOPIC TROCAR SLEEVE/SINGLE USE: Brand: KII® OPTICAL ACCESS SYSTEM

## (undated) DEVICE — APPLIER CLP M L L11.4IN DIA10MM ENDOSCP ROT MULT FOR LIG

## (undated) DEVICE — SLIT FULL HDL-2.8MM ANG C-CUT: Brand: SHARPOINT

## (undated) DEVICE — PACK CATRCT CUST AS835752] ALCON LABORATORIES INC]

## (undated) DEVICE — TOWEL 4 PLY TISS 19X30 SUE WHT

## (undated) DEVICE — CUFF BLD PRSS AD CLTH SGL TB W/ BAYNT CONN ROUNDED CORNER

## (undated) DEVICE — SET ADMIN 16ML TBNG L100IN 2 Y INJ SITE IV PIGGY BK DISP

## (undated) DEVICE — GLOVE SURG SZ 65 THK91MIL LTX FREE SYN POLYISOPRENE

## (undated) DEVICE — SYR 10ML LUER LOK 1/5ML GRAD --

## (undated) DEVICE — CONTAINER SPEC 20 ML LID NEUT BUFF FORMALIN 10 % POLYPR STS

## (undated) DEVICE — GARMENT,MEDLINE,DVT,INT,CALF,MED, GEN2: Brand: MEDLINE

## (undated) DEVICE — NEONATAL-ADULT SPO2 SENSOR: Brand: NELLCOR

## (undated) DEVICE — BASIN EMSIS 16OZ GRAPHITE PLAS KID SHP MOLD GRAD FOR ORAL

## (undated) DEVICE — HYPODERMIC SAFETY NEEDLE: Brand: MAGELLAN

## (undated) DEVICE — SYR 20ML LL STRL LF --

## (undated) DEVICE — SURGICAL PROCEDURE KIT GEN LAPAROSCOPY LF

## (undated) DEVICE — GOWN,SIRUS,NONRNF,SETINSLV,2XL,18/CS: Brand: MEDLINE

## (undated) DEVICE — NEEDLE HYPO 22GA L1.5IN BLK S STL HUB POLYPR SHLD REG BVL

## (undated) DEVICE — TROCAR: Brand: KII FIOS FIRST ENTRY

## (undated) DEVICE — BLOCK BITE ENDOSCP AD 21 MM W/ DIL BLU LF DISP

## (undated) DEVICE — ELECTRODE ES 36CM LAP FLAT L HK COAT DISP CLEANCOAT

## (undated) DEVICE — BAG SPEC BIOHZRD 10 X 10 IN --

## (undated) DEVICE — ELECTRODE,RADIOTRANSLUCENT,FOAM,5PK: Brand: MEDLINE

## (undated) DEVICE — FORCEPS BX L240CM JAW DIA2.8MM L CAP W/ NDL MIC MESH TOOTH

## (undated) DEVICE — YANKAUER,TAPERED BULBOUS TIP,W/O VENT: Brand: MEDLINE

## (undated) DEVICE — REM POLYHESIVE ADULT PATIENT RETURN ELECTRODE: Brand: VALLEYLAB

## (undated) DEVICE — SUTURE MCRYL SZ 4-0 L27IN ABSRB UD L19MM PS-2 1/2 CIR PRIM Y426H

## (undated) DEVICE — AGENT VISCOELASTIC SODIUM HYALURONATE 10 MG/ML PROVISC

## (undated) DEVICE — SOL INJ SOD CL 0.9% 500ML BG --

## (undated) DEVICE — C-ARM: Brand: UNBRANDED

## (undated) DEVICE — SOLUTION IRRIG 500ML STRL H2O NONPYROGENIC

## (undated) DEVICE — DERMABOND SKIN ADH 0.7ML -- DERMABOND ADVANCED 12/BX

## (undated) DEVICE — SYR 3ML LL TIP 1/10ML GRAD --

## (undated) DEVICE — DECANTER BAG 9": Brand: MEDLINE INDUSTRIES, INC.

## (undated) DEVICE — Z DISCONTINUED PER MEDLINE LINE GAS SAMPLING O2/CO2 LNG AD 13 FT NSL W/ TBNG FILTERLINE

## (undated) DEVICE — FORCEPS BX L160CM DIA8MM GRSP DISECT CUP TIP NONLOCKING ROT

## (undated) DEVICE — KIT CHOLGM POLYUR W/ KARLAN BLLN CATH 4FR L60CM 5MM INTRO

## (undated) DEVICE — 1200 GUARD II KIT W/5MM TUBE W/O VAC TUBE: Brand: GUARDIAN

## (undated) DEVICE — PREP SKN CHLRAPRP APL 26ML STR --

## (undated) DEVICE — TROCAR: Brand: KII® SLEEVE

## (undated) DEVICE — CATH IV AUTOGRD BC PNK 20GA 25 -- INSYTE

## (undated) DEVICE — STRAP,POSITIONING,KNEE/BODY,FOAM,4X60": Brand: MEDLINE